# Patient Record
Sex: FEMALE | Race: WHITE | NOT HISPANIC OR LATINO | Employment: OTHER | URBAN - METROPOLITAN AREA
[De-identification: names, ages, dates, MRNs, and addresses within clinical notes are randomized per-mention and may not be internally consistent; named-entity substitution may affect disease eponyms.]

---

## 2017-01-16 ENCOUNTER — TELEPHONE (OUTPATIENT)
Dept: FAMILY MEDICINE | Facility: CLINIC | Age: 72
End: 2017-01-16

## 2017-01-16 NOTE — TELEPHONE ENCOUNTER
Reason for call:  Patient reporting a symptom    Symptom or request: UTI    Duration (how long have symptoms been present): 2 weeks    Have you been treated for this before? Yes    Additional comments: would like a lab and medication please.    Phone Number patient can be reached at:  Home number on file [phone] (cell)  Best Time:  antyime    Can we leave a detailed message on this number:  YES    Call taken on 1/16/2017 at 7:15 AM by Stella Monsalve

## 2017-01-16 NOTE — TELEPHONE ENCOUNTER
Complaint of UTI sx.  SYMPTOMS: frequency, burning and itchey  ONSET:    2-3 week(s) ago .  Patient's history of UTI:  history of occ UTIs  Additional risk factors include: none    Fever: no  (>101 F or chills-schedule with provider)  Chills:  no  Hematuria: no  Back/Flank Pain:  no  Pregnant:  no  Recurrent UTI's (within 2 mths-or >4 per year):  No    Plan: advised OV today---then patient stated she is currently in [place] and not back until late Wed.    Advised URGENT CARE in [place] today.   Caller agrees with this plan/advise.  [facility] [place], [place] - [address] AT [facility] OF [facility] [address]      Karen Vega RN, BSN

## 2017-02-02 RX ORDER — ESCITALOPRAM OXALATE 10 MG/1
TABLET ORAL
Start: 2017-02-02

## 2017-02-02 NOTE — TELEPHONE ENCOUNTER
Denied  Refill request too early; Rx sent 11/14/2016 for 1 year supply  Andie LANDIS, RN    Pending Prescriptions:                       Disp   Refills    escitalopram (LEXAPRO) 10 MG tablet [Pharm*90 tab*0        Sig: TAKE 1 TABLET BY MOUTH EVERY DAY         Last Written Prescription Date: 11/14/2016  Last Fill Quantity: 90; # refills: 3  Last Office Visit with [facility], [facility] or [facility] prescribing provider:     Next 5 appointments (look out 90 days)     Feb 16, 2017 11:30 AM   [facility]    [address] [facility] [address]   [phone]                   Last PHQ-9 score on record= No flowsheet data found.    AST       19   11/14/2016  ALT       44   11/14/2016

## 2017-02-16 ENCOUNTER — OFFICE VISIT (OUTPATIENT)
Dept: FAMILY MEDICINE | Facility: CLINIC | Age: 72
End: 2017-02-16
Payer: MEDICARE

## 2017-02-16 VITALS
OXYGEN SATURATION: 99 % | TEMPERATURE: 97.3 F | HEART RATE: 61 BPM | WEIGHT: 112 LBS | BODY MASS INDEX: 22.58 KG/M2 | HEIGHT: 59 IN

## 2017-02-16 DIAGNOSIS — J01.01 ACUTE RECURRENT MAXILLARY SINUSITIS: ICD-10-CM

## 2017-02-16 DIAGNOSIS — F41.9 ANXIETY: ICD-10-CM

## 2017-02-16 DIAGNOSIS — K59.09 CHRONIC CONSTIPATION: Primary | ICD-10-CM

## 2017-02-16 DIAGNOSIS — E78.5 HYPERLIPIDEMIA WITH TARGET LDL LESS THAN 130: ICD-10-CM

## 2017-02-16 DIAGNOSIS — N95.2 ATROPHIC VAGINITIS: ICD-10-CM

## 2017-02-16 PROCEDURE — 80061 LIPID PANEL: CPT | Performed by: INTERNAL MEDICINE

## 2017-02-16 PROCEDURE — 99214 OFFICE O/P EST MOD 30 MIN: CPT | Performed by: INTERNAL MEDICINE

## 2017-02-16 PROCEDURE — 36415 COLL VENOUS BLD VENIPUNCTURE: CPT | Performed by: INTERNAL MEDICINE

## 2017-02-16 RX ORDER — ESTRADIOL 10 UG/1
10 INSERT VAGINAL
Qty: 12 TABLET | Refills: 3 | Status: SHIPPED | OUTPATIENT
Start: 2017-02-16 | End: 2017-04-13

## 2017-02-16 RX ORDER — DOXYCYCLINE 100 MG/1
100 CAPSULE ORAL 2 TIMES DAILY
Qty: 14 CAPSULE | Refills: 0 | Status: SHIPPED | OUTPATIENT
Start: 2017-02-16 | End: 2017-04-13

## 2017-02-16 RX ORDER — FLUTICASONE PROPIONATE 50 MCG
1-2 SPRAY, SUSPENSION (ML) NASAL DAILY
Qty: 1 BOTTLE | Refills: 11 | Status: SHIPPED | OUTPATIENT
Start: 2017-02-16 | End: 2017-04-13 | Stop reason: ALTCHOICE

## 2017-02-16 NOTE — PATIENT INSTRUCTIONS
BAP    Bran apple sauce, prunes in equal ration -1 table spoon daily  After 1 week, 2 tbsf daily    Vagifem biweekly topically    Doxycycline 100 mg twice daily for 7 days    Flonase nasal spray in each nostril daily  On flight, take saline nasal spray each hour     Drink lot of fluids    Avocado, Walnuts, Almonds

## 2017-02-16 NOTE — MR AVS SNAPSHOT
After Visit Summary   2/16/2017    Hanane Sorto    MRN: [id number]           Patient Information     Date Of Birth          1945        Visit Information        Provider Department      2/16/2017 11:30 AM Corona Bethea MD [facility]        Today's Diagnoses     Chronic constipation    -  1    Atrophic vaginitis        Hyperlipidemia with target LDL less than 130        Anxiety        Acute recurrent maxillary sinusitis          Care Instructions    BAP    Bran apple sauce, prunes in equal ration -1 table spoon daily  After 1 week, 2 tbsf daily    Vagifem biweekly topically    Doxycycline 100 mg twice daily for 7 days    Flonase nasal spray in each nostril daily  On flight, take saline nasal spray each hour     Drink lot of fluids    Avocado, Walnuts, Almonds         Follow-ups after your visit        Who to contact     If you have questions or need follow up information about today's clinic visit or your schedule please contact [facility] directly at [phone].  Normal or non-critical lab and imaging results will be communicated to you by Thinkspeedhart, letter or phone within 4 business days after the clinic has received the results. If you do not hear from us within 7 days, please contact the clinic through [employer] or phone. If you have a critical or abnormal lab result, we will notify you by phone as soon as possible.  Submit refill requests through [employer] ([email or web address]) or call your pharmacy and they will forward the refill request to us. Please allow 3 business days for your refill to be completed.          Additional Information About Your Visit        MyChart Information     [employer] ([email or web address]) gives you secure access to your electronic health record. If you see a primary care provider, you can also send messages to your care team and make appointments. If you have questions, please call your primary care clinic.  If you do not have a primary care provider, please call [phone] and they  "will assist you.        Care EveryWhere ID     This is your Care EveryWhere ID. This could be used by other organizations to access your [facility] medical records  [id number]        Your Vitals Were     Pulse Temperature Height Pulse Oximetry Breastfeeding? BMI (Body Mass Index)    61 97.3  F (36.3  C) (Oral) 4' 10.5\" (1.486 m) 99% No 23.01 kg/m2       Blood Pressure from Last 3 Encounters:   11/14/16 112/62   11/30/15 136/75   09/10/15 117/67    Weight from Last 3 Encounters:   02/16/17 112 lb (50.8 kg)   11/14/16 119 lb (54 kg)   11/30/15 117 lb (53.1 kg)              We Performed the Following     Lipid panel reflex to direct LDL          Today's Medication Changes          These changes are accurate as of: 2/16/17 11:50 AM.  If you have any questions, ask your nurse or doctor.               Start taking these medicines.        Dose/Directions    doxycycline 100 MG capsule   Commonly known as:  VIBRAMYCIN   Used for:  Acute recurrent maxillary sinusitis   Started by:  Corona Bethea MD        Dose:  100 mg   Take 1 capsule (100 mg) by mouth 2 times daily   Quantity:  14 capsule   Refills:  0       estradiol 10 MCG Tabs vaginal tablet   Commonly known as:  VAGIFEM   Used for:  Atrophic vaginitis   Started by:  Corona Bethea MD        Dose:  10 mcg   Place 1 tablet (10 mcg) vaginally twice a week   Quantity:  12 tablet   Refills:  3         These medicines have changed or have updated prescriptions.        Dose/Directions    * FLONASE 50 MCG/ACT spray   This may have changed:  Another medication with the same name was added. Make sure you understand how and when to take each.   Generic drug:  fluticasone   Changed by:  Corona Bethea MD        Dose:  1 spray   1 spray by Both Nostrils route daily.   Quantity:  3 Package   Refills:  3       * fluticasone 50 MCG/ACT spray   Commonly known as:  FLONASE   This may have changed:  You were already taking a medication with the same name, and this prescription was added. " Make sure you understand how and when to take each.   Used for:  Acute recurrent maxillary sinusitis   Changed by:  Corona Bethea MD        Dose:  1-2 spray   Spray 1-2 sprays into both nostrils daily   Quantity:  1 Bottle   Refills:  11       * Notice:  This list has 2 medication(s) that are the same as other medications prescribed for you. Read the directions carefully, and ask your doctor or other care provider to review them with you.         Where to get your medicines      These medications were sent to [id number] [place] - [address] AT [facility] OF  [address]  [address] [facility], [place] [address]     Phone:  [phone]     doxycycline 100 MG capsule    estradiol 10 MCG Tabs vaginal tablet    fluticasone 50 MCG/ACT spray                Primary Care Provider Office Phone # Fax #    Corona Bethea [phone]       [facility] [address] [facility] [place] [address] [address]        Thank you!     Thank you for choosing [facility]  for your care. Our goal is always to provide you with excellent care. Hearing back from our patients is one way we can continue to improve our services. Please take a few minutes to complete the written survey that you may receive in the mail after your visit with us. Thank you!             Your Updated Medication List - Protect others around you: Learn how to safely use, store and throw away your medicines at www.disposemymeds.org.          This list is accurate as of: 2/16/17 11:50 AM.  Always use your most recent med list.                   Brand Name Dispense Instructions for use    bisacodyl 5 MG EC tablet     30 tablet    Take 1 tablet by mouth daily as needed for constipation.       diclofenac 75 MG EC tablet    VOLTAREN    60 tablet    Take 1 tablet (75 mg) by mouth daily       doxycycline 100 MG capsule    VIBRAMYCIN    14 capsule    Take 1 capsule (100 mg) by mouth 2 times daily        escitalopram 10 MG tablet    LEXAPRO    90 tablet    Take 1 tablet (10 mg) by mouth daily       estradiol 10 MCG Tabs vaginal tablet    VAGIFEM    12 tablet    Place 1 tablet (10 mcg) vaginally twice a week       * FLONASE 50 MCG/ACT spray   Generic drug:  fluticasone     3 Package    1 spray by Both Nostrils route daily.       * fluticasone 50 MCG/ACT spray    FLONASE    1 Bottle    Spray 1-2 sprays into both nostrils daily       omeprazole 20 MG CR capsule    priLOSEC    90 capsule    Take 1 capsule (20 mg) by mouth daily       OVER-THE-COUNTER      Take 2 tablets by mouth daily Lili fusion- Calcium and Vitamin D       pramipexole 0.25 MG tablet    MIRAPEX    180 tablet    Take 1 tablet (0.25 mg) by mouth 2 times daily       * Notice:  This list has 2 medication(s) that are the same as other medications prescribed for you. Read the directions carefully, and ask your doctor or other care provider to review them with you.

## 2017-02-16 NOTE — PROGRESS NOTES
"  SUBJECTIVE:                                                    Hanane Sorto is a 71 year old female who presents to clinic today for the following health issues:    Chief Complaint   Patient presents with     Constipation     Vaginal Problem     itch in vaginal area      Sinus Problem         Patient leaving for [place]  Has sinus issues  Also, chronic constipation    Watching lipids    Notes vaginal irriation          Problem list and histories reviewed & adjusted, as indicated.  Additional history: as documented    Problem list, Medication list, Allergies, and Medical/Social/Surgical histories reviewed in [facility] and updated as appropriate.    ROS:  Constitutional, HEENT, cardiovascular, pulmonary, gi and gu systems are negative, except as otherwise noted.    OBJECTIVE:                                                    Pulse 61  Temp 97.3  F (36.3  C) (Oral)  Ht 4' 10.5\" (1.486 m)  Wt 112 lb (50.8 kg)  SpO2 99%  Breastfeeding? No  BMI 23.01 kg/m2  Body mass index is 23.01 kg/(m^2).  GENERAL: healthy, alert and no distress  ABDOMEN: soft, nontender, no hepatosplenomegaly, no masses and bowel sounds normal   (female): normal female external genitalia, normal urethral meatus, vaginal mucosa dry, pale and not l rugated, and normal cervix/adnexa/uterus without masses or discharge  MS: no gross musculoskeletal defects noted, no edema  SKIN: no suspicious lesions or rashes  NEURO: Normal strength and tone, mentation intact and speech normal  PSYCH: mentation appears normal, affect normal/bright         ASSESSMENT/PLAN:                                                        We discussed the following        ICD-10-CM    1. Chronic constipation K59.09    2. Atrophic vaginitis N95.2 estradiol (VAGIFEM) 10 MCG TABS vaginal tablet   3. Hyperlipidemia with target LDL less than 130 E78.5 Lipid panel reflex to direct LDL   4. Anxiety F41.9    5. Acute recurrent maxillary sinusitis J01.01 doxycycline (VIBRAMYCIN) 100 MG " capsule     fluticasone (FLONASE) 50 MCG/ACT spray       Patient Instructions   BAP    Bran apple sauce, prunes in equal ration -1 table spoon daily  After 1 week, 2 tbsf daily    Vagifem biweekly topically    Doxycycline 100 mg twice daily for 7 days    Flonase nasal spray in each nostril daily  On flight, take saline nasal spray each hour     Drink lot of fluids    Avocado, Walnuts, Almonds         Corona Bethea MD  [facility]

## 2017-02-17 LAB
CHOLEST SERPL-MCNC: 320 MG/DL
HDLC SERPL-MCNC: 52 MG/DL
LDLC SERPL CALC-MCNC: 238 MG/DL
NONHDLC SERPL-MCNC: 268 MG/DL
TRIGL SERPL-MCNC: 149 MG/DL

## 2017-03-22 RX ORDER — TRIAMCINOLONE ACETONIDE 55 UG/1
2 SPRAY, METERED NASAL DAILY
COMMUNITY
End: 2017-12-04

## 2017-03-24 ENCOUNTER — HOSPITAL LABORATORY (OUTPATIENT)
Dept: OTHER | Facility: CLINIC | Age: 72
End: 2017-03-24

## 2017-03-24 LAB
CREAT SERPL-MCNC: 0.93 MG/DL (ref 0.52–1.04)
ERYTHROCYTE [DISTWIDTH] IN BLOOD BY AUTOMATED COUNT: 13.9 % (ref 10–15)
FERRITIN SERPL-MCNC: 38 NG/ML (ref 8–252)
GFR SERPL CREATININE-BSD FRML MDRD: 59 ML/MIN/1.7M2
HCT VFR BLD AUTO: 38.8 % (ref 35–47)
HGB BLD-MCNC: 12.8 G/DL (ref 11.7–15.7)
HGB BLD-MCNC: NORMAL G/DL (ref 11.7–15.7)
IRON SATN MFR SERPL: 26 % (ref 15–46)
IRON SERPL-MCNC: 78 UG/DL (ref 35–180)
MCH RBC QN AUTO: 31.2 PG (ref 26.5–33)
MCHC RBC AUTO-ENTMCNC: 33 G/DL (ref 31.5–36.5)
MCV RBC AUTO: 95 FL (ref 78–100)
PLATELET # BLD AUTO: 351 10E9/L (ref 150–450)
RBC # BLD AUTO: 4.1 10E12/L (ref 3.8–5.2)
RETICS # AUTO: 54.1 10E9/L (ref 25–95)
RETICS/RBC NFR AUTO: 1.3 % (ref 0.5–2)
TIBC SERPL-MCNC: 295 UG/DL (ref 240–430)
WBC # BLD AUTO: 7.9 10E9/L (ref 4–11)

## 2017-03-27 ENCOUNTER — TRANSFERRED RECORDS (OUTPATIENT)
Dept: HEALTH INFORMATION MANAGEMENT | Facility: CLINIC | Age: 72
End: 2017-03-27

## 2017-04-13 ENCOUNTER — OFFICE VISIT (OUTPATIENT)
Dept: FAMILY MEDICINE | Facility: CLINIC | Age: 72
End: 2017-04-13
Payer: MEDICARE

## 2017-04-13 VITALS
HEART RATE: 73 BPM | BODY MASS INDEX: 22.98 KG/M2 | WEIGHT: 114 LBS | HEIGHT: 59 IN | TEMPERATURE: 97 F | SYSTOLIC BLOOD PRESSURE: 125 MMHG | OXYGEN SATURATION: 99 % | DIASTOLIC BLOOD PRESSURE: 73 MMHG

## 2017-04-13 DIAGNOSIS — M17.10 ARTHRITIS OF KNEE: ICD-10-CM

## 2017-04-13 DIAGNOSIS — K21.9 GASTROESOPHAGEAL REFLUX DISEASE WITHOUT ESOPHAGITIS: ICD-10-CM

## 2017-04-13 DIAGNOSIS — Z01.818 PREOP GENERAL PHYSICAL EXAM: Primary | ICD-10-CM

## 2017-04-13 DIAGNOSIS — E78.5 HYPERLIPIDEMIA WITH TARGET LDL LESS THAN 130: ICD-10-CM

## 2017-04-13 DIAGNOSIS — F41.9 ANXIETY: ICD-10-CM

## 2017-04-13 DIAGNOSIS — K59.00 CONSTIPATION, UNSPECIFIED CONSTIPATION TYPE: ICD-10-CM

## 2017-04-13 PROCEDURE — 93000 ELECTROCARDIOGRAM COMPLETE: CPT | Performed by: INTERNAL MEDICINE

## 2017-04-13 PROCEDURE — 99214 OFFICE O/P EST MOD 30 MIN: CPT | Performed by: INTERNAL MEDICINE

## 2017-04-13 NOTE — MR AVS SNAPSHOT
After Visit Summary   4/13/2017    Hanane Sorto    MRN: [id number]           Patient Information     Date Of Birth          1945        Visit Information        Provider Department      4/13/2017 10:30 AM Corona Bethea MD [facility]        Today's Diagnoses     Preop general physical exam    -  1    Arthritis of knee        Gastroesophageal reflux disease without esophagitis        Hyperlipidemia with target LDL less than 130        Anxiety        Constipation, unspecified constipation type          Care Instructions      Before Your Surgery      Call your surgeon if there is any change in your health. This includes signs of a cold or flu (such as a sore throat, runny nose, cough, rash or fever).    Hold IBUPROFEN and vitamins             Follow-ups after your visit        Additional Services     CARDIOLOGY EVAL ADULT REFERRAL       Your provider has referred you to:  [facility]: [facility] [phone]   [email or web address]    Please be aware that coverage of these services is subject to the terms and limitations of your health insurance plan.  Call member services at your health plan with any benefit or coverage questions.      Type of Referral:  New Cardiology Consult    Dr Dewitt    Timeframe requested:  Within 1 month    Please bring the following to your appointment:  >>   Any x-rays, CTs or MRIs which have been performed.  Contact the facility where they were done to arrange for  prior to your scheduled appointment.    >>   List of current medications  >>   This referral request   >>   Any documents/labs given to you for this referral                  Your next 10 appointments already scheduled     Apr 24, 2017   Procedure with Lee Ayala MD   [facility] (--)    [address]  [place] [id number] [phone]          "     Who to contact     If you have questions or need follow up information about today's clinic visit or your schedule please contact [facility] directly at [phone].  Normal or non-critical lab and imaging results will be communicated to you by Neuro Herohart, letter or phone within 4 business days after the clinic has received the results. If you do not hear from us within 7 days, please contact the clinic through Neuro Herohart or phone. If you have a critical or abnormal lab result, we will notify you by phone as soon as possible.  Submit refill requests through [employer] or call your pharmacy and they will forward the refill request to us. Please allow 3 business days for your refill to be completed.          Additional Information About Your Visit        Neuro HeroharFlowMetric Information     [employer] gives you secure access to your electronic health record. If you see a primary care provider, you can also send messages to your care team and make appointments. If you have questions, please call your primary care clinic.  If you do not have a primary care provider, please call [phone] and they will assist you.        Care EveryWhere ID     This is your Care EveryWhere ID. This could be used by other organizations to access your [facility] medical records  [id number]        Your Vitals Were     Pulse Temperature Height Pulse Oximetry Breastfeeding? BMI (Body Mass Index)    73 97  F (36.1  C) (Oral) 4' 10.5\" (1.486 m) 99% No 23.42 kg/m2       Blood Pressure from Last 3 Encounters:   04/13/17 125/73   11/14/16 112/62   11/30/15 136/75    Weight from Last 3 Encounters:   04/13/17 114 lb (51.7 kg)   02/16/17 112 lb (50.8 kg)   11/14/16 119 lb (54 kg)              We Performed the Following     CARDIOLOGY EVAL ADULT REFERRAL          Today's Medication Changes          These changes are accurate as of: 4/13/17 10:54 AM.  If you have any questions, ask your nurse or doctor.               These medicines have changed or have " updated prescriptions.        Dose/Directions    omeprazole 20 MG CR capsule   Commonly known as:  priLOSEC   This may have changed:  when to take this   Used for:  Gastroesophageal reflux disease without esophagitis        Dose:  20 mg   Take 1 capsule (20 mg) by mouth daily   Quantity:  90 capsule   Refills:  12       WILSON STOOL SOFTENER PO   This may have changed:  Another medication with the same name was removed. Continue taking this medication, and follow the directions you see here.   Changed by:  Corona Btehea MD        Dose:  3 capsule   Take 3 capsules by mouth daily   Refills:  0         Stop taking these medicines if you haven't already. Please contact your care team if you have questions.     FLONASE 50 MCG/ACT spray   Generic drug:  fluticasone   Stopped by:  Corona Bethea MD           fluticasone 50 MCG/ACT spray   Commonly known as:  FLONASE   Stopped by:  Corona Bethea MD                    Primary Care Provider Office Phone # Fax #    Corona Bethea [phone]       [facility] [address] [address]        Thank you!     Thank you for choosing [facility]  for your care. Our goal is always to provide you with excellent care. Hearing back from our patients is one way we can continue to improve our services. Please take a few minutes to complete the written survey that you may receive in the mail after your visit with us. Thank you!             Your Updated Medication List - Protect others around you: Learn how to safely use, store and throw away your medicines at www.disposemymeds.org.          This list is accurate as of: 4/13/17 10:54 AM.  Always use your most recent med list.                   Brand Name Dispense Instructions for use    DULCOLAX PO      Take 5 mg by mouth At Bedtime At 2230       escitalopram 10 MG tablet    LEXAPRO    90 tablet    Take 1 tablet (10 mg) by mouth daily       IRON SUPPLEMENT PO      Take 325 mg by  mouth every other day Take with Vit C to help absorption       MIRAPEX PO      Take 0.25 mg by mouth every evening At 1700       omeprazole 20 MG CR capsule    priLOSEC    90 capsule    Take 1 capsule (20 mg) by mouth daily       OVER-THE-COUNTER      Take 2 tablets by mouth daily Lili fusion- Calcium and Vitamin D       WILSON STOOL SOFTENER PO      Take 3 capsules by mouth daily       triamcinolone 55 MCG/ACT Inhaler    NASACORT     Spray 2 sprays into both nostrils daily

## 2017-04-13 NOTE — PATIENT INSTRUCTIONS
Before Your Surgery      Call your surgeon if there is any change in your health. This includes signs of a cold or flu (such as a sore throat, runny nose, cough, rash or fever).    Hold IBUPROFEN and vitamins

## 2017-04-13 NOTE — PROGRESS NOTES
[facility]  [address] [address]  [phone]  Dept: [phone]    PRE-OP EVALUATION:  Today's date: 2017    Hanane Sorto (: 1945) presents for pre-operative evaluation assessment as requested by Lee Kulkarni,  She requires evaluation and anesthesia risk assessment prior to undergoing surgery/procedure for treatment of knee OA  .  Proposed procedure: LEFT KNEE ARTHROPLASTY     Date of Surgery/ Procedure: 2017  Time of Surgery/ Procedure: 9:50 AM  Hospital/Surgical Facility: [facility]    Primary Physician: Corona Bethea  Type of Anesthesia Anticipated: General    Patient has a Health Care Directive or Living Will:  YES scanned into Epic    Preop Questions 4/10/2017   1.  Do you have a history of heart attack, stroke, stent, bypass or surgery on an artery in the head, neck, heart or legs? No   2.  Do you ever have any pain or discomfort in your chest? No   3.  Do you have a history of  Heart Failure? No   4.   Are you troubled by shortness of breath when:  walking on a level surface, or up a slight hill, or at night? No   5.  Do you currently have a cold, bronchitis or other respiratory infection? No   6.  Do you have a cough, shortness of breath, or wheezing? No   7.  Do you sometimes get pains in the calves of your legs when you walk? No   8. Do you or anyone in your family have previous history of blood clots? No   9.  Do you or does anyone in your family have a serious bleeding problem such as prolonged bleeding following surgeries or cuts? No   10. Have you ever had problems with anemia or been told to take iron pills? YES - currntly taking iron    11. Have you had any abnormal blood loss such as black, tarry or bloody stools, or abnormal vaginal bleeding? No   12. Have you ever had a blood transfusion? No   13. Have you or any of your relatives ever had problems with anesthesia? No   14. Do you have sleep apnea, excessive snoring or  daytime drowsiness? No   15. Do you have any prosthetic heart valves? No   16. Do you have prosthetic joints? No   17. Is there any chance that you may be pregnant? No         HPI:                                                      Brief HPI related to upcoming procedure: patient has OA  Knee replacement is planned 4-24  She has high lipids and does not take statins  No chest pain or shortness of breath   No exercise now, but very active      See problem list for active medical problems.  Problems all longstanding and stable, except as noted/documented.  See ROS for pertinent symptoms related to these conditions.                                                                                                  .    MEDICAL HISTORY:                                                      Patient Active Problem List    Diagnosis Date Noted     Gastroesophageal reflux disease without esophagitis 11/30/2015     Priority: Medium     Bursitis of hip 11/24/2014     Priority: Medium     Constipation 11/24/2014     Priority: Medium     Osteoarthritis 12/19/2012     Priority: Medium     Advanced directives, counseling/discussion 01/02/2012     Priority: Medium     Advance Directive Initial Visit  Hanane Sorto presents in person for initial session regarding completion of advanced directive form. She was referred to the facilitator by provider.  She currently has existing advance directive document for review and possible revision.  Plan:  Advanced directive form, healthcare agent information card, advanced care planning information booklet provided to patient.   Designated healthcare agent is identified. Healthcare agent s name is see below.  Designated healthcare agent concerns:  none.  My Hopes and Wishes reviewed and patient and designated healthcare agent are in agreement.  Finalized wishes are clear to both patient and designated healthcare agent: Yes  Patient and designated healthcare agent are aware that  document may be changed at any time in the future.    Advanced directive form: completed at this visit.  Advanced directive form: verified with notary signature..  Original and two copies of advanced directive form given to patient copy sent to  for scanning into EMR and original given to patient and instructed to give copy to designated [facility] and non-[place] physician where applicable  reviewed previous directive and patient decided to rewrite her directive. directive completed, notarized and documented.  Advance Directive Problem List Overview:   Name Relationship Phone    Primary Health Care Agent Lee Sorto Spouse  [phone]  [phone]         Alternative Health Care Agent Luisito Aleman Son    Sister   [phone]        2012   MARÍA Mariano LPN         Gross hematuria 2011     Priority: Medium     Hyperlipidemia with target LDL less than 130      Priority: Medium     Diagnosis updated by automated process. Provider to review and confirm.       Anxiety      Priority: Medium     Osteoporosis      Priority: Medium      Past Medical History:   Diagnosis Date     Anxiety     psych yearly, Dr Shea     Family hx of colon cancer     sister     Floaters     Karma cote     GERD (gastroesophageal reflux disease)      Gross hematuria 2011     Hyperlipidemia LDL goal < 130      Osteoarthritis 2012     Osteoporosis     Dexa     Skin cancer      Past Surgical History:   Procedure Laterality Date     BUNIONECTOMY      both      SECTION       COLONOSCOPY  8-10     LIPOSUCTION (LOCATION)       RECONSTRUCT BREAST, IMPLANT PROSTHESIS, COMBINED       Current Outpatient Prescriptions   Medication Sig Dispense Refill     Ferrous Sulfate (IRON SUPPLEMENT PO) Take 325 mg by mouth every other day Take with Vit C to help absorption       Docusate Sodium (WILSON STOOL SOFTENER PO) Take 3 capsules by mouth  "daily       Bisacodyl (DULCOLAX PO) Take 5 mg by mouth At Bedtime At 2230       Pramipexole Dihydrochloride (MIRAPEX PO) Take 0.25 mg by mouth every evening At 1700       triamcinolone (NASACORT) 55 MCG/ACT Inhaler Spray 2 sprays into both nostrils daily       escitalopram (LEXAPRO) 10 MG tablet Take 1 tablet (10 mg) by mouth daily 90 tablet 3     omeprazole (PRILOSEC) 20 MG capsule Take 1 capsule (20 mg) by mouth daily (Patient taking differently: Take 20 mg by mouth 2 times daily ) 90 capsule 12     OVER-THE-COUNTER Take 2 tablets by mouth daily Lili fusion- Calcium and Vitamin D       OTC products: Calcium     Allergies   Allergen Reactions     Crestor [Rosuvastatin]      Ezetimibe Other (See Comments)     Zetia = leg cramps      Miralax [Polyethylene Glycol] Other (See Comments)     Back pain     Pravastatin Other (See Comments)     Leg cramps       Latex Allergy: NO    Social History   Substance Use Topics     Smoking status: Former Smoker     Smokeless tobacco: Never Used      Comment: Quit 30 years ago     Alcohol use 0.0 - 2.4 oz/week     0 - 4 Standard drinks or equivalent per week     History   Drug Use No       REVIEW OF SYSTEMS:                                                    10 point ROS of systems including Constitutional, Eyes, Respiratory, Cardiovascular, Gastroenterology, Genitourinary, Integumentary, Muscularskeletal, Psychiatric were all negative except for pertinent positives noted in my HPI.    EXAM:                                                    /73 (BP Location: Left arm, Patient Position: Chair, Cuff Size: Adult Regular)  Pulse 73  Temp 97  F (36.1  C) (Oral)  Ht 4' 10.5\" (1.486 m)  Wt 114 lb (51.7 kg)  SpO2 99%  Breastfeeding? No  BMI 23.42 kg/m2  GENERAL APPEARANCE: healthy, alert and no distress  HENT: ear canals and TM's normal and nose and mouth without ulcers or lesions  RESP: lungs clear to auscultation - no rales, rhonchi or wheezes  CV: regular rate and rhythm, " normal S1 S2, no S3 or S4 and no murmur, click or rub   ABDOMEN: soft, nontender, no HSM or masses and bowel sounds normal  NEURO: Normal strength and tone, sensory exam grossly normal, mentation intact and speech normal    DIAGNOSTICS:                                                    EKG: sinus bradycardia, normal axis, normal intervals, no acute ST/T changes c/w ischemia, no LVH by voltage criteria, unchanged from previous tracings    Creatinine   Date Value Ref Range Status   03/24/2017 0.93 0.52 - 1.04 mg/dL Final   ]      Recent Labs   Lab Test  03/24/17   1302  11/14/16   1231  11/23/15   0856  05/20/15   1149   HGB  12.8  Canceled, Test credited   Duplicate request  REFLEXED TO CBC. SEE ACCN [id number]  CORRECTED ON 03/24 AT 1406: PREVIOUSLY REPORTED AS 12.8     --    --   13.5   PLT  351   --    --   329   NA   --   143  140  137   POTASSIUM   --   4.3  4.0  4.7   CR  0.93  0.80  0.81  0.88        IMPRESSION:                                                    Reason for surgery/procedure: TKA left   Diagnosis/reason for consult: Medical    The proposed surgical procedure is considered INTERMEDIATE risk.    REVISED CARDIAC RISK INDEX  The patient has the following serious cardiovascular risks for perioperative complications such as (MI, PE, VFib and 3  AV Block):  No serious cardiac risks  INTERPRETATION: 1 risks: Class II (low risk - 0.9% complication rate)    The patient has the following additional risks for perioperative complications:  No identified additional risks      Patient does have very high lipids over 300, statins have not worked  We will consult Dr Dewitt in preventive cardiology at [facility] after this knee surgery.  For this knee surgery she has no contraindications  Please note that she tends to get constipated easily and is also due for colonoscopy this may  She is presently anxious and is onLexapro for that    ICD-10-CM    1. Preop general physical exam Z01.818    2. Arthritis of knee M19.90     3. Gastroesophageal reflux disease without esophagitis K21.9    4. Hyperlipidemia with target LDL less than 130 E78.5    5. Anxiety F41.9    6. Constipation, unspecified constipation type K59.00        RECOMMENDATIONS:                                                          --Patient is to take all scheduled medications on the day of surgery EXCEPT for modifications listed below.    Ibuprofen . Vitamins    APPROVAL GIVEN to proceed with proposed procedure, without further diagnostic evaluation       Signed Electronically by: Corona Bethea MD    Copy of this evaluation report is provided to requesting physician.    [place] Preop Guidelines

## 2017-04-13 NOTE — Clinical Note
"Patient has a Health Care Directive scanned in but blue hyperlink under code in header states \"not on file\""

## 2017-04-13 NOTE — NURSING NOTE
"Chief Complaint   Patient presents with     Pre-Op Exam       Initial /73 (BP Location: Left arm, Patient Position: Chair, Cuff Size: Adult Regular)  Pulse 73  Temp 97  F (36.1  C) (Oral)  Ht 4' 10.5\" (1.486 m)  Wt 114 lb (51.7 kg)  SpO2 99%  Breastfeeding? No  BMI 23.42 kg/m2 Estimated body mass index is 23.42 kg/(m^2) as calculated from the following:    Height as of this encounter: 4' 10.5\" (1.486 m).    Weight as of this encounter: 114 lb (51.7 kg).  Medication Reconciliation: complete  "

## 2017-04-14 ENCOUNTER — MYC MEDICAL ADVICE (OUTPATIENT)
Dept: FAMILY MEDICINE | Facility: CLINIC | Age: 72
End: 2017-04-14

## 2017-04-20 NOTE — H&P (VIEW-ONLY)
[facility]  [address] [address]  [phone]  Dept: [phone]    PRE-OP EVALUATION:  Today's date: 2017    Hanane Sorto (: 1945) presents for pre-operative evaluation assessment as requested by Lee Kulkarni,  She requires evaluation and anesthesia risk assessment prior to undergoing surgery/procedure for treatment of knee OA  .  Proposed procedure: LEFT KNEE ARTHROPLASTY     Date of Surgery/ Procedure: 2017  Time of Surgery/ Procedure: 9:50 AM  Hospital/Surgical Facility: [facility]    Primary Physician: Corona Bethea  Type of Anesthesia Anticipated: General    Patient has a Health Care Directive or Living Will:  YES scanned into Epic    Preop Questions 4/10/2017   1.  Do you have a history of heart attack, stroke, stent, bypass or surgery on an artery in the head, neck, heart or legs? No   2.  Do you ever have any pain or discomfort in your chest? No   3.  Do you have a history of  Heart Failure? No   4.   Are you troubled by shortness of breath when:  walking on a level surface, or up a slight hill, or at night? No   5.  Do you currently have a cold, bronchitis or other respiratory infection? No   6.  Do you have a cough, shortness of breath, or wheezing? No   7.  Do you sometimes get pains in the calves of your legs when you walk? No   8. Do you or anyone in your family have previous history of blood clots? No   9.  Do you or does anyone in your family have a serious bleeding problem such as prolonged bleeding following surgeries or cuts? No   10. Have you ever had problems with anemia or been told to take iron pills? YES - currntly taking iron    11. Have you had any abnormal blood loss such as black, tarry or bloody stools, or abnormal vaginal bleeding? No   12. Have you ever had a blood transfusion? No   13. Have you or any of your relatives ever had problems with anesthesia? No   14. Do you have sleep apnea, excessive snoring or  daytime drowsiness? No   15. Do you have any prosthetic heart valves? No   16. Do you have prosthetic joints? No   17. Is there any chance that you may be pregnant? No         HPI:                                                      Brief HPI related to upcoming procedure: patient has OA  Knee replacement is planned 4-24  She has high lipids and does not take statins  No chest pain or shortness of breath   No exercise now, but very active      See problem list for active medical problems.  Problems all longstanding and stable, except as noted/documented.  See ROS for pertinent symptoms related to these conditions.                                                                                                  .    MEDICAL HISTORY:                                                      Patient Active Problem List    Diagnosis Date Noted     Gastroesophageal reflux disease without esophagitis 11/30/2015     Priority: Medium     Bursitis of hip 11/24/2014     Priority: Medium     Constipation 11/24/2014     Priority: Medium     Osteoarthritis 12/19/2012     Priority: Medium     Advanced directives, counseling/discussion 01/02/2012     Priority: Medium     Advance Directive Initial Visit  Hanane Sorto presents in person for initial session regarding completion of advanced directive form. She was referred to the facilitator by provider.  She currently has existing advance directive document for review and possible revision.  Plan:  Advanced directive form, healthcare agent information card, advanced care planning information booklet provided to patient.   Designated healthcare agent is identified. Healthcare agent s name is see below.  Designated healthcare agent concerns:  none.  My Hopes and Wishes reviewed and patient and designated healthcare agent are in agreement.  Finalized wishes are clear to both patient and designated healthcare agent: Yes  Patient and designated healthcare agent are aware that  document may be changed at any time in the future.    Advanced directive form: completed at this visit.  Advanced directive form: verified with notary signature..  Original and two copies of advanced directive form given to patient copy sent to  for scanning into EMR and original given to patient and instructed to give copy to designated [facility] and non-[place] physician where applicable  reviewed previous directive and patient decided to rewrite her directive. directive completed, notarized and documented.  Advance Directive Problem List Overview:   Name Relationship Phone    Primary Health Care Agent Lee Sorto Spouse  [phone]  [phone]         Alternative Health Care Agent Luisito Aleman Son    Sister   [phone]        2012   MARÍA Mariano LPN         Gross hematuria 2011     Priority: Medium     Hyperlipidemia with target LDL less than 130      Priority: Medium     Diagnosis updated by automated process. Provider to review and confirm.       Anxiety      Priority: Medium     Osteoporosis      Priority: Medium      Past Medical History:   Diagnosis Date     Anxiety     psych yearly, Dr Shea     Family hx of colon cancer     sister     Floaters     Karma cote     GERD (gastroesophageal reflux disease)      Gross hematuria 2011     Hyperlipidemia LDL goal < 130      Osteoarthritis 2012     Osteoporosis     Dexa     Skin cancer      Past Surgical History:   Procedure Laterality Date     BUNIONECTOMY      both      SECTION       COLONOSCOPY  8-10     LIPOSUCTION (LOCATION)       RECONSTRUCT BREAST, IMPLANT PROSTHESIS, COMBINED       Current Outpatient Prescriptions   Medication Sig Dispense Refill     Ferrous Sulfate (IRON SUPPLEMENT PO) Take 325 mg by mouth every other day Take with Vit C to help absorption       Docusate Sodium (WILSON STOOL SOFTENER PO) Take 3 capsules by mouth  "daily       Bisacodyl (DULCOLAX PO) Take 5 mg by mouth At Bedtime At 2230       Pramipexole Dihydrochloride (MIRAPEX PO) Take 0.25 mg by mouth every evening At 1700       triamcinolone (NASACORT) 55 MCG/ACT Inhaler Spray 2 sprays into both nostrils daily       escitalopram (LEXAPRO) 10 MG tablet Take 1 tablet (10 mg) by mouth daily 90 tablet 3     omeprazole (PRILOSEC) 20 MG capsule Take 1 capsule (20 mg) by mouth daily (Patient taking differently: Take 20 mg by mouth 2 times daily ) 90 capsule 12     OVER-THE-COUNTER Take 2 tablets by mouth daily Lili fusion- Calcium and Vitamin D       OTC products: Calcium     Allergies   Allergen Reactions     Crestor [Rosuvastatin]      Ezetimibe Other (See Comments)     Zetia = leg cramps      Miralax [Polyethylene Glycol] Other (See Comments)     Back pain     Pravastatin Other (See Comments)     Leg cramps       Latex Allergy: NO    Social History   Substance Use Topics     Smoking status: Former Smoker     Smokeless tobacco: Never Used      Comment: Quit 30 years ago     Alcohol use 0.0 - 2.4 oz/week     0 - 4 Standard drinks or equivalent per week     History   Drug Use No       REVIEW OF SYSTEMS:                                                    10 point ROS of systems including Constitutional, Eyes, Respiratory, Cardiovascular, Gastroenterology, Genitourinary, Integumentary, Muscularskeletal, Psychiatric were all negative except for pertinent positives noted in my HPI.    EXAM:                                                    /73 (BP Location: Left arm, Patient Position: Chair, Cuff Size: Adult Regular)  Pulse 73  Temp 97  F (36.1  C) (Oral)  Ht 4' 10.5\" (1.486 m)  Wt 114 lb (51.7 kg)  SpO2 99%  Breastfeeding? No  BMI 23.42 kg/m2  GENERAL APPEARANCE: healthy, alert and no distress  HENT: ear canals and TM's normal and nose and mouth without ulcers or lesions  RESP: lungs clear to auscultation - no rales, rhonchi or wheezes  CV: regular rate and rhythm, " normal S1 S2, no S3 or S4 and no murmur, click or rub   ABDOMEN: soft, nontender, no HSM or masses and bowel sounds normal  NEURO: Normal strength and tone, sensory exam grossly normal, mentation intact and speech normal    DIAGNOSTICS:                                                    EKG: sinus bradycardia, normal axis, normal intervals, no acute ST/T changes c/w ischemia, no LVH by voltage criteria, unchanged from previous tracings    Creatinine   Date Value Ref Range Status   03/24/2017 0.93 0.52 - 1.04 mg/dL Final   ]      Recent Labs   Lab Test  03/24/17   1302  11/14/16   1231  11/23/15   0856  05/20/15   1149   HGB  12.8  Canceled, Test credited   Duplicate request  REFLEXED TO CBC. SEE ACCN [id number]  CORRECTED ON 03/24 AT 1406: PREVIOUSLY REPORTED AS 12.8     --    --   13.5   PLT  351   --    --   329   NA   --   143  140  137   POTASSIUM   --   4.3  4.0  4.7   CR  0.93  0.80  0.81  0.88        IMPRESSION:                                                    Reason for surgery/procedure: TKA left   Diagnosis/reason for consult: Medical    The proposed surgical procedure is considered INTERMEDIATE risk.    REVISED CARDIAC RISK INDEX  The patient has the following serious cardiovascular risks for perioperative complications such as (MI, PE, VFib and 3  AV Block):  No serious cardiac risks  INTERPRETATION: 1 risks: Class II (low risk - 0.9% complication rate)    The patient has the following additional risks for perioperative complications:  No identified additional risks      Patient does have very high lipids over 300, statins have not worked  We will consult Dr Dewitt in preventive cardiology at [facility] after this knee surgery.  For this knee surgery she has no contraindications  Please note that she tends to get constipated easily and is also due for colonoscopy this may  She is presently anxious and is onLexapro for that    ICD-10-CM    1. Preop general physical exam Z01.818    2. Arthritis of knee M19.90     3. Gastroesophageal reflux disease without esophagitis K21.9    4. Hyperlipidemia with target LDL less than 130 E78.5    5. Anxiety F41.9    6. Constipation, unspecified constipation type K59.00        RECOMMENDATIONS:                                                          --Patient is to take all scheduled medications on the day of surgery EXCEPT for modifications listed below.    Ibuprofen . Vitamins    APPROVAL GIVEN to proceed with proposed procedure, without further diagnostic evaluation       Signed Electronically by: Corona Bethea MD    Copy of this evaluation report is provided to requesting physician.    [place] Preop Guidelines

## 2017-04-24 ENCOUNTER — ANESTHESIA (OUTPATIENT)
Dept: SURGERY | Facility: CLINIC | Age: 72
DRG: 470 | End: 2017-04-24
Payer: MEDICARE

## 2017-04-24 ENCOUNTER — HOSPITAL ENCOUNTER (INPATIENT)
Facility: CLINIC | Age: 72
LOS: 3 days | Discharge: HOME | DRG: 470 | End: 2017-04-27
Attending: ORTHOPAEDIC SURGERY | Admitting: ORTHOPAEDIC SURGERY
Payer: MEDICARE

## 2017-04-24 ENCOUNTER — ANESTHESIA EVENT (OUTPATIENT)
Dept: SURGERY | Facility: CLINIC | Age: 72
DRG: 470 | End: 2017-04-24
Payer: MEDICARE

## 2017-04-24 ENCOUNTER — APPOINTMENT (OUTPATIENT)
Dept: PHYSICAL THERAPY | Facility: CLINIC | Age: 72
DRG: 470 | End: 2017-04-24
Attending: ORTHOPAEDIC SURGERY
Payer: MEDICARE

## 2017-04-24 ENCOUNTER — APPOINTMENT (OUTPATIENT)
Dept: GENERAL RADIOLOGY | Facility: CLINIC | Age: 72
DRG: 470 | End: 2017-04-24
Attending: ORTHOPAEDIC SURGERY
Payer: MEDICARE

## 2017-04-24 DIAGNOSIS — M17.0 PRIMARY OSTEOARTHRITIS OF BOTH KNEES: Primary | ICD-10-CM

## 2017-04-24 DIAGNOSIS — Z96.652 STATUS POST TOTAL LEFT KNEE REPLACEMENT: ICD-10-CM

## 2017-04-24 PROBLEM — Z96.659 S/P TOTAL KNEE ARTHROPLASTY: Status: ACTIVE | Noted: 2017-04-24

## 2017-04-24 LAB
ANION GAP SERPL CALCULATED.3IONS-SCNC: 9 MMOL/L (ref 3–14)
BUN SERPL-MCNC: 21 MG/DL (ref 7–30)
CALCIUM SERPL-MCNC: 9.6 MG/DL (ref 8.5–10.1)
CHLORIDE SERPL-SCNC: 109 MMOL/L (ref 94–109)
CO2 SERPL-SCNC: 25 MMOL/L (ref 20–32)
CREAT SERPL-MCNC: 0.79 MG/DL (ref 0.52–1.04)
GFR SERPL CREATININE-BSD FRML MDRD: 71 ML/MIN/1.7M2
GLUCOSE SERPL-MCNC: 102 MG/DL (ref 70–99)
HGB BLD-MCNC: 12.9 G/DL (ref 11.7–15.7)
INR PPP: 0.91 (ref 0.86–1.14)
PLATELET # BLD AUTO: 327 10E9/L (ref 150–450)
POTASSIUM SERPL-SCNC: 4.2 MMOL/L (ref 3.4–5.3)
SODIUM SERPL-SCNC: 143 MMOL/L (ref 133–144)

## 2017-04-24 PROCEDURE — 0SRD0J9 REPLACEMENT OF LEFT KNEE JOINT WITH SYNTHETIC SUBSTITUTE, CEMENTED, OPEN APPROACH: ICD-10-PCS | Performed by: ORTHOPAEDIC SURGERY

## 2017-04-24 PROCEDURE — 37000008 ZZH ANESTHESIA TECHNICAL FEE, 1ST 30 MIN: Performed by: ORTHOPAEDIC SURGERY

## 2017-04-24 PROCEDURE — 25000128 H RX IP 250 OP 636: Performed by: REGISTERED NURSE

## 2017-04-24 PROCEDURE — 97530 THERAPEUTIC ACTIVITIES: CPT | Mod: GP

## 2017-04-24 PROCEDURE — 37000009 ZZH ANESTHESIA TECHNICAL FEE, EACH ADDTL 15 MIN: Performed by: ORTHOPAEDIC SURGERY

## 2017-04-24 PROCEDURE — 25000125 ZZHC RX 250: Performed by: ANESTHESIOLOGY

## 2017-04-24 PROCEDURE — 36415 COLL VENOUS BLD VENIPUNCTURE: CPT | Performed by: ORTHOPAEDIC SURGERY

## 2017-04-24 PROCEDURE — 85018 HEMOGLOBIN: CPT | Performed by: ORTHOPAEDIC SURGERY

## 2017-04-24 PROCEDURE — 85049 AUTOMATED PLATELET COUNT: CPT | Performed by: ORTHOPAEDIC SURGERY

## 2017-04-24 PROCEDURE — 27210995 ZZH RX 272: Performed by: ORTHOPAEDIC SURGERY

## 2017-04-24 PROCEDURE — 40000986 XR KNEE PORT LT 1/2 VW: Mod: LT

## 2017-04-24 PROCEDURE — C1776 JOINT DEVICE (IMPLANTABLE): HCPCS | Performed by: ORTHOPAEDIC SURGERY

## 2017-04-24 PROCEDURE — 27210794 ZZH OR GENERAL SUPPLY STERILE: Performed by: ORTHOPAEDIC SURGERY

## 2017-04-24 PROCEDURE — 80048 BASIC METABOLIC PNL TOTAL CA: CPT | Performed by: ORTHOPAEDIC SURGERY

## 2017-04-24 PROCEDURE — 97161 PT EVAL LOW COMPLEX 20 MIN: CPT | Mod: GP

## 2017-04-24 PROCEDURE — 12000007 ZZH R&B INTERMEDIATE

## 2017-04-24 PROCEDURE — 97110 THERAPEUTIC EXERCISES: CPT | Mod: GP

## 2017-04-24 PROCEDURE — 25000125 ZZHC RX 250: Performed by: REGISTERED NURSE

## 2017-04-24 PROCEDURE — 25000132 ZZH RX MED GY IP 250 OP 250 PS 637: Mod: GY | Performed by: ORTHOPAEDIC SURGERY

## 2017-04-24 PROCEDURE — 25000128 H RX IP 250 OP 636: Performed by: ANESTHESIOLOGY

## 2017-04-24 PROCEDURE — 85610 PROTHROMBIN TIME: CPT | Performed by: ORTHOPAEDIC SURGERY

## 2017-04-24 PROCEDURE — 27110028 ZZH OR GENERAL SUPPLY NON-STERILE: Performed by: ORTHOPAEDIC SURGERY

## 2017-04-24 PROCEDURE — 40000193 ZZH STATISTIC PT WARD VISIT

## 2017-04-24 PROCEDURE — 25800025 ZZH RX 258: Performed by: ORTHOPAEDIC SURGERY

## 2017-04-24 PROCEDURE — 36000093 ZZH SURGERY LEVEL 4 1ST 30 MIN: Performed by: ORTHOPAEDIC SURGERY

## 2017-04-24 PROCEDURE — 25000125 ZZHC RX 250: Performed by: ORTHOPAEDIC SURGERY

## 2017-04-24 PROCEDURE — 25800025 ZZH RX 258: Performed by: ANESTHESIOLOGY

## 2017-04-24 PROCEDURE — 25000128 H RX IP 250 OP 636: Performed by: ORTHOPAEDIC SURGERY

## 2017-04-24 PROCEDURE — 71000012 ZZH RECOVERY PHASE 1 LEVEL 1 FIRST HR: Performed by: ORTHOPAEDIC SURGERY

## 2017-04-24 PROCEDURE — S0020 INJECTION, BUPIVICAINE HYDRO: HCPCS | Performed by: REGISTERED NURSE

## 2017-04-24 PROCEDURE — A9270 NON-COVERED ITEM OR SERVICE: HCPCS | Mod: GY | Performed by: ORTHOPAEDIC SURGERY

## 2017-04-24 PROCEDURE — 40000171 ZZH STATISTIC PRE-PROCEDURE ASSESSMENT III: Performed by: ORTHOPAEDIC SURGERY

## 2017-04-24 PROCEDURE — 97116 GAIT TRAINING THERAPY: CPT | Mod: GP

## 2017-04-24 PROCEDURE — 27810169 ZZH OR IMPLANT GENERAL: Performed by: ORTHOPAEDIC SURGERY

## 2017-04-24 PROCEDURE — 36000063 ZZH SURGERY LEVEL 4 EA 15 ADDTL MIN: Performed by: ORTHOPAEDIC SURGERY

## 2017-04-24 DEVICE — IMPLANTABLE DEVICE: Type: IMPLANTABLE DEVICE | Site: KNEE | Status: FUNCTIONAL

## 2017-04-24 DEVICE — IMP BASEPLATE TIBIAL GENESIS II SZ 2 LT TI 71420162: Type: IMPLANTABLE DEVICE | Site: KNEE | Status: FUNCTIONAL

## 2017-04-24 DEVICE — BONE CEMENT RADIOPAQUE SIMPLEX HV FULL DOSE 6194-1-001: Type: IMPLANTABLE DEVICE | Site: KNEE | Status: FUNCTIONAL

## 2017-04-24 DEVICE — IMP COMP PATELLA SNR RESURF GENESIS II 9X26MM 71420580: Type: IMPLANTABLE DEVICE | Site: KNEE | Status: FUNCTIONAL

## 2017-04-24 RX ORDER — SODIUM CHLORIDE, SODIUM LACTATE, POTASSIUM CHLORIDE, CALCIUM CHLORIDE 600; 310; 30; 20 MG/100ML; MG/100ML; MG/100ML; MG/100ML
INJECTION, SOLUTION INTRAVENOUS CONTINUOUS
Status: DISCONTINUED | OUTPATIENT
Start: 2017-04-24 | End: 2017-04-24 | Stop reason: HOSPADM

## 2017-04-24 RX ORDER — FENTANYL CITRATE 50 UG/ML
25-50 INJECTION, SOLUTION INTRAMUSCULAR; INTRAVENOUS
Status: DISCONTINUED | OUTPATIENT
Start: 2017-04-24 | End: 2017-04-24 | Stop reason: HOSPADM

## 2017-04-24 RX ORDER — ONDANSETRON 4 MG/1
4 TABLET, ORALLY DISINTEGRATING ORAL EVERY 6 HOURS PRN
Status: DISCONTINUED | OUTPATIENT
Start: 2017-04-24 | End: 2017-04-27 | Stop reason: HOSPADM

## 2017-04-24 RX ORDER — NALOXONE HYDROCHLORIDE 0.4 MG/ML
.1-.4 INJECTION, SOLUTION INTRAMUSCULAR; INTRAVENOUS; SUBCUTANEOUS
Status: DISCONTINUED | OUTPATIENT
Start: 2017-04-24 | End: 2017-04-27 | Stop reason: HOSPADM

## 2017-04-24 RX ORDER — SODIUM CHLORIDE, SODIUM LACTATE, POTASSIUM CHLORIDE, CALCIUM CHLORIDE 600; 310; 30; 20 MG/100ML; MG/100ML; MG/100ML; MG/100ML
INJECTION, SOLUTION INTRAVENOUS CONTINUOUS
Status: DISCONTINUED | OUTPATIENT
Start: 2017-04-24 | End: 2017-04-24

## 2017-04-24 RX ORDER — DEXTROSE MONOHYDRATE, SODIUM CHLORIDE, AND POTASSIUM CHLORIDE 50; 1.49; 4.5 G/1000ML; G/1000ML; G/1000ML
INJECTION, SOLUTION INTRAVENOUS CONTINUOUS
Status: DISCONTINUED | OUTPATIENT
Start: 2017-04-24 | End: 2017-04-27 | Stop reason: HOSPADM

## 2017-04-24 RX ORDER — PROPOFOL 10 MG/ML
INJECTION, EMULSION INTRAVENOUS CONTINUOUS PRN
Status: DISCONTINUED | OUTPATIENT
Start: 2017-04-24 | End: 2017-04-24

## 2017-04-24 RX ORDER — HYDROMORPHONE HYDROCHLORIDE 1 MG/ML
.3-.5 INJECTION, SOLUTION INTRAMUSCULAR; INTRAVENOUS; SUBCUTANEOUS EVERY 5 MIN PRN
Status: DISCONTINUED | OUTPATIENT
Start: 2017-04-24 | End: 2017-04-24 | Stop reason: HOSPADM

## 2017-04-24 RX ORDER — AMOXICILLIN 250 MG
1-2 CAPSULE ORAL 2 TIMES DAILY
Status: DISCONTINUED | OUTPATIENT
Start: 2017-04-24 | End: 2017-04-27 | Stop reason: HOSPADM

## 2017-04-24 RX ORDER — PROCHLORPERAZINE MALEATE 5 MG
5 TABLET ORAL EVERY 6 HOURS PRN
Status: DISCONTINUED | OUTPATIENT
Start: 2017-04-24 | End: 2017-04-27 | Stop reason: HOSPADM

## 2017-04-24 RX ORDER — ONDANSETRON 2 MG/ML
INJECTION INTRAMUSCULAR; INTRAVENOUS PRN
Status: DISCONTINUED | OUTPATIENT
Start: 2017-04-24 | End: 2017-04-24

## 2017-04-24 RX ORDER — ACETAMINOPHEN 325 MG/1
975 TABLET ORAL EVERY 8 HOURS
Status: COMPLETED | OUTPATIENT
Start: 2017-04-24 | End: 2017-04-27

## 2017-04-24 RX ORDER — LIDOCAINE 40 MG/G
CREAM TOPICAL
Status: DISCONTINUED | OUTPATIENT
Start: 2017-04-24 | End: 2017-04-27 | Stop reason: HOSPADM

## 2017-04-24 RX ORDER — KETOROLAC TROMETHAMINE 15 MG/ML
15 INJECTION, SOLUTION INTRAMUSCULAR; INTRAVENOUS EVERY 6 HOURS
Status: COMPLETED | OUTPATIENT
Start: 2017-04-24 | End: 2017-04-25

## 2017-04-24 RX ORDER — EPHEDRINE SULFATE 50 MG/ML
INJECTION, SOLUTION INTRAMUSCULAR; INTRAVENOUS; SUBCUTANEOUS PRN
Status: DISCONTINUED | OUTPATIENT
Start: 2017-04-24 | End: 2017-04-24

## 2017-04-24 RX ORDER — ACETAMINOPHEN 325 MG/1
650 TABLET ORAL EVERY 4 HOURS PRN
Status: DISCONTINUED | OUTPATIENT
Start: 2017-04-27 | End: 2017-04-27 | Stop reason: HOSPADM

## 2017-04-24 RX ORDER — PRAMIPEXOLE DIHYDROCHLORIDE 0.25 MG/1
0.25 TABLET ORAL
Status: DISCONTINUED | OUTPATIENT
Start: 2017-04-24 | End: 2017-04-27 | Stop reason: HOSPADM

## 2017-04-24 RX ORDER — CEFAZOLIN SODIUM 1 G/3ML
1 INJECTION, POWDER, FOR SOLUTION INTRAMUSCULAR; INTRAVENOUS SEE ADMIN INSTRUCTIONS
Status: DISCONTINUED | OUTPATIENT
Start: 2017-04-24 | End: 2017-04-24

## 2017-04-24 RX ORDER — CYCLOBENZAPRINE HCL 5 MG
5 TABLET ORAL 3 TIMES DAILY PRN
Status: DISCONTINUED | OUTPATIENT
Start: 2017-04-24 | End: 2017-04-27 | Stop reason: HOSPADM

## 2017-04-24 RX ORDER — ESCITALOPRAM OXALATE 5 MG/1
10 TABLET ORAL DAILY
Status: DISCONTINUED | OUTPATIENT
Start: 2017-04-24 | End: 2017-04-27 | Stop reason: HOSPADM

## 2017-04-24 RX ORDER — ONDANSETRON 2 MG/ML
4 INJECTION INTRAMUSCULAR; INTRAVENOUS EVERY 30 MIN PRN
Status: DISCONTINUED | OUTPATIENT
Start: 2017-04-24 | End: 2017-04-24 | Stop reason: HOSPADM

## 2017-04-24 RX ORDER — FENTANYL CITRATE 50 UG/ML
50-100 INJECTION, SOLUTION INTRAMUSCULAR; INTRAVENOUS
Status: DISCONTINUED | OUTPATIENT
Start: 2017-04-24 | End: 2017-04-24

## 2017-04-24 RX ORDER — LIDOCAINE HYDROCHLORIDE 20 MG/ML
INJECTION, SOLUTION INFILTRATION; PERINEURAL PRN
Status: DISCONTINUED | OUTPATIENT
Start: 2017-04-24 | End: 2017-04-24

## 2017-04-24 RX ORDER — CEFAZOLIN SODIUM 2 G/100ML
2 INJECTION, SOLUTION INTRAVENOUS
Status: COMPLETED | OUTPATIENT
Start: 2017-04-24 | End: 2017-04-24

## 2017-04-24 RX ORDER — HYDROMORPHONE HYDROCHLORIDE 1 MG/ML
.3-.5 INJECTION, SOLUTION INTRAMUSCULAR; INTRAVENOUS; SUBCUTANEOUS
Status: DISCONTINUED | OUTPATIENT
Start: 2017-04-24 | End: 2017-04-24 | Stop reason: RX

## 2017-04-24 RX ORDER — OXYCODONE HYDROCHLORIDE 5 MG/1
5-10 TABLET ORAL EVERY 4 HOURS PRN
Status: DISCONTINUED | OUTPATIENT
Start: 2017-04-24 | End: 2017-04-25

## 2017-04-24 RX ORDER — MAGNESIUM HYDROXIDE 1200 MG/15ML
LIQUID ORAL PRN
Status: DISCONTINUED | OUTPATIENT
Start: 2017-04-24 | End: 2017-04-24 | Stop reason: HOSPADM

## 2017-04-24 RX ORDER — ONDANSETRON 2 MG/ML
4 INJECTION INTRAMUSCULAR; INTRAVENOUS EVERY 6 HOURS PRN
Status: DISCONTINUED | OUTPATIENT
Start: 2017-04-24 | End: 2017-04-27 | Stop reason: HOSPADM

## 2017-04-24 RX ORDER — ONDANSETRON 4 MG/1
4 TABLET, ORALLY DISINTEGRATING ORAL EVERY 30 MIN PRN
Status: DISCONTINUED | OUTPATIENT
Start: 2017-04-24 | End: 2017-04-24 | Stop reason: HOSPADM

## 2017-04-24 RX ORDER — CEFAZOLIN SODIUM 1 G/3ML
1 INJECTION, POWDER, FOR SOLUTION INTRAMUSCULAR; INTRAVENOUS EVERY 8 HOURS
Status: COMPLETED | OUTPATIENT
Start: 2017-04-24 | End: 2017-04-25

## 2017-04-24 RX ORDER — BUPIVACAINE HYDROCHLORIDE 7.5 MG/ML
INJECTION, SOLUTION INTRASPINAL PRN
Status: DISCONTINUED | OUTPATIENT
Start: 2017-04-24 | End: 2017-04-24

## 2017-04-24 RX ORDER — FENTANYL CITRATE 50 UG/ML
INJECTION, SOLUTION INTRAMUSCULAR; INTRAVENOUS PRN
Status: DISCONTINUED | OUTPATIENT
Start: 2017-04-24 | End: 2017-04-24

## 2017-04-24 RX ADMIN — CEFAZOLIN SODIUM 2 G: 2 INJECTION, SOLUTION INTRAVENOUS at 10:09

## 2017-04-24 RX ADMIN — PROPOFOL 50 MCG/KG/MIN: 10 INJECTION, EMULSION INTRAVENOUS at 10:07

## 2017-04-24 RX ADMIN — PRAMIPEXOLE DIHYDROCHLORIDE 0.25 MG: 0.25 TABLET ORAL at 19:14

## 2017-04-24 RX ADMIN — EPINEPHRINE 20 ML GIVEN: 1 INJECTION, SOLUTION INTRAMUSCULAR; SUBCUTANEOUS at 09:40

## 2017-04-24 RX ADMIN — KETOROLAC TROMETHAMINE 15 MG: 15 INJECTION, SOLUTION INTRAMUSCULAR; INTRAVENOUS at 16:14

## 2017-04-24 RX ADMIN — CEFAZOLIN SODIUM 1 G: 1 INJECTION, POWDER, FOR SOLUTION INTRAMUSCULAR; INTRAVENOUS at 18:25

## 2017-04-24 RX ADMIN — POTASSIUM CHLORIDE, DEXTROSE MONOHYDRATE AND SODIUM CHLORIDE: 150; 5; 450 INJECTION, SOLUTION INTRAVENOUS at 14:08

## 2017-04-24 RX ADMIN — TRANEXAMIC ACID 1 G: 100 INJECTION, SOLUTION INTRAVENOUS at 11:10

## 2017-04-24 RX ADMIN — Medication 5 MG: at 10:16

## 2017-04-24 RX ADMIN — SENNOSIDES AND DOCUSATE SODIUM 1 TABLET: 8.6; 5 TABLET ORAL at 21:00

## 2017-04-24 RX ADMIN — HYDROMORPHONE HYDROCHLORIDE 0.5 MG: 1 INJECTION, SOLUTION INTRAMUSCULAR; INTRAVENOUS; SUBCUTANEOUS at 14:05

## 2017-04-24 RX ADMIN — SODIUM CHLORIDE, POTASSIUM CHLORIDE, SODIUM LACTATE AND CALCIUM CHLORIDE: 600; 310; 30; 20 INJECTION, SOLUTION INTRAVENOUS at 09:23

## 2017-04-24 RX ADMIN — MIDAZOLAM HYDROCHLORIDE 1 MG: 1 INJECTION, SOLUTION INTRAMUSCULAR; INTRAVENOUS at 10:24

## 2017-04-24 RX ADMIN — SODIUM CHLORIDE, POTASSIUM CHLORIDE, SODIUM LACTATE AND CALCIUM CHLORIDE: 600; 310; 30; 20 INJECTION, SOLUTION INTRAVENOUS at 11:45

## 2017-04-24 RX ADMIN — CYCLOBENZAPRINE HYDROCHLORIDE 5 MG: 5 TABLET, FILM COATED ORAL at 21:00

## 2017-04-24 RX ADMIN — PRAMIPEXOLE DIHYDROCHLORIDE 0.25 MG: 0.25 TABLET ORAL at 21:00

## 2017-04-24 RX ADMIN — LIDOCAINE HYDROCHLORIDE 40 MG: 20 INJECTION, SOLUTION INFILTRATION; PERINEURAL at 10:07

## 2017-04-24 RX ADMIN — Medication 5 MG: at 10:32

## 2017-04-24 RX ADMIN — LIDOCAINE HYDROCHLORIDE 1 ML: 10 INJECTION, SOLUTION EPIDURAL; INFILTRATION; INTRACAUDAL; PERINEURAL at 09:24

## 2017-04-24 RX ADMIN — ACETAMINOPHEN 975 MG: 325 TABLET, FILM COATED ORAL at 22:32

## 2017-04-24 RX ADMIN — FENTANYL CITRATE 50 MCG: 50 INJECTION, SOLUTION INTRAMUSCULAR; INTRAVENOUS at 09:30

## 2017-04-24 RX ADMIN — OXYCODONE HYDROCHLORIDE 10 MG: 5 TABLET ORAL at 22:32

## 2017-04-24 RX ADMIN — ESCITALOPRAM OXALATE 10 MG: 5 TABLET, FILM COATED ORAL at 16:15

## 2017-04-24 RX ADMIN — ACETAMINOPHEN 975 MG: 325 TABLET, FILM COATED ORAL at 16:14

## 2017-04-24 RX ADMIN — BUPIVACAINE HYDROCHLORIDE IN DEXTROSE 11.25 MG: 7.5 INJECTION, SOLUTION SUBARACHNOID at 10:07

## 2017-04-24 RX ADMIN — FENTANYL CITRATE 25 MCG: 50 INJECTION, SOLUTION INTRAMUSCULAR; INTRAVENOUS at 10:05

## 2017-04-24 RX ADMIN — DEXMEDETOMIDINE 8 MCG: 100 INJECTION, SOLUTION, CONCENTRATE INTRAVENOUS at 10:04

## 2017-04-24 RX ADMIN — OMEPRAZOLE 20 MG: 20 CAPSULE, DELAYED RELEASE ORAL at 19:14

## 2017-04-24 RX ADMIN — KETOROLAC TROMETHAMINE 15 MG: 15 INJECTION, SOLUTION INTRAMUSCULAR; INTRAVENOUS at 21:00

## 2017-04-24 RX ADMIN — DEXMEDETOMIDINE 8 MCG: 100 INJECTION, SOLUTION, CONCENTRATE INTRAVENOUS at 11:31

## 2017-04-24 RX ADMIN — MIDAZOLAM HYDROCHLORIDE 1 MG: 1 INJECTION, SOLUTION INTRAMUSCULAR; INTRAVENOUS at 10:04

## 2017-04-24 RX ADMIN — Medication 5 MG: at 11:25

## 2017-04-24 RX ADMIN — ONDANSETRON 4 MG: 2 INJECTION INTRAMUSCULAR; INTRAVENOUS at 11:25

## 2017-04-24 RX ADMIN — MIDAZOLAM HYDROCHLORIDE 1 MG: 1 INJECTION, SOLUTION INTRAMUSCULAR; INTRAVENOUS at 09:30

## 2017-04-24 RX ADMIN — OXYCODONE HYDROCHLORIDE 10 MG: 5 TABLET ORAL at 18:24

## 2017-04-24 RX ADMIN — HYDROMORPHONE HYDROCHLORIDE 0.5 MG: 1 INJECTION, SOLUTION INTRAMUSCULAR; INTRAVENOUS; SUBCUTANEOUS at 16:15

## 2017-04-24 ASSESSMENT — ENCOUNTER SYMPTOMS: DYSRHYTHMIAS: 0

## 2017-04-24 NOTE — PROGRESS NOTES
" 04/24/17 1600   Quick Adds   Type of Visit Initial PT Evaluation   Living Environment   Lives With spouse   Living Arrangements house   Home Accessibility stairs within home;stairs to enter home   Number of Stairs to Enter Home 1   Number of Stairs Within Home 10   Stair Railings at Home outside, present on right side;inside, present on right side   Transportation Available car;family or friend will provide   Living Environment Comment main floor living available   Self-Care   Usual Activity Tolerance good   Current Activity Tolerance fair   Regular Exercise no   Functional Level Prior   Ambulation 0-->independent   Transferring 0-->independent   Toileting 0-->independent   Bathing 0-->independent   Dressing 0-->independent   Eating 0-->independent   Communication 0-->understands/communicates without difficulty   Swallowing 0-->swallows foods/liquids without difficulty   Cognition 0 - no cognition issues reported   Fall history within last six months no   Prior Functional Level Comment Pt is very active, plays tennis 2x/wk   General Information   Onset of Illness/Injury or Date of Surgery - Date 04/24/17   Referring Physician Lee Ayala MD   Patient/Family Goals Statement \"Go home\"   Pertinent History of Current Problem (include personal factors and/or comorbidities that impact the POC) 71 YOF with L knee OA now POD 0 L TKA.   Precautions/Limitations fall precautions;oxygen therapy device and L/min;other (see comments)  (KI NOC)   Weight-Bearing Status - LUE full weight-bearing   Weight-Bearing Status - RUE full weight-bearing   Weight-Bearing Status - LLE weight-bearing as tolerated   Weight-Bearing Status - RLE full weight-bearing   General Observations Pleasant and cooperative   General Info Comments Activity: ambulate with assist   Cognitive Status Examination   Orientation orientation to person, place and time   Level of Consciousness alert   Follows Commands and Answers Questions 100% of the " time;able to follow multistep instructions   Personal Safety and Judgment intact   Memory intact   Cognitive Comment no concerns   Pain Assessment   Patient Currently in Pain Yes, see Vital Sign flowsheet  (5/10 L knee)   Posture    Posture Forward head position   Range of Motion (ROM)   ROM Comment RLE WFL. L hip and ankle WFL. L [id number] degrees AAROM   Strength   Strength Comments RLE 5/5 throughout. L hip 3+/5, knee 2-/5, ankle 4/5   Bed Mobility   Bed Mobility Comments Supine>sit with Jeyson   Transfer Skills   Transfer Comments SIt<>stand from EOB to FWW with KI donned and Jeyson   Gait   Gait Comments Gait with FWW and KI donned x 5' with modA, 3 point pattern, mild L knee buckling, fair tolerance for LLE WB   Balance   Balance Comments Good sitting, requires UE support and KI for standing   Sensory Examination   Sensory Perception no deficits were identified   Coordination   Coordination no deficits were identified   General Therapy Interventions   Planned Therapy Interventions balance training;bed mobility training;gait training;ROM;strengthening;transfer training   Clinical Impression   Criteria for Skilled Therapeutic Intervention yes, treatment indicated   PT Diagnosis Impaired gait, weakness   Influenced by the following impairments pain,weakness   Functional limitations due to impairments bed mobility, transfers, gait   Clinical Presentation Stable/Uncomplicated   Clinical Presentation Rationale elective TKA progressing per pathway   Clinical Decision Making (Complexity) Low complexity   Therapy Frequency` 2 times/day   Predicted Duration of Therapy Intervention (days/wks) 4 days   Anticipated Equipment Needs at Discharge walker   Anticipated Discharge Disposition Home with Assist;Home with Outpatient Therapy   Risk & Benefits of therapy have been explained Yes   Patient, Family & other staff in agreement with plan of care Yes   Clinical Impression Comments PT admitted under the [facility] program   [place]  "[facility] TM \"6 Clicks\"   2016, Trustees of [facility], under license to [employer].  All rights reserved.   6 Clicks Short Forms Basic Mobility Inpatient Short Form   [facility]  \"6 Clicks\" V.2 Basic Mobility Inpatient Short Form   1. Turning from your back to your side while in a flat bed without using bedrails? 3 - A Little   2. Moving from lying on your back to sitting on the side of a flat bed without using bedrails? 3 - A Little   3. Moving to and from a bed to a chair (including a wheelchair)? 3 - A Little   4. Standing up from a chair using your arms (e.g., wheelchair, or bedside chair)? 3 - A Little   5. To walk in hospital room? 3 - A Little   6. Climbing 3-5 steps with a railing? 3 - A Little   Basic Mobility Raw Score (Score out of 24.Lower scores equate to lower levels of function) 18   Total Evaluation Time   Total Evaluation Time (Minutes) 15     "

## 2017-04-24 NOTE — PLAN OF CARE
Problem: Goal Outcome Summary  Goal: Goal Outcome Summary  PT: PT orders received, initial eval completed and treatment initiated. Patient lives with her  in a house with main floor living available, stairs to enter and  access the lower level. Pt was previously independent with all ADLs and mobility without a device, drives, very active at baseline playing tennis 2x/wk. SHe does have B ax crutches available at home. Pt presents POD 0 L ANGUS.     BPCI Care Plan: home with assist and OPPT     Current Functional Status: Pt currently requires Jeyson for bed mobility, modA for transfers and gait x 10' with a FWW and KI donned, mild L knee buckling with transfers and gait. L knee AAROM 11-81 degrees.     Barriers to Plan: level of assist required, L knee buckling, stairs at home

## 2017-04-24 NOTE — OP NOTE
PATIENT NAME:  Hanane Sorto  MEDICAL RECORD #: [id number]  PATIENT BIRTHDAY:  1945  DATE OF SURGERY: 4/24/2017    SURGEON:    Lee Ayala MD    1st ASSISTANT:  ARMIDA Mcmahan OPA-C    PREOPERATIVE DIAGNOSIS:  Degenerative osteoarthritis left knee.    POSTOPERATIVE DIAGNOSIS:  Degenerative osteoarthritis left knee.    PROCEDURE: left total knee arthroplasty.    COMPONENTS: [employer] - Size 3N CR femoral component, size 2 fully stemmed tibial baseplate with 12 mm CR XLPE high flexion articular insert, and 26 mm patellar component.    ANESTHESIA: Spinal     INDICATIONS FOR PROCEDURE:  The patient was brought to the operating room for elective total knee replacement for advanced degenerative osteoarthritis.  The patient received IV antibiotics preoperatively.  These will be continued for 24 hours.  The patient also will receive anticoagulants  for postoperative thrombosis prophylaxis.  The patient understands the indications, alternatives, risks, benefits, and time involved for recovery and wishes to proceed.  The patient is consented for the procedure.      DESCRIPTION OF PROCEDURE:  The patient was brought to the operating room  and following suitable Spinal anesthesia, the left knee was prepped and draped in the usual manner.  Full timeout was carried out and the patient and proper extremity and operative site identified and confirmed by all members of the operative team.    We next exsanguinated the operative leg with a Dre bandage and the tourniquet was elevated to 350 mmHg on the thigh.    A 10 cm longitudinal incision was made anteriorly for the MIS technique.  Sharp dissection was carried down through the subcutaneous tissue.  A median parapatellar arthrotomy was carried out and the knee flexed to 90 degrees.    There was moderate wear in the medial compartment and moderate wear in the patellofemoral  compartment and severe wear in the lateral compartment.    The [employer]  instrumentation was used.  The femur was cut for a size 3N CR  implant.  The tibia was cut for a size 2 fully stemmed base plate.  The patella was cut for a 26 mm patellar button.    The trial components were removed from the knee.  The bone surfaces were prepared with jet lavage and careful drying technique.     The posterior capsule was injected for postoperative relief with 30 cc of saline, 30 cc of 0.2% Ropivacaine, and 15 mg of Toradol.       We then cemented the components with HD Simplex cement beginning with the tibial baseplate, followed by the femoral component, followed by the patellar component.    The knee was held in extension with the trial insert in place in the tibia until the cement was set.  Once the cement was set up, the trial component was removed.  We selected the 12 mm CR XLPE high flexion articular insert.  This insert was secured and the knee checked one final time for motion, stability, alignment and soft tissue balance, all of which were excellent.    The wound was irrigated throughout with antibiotic solution using jet lavage.  The tourniquet was deflated prior to wound closure and all bleeding controlled with electrocautery.  We then re-exsanguinated the leg and reinflated the tourniquet during wound closure.    The wound was closed over a medium Hemovac drain with spaced 0 Ethibond interrupted and V-Loc running suture in the parapatellar arthrotomy, 2-0 undyed Vicryl in subcutaneous tissue and skin staples in the skin.  A sterile soft dressing was applied.  The tourniquet deflated one final time.  The patient was taken to the PAR in satisfactory condition.  There were no known complications during the procedure.    A surgical assistant was medically necessary for this procedure for pre-op positioning as well as intra-op retraction and extremity support and positioning.  He was present for the entire procedure.      YAO ROMERO MD    CC  Yao Romero MD          [phone]  Fax

## 2017-04-24 NOTE — ANESTHESIA PROCEDURE NOTES
Peripheral nerve/Neuraxial procedure note : femoral and Adductor canal  Pre-Procedure  Performed by RITA MILNER  Location: pre-op      Pre-Anesthestic Checklist: patient identified, IV checked, site marked, risks and benefits discussed, informed consent, monitors and equipment checked, at physician/surgeon's request and post-op pain management    Timeout  Correct Patient: Yes   Correct Procedure: Yes   Correct Site: Yes   Correct Laterality: Yes   Correct Position: Yes   Site Marked: Yes   .   Procedure Documentation    .    Procedure:    Femoral and Adductor canal.  Local skin infiltrated with 3 mL of 1% lidocaine.     Ultrasound used to identify targeted nerve, plexus, or vascular marker and placed a needle adjacent to it., Ultrasound was used to visualize the spread of the anesthetic in close proximity to the above stated nerve. A permanent image is entered into the patient's record.  Patient Prep;mask, sterile gloves, chlorhexidine gluconate and isopropyl alcohol, patient draped.  .  Needle: insulated, short bevel (20 G. 80 mm ). .  Spinal Needle: . . .     Assessment/Narrative  Paresthesias: No.  Injection made incrementally with aspirations every 5 mL..  The placement was negative for: blood aspirated, painful injection and site bleeding.  Bolus given via needle..   Secured via.   Complications: none. Comments:  Single shot femoral nerve block in adductor canal.    20 ml 0.5% Ropivacaine with 1:400,000 Epinephrine.  No abnormal pain or paresthesia throughout.  Patient tolerated well.

## 2017-04-24 NOTE — PROGRESS NOTES
Admission medication history interview status for the 4/24/2017  admission is complete. See EPIC admission navigator for prior to admission medications     Medication history source reliability:Good    Medication history interview source(s):Patient    Medication history resources (including written lists, pill bottles, clinic record):None    Primary pharmacy.[facility]    Additional medication history information not noted on PTA med list :None    Time spent in this activity: 40 minutes    Prior to Admission medications    Medication Sig Last Dose Taking? Auth Provider   OMEPRAZOLE PO Take 20 mg by mouth 2 times daily 4/23/2017 at Afternoon Yes Reported, Patient   Calcium Carb-Cholecalciferol (CALCIUM 500/D) 500-400 MG-UNIT CHEW Take 2 chew tab by mouth daily (VITAFUSION) Past Month at AM Yes Reported, Patient   Ascorbic Acid (VITAMIN C PO) Take 1 tablet by mouth every other day 4/21/2017 at AM Yes Reported, Patient   Acetaminophen (TYLENOL PO) Take 1,000 mg by mouth 3 times daily as needed for mild pain or fever 4/22/2017 at PRN Yes Reported, Patient   Ferrous Sulfate (IRON SUPPLEMENT PO) Take 325 mg by mouth every other day Take with Vit C to help absorption 4/21/2017 at AM Yes Reported, Patient   Docusate Sodium (WILSON STOOL SOFTENER PO) Take 3 capsules by mouth daily 4/22/2017 at HS Yes Reported, Patient   Bisacodyl (DULCOLAX PO) Take 5 mg by mouth At Bedtime At 2230 4/22/2017 at HS Yes Reported, Patient   Pramipexole Dihydrochloride (MIRAPEX PO) Take 0.25 mg by mouth 2 times daily At 1700 and HS 4/22/2017 at PM Yes Reported, Patient   triamcinolone (NASACORT) 55 MCG/ACT Inhaler Spray 2 sprays into both nostrils daily 4/21/2017 at AM Yes Reported, Patient   escitalopram (LEXAPRO) 10 MG tablet Take 1 tablet (10 mg) by mouth daily 4/23/2017 at AM Yes Corona Bethea MD

## 2017-04-24 NOTE — IP AVS SNAPSHOT
MRN:[id number]                      After Visit Summary   4/24/2017    Hanane Sorto    MRN: [id number]           Thank you!     Thank you for choosing [facility] for your care. Our goal is always to provide you with excellent care. Hearing back from our patients is one way we can continue to improve our services. Please take a few minutes to complete the written survey that you may receive in the mail after you visit with us. Thank you!        Patient Information     Date Of Birth          1945        Designated Caregiver       Most Recent Value    Caregiver    Will someone help with your care after discharge? yes    Name of designated caregiver Yao    Phone number of caregiver     Caregiver address [place]      About your hospital stay     You were admitted on:  April 24, 2017 You last received care in the:  [facility] [address] [facility]    You were discharged on:  April 27, 2017        Reason for your hospital stay       tka                  Who to Call     For medical emergencies, please call 911.  For non-urgent questions about your medical care, please call your primary care provider or clinic, [phone]  For questions related to your surgery, please call your surgery clinic        Attending Provider     Provider Yao Lezama MD Orthopedics       Primary Care Provider Office Phone # Fax #    Bhni Bethea [phone]       [facility] [address] [address]        Follow-up Appointments     Follow-up and recommended labs and tests        Office visit prearranged                  Further instructions from your care team         DISCHARGE INSTRUCTIONS AFTER YOUR TOTAL KNEE REPLACEMENT     YAO ROMERO MD       Instructions to care for your wound at home:   Change the dressing daily.  Inspect your incision at the time of dressing change for increased redness, tenderness, swelling,  or drainage along the incision line.  Some bruising or discoloration is usually present, but call my office for any changes in appearance that concern you.  Also call if you develop a fever above 101 degrees.     You may shower directly over the wound beginning 4 days after your surgery, but do not submerge the wound under water until after your post operative visit when the sutures are removed and the wound is completely sealed without drainage.    Activities:  Physical activity may be resumed gradually according to your comfort level. You may bear weight on your operative leg as tolerated with your crutches or walker as instructed by your therapist.  Follow your home exercise program.  Ice your knee after exercising.        Wear your knee immobilizer at night only until your office visit in 2 weeks to maintain full knee extension.  Do not use the immobilizer during the day unless otherwise instructed.      Wear the anti-embolism stockings day and night until seen in the office for your post operative visit. Remove them twice daily for one hour at a time. Keep the compression stockings flat on your leg.  Do not allow them to roll up or crease your skin.  Call if you develop calf pain.     Outpatient Physical Therapy and home exercises:  Outpatient physical therapy visits are required following discharge from the hospital. The referral for these outpatient therapy visits is routinely given to you at the time of your surgical scheduling. You should have already scheduled your therapy sessions in advance.  If you have not done so, please immediately call the therapy site of your choice to schedule the physical therapy regimen that has been prescribed for you.  You may discontinue the crutches or walker per the therapist's recommendation.      Medications:  New medications for you on discharge will include a pain medication, a stool softener while on the narcotic pain medication, and a blood thinner.  Detailed  "instructions will come with those medications.  You will also receive instructions on when to resume your home medications.     If you routinely take Aspirin 81 mg, hold the Aspirin while taking the formal blood thinner medication. Then take Aspirin 325 mg (1 tablet) daily for 4 weeks.  Then resume your Aspirin 81 mg daily if that is your routine.        Antibiotic coverage will be needed before any type of dental procedure.  This is a life long recommendation.  You should notify your dentist of your total knee surgery and call your dentist or our office one week before a dental appointment for antibiotics.        Clinic follow-up appointment:  Your clinic follow-up appointment has been prearranged.  Call [phone] with any questions.    Lee Ayala MD     Pending Results     No orders found from 4/22/2017 to 4/25/2017.            Statement of Approval     Ordered          04/26/17 1148  I have reviewed and agree with all the recommendations and orders detailed in this document.  EFFECTIVE NOW     Approved and electronically signed by:  Lee Ayala MD             Admission Information     Date & Time Provider Department Dept. Phone    4/24/2017 Lee Ayala MD [facility] [address] [facility] [phone]      Your Vitals Were     Blood Pressure Pulse Temperature Respirations Height Weight    115/61 (BP Location: Right arm) 85 98.4  F (36.9  C) 16 1.486 m (4' 10.5\") 51.7 kg (114 lb)    Pulse Oximetry BMI (Body Mass Index)                95% 23.42 kg/m2          MyChart Information     [facility] gives you secure access to your electronic health record. If you see a primary care provider, you can also send messages to your care team and make appointments. If you have questions, please call your primary care clinic.  If you do not have a primary care provider, please call [phone] and they will assist you.        Care EveryWhere ID     This is your Care EveryWhere ID. This " could be used by other organizations to access your [facility] medical records  [id number]           Review of your medicines      START taking        Dose / Directions    enoxaparin 40 MG/0.4ML injection   Commonly known as:  LOVENOX        Dose:  40 mg   Inject 0.4 mLs (40 mg) Subcutaneous every 24 hours for 10 days   Quantity:  4 mL   Refills:  0       order for DME   Used for:  Primary osteoarthritis of both knees        Equipment being ordered: Walker Wheels () and Walker () Treatment Diagnosis: Impaired gait   Quantity:  1 each   Refills:  0       oxyCODONE 5 MG IR tablet   Commonly known as:  ROXICODONE   Used for:  Primary osteoarthritis of both knees        Dose:  5 mg   Take 1 tablet (5 mg) by mouth every 4 hours as needed for moderate to severe pain   Quantity:  50 tablet   Refills:  0         CONTINUE these medicines which have NOT CHANGED        Dose / Directions    calcium 500/D 500-400 MG-UNIT Chew   Generic drug:  Calcium Carb-Cholecalciferol        Dose:  2 chew tab   Take 2 chew tab by mouth daily (VITAFUSION)   Refills:  0       DULCOLAX PO        Dose:  5 mg   Take 5 mg by mouth At Bedtime At 2230   Refills:  0       escitalopram 10 MG tablet   Commonly known as:  LEXAPRO   Used for:  Anxiety        Dose:  10 mg   Take 1 tablet (10 mg) by mouth daily   Quantity:  90 tablet   Refills:  3       IRON SUPPLEMENT PO        Dose:  325 mg   Take 325 mg by mouth every other day Take with Vit C to help absorption   Refills:  0       MIRAPEX PO        Dose:  0.25 mg   Take 0.25 mg by mouth 2 times daily At 1700 and HS   Refills:  0       OMEPRAZOLE PO        Dose:  20 mg   Take 20 mg by mouth 2 times daily   Refills:  0       WILSON STOOL SOFTENER PO        Dose:  3 capsule   Take 3 capsules by mouth daily   Refills:  0       triamcinolone 55 MCG/ACT Inhaler   Commonly known as:  NASACORT        Dose:  2 spray   Spray 2 sprays into both nostrils daily   Refills:  0       TYLENOL PO        Dose:   1000 mg   Take 1,000 mg by mouth 3 times daily as needed for mild pain or fever   Refills:  0       VITAMIN C PO        Dose:  1 tablet   Take 1 tablet by mouth every other day   Refills:  0            Where to get your medicines      These medications were sent to [facility] [place], [place] - [address]  [address] [place] [address]     Phone:  [phone]     enoxaparin 40 MG/0.4ML injection         Some of these will need a paper prescription and others can be bought over the counter. Ask your nurse if you have questions.     Bring a paper prescription for each of these medications     order for DME    oxyCODONE 5 MG IR tablet                Protect others around you: Learn how to safely use, store and throw away your medicines at www.disposemymeds.org.             Medication List: This is a list of all your medications and when to take them. Check marks below indicate your daily home schedule. Keep this list as a reference.      Medications           Morning Afternoon Evening Bedtime As Needed    calcium 500/D 500-400 MG-UNIT Chew   Take 2 chew tab by mouth daily (VITAFUSION)   Generic drug:  Calcium Carb-Cholecalciferol   Next Dose Due:  Resume on discharge.                                DULCOLAX PO   Take 5 mg by mouth At Bedtime At 2230   Next Dose Due:  Resume on discharge.                                enoxaparin 40 MG/0.4ML injection   Commonly known as:  LOVENOX   Inject 0.4 mLs (40 mg) Subcutaneous every 24 hours for 10 days   Last time this was given:  40 mg on 4/27/2017 11:15 AM   Next Dose Due:  4/28/17                                   escitalopram 10 MG tablet   Commonly known as:  LEXAPRO   Take 1 tablet (10 mg) by mouth daily   Last time this was given:  10 mg on 4/27/2017  8:47 AM   Next Dose Due:  Resume on discharge.                                IRON SUPPLEMENT PO   Take 325 mg by mouth every other day Take with Vit C to help absorption   Next Dose Due:   Resume on discharge.                                MIRAPEX PO   Take 0.25 mg by mouth 2 times daily At 1700 and HS   Last time this was given:  0.25 mg on 4/26/2017  9:11 PM   Next Dose Due:  Resume on discharge.                                OMEPRAZOLE PO   Take 20 mg by mouth 2 times daily   Last time this was given:  20 mg on 4/27/2017  8:47 AM   Next Dose Due:  Resume on discharge.                                order for DME   Equipment being ordered: Walker Wheels () and Walker () Treatment Diagnosis: Impaired gait                                oxyCODONE 5 MG IR tablet   Commonly known as:  ROXICODONE   Take 1 tablet (5 mg) by mouth every 4 hours as needed for moderate to severe pain   Last time this was given:  5 mg on 4/27/2017 11:15 AM   Next Dose Due:  4/27/17 not earlier than 3:15 pm                                   WILSON STOOL SOFTENER PO   Take 3 capsules by mouth daily   Next Dose Due:  Resume on discharge.                                triamcinolone 55 MCG/ACT Inhaler   Commonly known as:  NASACORT   Spray 2 sprays into both nostrils daily   Next Dose Due:  Resume on discharge.                                TYLENOL PO   Take 1,000 mg by mouth 3 times daily as needed for mild pain or fever   Last time this was given:  975 mg on 4/27/2017  8:47 AM   Next Dose Due:  4/27/17                                      VITAMIN C PO   Take 1 tablet by mouth every other day   Next Dose Due:  Resume on discharge.

## 2017-04-24 NOTE — ANESTHESIA PREPROCEDURE EVALUATION
Anesthesia Evaluation     . Pt has had prior anesthetic.     No history of anesthetic complications          ROS/MED HX    ENT/Pulmonary:  - neg pulmonary ROS     Neurologic:  - neg neurologic ROS     Cardiovascular:     (+) Dyslipidemia, ----. : . . . :. .      (-) CAD, CHF and arrhythmias   METS/Exercise Tolerance:  >4 METS   Hematologic:         Musculoskeletal:   (+) arthritis, , , -       GI/Hepatic:     (+) GERD Asymptomatic on medication,       Renal/Genitourinary:      (-) renal disease   Endo:         Psychiatric:     (+) psychiatric history anxiety      Infectious Disease:         Malignancy:         Other:                     Physical Exam  Normal systems: dental    Airway   Mallampati: II  TM distance: >3 FB  Neck ROM: full    Dental     Cardiovascular   Rhythm and rate: regular and normal      Pulmonary    breath sounds clear to auscultation                    Anesthesia Plan      History & Physical Review  History and physical reviewed and following examination; no interval change.    ASA Status:  2 .    NPO Status:  > 8 hours    Plan for Periph. Nerve Block for postop pain and Spinal          Postoperative Care      Consents  Anesthetic plan, risks, benefits and alternatives discussed with:  Patient.  Use of blood products discussed: Yes.   Use of blood products discussed with Patient.  Consented to blood products.  .                          .

## 2017-04-24 NOTE — ANESTHESIA PROCEDURE NOTES
Peripheral nerve/Neuraxial procedure note : intrathecal  Pre-Procedure  Performed by RITA MILNER  Location: OR      Pre-Anesthestic Checklist: patient identified, IV checked, risks and benefits discussed, informed consent, monitors and equipment checked and pre-op evaluation    Timeout  Correct Patient: Yes   Correct Procedure: Yes   Correct Site: Yes   Correct Laterality: N/A   Correct Position: Yes   Site Marked: N/A   .   Procedure Documentation    .    Procedure:    Intrathecal.  Insertion Site:L3-4  (midline approach)      Patient Prep;mask, sterile gloves, povidone-iodine 7.5% surgical scrub, patient draped.  .  Needle: (). # of attempts: 1. # of redirects:. Spinal Needle: Yuliet tip 25 G. 3.5 in.  Introducer used. Introducer: 20 G. .     Assessment/Narrative  Paresthesias: No.  .  .  clear CSF fluid removed . Comments:  Subarachnoid Block.  Clear CSF on first pass.  No abnormal pain or paresthesia throughout.   1.6 ml 0.75% bupivacaine with dextrose and 100 mcg epinephrine

## 2017-04-24 NOTE — ANESTHESIA CARE TRANSFER NOTE
Patient: Hanane Sorto    Procedure(s):  LEFT TOTAL KNEE ARTHROPLASTY - Wound Class: I-Clean    Diagnosis: LEFT KNEE DJD   Diagnosis Additional Information: No value filed.    Anesthesia Type:   Periph. Nerve Block for postop pain, Spinal     Note:  Airway :Room Air  Patient transferred to:PACU      Transferred to PACU, spontaneous respirations. Oxygen via facemask at 6 LPM to PACU, connected to wall O2 in PACU, O2 removed in PACU and sats 93-95% on room air at handoff. All monitors and alarms on and functioning, clinically stable vital signs. Report given to PACU RN and questions answered. Patient alert and talking to staff at handoff.    Electronically Signed By: SHADE Cox CRNA  April 24, 2017  11:58 AM

## 2017-04-24 NOTE — IP AVS SNAPSHOT
[address] [facility]    [address] [place] [address]    Phone:  [phone]                                       After Visit Summary   4/24/2017    Hanane Sorto    MRN: [id number]           After Visit Summary Signature Page     I have received my discharge instructions, and my questions have been answered. I have discussed any challenges I see with this plan with the nurse or doctor.    ..........................................................................................................................................  Patient/Patient Representative Signature      ..........................................................................................................................................  Patient Representative Print Name and Relationship to Patient    ..................................................               ................................................  Date                                            Time    ..........................................................................................................................................  Reviewed by Signature/Title    ...................................................              ..............................................  Date                                                            Time

## 2017-04-25 ENCOUNTER — APPOINTMENT (OUTPATIENT)
Dept: PHYSICAL THERAPY | Facility: CLINIC | Age: 72
DRG: 470 | End: 2017-04-25
Attending: ORTHOPAEDIC SURGERY
Payer: MEDICARE

## 2017-04-25 LAB
ANION GAP SERPL CALCULATED.3IONS-SCNC: 5 MMOL/L (ref 3–14)
BUN SERPL-MCNC: 14 MG/DL (ref 7–30)
CALCIUM SERPL-MCNC: 8.6 MG/DL (ref 8.5–10.1)
CHLORIDE SERPL-SCNC: 109 MMOL/L (ref 94–109)
CO2 SERPL-SCNC: 26 MMOL/L (ref 20–32)
CREAT SERPL-MCNC: 0.88 MG/DL (ref 0.52–1.04)
GFR SERPL CREATININE-BSD FRML MDRD: 63 ML/MIN/1.7M2
GLUCOSE SERPL-MCNC: 131 MG/DL (ref 70–99)
HGB BLD-MCNC: 9.8 G/DL (ref 11.7–15.7)
POTASSIUM SERPL-SCNC: 4.7 MMOL/L (ref 3.4–5.3)
SODIUM SERPL-SCNC: 140 MMOL/L (ref 133–144)

## 2017-04-25 PROCEDURE — 97530 THERAPEUTIC ACTIVITIES: CPT | Mod: GP

## 2017-04-25 PROCEDURE — 25000132 ZZH RX MED GY IP 250 OP 250 PS 637: Mod: GY | Performed by: ORTHOPAEDIC SURGERY

## 2017-04-25 PROCEDURE — 36415 COLL VENOUS BLD VENIPUNCTURE: CPT | Performed by: ORTHOPAEDIC SURGERY

## 2017-04-25 PROCEDURE — 97110 THERAPEUTIC EXERCISES: CPT | Mod: GP

## 2017-04-25 PROCEDURE — 12000007 ZZH R&B INTERMEDIATE

## 2017-04-25 PROCEDURE — 97116 GAIT TRAINING THERAPY: CPT | Mod: GP

## 2017-04-25 PROCEDURE — A9270 NON-COVERED ITEM OR SERVICE: HCPCS | Mod: GY | Performed by: ORTHOPAEDIC SURGERY

## 2017-04-25 PROCEDURE — 40000193 ZZH STATISTIC PT WARD VISIT

## 2017-04-25 PROCEDURE — 80048 BASIC METABOLIC PNL TOTAL CA: CPT | Performed by: ORTHOPAEDIC SURGERY

## 2017-04-25 PROCEDURE — 85018 HEMOGLOBIN: CPT | Performed by: ORTHOPAEDIC SURGERY

## 2017-04-25 PROCEDURE — 25000128 H RX IP 250 OP 636: Performed by: ORTHOPAEDIC SURGERY

## 2017-04-25 PROCEDURE — 25800025 ZZH RX 258: Performed by: ORTHOPAEDIC SURGERY

## 2017-04-25 RX ORDER — OXYCODONE HYDROCHLORIDE 5 MG/1
5 TABLET ORAL
Status: DISCONTINUED | OUTPATIENT
Start: 2017-04-25 | End: 2017-04-25

## 2017-04-25 RX ORDER — OXYCODONE HYDROCHLORIDE 5 MG/1
5-10 TABLET ORAL
Status: DISCONTINUED | OUTPATIENT
Start: 2017-04-25 | End: 2017-04-27 | Stop reason: HOSPADM

## 2017-04-25 RX ADMIN — PRAMIPEXOLE DIHYDROCHLORIDE 0.25 MG: 0.25 TABLET ORAL at 20:42

## 2017-04-25 RX ADMIN — SENNOSIDES AND DOCUSATE SODIUM 2 TABLET: 8.6; 5 TABLET ORAL at 20:41

## 2017-04-25 RX ADMIN — KETOROLAC TROMETHAMINE 15 MG: 15 INJECTION, SOLUTION INTRAMUSCULAR; INTRAVENOUS at 05:28

## 2017-04-25 RX ADMIN — ESCITALOPRAM OXALATE 10 MG: 5 TABLET, FILM COATED ORAL at 08:17

## 2017-04-25 RX ADMIN — PRAMIPEXOLE DIHYDROCHLORIDE 0.25 MG: 0.25 TABLET ORAL at 17:05

## 2017-04-25 RX ADMIN — ENOXAPARIN SODIUM 40 MG: 40 INJECTION SUBCUTANEOUS at 10:03

## 2017-04-25 RX ADMIN — OMEPRAZOLE 20 MG: 20 CAPSULE, DELAYED RELEASE ORAL at 05:28

## 2017-04-25 RX ADMIN — ACETAMINOPHEN 975 MG: 325 TABLET, FILM COATED ORAL at 23:47

## 2017-04-25 RX ADMIN — CEFAZOLIN SODIUM 1 G: 1 INJECTION, POWDER, FOR SOLUTION INTRAMUSCULAR; INTRAVENOUS at 02:41

## 2017-04-25 RX ADMIN — OXYCODONE HYDROCHLORIDE 10 MG: 5 TABLET ORAL at 13:14

## 2017-04-25 RX ADMIN — KETOROLAC TROMETHAMINE 15 MG: 15 INJECTION, SOLUTION INTRAMUSCULAR; INTRAVENOUS at 10:00

## 2017-04-25 RX ADMIN — OXYCODONE HYDROCHLORIDE 10 MG: 5 TABLET ORAL at 02:42

## 2017-04-25 RX ADMIN — OXYCODONE HYDROCHLORIDE 10 MG: 5 TABLET ORAL at 20:41

## 2017-04-25 RX ADMIN — ACETAMINOPHEN 975 MG: 325 TABLET, FILM COATED ORAL at 17:03

## 2017-04-25 RX ADMIN — POTASSIUM CHLORIDE, DEXTROSE MONOHYDRATE AND SODIUM CHLORIDE: 150; 5; 450 INJECTION, SOLUTION INTRAVENOUS at 16:14

## 2017-04-25 RX ADMIN — SENNOSIDES AND DOCUSATE SODIUM 1 TABLET: 8.6; 5 TABLET ORAL at 08:18

## 2017-04-25 RX ADMIN — OXYCODONE HYDROCHLORIDE 10 MG: 5 TABLET ORAL at 16:14

## 2017-04-25 RX ADMIN — OMEPRAZOLE 20 MG: 20 CAPSULE, DELAYED RELEASE ORAL at 17:03

## 2017-04-25 RX ADMIN — OXYCODONE HYDROCHLORIDE 10 MG: 5 TABLET ORAL at 10:00

## 2017-04-25 RX ADMIN — HYDROMORPHONE HYDROCHLORIDE 0.5 MG: 1 INJECTION, SOLUTION INTRAMUSCULAR; INTRAVENOUS; SUBCUTANEOUS at 18:26

## 2017-04-25 RX ADMIN — ACETAMINOPHEN 975 MG: 325 TABLET, FILM COATED ORAL at 08:17

## 2017-04-25 RX ADMIN — OXYCODONE HYDROCHLORIDE 10 MG: 5 TABLET ORAL at 06:25

## 2017-04-25 NOTE — PROGRESS NOTES
[facility] Orthopedic Post-Op / Progress Note  Hanane Sorto is a 71 year old female    Today's Date:2017  Admission Date: 2017  POD # 1 TKA         Interval History:   doing well  Good pain management            Physical Exam:   All vitals have been reviewed  Temperatures:  Current - Temp: 98.2  F (36.8  C); Max - Temp  Av.8  F (36.6  C)  Min: 97.2  F (36.2  C)  Max: 98.5  F (36.9  C)  Pulse range: Pulse  Av  Min: 58  Max: 68  Blood pressure range: Systolic (24hrs), Av , Min:78 , Max:128   ; Diastolic (24hrs), Av, Min:42, Max:65      Intake/Output Summary (Last 24 hours) at 17 0820  Last data filed at 17 0600   Gross per 24 hour   Intake             3076 ml   Output             1065 ml   Net             2011 ml       Wound clean and dry with minimal or no drainage.  Surrounding skin with minimal erythema.          Data:   All laboratory data related to this surgery reviewed      Lab Results   Component Value Date     2017    POTASSIUM 4.7 2017    CHLORIDE 109 2017    CO2 26 2017     (H) 2017     Lab Results   Component Value Date    HGB 9.8 (L) 2017    HGB 12.9 2017    HGB  2017     Canceled, Test credited   Duplicate request  REFLEXED TO CBC. SEE ACCN [id number]  CORRECTED ON  AT 1406: PREVIOUSLY REPORTED AS 12.8      HGB 12.8 2017     Platelet Count (10e9/L)   Date Value   2017 327   2017 351   2015 329       All imaging studies related to this surgery reviewed         Assessment and Plan:    Assessment:  Doing well.  No immediate surgical complications identified.  No excessive bleeding  Pain well-controlled.    Plan:  Continue physical therapy  Pain control measures  Discharge plan: Home probably tomorrow PM    Lee Ayala MD

## 2017-04-25 NOTE — PLAN OF CARE
Problem: Goal Outcome Summary  Goal: Goal Outcome Summary  Outcome: Improving  Pt. A&o, vss except on 2L of oxygen, desats to 80's in RA,  up with one assist and walker, voiding at BSC/Br, dressing changed and hvc d/c'd, Will continue to monitor.

## 2017-04-25 NOTE — PLAN OF CARE
Problem: Goal Outcome Summary  Goal: Goal Outcome Summary  Goal: Goal Outcome Summary     [facility] Care Plan: home with assist and OPPT, probably tomorrow PM      Current Functional Status: In supine x 10 reps each for TKA ex.  Sit<>supine, CGA, cues for handplacement.  Sit<>stand KI, FWW, CGA, cues for sequencing.  Gait training 35' x1, FWW, CGA, KI, modified step through, cues for sequencing, and walker proximity.      Barriers to Plan: stairs at home not yet assessed

## 2017-04-25 NOTE — PLAN OF CARE
Problem: Goal Outcome Summary  Goal: Goal Outcome Summary  Outcome: Improving  Pain well controlled, dressing clean, dry and intact, CMS is WNL, VSS, taking food and fluids without nausea. Hemovac intact and patent. Got OOB with PT and tolerated well. Voided on BSC x1.

## 2017-04-25 NOTE — PLAN OF CARE
Problem: Goal Outcome Summary  Goal: Goal Outcome Summary  A&O. VSS ex BPs soft. 2L O2 overnight. Up with A1 and walker to BSC. Pain managed with Tylenol, oxycodone and toradol. CMS intact. Dressing CDI. Hemovac intact. Voiding adequately. Slept between cares.

## 2017-04-26 ENCOUNTER — APPOINTMENT (OUTPATIENT)
Dept: PHYSICAL THERAPY | Facility: CLINIC | Age: 72
DRG: 470 | End: 2017-04-26
Attending: ORTHOPAEDIC SURGERY
Payer: MEDICARE

## 2017-04-26 ENCOUNTER — APPOINTMENT (OUTPATIENT)
Dept: OCCUPATIONAL THERAPY | Facility: CLINIC | Age: 72
DRG: 470 | End: 2017-04-26
Attending: ORTHOPAEDIC SURGERY
Payer: MEDICARE

## 2017-04-26 LAB — HGB BLD-MCNC: 9.8 G/DL (ref 11.7–15.7)

## 2017-04-26 PROCEDURE — A9270 NON-COVERED ITEM OR SERVICE: HCPCS | Mod: GY | Performed by: ORTHOPAEDIC SURGERY

## 2017-04-26 PROCEDURE — 97110 THERAPEUTIC EXERCISES: CPT | Mod: GP

## 2017-04-26 PROCEDURE — 97535 SELF CARE MNGMENT TRAINING: CPT | Mod: GO | Performed by: OCCUPATIONAL THERAPIST

## 2017-04-26 PROCEDURE — 25000132 ZZH RX MED GY IP 250 OP 250 PS 637: Mod: GY | Performed by: ORTHOPAEDIC SURGERY

## 2017-04-26 PROCEDURE — 12000007 ZZH R&B INTERMEDIATE

## 2017-04-26 PROCEDURE — 36415 COLL VENOUS BLD VENIPUNCTURE: CPT | Performed by: ORTHOPAEDIC SURGERY

## 2017-04-26 PROCEDURE — 25000128 H RX IP 250 OP 636: Performed by: ORTHOPAEDIC SURGERY

## 2017-04-26 PROCEDURE — 97165 OT EVAL LOW COMPLEX 30 MIN: CPT | Mod: GO | Performed by: OCCUPATIONAL THERAPIST

## 2017-04-26 PROCEDURE — 85018 HEMOGLOBIN: CPT | Performed by: ORTHOPAEDIC SURGERY

## 2017-04-26 PROCEDURE — 97116 GAIT TRAINING THERAPY: CPT | Mod: GP

## 2017-04-26 PROCEDURE — 40000133 ZZH STATISTIC OT WARD VISIT: Performed by: OCCUPATIONAL THERAPIST

## 2017-04-26 PROCEDURE — 40000193 ZZH STATISTIC PT WARD VISIT

## 2017-04-26 RX ORDER — OXYCODONE HYDROCHLORIDE 5 MG/1
5 TABLET ORAL EVERY 4 HOURS PRN
Qty: 50 TABLET | Refills: 0 | Status: SHIPPED | OUTPATIENT
Start: 2017-04-26 | End: 2017-07-31

## 2017-04-26 RX ADMIN — PRAMIPEXOLE DIHYDROCHLORIDE 0.25 MG: 0.25 TABLET ORAL at 16:06

## 2017-04-26 RX ADMIN — OXYCODONE HYDROCHLORIDE 5 MG: 5 TABLET ORAL at 13:46

## 2017-04-26 RX ADMIN — CYCLOBENZAPRINE HYDROCHLORIDE 5 MG: 5 TABLET, FILM COATED ORAL at 00:06

## 2017-04-26 RX ADMIN — ESCITALOPRAM OXALATE 10 MG: 5 TABLET, FILM COATED ORAL at 15:10

## 2017-04-26 RX ADMIN — ACETAMINOPHEN 975 MG: 325 TABLET, FILM COATED ORAL at 09:01

## 2017-04-26 RX ADMIN — CYCLOBENZAPRINE HYDROCHLORIDE 5 MG: 5 TABLET, FILM COATED ORAL at 09:02

## 2017-04-26 RX ADMIN — SENNOSIDES AND DOCUSATE SODIUM 2 TABLET: 8.6; 5 TABLET ORAL at 09:01

## 2017-04-26 RX ADMIN — OXYCODONE HYDROCHLORIDE 5 MG: 5 TABLET ORAL at 18:54

## 2017-04-26 RX ADMIN — OMEPRAZOLE 20 MG: 20 CAPSULE, DELAYED RELEASE ORAL at 16:06

## 2017-04-26 RX ADMIN — OXYCODONE HYDROCHLORIDE 10 MG: 5 TABLET ORAL at 06:24

## 2017-04-26 RX ADMIN — OXYCODONE HYDROCHLORIDE 10 MG: 5 TABLET ORAL at 09:39

## 2017-04-26 RX ADMIN — ACETAMINOPHEN 975 MG: 325 TABLET, FILM COATED ORAL at 16:06

## 2017-04-26 RX ADMIN — ENOXAPARIN SODIUM 40 MG: 40 INJECTION SUBCUTANEOUS at 11:33

## 2017-04-26 RX ADMIN — SENNOSIDES AND DOCUSATE SODIUM 2 TABLET: 8.6; 5 TABLET ORAL at 21:10

## 2017-04-26 RX ADMIN — OXYCODONE HYDROCHLORIDE 5 MG: 5 TABLET ORAL at 23:32

## 2017-04-26 RX ADMIN — PRAMIPEXOLE DIHYDROCHLORIDE 0.25 MG: 0.25 TABLET ORAL at 21:11

## 2017-04-26 RX ADMIN — ACETAMINOPHEN 975 MG: 325 TABLET, FILM COATED ORAL at 23:32

## 2017-04-26 RX ADMIN — OMEPRAZOLE 20 MG: 20 CAPSULE, DELAYED RELEASE ORAL at 06:25

## 2017-04-26 NOTE — PLAN OF CARE
Problem: Goal Outcome Summary  Goal: Goal Outcome Summary  Outcome: Improving  Pt remains A/O up with A1 walker and GB. Voiding in BSC. Iv SL. O2 2L. Pain controlled with oxy, tylenol, and dilaudid IV. Continue to monitor.

## 2017-04-26 NOTE — PLAN OF CARE
Problem: Goal Outcome Summary  Goal: Goal Outcome Summary  Outcome: Improving  Pt A&O.  Up with assist of 1 with walker.  VSS.  Pt on RA. CMS intact. Sterile dressing in place. Regular diet.  Pain controlled with oxycodone 1 tab. Saline locked. Possible discharge 4/27.

## 2017-04-26 NOTE — PLAN OF CARE
BPCI Care Plan: home with assist and OPPT      Current Functional Status: In supine x 10 reps each for TKA ex. Min A for ROM ex. Extra time due to pain.  Sit<>supine, CGA, cues for handplacement, educate hooking opposite LE to assist.  Sit<>stand, FWW, CGA, cues for sequencing, safety.  Gait training 12' x1, 15' x1, FWW, CGA, Step to with cues for step through sequencing, and walker proximity.  initiated stairs, Min A, up/down 1 step, SEC and 1 rail, cues for sequencing. Pt. Exhibited nausea once on step needed help to come down and sit in wc.      Barriers to Plan: stairs at home not yet fully assessed

## 2017-04-26 NOTE — DISCHARGE INSTRUCTIONS
DISCHARGE INSTRUCTIONS AFTER YOUR TOTAL KNEE REPLACEMENT     YAO ROMERO MD       Instructions to care for your wound at home:   Change the dressing daily.  Inspect your incision at the time of dressing change for increased redness, tenderness, swelling, or drainage along the incision line.  Some bruising or discoloration is usually present, but call my office for any changes in appearance that concern you.  Also call if you develop a fever above 101 degrees.     You may shower directly over the wound beginning 4 days after your surgery, but do not submerge the wound under water until after your post operative visit when the sutures are removed and the wound is completely sealed without drainage.    Activities:  Physical activity may be resumed gradually according to your comfort level. You may bear weight on your operative leg as tolerated with your crutches or walker as instructed by your therapist.  Follow your home exercise program.  Ice your knee after exercising.        Wear your knee immobilizer at night only until your office visit in 2 weeks to maintain full knee extension.  Do not use the immobilizer during the day unless otherwise instructed.      Wear the anti-embolism stockings day and night until seen in the office for your post operative visit. Remove them twice daily for one hour at a time. Keep the compression stockings flat on your leg.  Do not allow them to roll up or crease your skin.  Call if you develop calf pain.     Outpatient Physical Therapy and home exercises:  Outpatient physical therapy visits are required following discharge from the hospital. The referral for these outpatient therapy visits is routinely given to you at the time of your surgical scheduling. You should have already scheduled your therapy sessions in advance.  If you have not done so, please immediately call the therapy site of your choice to schedule the physical therapy regimen that has been prescribed for you.   You may discontinue the crutches or walker per the therapist's recommendation.      Medications:  New medications for you on discharge will include a pain medication, a stool softener while on the narcotic pain medication, and a blood thinner.  Detailed instructions will come with those medications.  You will also receive instructions on when to resume your home medications.     If you routinely take Aspirin 81 mg, hold the Aspirin while taking the formal blood thinner medication. Then take Aspirin 325 mg (1 tablet) daily for 4 weeks.  Then resume your Aspirin 81 mg daily if that is your routine.        Antibiotic coverage will be needed before any type of dental procedure.  This is a life long recommendation.  You should notify your dentist of your total knee surgery and call your dentist or our office one week before a dental appointment for antibiotics.        Clinic follow-up appointment:  Your clinic follow-up appointment has been prearranged.  Call [phone] with any questions.    Lee Ayala MD

## 2017-04-26 NOTE — PROGRESS NOTES
04/26/17 1145   Quick Adds   Type of Visit Initial Occupational Therapy Evaluation   Living Environment   Lives With spouse   Living Arrangements house  (2 story home, pt can stay on first floor)   Home Accessibility stairs to enter home;stairs within home   Number of Stairs to Enter Home 1   Number of Stairs Within Home 10   Stair Railings at Home inside, present on right side;outside, present on right side   Transportation Available car;family or friend will provide   Living Environment Comment pt's spouse work, can be off work while pt recovers   Self-Care   Dominant Hand right   Usual Activity Tolerance good   Current Activity Tolerance fair   Regular Exercise yes   Activity/Exercise Type team sports  (pt golfs and plays tennis regularly)   Exercise Amount/Frequency 45 mins;3-5 times/wk   Equipment Currently Used at Home none   Functional Level Prior   Ambulation 0-->independent   Transferring 0-->independent   Toileting 0-->independent   Bathing 0-->independent   Dressing 0-->independent   Eating 0-->independent   Communication 0-->understands/communicates without difficulty   Swallowing 0-->swallows foods/liquids without difficulty   Cognition 0 - no cognition issues reported   Fall history within last six months no   General Information   Onset of Illness/Injury or Date of Surgery - Date 04/24/17   Referring Physician Lee Ayala   Patient/Family Goals Statement return home   Additional Occupational Profile Info/Pertinent History of Current Problem pt admitted for a left TKA   Precautions/Limitations fall precautions;oxygen therapy device and L/min  (2L O2)   General Observations Pt laying in  bed, daughter present   Cognitive Status Examination   Orientation orientation to person, place and time   Level of Consciousness alert   Able to Follow Commands WNL/WFL   Personal Safety (Cognitive) WNL/WFL   Memory intact   Attention No deficits were identified   Cognitive Comment pt sleepy from pain pills   Visual  "Perception   Visual Perception Wears glasses   Pain Assessment   Patient Currently in Pain No   Range of Motion (ROM)   ROM Quick Adds No deficits were identified   ROM Comment B UEs   Coordination   Upper Extremity Coordination No deficits were identified   Mobility   Bed Mobility Comments pt declined any OOB activity, too tired and sleepy from    Activities of Daily Living Analysis   Impairments Contributing to Impaired Activities of Daily Living pain;ROM decreased   General Therapy Interventions   Planned Therapy Interventions ADL retraining   Clinical Impression   Criteria for Skilled Therapeutic Interventions Met yes, treatment indicated   OT Diagnosis decreased independence in ADLS   Influenced by the following impairments pain, decreased ROM, decreased endurance   Assessment of Occupational Performance 3-5 Performance Deficits   Identified Performance Deficits decreased independence for dressing, bathing, toilet transfers, cooking and other household chores   Clinical Decision Making (Complexity) Low complexity   Therapy Frequency daily   Predicted Duration of Therapy Intervention (days/wks) 5 days   Anticipated Discharge Disposition Home with Assist   Risks and Benefits of Treatment have been explained. Yes   Patient, Family & other staff in agreement with plan of care Yes   [facility] TM \"6 Clicks\"   2016, Trustees of [facility], under license to [employer].  All rights reserved.   6 Clicks Short Forms Daily Activity Inpatient Short Form   [facility] AM-PAC  \"6 Clicks\" Daily Activity Inpatient Short Form   1. Putting on and taking off regular lower body clothing? 3 - A Little   2. Bathing (including washing, rinsing, drying)? 3 - A Little   3. Toileting, which includes using toilet, bedpan or urinal? 3 - A Little   4. Putting on and taking off regular upper body clothing? 3 - A Little   5. Taking care of personal grooming such as brushing teeth? 3 - A Little   6. Eating meals? " 3 - A Little   Daily Activity Raw Score (Score out of 24.Lower scores equate to lower levels of function) 18   Total Evaluation Time   Total Evaluation Time (Minutes) 15

## 2017-04-26 NOTE — PLAN OF CARE
Problem: Goal Outcome Summary  Goal: Goal Outcome Summary  Outcome: No Change  Pt A&O. VSS on 2 L O2. CMS intact. Dressing CDI. Regular diet. Complains of 5-8/10 pain controlled with oxycodone, tylenol, and flexeril. Saline locked. Up with 1 to BSC. Possible discharge 4/26.

## 2017-04-26 NOTE — PLAN OF CARE
Problem: Goal Outcome Summary  Goal: Goal Outcome Summary  Outcome: Therapy, progress toward functional goals is gradual  BPCI care plan: Home with assist  Current functional status.  Evaluation complete and limited treatment started.  Pt in bed, daughter present.  Pt too fatigued from PT, sleepy from pain meds.  Admitted for Left TKA.  Has been active and independent at home prior to surgery.  Educated in OT POC, AE, and EC principles, literature left.  Will work on OOB activities to check on ADL independence and AE needs.  Anticipate pt will DC home with assist from family and AE as needed.  Barriers: None seen if pt can get out of bed and tolerate activity on her feet.

## 2017-04-26 NOTE — PROGRESS NOTES
[facility] Orthopedic Post-Op / Progress Note  Hanane Sorto is a 71 year old female    Today's Date:2017  Admission Date: 2017  POD # 2 TKA         Interval History:   Struggling with side effects from narcotics vs increased pain with PT  She is small and will decrease narcotics and muscle relaxers. Accept a bit more discomfort as she is dosing during conversations.              Physical Exam:   All vitals have been reviewed  Temperatures:  Current - Temp: 98  F (36.7  C); Max - Temp  Av.1  F (36.7  C)  Min: 97.8  F (36.6  C)  Max: 98.6  F (37  C)  Pulse range: Pulse  Av  Min: 78  Max: 78  Blood pressure range: Systolic (24hrs), Av , Min:107 , Max:127   ; Diastolic (24hrs), Av, Min:47, Max:58      Intake/Output Summary (Last 24 hours) at 17 1141  Last data filed at 17 0719   Gross per 24 hour   Intake              610 ml   Output              225 ml   Net              385 ml       Wound clean and dry with minimal or no drainage.  Surrounding skin with minimal erythema.          Data:   All laboratory data related to this surgery reviewed      Lab Results   Component Value Date     2017    POTASSIUM 4.7 2017    CHLORIDE 109 2017    CO2 26 2017     (H) 2017     Lab Results   Component Value Date    HGB 9.8 (L) 2017    HGB 9.8 (L) 2017    HGB 12.9 2017     Platelet Count (10e9/L)   Date Value   2017 327   2017 351   2015 329       All imaging studies related to this surgery reviewed  Hardware is intact and in good approximation         Assessment and Plan:    Assessment:  Doing well.  Normal healing wound.  No immediate surgical complications identified.  No excessive bleeding  Labs are good    Plan:  Continue physical therapy  Pain control measures  Discharge plan: Home tomorrow    Lee Ayala MD

## 2017-04-26 NOTE — DISCHARGE SUMMARY
DISCHARGE SUMMARY    NAME:  Hanane Sorto  AGE:  71 year old  YOB: 1945  MRN#:  [id number]    Hanane Sorto was admitted for elective total knee arthroplasty.  The surgery was performed on 4/24/2017.  The postoperative course is documented in the medical record.  There were no complications. The patient was felt ready for discharge home with post-discharge physical therapy and outpatient visit prearranged.     Lab Results   Component Value Date    HGB 9.8 (L) 04/26/2017    HGB 9.8 (L) 04/25/2017    HGB 12.9 04/24/2017       The patient received 0 units transfusion.      FINAL DISCHARGE DIAGNOSIS:  Degenerative osteoarthritis knee.               Acute blood loss anemia     SURGICAL PROCEDURE THIS ADMISSION:   total knee arthroplasty.         YAO ROMERO MD      CC: Fax [phone]         Yao Romero MD

## 2017-04-27 ENCOUNTER — APPOINTMENT (OUTPATIENT)
Dept: OCCUPATIONAL THERAPY | Facility: CLINIC | Age: 72
DRG: 470 | End: 2017-04-27
Attending: ORTHOPAEDIC SURGERY
Payer: MEDICARE

## 2017-04-27 ENCOUNTER — APPOINTMENT (OUTPATIENT)
Dept: PHYSICAL THERAPY | Facility: CLINIC | Age: 72
DRG: 470 | End: 2017-04-27
Attending: ORTHOPAEDIC SURGERY
Payer: MEDICARE

## 2017-04-27 VITALS
WEIGHT: 114 LBS | TEMPERATURE: 98.4 F | OXYGEN SATURATION: 95 % | BODY MASS INDEX: 22.98 KG/M2 | RESPIRATION RATE: 16 BRPM | DIASTOLIC BLOOD PRESSURE: 61 MMHG | HEIGHT: 59 IN | HEART RATE: 85 BPM | SYSTOLIC BLOOD PRESSURE: 115 MMHG

## 2017-04-27 LAB
CREAT SERPL-MCNC: 0.82 MG/DL (ref 0.52–1.04)
GFR SERPL CREATININE-BSD FRML MDRD: 68 ML/MIN/1.7M2
PLATELET # BLD AUTO: 268 10E9/L (ref 150–450)

## 2017-04-27 PROCEDURE — 97116 GAIT TRAINING THERAPY: CPT | Mod: GP

## 2017-04-27 PROCEDURE — 97535 SELF CARE MNGMENT TRAINING: CPT | Mod: GO | Performed by: OCCUPATIONAL THERAPY ASSISTANT

## 2017-04-27 PROCEDURE — 25000132 ZZH RX MED GY IP 250 OP 250 PS 637: Mod: GY | Performed by: ORTHOPAEDIC SURGERY

## 2017-04-27 PROCEDURE — 36415 COLL VENOUS BLD VENIPUNCTURE: CPT | Performed by: ORTHOPAEDIC SURGERY

## 2017-04-27 PROCEDURE — 40000193 ZZH STATISTIC PT WARD VISIT

## 2017-04-27 PROCEDURE — 82565 ASSAY OF CREATININE: CPT | Performed by: ORTHOPAEDIC SURGERY

## 2017-04-27 PROCEDURE — A9270 NON-COVERED ITEM OR SERVICE: HCPCS | Mod: GY | Performed by: ORTHOPAEDIC SURGERY

## 2017-04-27 PROCEDURE — 25000128 H RX IP 250 OP 636: Performed by: ORTHOPAEDIC SURGERY

## 2017-04-27 PROCEDURE — 85049 AUTOMATED PLATELET COUNT: CPT | Performed by: ORTHOPAEDIC SURGERY

## 2017-04-27 PROCEDURE — 97110 THERAPEUTIC EXERCISES: CPT | Mod: GP

## 2017-04-27 PROCEDURE — 40000133 ZZH STATISTIC OT WARD VISIT: Performed by: OCCUPATIONAL THERAPY ASSISTANT

## 2017-04-27 RX ADMIN — ESCITALOPRAM OXALATE 10 MG: 5 TABLET, FILM COATED ORAL at 08:47

## 2017-04-27 RX ADMIN — OXYCODONE HYDROCHLORIDE 5 MG: 5 TABLET ORAL at 05:47

## 2017-04-27 RX ADMIN — ENOXAPARIN SODIUM 40 MG: 40 INJECTION SUBCUTANEOUS at 11:15

## 2017-04-27 RX ADMIN — ACETAMINOPHEN 975 MG: 325 TABLET, FILM COATED ORAL at 08:47

## 2017-04-27 RX ADMIN — OXYCODONE HYDROCHLORIDE 5 MG: 5 TABLET ORAL at 11:15

## 2017-04-27 RX ADMIN — OMEPRAZOLE 20 MG: 20 CAPSULE, DELAYED RELEASE ORAL at 08:47

## 2017-04-27 RX ADMIN — SENNOSIDES AND DOCUSATE SODIUM 2 TABLET: 8.6; 5 TABLET ORAL at 08:47

## 2017-04-27 NOTE — PLAN OF CARE
Problem: Goal Outcome Summary  Goal: Goal Outcome Summary  BPCI Care Plan: home with assist and OPPT      Current Functional Status: Pt. Sit<>supine, Mod indep, uses hands to lift L LE in/OOB.  Sit<>stand, FWW, SBA, cues for hand placement.  Gait training 150' x1, FWW, CGA/SBA, cues for walker proximity and step through on R.  Performed 3 steps x2, up/down, 1 rail, 1 sec, CGA, moderate cues to remember technique for safety.      Barriers to Plan: stairs assessed, pt still needs assist with sequencing.    Pt. Discharging today.  PT goals partially met.

## 2017-04-27 NOTE — PROGRESS NOTES
VSS, CMS intact. Reviewed AVS with patient. No questions/concerns at this time. Discharge medications and instruction sheets given. Dressing changed, incision looked great. Alert and orient x 4. Left floor at via 1235 wheelchair.

## 2017-04-27 NOTE — PLAN OF CARE
Problem: Goal Outcome Summary  Goal: Goal Outcome Summary  BPCI Care Plan: home with assist as needed     Current Functional Status: Pt completed toileting SBA, SBA standing at sink for ADLS, LB dressing independent, verbalized safe and independent with completing shower transfer, independent sit to supine.      Barriers to Plan: none     Pt to d/c home today with assist as needed, GOALS PARTIALLY MET, see discharge summary

## 2017-04-27 NOTE — PLAN OF CARE
Problem: Goal Outcome Summary  Goal: Goal Outcome Summary  Outcome: Improving  Pt is A&O. VSS on RA. Afebrile. CMS intact. Up with A-1. Dressing is c/d/i. Voiding adequately in the BR. IV SL. Pain managed by PO tylenol and oxycodone. Progressing well per plan of care.

## 2017-04-27 NOTE — PLAN OF CARE
Problem: Goal Outcome Summary  Goal: Goal Outcome Summary  Outcome: Improving  Pt remains A/O up with A1 walker and GB. IV SL. Pain controlled with tylenol and oxycodone. Voiding BR. Continue to monitor.

## 2017-04-28 ENCOUNTER — TELEPHONE (OUTPATIENT)
Dept: FAMILY MEDICINE | Facility: CLINIC | Age: 72
End: 2017-04-28

## 2017-04-28 NOTE — TELEPHONE ENCOUNTER
DISCHARGE SUMMARY     NAME: Hanane Sorto  AGE: 71 year old  YOB: 1945  MRN#: [id number]     Hanane Sorto was admitted for elective total knee arthroplasty. The surgery was performed on 4/24/2017. The postoperative course is documented in the medical record. There were no complications. The patient was felt ready for discharge home with post-discharge physical therapy and outpatient visit prearranged.      FINAL DISCHARGE DIAGNOSIS: Degenerative osteoarthritis knee.   Acute blood loss anemia      SURGICAL PROCEDURE THIS ADMISSION: total knee arthroplasty.

## 2017-04-28 NOTE — TELEPHONE ENCOUNTER
ED / Discharge Outreach Protocol    Patient Contact    Attempt # 1    Was call answered?  No.  Left message on voicemail with information to call me back.  Valentine Dawkins RN

## 2017-05-02 NOTE — TELEPHONE ENCOUNTER
"ED / Discharge Outreach Protocol    Patient Contact    Attempt # 2    Was call answered?  Yes.  \"May I please speak with Hanane\"  Is patient available?   Yes      Thursday appointment with Surgeon  Started PT yesterday. Is going well  Continues to have pain as expected.   She has been using oxycodone 1 tab in AM in 1 tab in PM  She is sleeping okay. When she wakes up in middle of night she will take another Oxycodone if needed to help with sleep.   She has continued Lovenox with no issues.       Hospital/TCU/ED for chronic condition Discharge Protocol    \"Hi, my name is Leandra Gorman, a registered nurse, and I am calling from [facility].  I am calling to follow up and see how things are going for you after your recent hospital stay.\"    Tell me how you are doing now that you are home?\" See above.      Discharge Instructions    \"Let's review your discharge instructions.  What is/are the follow-up recommendations?  Pt. Response: follow up with surgeon on Thursday. Continue PT.     \"Has an appointment with your primary care provider been scheduled?\"   Patient not needing PCP follow up.     \"When you see the provider, I would recommend that you bring your medications with you.\"    Medications    \"Tell me what changed about your medicines when you discharged?\"    Changes to chronic meds?    0-1    \"What questions do you have about your medications?\"    None     New diagnoses of heart failure, COPD, diabetes, or MI?    No    Medication reconciliation completed? Yes  Was MTM referral placed (*Make sure to put transitions as reason for referral)?   No    Call Summary    \"What questions or concerns do you have about your recent visit and your follow-up care?\"     none    \"If you have questions or things don't continue to improve, we encourage you contact us through the main clinic number (give number).  Even if the clinic is not open, triage nurses are available 24/7 to help you.     We would like you to know that our " "clinic has extended hours (provide information).  We also have urgent care (provide details on closest location and hours/contact info)\"      \"Thank you for your time and take care!\"    Leandra Gorman RN        "

## 2017-05-04 ENCOUNTER — TRANSFERRED RECORDS (OUTPATIENT)
Dept: HEALTH INFORMATION MANAGEMENT | Facility: CLINIC | Age: 72
End: 2017-05-04

## 2017-05-05 NOTE — ANESTHESIA POSTPROCEDURE EVALUATION
Patient: Hanane Sorto    Procedure(s):  LEFT TOTAL KNEE ARTHROPLASTY - Wound Class: I-Clean    Diagnosis:LEFT KNEE DJD   Diagnosis Additional Information: No value filed.    Anesthesia Type:  Periph. Nerve Block for postop pain, Spinal    Note:  Anesthesia Post Evaluation    Patient location during evaluation: PACU  Patient participation: Able to fully participate in evaluation  Level of consciousness: awake  Pain management: adequate  Airway patency: patent  Cardiovascular status: acceptable  Respiratory status: acceptable  Hydration status: acceptable  PONV: none     Anesthetic complications: None          Last vitals:  Vitals:    04/27/17 0836 04/27/17 1115 04/27/17 1200   BP: 115/61     Pulse:      Resp: 16 16 16   Temp: 36.9  C (98.4  F)     SpO2: 95%           Electronically Signed By: Pj Lam MD  May 5, 2017  6:52 AM

## 2017-07-12 ENCOUNTER — TRANSFERRED RECORDS (OUTPATIENT)
Dept: HEALTH INFORMATION MANAGEMENT | Facility: CLINIC | Age: 72
End: 2017-07-12

## 2017-07-14 ENCOUNTER — TRANSFERRED RECORDS (OUTPATIENT)
Dept: HEALTH INFORMATION MANAGEMENT | Facility: CLINIC | Age: 72
End: 2017-07-14

## 2017-07-21 ENCOUNTER — MYC MEDICAL ADVICE (OUTPATIENT)
Dept: FAMILY MEDICINE | Facility: CLINIC | Age: 72
End: 2017-07-21

## 2017-07-21 DIAGNOSIS — D62 ANEMIA DUE TO BLOOD LOSS, ACUTE: Primary | ICD-10-CM

## 2017-07-21 DIAGNOSIS — D62 ANEMIA DUE TO BLOOD LOSS, ACUTE: ICD-10-CM

## 2017-07-21 LAB
BASOPHILS # BLD AUTO: 0 10E9/L (ref 0–0.2)
BASOPHILS NFR BLD AUTO: 0.5 %
DIFFERENTIAL METHOD BLD: ABNORMAL
EOSINOPHIL # BLD AUTO: 0.2 10E9/L (ref 0–0.7)
EOSINOPHIL NFR BLD AUTO: 2.1 %
ERYTHROCYTE [DISTWIDTH] IN BLOOD BY AUTOMATED COUNT: 15.3 % (ref 10–15)
HCT VFR BLD AUTO: 36.5 % (ref 35–47)
HGB BLD-MCNC: 11.6 G/DL (ref 11.7–15.7)
LYMPHOCYTES # BLD AUTO: 1.5 10E9/L (ref 0.8–5.3)
LYMPHOCYTES NFR BLD AUTO: 20.7 %
MCH RBC QN AUTO: 28.3 PG (ref 26.5–33)
MCHC RBC AUTO-ENTMCNC: 31.8 G/DL (ref 31.5–36.5)
MCV RBC AUTO: 89 FL (ref 78–100)
MONOCYTES # BLD AUTO: 0.6 10E9/L (ref 0–1.3)
MONOCYTES NFR BLD AUTO: 8.1 %
NEUTROPHILS # BLD AUTO: 5 10E9/L (ref 1.6–8.3)
NEUTROPHILS NFR BLD AUTO: 68.6 %
PLATELET # BLD AUTO: 359 10E9/L (ref 150–450)
RBC # BLD AUTO: 4.1 10E12/L (ref 3.8–5.2)
WBC # BLD AUTO: 7.3 10E9/L (ref 4–11)

## 2017-07-21 PROCEDURE — 36415 COLL VENOUS BLD VENIPUNCTURE: CPT | Performed by: INTERNAL MEDICINE

## 2017-07-21 PROCEDURE — 82728 ASSAY OF FERRITIN: CPT | Performed by: INTERNAL MEDICINE

## 2017-07-21 PROCEDURE — 85025 COMPLETE CBC W/AUTO DIFF WBC: CPT | Performed by: INTERNAL MEDICINE

## 2017-07-21 NOTE — TELEPHONE ENCOUNTER
Patient informed and appt scheduled for today.        To PCP:  Please see lab orders pended.  Not sure what DX to use for this.  Please review.  Thank you.  Desire Kerr RN

## 2017-07-22 LAB — FERRITIN SERPL-MCNC: 51 NG/ML (ref 8–252)

## 2017-07-28 ENCOUNTER — TELEPHONE (OUTPATIENT)
Dept: FAMILY MEDICINE | Facility: CLINIC | Age: 72
End: 2017-07-28

## 2017-07-28 NOTE — TELEPHONE ENCOUNTER
Reason for Call:  Other call back    Detailed comments: Pt called this morning in search of an appointment with Dr. Bethea for her restless arms. Pt is having difficulty sleeping at night and would like to be seen ASAP. Please give pt a call if we are able to work her into Dr. Bethea's schedule.    Phone Number Patient can be reached at: Cell number on file:    Telephone Information:   Mobile [phone]       Best Time:     Can we leave a detailed message on this number? YES    Call taken on 7/28/2017 at 8:25 AM by Marimar Bardales

## 2017-07-28 NOTE — TELEPHONE ENCOUNTER
To PCP:  Are you able to see patient?  Please advise.  Thank you.    Desire Kerr RN    Notes Recorded by Corona Bethea MD on 7/24/2017 at 11:16 AM  Patient will need to see me  Her hemoglobin and iron have improved  But she continues to have restless leg syndrome  We will try this week to get her in

## 2017-07-29 ENCOUNTER — HEALTH MAINTENANCE LETTER (OUTPATIENT)
Age: 72
End: 2017-07-29

## 2017-07-31 ENCOUNTER — OFFICE VISIT (OUTPATIENT)
Dept: FAMILY MEDICINE | Facility: CLINIC | Age: 72
End: 2017-07-31
Payer: MEDICARE

## 2017-07-31 VITALS
HEART RATE: 73 BPM | WEIGHT: 99.9 LBS | DIASTOLIC BLOOD PRESSURE: 60 MMHG | HEIGHT: 59 IN | SYSTOLIC BLOOD PRESSURE: 108 MMHG | OXYGEN SATURATION: 100 % | BODY MASS INDEX: 20.14 KG/M2 | TEMPERATURE: 97 F

## 2017-07-31 DIAGNOSIS — G25.81 RESTLESS LEG SYNDROME: Primary | ICD-10-CM

## 2017-07-31 DIAGNOSIS — D62 ANEMIA DUE TO BLOOD LOSS, ACUTE: ICD-10-CM

## 2017-07-31 DIAGNOSIS — Z96.652 S/P TOTAL KNEE ARTHROPLASTY, LEFT: ICD-10-CM

## 2017-07-31 PROCEDURE — 99214 OFFICE O/P EST MOD 30 MIN: CPT | Performed by: INTERNAL MEDICINE

## 2017-07-31 RX ORDER — GABAPENTIN 100 MG/1
CAPSULE ORAL
Qty: 90 CAPSULE | Refills: 0 | Status: SHIPPED | OUTPATIENT
Start: 2017-07-31 | End: 2017-08-14 | Stop reason: SINTOL

## 2017-07-31 NOTE — PROGRESS NOTES
SUBJECTIVE:                                                    Hanane Sorto is a 71 year old female who presents to clinic today for the following health issues:      Chief Complaint   Patient presents with     Follow Up For     Restless Leg Syndrome. Her hemoglobin and iron have improved but she continues to have restless leg syndrome.         Patient had left TKA in 4/2017  since then, unable to gain weight   She reduced Tramadol which she took x 6 weeks   It caused no appetite  She was also on Oxycodone  She is now off x 3 weeks  Weight still not going up  She was seen by [place]  She was also noticing arm and restless legs   She has twitching in legs and arms  Not able to sleep    Orders Only on 07/21/2017   Component Date Value Ref Range Status     WBC 07/21/2017 7.3  4.0 - 11.0 10e9/L Final     RBC Count 07/21/2017 4.10  3.8 - 5.2 10e12/L Final     Hemoglobin 07/21/2017 11.6* 11.7 - 15.7 g/dL Final     Hematocrit 07/21/2017 36.5  35.0 - 47.0 % Final     MCV 07/21/2017 89  78 - 100 fl Final     MCH 07/21/2017 28.3  26.5 - 33.0 pg Final     MCHC 07/21/2017 31.8  31.5 - 36.5 g/dL Final     RDW 07/21/2017 15.3* 10.0 - 15.0 % Final     Platelet Count 07/21/2017 359  150 - 450 10e9/L Final     Diff Method 07/21/2017 Automated Method   Final     % Neutrophils 07/21/2017 68.6  % Final     % Lymphocytes 07/21/2017 20.7  % Final     % Monocytes 07/21/2017 8.1  % Final     % Eosinophils 07/21/2017 2.1  % Final     % Basophils 07/21/2017 0.5  % Final     Absolute Neutrophil 07/21/2017 5.0  1.6 - 8.3 10e9/L Final     Absolute Lymphocytes 07/21/2017 1.5  0.8 - 5.3 10e9/L Final     Absolute Monocytes 07/21/2017 0.6  0.0 - 1.3 10e9/L Final     Absolute Eosinophils 07/21/2017 0.2  0.0 - 0.7 10e9/L Final     Absolute Basophils 07/21/2017 0.0  0.0 - 0.2 10e9/L Final     Ferritin 07/21/2017 51  8 - 252 ng/mL Final       Problem list and histories reviewed & adjusted, as indicated.  Additional history: as documented    Patient  Active Problem List   Diagnosis     Hyperlipidemia with target LDL less than 130     Anxiety     Osteoporosis     Gross hematuria     Advanced directives, counseling/discussion     Osteoarthritis     Bursitis of hip     Constipation     Gastroesophageal reflux disease without esophagitis     S/P total knee arthroplasty     Past Surgical History:   Procedure Laterality Date     ARTHROPLASTY KNEE Left 2017    Procedure: ARTHROPLASTY KNEE;  LEFT TOTAL KNEE ARTHROPLASTY;  Surgeon: Lee Ayala MD;  Location: [facility] OR     BUNIONECTOMY      both      SECTION       COLONOSCOPY  8-10     LIPOSUCTION (LOCATION)       RECONSTRUCT BREAST, IMPLANT PROSTHESIS, COMBINED         Social History   Substance Use Topics     Smoking status: Former Smoker     Smokeless tobacco: Never Used      Comment: Quit 30 years ago     Alcohol use 0.0 - 2.4 oz/week     0 - 4 Standard drinks or equivalent per week      Comment: once every two weeks     Family History   Problem Relation Age of Onset     C.A.D. Father      Lipids Mother      Alzheimer Disease Mother      Breast Cancer Sister      53     Cancer - colorectal Sister      40s     Alcoholism Son          Current Outpatient Prescriptions   Medication Sig Dispense Refill     OMEPRAZOLE PO Take 20 mg by mouth 2 times daily       Acetaminophen (TYLENOL PO) Take 1,000 mg by mouth 3 times daily as needed for mild pain or fever       Docusate Sodium (WILSON STOOL SOFTENER PO) Take 3 capsules by mouth daily       Pramipexole Dihydrochloride (MIRAPEX PO) Take 0.25 mg by mouth 2 times daily At 1700 and HS       triamcinolone (NASACORT) 55 MCG/ACT Inhaler Spray 2 sprays into both nostrils daily           Reviewed and updated as needed this visit by clinical staffTobacco  Allergies  Meds  Problems       Reviewed and updated as needed this visit by Provider  Allergies  Meds  Problems         ROS:  Constitutional, HEENT, cardiovascular, pulmonary, GI, ,  "musculoskeletal, neuro, skin, endocrine and psych systems are negative, except as otherwise noted.      OBJECTIVE:   /60 (BP Location: Left arm, Cuff Size: Adult Regular)  Pulse 73  Temp 97  F (36.1  C) (Oral)  Ht 4' 10.5\" (1.486 m)  Wt 99 lb 14.4 oz (45.3 kg)  SpO2 100%  BMI 20.52 kg/m2  Body mass index is 20.52 kg/(m^2).  GENERAL: healthy, alert and no distress  NECK: no adenopathy, no asymmetry, masses, or scars and thyroid normal to palpation  RESP: lungs clear to auscultation - no rales, rhonchi or wheezes  CV: regular rate and rhythm, normal S1 S2, no S3 or S4, no murmur, click or rub, no peripheral edema and peripheral pulses strong  ABDOMEN: soft, nontender, no hepatosplenomegaly, no masses and bowel sounds normal  MS: no gross musculoskeletal defects noted, no edema    Wound looks healthy    ASSESSMENT/PLAN:             1. Restless leg syndrome  I think the patient has iron deficiency related restless leg syndrome  Tramadol probably cause weight loss  She has been to [facility] and all the evaluation records will be obtained  I think gabapentin may be a good option for weight control and restless leg control  It may cause daytime augmentation for which a b.i.d. dosing will be needed  She will see me in 4 weeks    - gabapentin (NEURONTIN) 100 MG capsule; 100 mg bed time  Increase in 3 days to 200 mg bed time  Increase in 7 days to 300 mg bed time  Dispense: 90 capsule; Refill: 0  - **CBC with platelets FUTURE anytime; Future    2. S/P total knee arthroplasty, left  She should've seemingly within 2 weeks of hospital discharge and I will report to [facility] that no appointment was made    3. Anemia due to blood loss, acute  As above  - gabapentin (NEURONTIN) 100 MG capsule; 100 mg bed time  Increase in 3 days to 200 mg bed time  Increase in 7 days to 300 mg bed time  Dispense: 90 capsule; Refill: 0  - **CBC with platelets FUTURE anytime; Future  - **Ferritin FUTURE 2mo; Future    She will " take daily she will take daily iron  Repeat follow-up and labs within 4 weeks  She will report if symptoms get worse  Weight loss workup will be done if persistent    Corona Bethea MD  [facility]

## 2017-07-31 NOTE — PATIENT INSTRUCTIONS
Restless legs syndrome (RLS) is a condition in which your legs feel extremely uncomfortable, typically in the evenings while you're sitting or lying down. It makes you feel like getting up and moving around. When you do so, the unpleasant feeling of restless legs syndrome temporarily goes away.  Restless legs syndrome can begin at any age and generally worsens as you age. Restless legs syndrome can disrupt sleep -- leading to daytime drowsiness -- and make traveling difficult.  A number of simple self-care steps and lifestyle changes may help you. Medications also help many people with restless legs syndrome.  For the most part, restless legs syndrome isn't related to a serious underlying medical problem. However, RLS sometimes accompanies other conditions, such as:  Peripheral neuropathy. This damage to the nerves in your hands and feet is sometimes due to chronic diseases such as diabetes and alcoholism.   Iron deficiency. Even without anemia, iron deficiency can cause or worsen RLS. If you have a history of bleeding from your stomach or bowels, experience heavy menstrual periods, or repeatedly donate blood, you may have iron deficiency.   Kidney failure. If you have kidney failure, you may also have iron deficiency, often with anemia. When kidneys fail to function properly, iron stores in your blood can decrease. This, with other changes in body chemistry, may cause or worsen RLS  Medication therapy  Several prescription medications, most of which were developed to treat other diseases, are available to reduce the restlessness in your legs. These include:  Medications for Parkinson's disease. These medications reduce the amount of motion in your legs by affecting the level of the chemical messenger dopamine in your brain. Two drugs, ropinirole (Requip) and pramipexole (Mirapex), are approved by the Food and Drug Administration for the treatment of moderate to severe RLS.  Another drug used to treat Parkinson's  disease, rotigotine (Neupro), may also be used to treat RLS. This drug is applied as a patch to the skin.  Doctors commonly also use other Parkinson's drugs to treat restless legs syndrome, such as a combination of carbidopa and levodopa (Sinemet). People with RLS are at no greater risk of developing Parkinson's disease than are those without RLS. Short-term side effects of Parkinson's medications are usually mild and include nausea, lightheadedness and fatigue.  Medications for epilepsy. Certain epilepsy medications, such as gabapentin (Neurontin) and gabapentin enacarbil (Horizant), may work for some people with RLS.   Opioids. Narcotic medications can relieve mild to severe symptoms, but they may be addicting if used in high doses. Some examples of these medications include codeine, oxycodone (Oxycontin, Roxicodone), the combination medicine oxycodone and acetaminophen (Percocet, Roxicet), and the combination medicine hydrocodone and acetaminophen (Lortab, Norco,Vicodin).   Muscle relaxants and sleep medications. This class of medications, known as benzodiazepines, helps you sleep better at night. But these medications don't eliminate the leg sensations, and they may cause daytime drowsiness. Commonly used sedatives for RLS include clonazepam (Klonopin), triazolam (Halcion), eszopiclone (Lunesta), ramelteon (Rozerem), temazepam (Restoril), zaleplon (Sonata) and zolpidem (Ambien).   Lifestyle and home remedies    Making simple lifestyle changes can play an important role in alleviating symptoms of RLS. These steps may help reduce the extra activity in your legs:  Take pain relievers. For very mild symptoms, taking an over-the-counter pain reliever such as ibuprofen (Advil, Motrin, others) when symptoms begin may relieve the twitching and the sensations.   Try baths and massages. Soaking in a warm bath and massaging your legs can relax your muscles.   Apply warm or cool packs. You may find that the use of heat or  cold, or alternating use of the two, lessens the sensations in your limbs.   Try relaxation techniques, such as meditation or yoga. Stress can aggravate RLS. Learn to relax, especially before going to bed at night.   Establish good sleep hygiene. Fatigue tends to worsen symptoms of RLS, so it's important that you practice good sleep hygiene. Ideally, sleep hygiene involves having a cool, quiet and comfortable sleeping environment, going to bed at the same time, rising at the same time, and getting enough sleep to feel well rested. Some people with RLS find that going to bed later and rising later in the day helps in getting enough sleep.   Exercise. Getting moderate, regular exercise may relieve symptoms of RLS, but overdoing it at the gym or working out too late in the day may intensify symptoms.   Avoid caffeine. Sometimes cutting back on caffeine may help restless legs. It's worth trying to avoid caffeine-containing products, including chocolate and caffeinated beverages, such as coffee, tea and soft drinks, for a few weeks to see if this helps.   Cut back on alcohol and tobacco. These substances also may aggravate or trigger symptoms of RLS. Test to see whether avoiding them helps.

## 2017-07-31 NOTE — NURSING NOTE
"Chief Complaint   Patient presents with     Follow Up For     Restless Leg Syndrome. Her hemoglobin and iron have improved but she continues to have restless leg syndrome.       Initial /60 (BP Location: Left arm, Cuff Size: Adult Regular)  Pulse 73  Temp 97  F (36.1  C) (Oral)  Ht 4' 10.5\" (1.486 m)  Wt 99 lb 14.4 oz (45.3 kg)  SpO2 100%  BMI 20.52 kg/m2 Estimated body mass index is 20.52 kg/(m^2) as calculated from the following:    Height as of this encounter: 4' 10.5\" (1.486 m).    Weight as of this encounter: 99 lb 14.4 oz (45.3 kg).  Medication Reconciliation: complete.      Melani Maldonado CMA      "

## 2017-07-31 NOTE — MR AVS SNAPSHOT
After Visit Summary   7/31/2017    Hanane Sorto    MRN: [id number]           Patient Information     Date Of Birth          1945        Visit Information        Provider Department      7/31/2017 2:30 PM Corona Bethea MD [facility]        Today's Diagnoses     Restless leg syndrome    -  1    S/P total knee arthroplasty, left        Anemia due to blood loss, acute          Care Instructions    Restless legs syndrome (RLS) is a condition in which your legs feel extremely uncomfortable, typically in the evenings while you're sitting or lying down. It makes you feel like getting up and moving around. When you do so, the unpleasant feeling of restless legs syndrome temporarily goes away.  Restless legs syndrome can begin at any age and generally worsens as you age. Restless legs syndrome can disrupt sleep -- leading to daytime drowsiness -- and make traveling difficult.  A number of simple self-care steps and lifestyle changes may help you. Medications also help many people with restless legs syndrome.  For the most part, restless legs syndrome isn't related to a serious underlying medical problem. However, RLS sometimes accompanies other conditions, such as:  Peripheral neuropathy. This damage to the nerves in your hands and feet is sometimes due to chronic diseases such as diabetes and alcoholism.   Iron deficiency. Even without anemia, iron deficiency can cause or worsen RLS. If you have a history of bleeding from your stomach or bowels, experience heavy menstrual periods, or repeatedly donate blood, you may have iron deficiency.   Kidney failure. If you have kidney failure, you may also have iron deficiency, often with anemia. When kidneys fail to function properly, iron stores in your blood can decrease. This, with other changes in body chemistry, may cause or worsen RLS  Medication therapy  Several prescription medications, most of which were developed to treat other diseases,  are available to reduce the restlessness in your legs. These include:  Medications for Parkinson's disease. These medications reduce the amount of motion in your legs by affecting the level of the chemical messenger dopamine in your brain. Two drugs, ropinirole (Requip) and pramipexole (Mirapex), are approved by the Food and Drug Administration for the treatment of moderate to severe RLS.  Another drug used to treat Parkinson's disease, rotigotine (Neupro), may also be used to treat RLS. This drug is applied as a patch to the skin.  Doctors commonly also use other Parkinson's drugs to treat restless legs syndrome, such as a combination of carbidopa and levodopa (Sinemet). People with RLS are at no greater risk of developing Parkinson's disease than are those without RLS. Short-term side effects of Parkinson's medications are usually mild and include nausea, lightheadedness and fatigue.  Medications for epilepsy. Certain epilepsy medications, such as gabapentin (Neurontin) and gabapentin enacarbil (Horizant), may work for some people with RLS.   Opioids. Narcotic medications can relieve mild to severe symptoms, but they may be addicting if used in high doses. Some examples of these medications include codeine, oxycodone (Oxycontin, Roxicodone), the combination medicine oxycodone and acetaminophen (Percocet, Roxicet), and the combination medicine hydrocodone and acetaminophen (Lortab, Norco,Vicodin).   Muscle relaxants and sleep medications. This class of medications, known as benzodiazepines, helps you sleep better at night. But these medications don't eliminate the leg sensations, and they may cause daytime drowsiness. Commonly used sedatives for RLS include clonazepam (Klonopin), triazolam (Halcion), eszopiclone (Lunesta), ramelteon (Rozerem), temazepam (Restoril), zaleplon (Sonata) and zolpidem (Ambien).   Lifestyle and home remedies    Making simple lifestyle changes can play an important role in alleviating  symptoms of RLS. These steps may help reduce the extra activity in your legs:  Take pain relievers. For very mild symptoms, taking an over-the-counter pain reliever such as ibuprofen (Advil, Motrin, others) when symptoms begin may relieve the twitching and the sensations.   Try baths and massages. Soaking in a warm bath and massaging your legs can relax your muscles.   Apply warm or cool packs. You may find that the use of heat or cold, or alternating use of the two, lessens the sensations in your limbs.   Try relaxation techniques, such as meditation or yoga. Stress can aggravate RLS. Learn to relax, especially before going to bed at night.   Establish good sleep hygiene. Fatigue tends to worsen symptoms of RLS, so it's important that you practice good sleep hygiene. Ideally, sleep hygiene involves having a cool, quiet and comfortable sleeping environment, going to bed at the same time, rising at the same time, and getting enough sleep to feel well rested. Some people with RLS find that going to bed later and rising later in the day helps in getting enough sleep.   Exercise. Getting moderate, regular exercise may relieve symptoms of RLS, but overdoing it at the gym or working out too late in the day may intensify symptoms.   Avoid caffeine. Sometimes cutting back on caffeine may help restless legs. It's worth trying to avoid caffeine-containing products, including chocolate and caffeinated beverages, such as coffee, tea and soft drinks, for a few weeks to see if this helps.   Cut back on alcohol and tobacco. These substances also may aggravate or trigger symptoms of RLS. Test to see whether avoiding them helps.               Follow-ups after your visit        Who to contact     If you have questions or need follow up information about today's clinic visit or your schedule please contact [facility] directly at [phone].  Normal or non-critical lab and imaging results will be communicated to you by  "MyChart, letter or phone within 4 business days after the clinic has received the results. If you do not hear from us within 7 days, please contact the clinic through [employer] or phone. If you have a critical or abnormal lab result, we will notify you by phone as soon as possible.  Submit refill requests through [employer] or call your pharmacy and they will forward the refill request to us. Please allow 3 business days for your refill to be completed.          Additional Information About Your Visit        [facility] Information     [employer] gives you secure access to your electronic health record. If you see a primary care provider, you can also send messages to your care team and make appointments. If you have questions, please call your primary care clinic.  If you do not have a primary care provider, please call [phone] and they will assist you.        Care EveryWhere ID     This is your Care EveryWhere ID. This could be used by other organizations to access your [facility] medical records  [id number]        Your Vitals Were     Pulse Temperature Height Pulse Oximetry BMI (Body Mass Index)       73 97  F (36.1  C) (Oral) 4' 10.5\" (1.486 m) 100% 20.52 kg/m2        Blood Pressure from Last 3 Encounters:   07/31/17 108/60   04/27/17 115/61   04/13/17 125/73    Weight from Last 3 Encounters:   07/31/17 99 lb 14.4 oz (45.3 kg)   04/24/17 114 lb (51.7 kg)   04/13/17 114 lb (51.7 kg)              Today, you had the following     No orders found for display         Today's Medication Changes          These changes are accurate as of: 7/31/17  2:59 PM.  If you have any questions, ask your nurse or doctor.               Start taking these medicines.        Dose/Directions    gabapentin 100 MG capsule   Commonly known as:  NEURONTIN   Used for:  Restless leg syndrome, Anemia due to blood loss, acute   Started by:  Corona Bethea MD        100 mg bed time Increase in 3 days to 200 mg bed time Increase in 7 days to 300 mg bed " time   Quantity:  90 capsule   Refills:  0            Where to get your medicines      Some of these will need a paper prescription and others can be bought over the counter.  Ask your nurse if you have questions.     Bring a paper prescription for each of these medications     gabapentin 100 MG capsule                Primary Care Provider Office Phone # Fax #    Corona Bethea [phone]       [facility] [address] [facility] [address]  [facility] [address]        Equal Access to Services     DANUTA COATS : Hadii aad ku hadasho Soomaali, waaxda luqadaha, qaybta kaalmada adeegyada, waxay idiin hayaan adeeg kharaleonor la'carol . So [facility] [phone].    ATENCIÓN: Si habla español, tiene a eisenberg disposición servicios gratuitos de asistencia lingüística. Ll[facility] [phone].    We comply with applicable federal civil rights laws and [place] laws. We do not discriminate on the basis of race, color, national origin, age, disability sex, sexual orientation or gender identity.            Thank you!     Thank you for choosing [facility]  for your care. Our goal is always to provide you with excellent care. Hearing back from our patients is one way we can continue to improve our services. Please take a few minutes to complete the written survey that you may receive in the mail after your visit with us. Thank you!             Your Updated Medication List - Protect others around you: Learn how to safely use, store and throw away your medicines at www.disposemymeds.org.          This list is accurate as of: 7/31/17  2:59 PM.  Always use your most recent med list.                   Brand Name Dispense Instructions for use Diagnosis    gabapentin 100 MG capsule    NEURONTIN    90 capsule    100 mg bed time Increase in 3 days to 200 mg bed time Increase in 7 days to 300 mg bed time    Restless leg syndrome, Anemia due to blood loss, acute       MIRAPEX PO      Take 0.25 mg by mouth 2 times daily At  1700 and HS        OMEPRAZOLE PO      Take 20 mg by mouth 2 times daily        WILSON STOOL SOFTENER PO      Take 3 capsules by mouth daily        triamcinolone 55 MCG/ACT Inhaler    NASACORT     Spray 2 sprays into both nostrils daily        TYLENOL PO      Take 1,000 mg by mouth 3 times daily as needed for mild pain or fever

## 2017-08-08 ENCOUNTER — MYC MEDICAL ADVICE (OUTPATIENT)
Dept: FAMILY MEDICINE | Facility: CLINIC | Age: 72
End: 2017-08-08

## 2017-08-08 ENCOUNTER — TRANSFERRED RECORDS (OUTPATIENT)
Dept: HEALTH INFORMATION MANAGEMENT | Facility: CLINIC | Age: 72
End: 2017-08-08

## 2017-08-08 DIAGNOSIS — G25.81 RESTLESS LEG SYNDROME: Primary | ICD-10-CM

## 2017-08-08 DIAGNOSIS — R63.4 LOSS OF WEIGHT: ICD-10-CM

## 2017-08-09 RX ORDER — PRAMIPEXOLE DIHYDROCHLORIDE 0.12 MG/1
TABLET ORAL
Qty: 90 TABLET | Refills: 3 | Status: SHIPPED | OUTPATIENT
Start: 2017-08-09 | End: 2017-08-28

## 2017-08-28 ENCOUNTER — OFFICE VISIT (OUTPATIENT)
Dept: FAMILY MEDICINE | Facility: CLINIC | Age: 72
End: 2017-08-28
Payer: MEDICARE

## 2017-08-28 VITALS
OXYGEN SATURATION: 99 % | DIASTOLIC BLOOD PRESSURE: 72 MMHG | TEMPERATURE: 97.2 F | WEIGHT: 101.9 LBS | HEART RATE: 81 BPM | HEIGHT: 59 IN | BODY MASS INDEX: 20.54 KG/M2 | SYSTOLIC BLOOD PRESSURE: 112 MMHG

## 2017-08-28 DIAGNOSIS — E46 MALNUTRITION (H): ICD-10-CM

## 2017-08-28 DIAGNOSIS — Z96.652 S/P TOTAL KNEE ARTHROPLASTY, LEFT: ICD-10-CM

## 2017-08-28 DIAGNOSIS — G25.81 RESTLESS LEG SYNDROME: ICD-10-CM

## 2017-08-28 DIAGNOSIS — E53.8 VITAMIN B12 DEFICIENCY (NON ANEMIC): ICD-10-CM

## 2017-08-28 DIAGNOSIS — S62.102D FRACTURE OF WRIST, LEFT, WITH ROUTINE HEALING, SUBSEQUENT ENCOUNTER: Primary | ICD-10-CM

## 2017-08-28 DIAGNOSIS — M81.0 AGE-RELATED OSTEOPOROSIS WITHOUT CURRENT PATHOLOGICAL FRACTURE: ICD-10-CM

## 2017-08-28 DIAGNOSIS — E78.5 HYPERLIPIDEMIA LDL GOAL <130: ICD-10-CM

## 2017-08-28 PROCEDURE — 99214 OFFICE O/P EST MOD 30 MIN: CPT | Mod: 25 | Performed by: INTERNAL MEDICINE

## 2017-08-28 PROCEDURE — 96372 THER/PROPH/DIAG INJ SC/IM: CPT | Performed by: INTERNAL MEDICINE

## 2017-08-28 RX ORDER — PRAMIPEXOLE DIHYDROCHLORIDE 0.25 MG/1
0.25 TABLET ORAL 2 TIMES DAILY
Qty: 180 TABLET | Refills: 3 | Status: SHIPPED | OUTPATIENT
Start: 2017-08-28 | End: 2017-12-04

## 2017-08-28 RX ORDER — CYANOCOBALAMIN 1000 UG/ML
1 INJECTION, SOLUTION INTRAMUSCULAR; SUBCUTANEOUS ONCE
Qty: 1 ML | Refills: 0 | OUTPATIENT
Start: 2017-08-28 | End: 2017-08-28

## 2017-08-28 NOTE — PROGRESS NOTES
SUBJECTIVE:   Hanane Sorto is a 71 year old female who presents to clinic today for the following health issues:      Chief Complaint   Patient presents with     Follow Up For     Restless leg syndrome, Loss of weight          Patient has improved RLS  She fell accidentally and hit her face and left wrist was broken  Seen in [facility]  Has a splint  She has no pain and sleep is improved    Problem list and histories reviewed & adjusted, as indicated.  Additional history: as documented    Patient Active Problem List   Diagnosis     Hyperlipidemia with target LDL less than 130     Anxiety     Osteoporosis     Gross hematuria     Advanced directives, counseling/discussion     Osteoarthritis     Bursitis of hip     Constipation     Gastroesophageal reflux disease without esophagitis     S/P total knee arthroplasty     Fracture of wrist, left, with routine healing, subsequent encounter     Malnutrition (H)     Hyperlipidemia LDL goal <130     Past Surgical History:   Procedure Laterality Date     ARTHROPLASTY KNEE Left 2017    Procedure: ARTHROPLASTY KNEE;  LEFT TOTAL KNEE ARTHROPLASTY;  Surgeon: Lee Ayala MD;  Location: [facility] OR     BUNIONECTOMY      both      SECTION       COLONOSCOPY  8-10     LIPOSUCTION (LOCATION)       RECONSTRUCT BREAST, IMPLANT PROSTHESIS, COMBINED         Social History   Substance Use Topics     Smoking status: Former Smoker     Smokeless tobacco: Never Used      Comment: Quit 30 years ago     Alcohol use 0.0 - 2.4 oz/week     0 - 4 Standard drinks or equivalent per week      Comment: once every two weeks     Family History   Problem Relation Age of Onset     C.A.D. Father      Lipids Mother      Alzheimer Disease Mother      Breast Cancer Sister      53     Cancer - colorectal Sister      40s     Alcoholism Son          Current Outpatient Prescriptions   Medication Sig Dispense Refill     pramipexole (MIRAPEX) 0.25 MG tablet Take 1 tablet (0.25 mg) by mouth 2 times  "daily At 1700 and  tablet 3     OMEPRAZOLE PO Take 20 mg by mouth 2 times daily       Acetaminophen (TYLENOL PO) Take 1,000 mg by mouth 3 times daily as needed for mild pain or fever       Docusate Sodium (WILSON STOOL SOFTENER PO) Take 3 capsules by mouth daily       triamcinolone (NASACORT) 55 MCG/ACT Inhaler Spray 2 sprays into both nostrils daily       [DISCONTINUED] pramipexole (MIRAPEX) 0.125 MG tablet 1-2 tablets at night for restless leg 90 tablet 3     [DISCONTINUED] Pramipexole Dihydrochloride (MIRAPEX PO) Take 0.25 mg by mouth 2 times daily At 1700 and HS           Reviewed and updated as needed this visit by clinical staffTobacco  Allergies  Meds  Problems       Reviewed and updated as needed this visit by Provider  Allergies  Meds  Problems         ROS:  Constitutional, HEENT, cardiovascular, pulmonary, GI, , musculoskeletal, neuro, skin, endocrine and psych systems are negative, except as otherwise noted.      OBJECTIVE:   /72 (BP Location: Right arm, Cuff Size: Adult Regular)  Pulse 81  Temp 97.2  F (36.2  C) (Oral)  Ht 4' 10.5\" (1.486 m)  Wt 101 lb 14.4 oz (46.2 kg)  SpO2 99%  BMI 20.93 kg/m2  Body mass index is 20.93 kg/(m^2).  GENERAL: healthy, alert and no distress  NECK: no adenopathy, no asymmetry, masses, or scars and thyroid normal to palpation  RESP: lungs clear to auscultation - no rales, rhonchi or wheezes  CV: regular rate and rhythm, normal S1 S2, no S3 or S4, no murmur, click or rub, no peripheral edema and peripheral pulses strong  ABDOMEN: soft, nontender, no hepatosplenomegaly, no masses and bowel sounds normal  MS: no gross musculoskeletal defects noted, no edema    Left TKA healthy  Left wrist in splint  Black eye    ASSESSMENT/PLAN:             1. Fracture of wrist, left, with routine healing, subsequent encounter  She has Dexa done at [facility]  Was told osteopenia  Will get records    2. Age-related osteoporosis without current pathological fracture  May " need to Dexa wrist if not done to see if Prescription needed      3. Restless leg syndrome  Will give Vitamin B12 Injection  Based on [facility] lab below 300  Will see her back  Can stop 5 PM dose and take night time only  Iron level normal.   - pramipexole (MIRAPEX) 0.25 MG tablet; Take 1 tablet (0.25 mg) by mouth 2 times daily At 1700 and HS  Dispense: 180 tablet; Refill: 3    4. S/P total knee arthroplasty, left  Improved and normal.     5. Malnutrition (H)  Does not want to see dietician  Will see me back 3 molnths    6. Vitamin B12 deficiency (non anemic)    - B12 - 1000 MCG  - INJECTION INTRAMUSCULAR OR SUB-Q    7. Hyperlipidemia LDL goal <130        Improved, but could be from weight loss  Will Repeat       Corona Bethea MD  [facility]

## 2017-08-28 NOTE — NURSING NOTE
"Chief Complaint   Patient presents with     Follow Up For     Restless leg syndrome, Loss of weight        Initial /72 (BP Location: Right arm, Cuff Size: Adult Regular)  Pulse 81  Temp 97.2  F (36.2  C) (Oral)  Ht 4' 10.5\" (1.486 m)  Wt 101 lb 14.4 oz (46.2 kg)  SpO2 99%  BMI 20.93 kg/m2 Estimated body mass index is 20.93 kg/(m^2) as calculated from the following:    Height as of this encounter: 4' 10.5\" (1.486 m).    Weight as of this encounter: 101 lb 14.4 oz (46.2 kg).  Medication Reconciliation: complete.      Melani Maldonado ,MANAN      "

## 2017-08-28 NOTE — MR AVS SNAPSHOT
After Visit Summary   8/28/2017    Hanane Sorto    MRN: [id number]           Patient Information     Date Of Birth          1945        Visit Information        Provider Department      8/28/2017 10:30 AM Corona Bethea MD [facility]        Today's Diagnoses     Fracture of wrist, left, with routine healing, subsequent encounter    -  1    Age-related osteoporosis without current pathological fracture        Restless leg syndrome        S/P total knee arthroplasty, left        Malnutrition (H)        Vitamin B12 deficiency (non anemic)        Hyperlipidemia LDL goal <130           Follow-ups after your visit        Who to contact     If you have questions or need follow up information about today's clinic visit or your schedule please contact [facility] directly at [phone].  Normal or non-critical lab and imaging results will be communicated to you by Alliquahart, letter or phone within 4 business days after the clinic has received the results. If you do not hear from us within 7 days, please contact the clinic through Alliquahart or phone. If you have a critical or abnormal lab result, we will notify you by phone as soon as possible.  Submit refill requests through bitFlyer or call your pharmacy and they will forward the refill request to us. Please allow 3 business days for your refill to be completed.          Additional Information About Your Visit        MyCharNewtricious Information     bitFlyer gives you secure access to your electronic health record. If you see a primary care provider, you can also send messages to your care team and make appointments. If you have questions, please call your primary care clinic.  If you do not have a primary care provider, please call [phone] and they will assist you.        Care EveryWhere ID     This is your Care EveryWhere ID. This could be used by other organizations to access your [place] medical records  [id number]        Your  "Vitals Were     Pulse Temperature Height Pulse Oximetry BMI (Body Mass Index)       81 97.2  F (36.2  C) (Oral) 4' 10.5\" (1.486 m) 99% 20.93 kg/m2        Blood Pressure from Last 3 Encounters:   08/28/17 112/72   07/31/17 108/60   04/27/17 115/61    Weight from Last 3 Encounters:   08/28/17 101 lb 14.4 oz (46.2 kg)   07/31/17 99 lb 14.4 oz (45.3 kg)   04/24/17 114 lb (51.7 kg)              Today, you had the following     No orders found for display         Today's Medication Changes          These changes are accurate as of: 8/28/17 10:55 AM.  If you have any questions, ask your nurse or doctor.               These medicines have changed or have updated prescriptions.        Dose/Directions    pramipexole 0.25 MG tablet   Commonly known as:  MIRAPEX   This may have changed:    - medication strength  - Another medication with the same name was removed. Continue taking this medication, and follow the directions you see here.   Used for:  Restless leg syndrome   Changed by:  Corona Bethea MD        Dose:  0.25 mg   Take 1 tablet (0.25 mg) by mouth 2 times daily At 1700 and HS   Quantity:  180 tablet   Refills:  3            Where to get your medicines      [id number] [place], [place] - [address] AT [facility] [address] [facility]  [address] [facility] [place] [address]     Phone:  [phone]     pramipexole 0.25 MG tablet                Primary Care Provider Office Phone # Fax #    Corona Bethea [phone] [address] [place] [address] [place] [address]        Equal Access to Services     DANUTA COATS AH: Sotero Telles, wayash luqbasil, qaybta kaalsindy beavers, radha biggs. So [facility] [phone].    ATENCIÓN: Si habla español, tiene a eisenberg disposición servicios gratuitos de asistencia lingüística. Llame al [phone].    We comply with applicable federal civil rights laws and " [place] laws. We do not discriminate on the basis of race, color, national origin, age, disability sex, sexual orientation or gender identity.            Thank you!     Thank you for choosing [facility]  for your care. Our goal is always to provide you with excellent care. Hearing back from our patients is one way we can continue to improve our services. Please take a few minutes to complete the written survey that you may receive in the mail after your visit with us. Thank you!             Your Updated Medication List - Protect others around you: Learn how to safely use, store and throw away your medicines at www.disposemymeds.org.          This list is accurate as of: 8/28/17 10:55 AM.  Always use your most recent med list.                   Brand Name Dispense Instructions for use Diagnosis    OMEPRAZOLE PO      Take 20 mg by mouth 2 times daily        WILSON STOOL SOFTENER PO      Take 3 capsules by mouth daily        pramipexole 0.25 MG tablet    MIRAPEX    180 tablet    Take 1 tablet (0.25 mg) by mouth 2 times daily At 1700 and HS    Restless leg syndrome       triamcinolone 55 MCG/ACT Inhaler    NASACORT     Spray 2 sprays into both nostrils daily        TYLENOL PO      Take 1,000 mg by mouth 3 times daily as needed for mild pain or fever

## 2017-09-13 ENCOUNTER — HOSPITAL ENCOUNTER (OUTPATIENT)
Dept: CT IMAGING | Facility: CLINIC | Age: 72
Discharge: HOME | End: 2017-09-13
Attending: INTERNAL MEDICINE | Admitting: INTERNAL MEDICINE
Payer: MEDICARE

## 2017-09-13 LAB
CREAT BLD-MCNC: 0.8 MG/DL (ref 0.52–1.04)
GFR SERPL CREATININE-BSD FRML MDRD: 71 ML/MIN/1.7M2

## 2017-09-13 PROCEDURE — 25000128 H RX IP 250 OP 636: Performed by: INTERNAL MEDICINE

## 2017-09-13 PROCEDURE — 74160 CT ABDOMEN W/CONTRAST: CPT

## 2017-09-13 PROCEDURE — 25000125 ZZHC RX 250: Performed by: INTERNAL MEDICINE

## 2017-09-13 PROCEDURE — 82565 ASSAY OF CREATININE: CPT

## 2017-09-13 RX ORDER — IOPAMIDOL 755 MG/ML
100 INJECTION, SOLUTION INTRAVASCULAR ONCE
Status: COMPLETED | OUTPATIENT
Start: 2017-09-13 | End: 2017-09-13

## 2017-09-13 RX ADMIN — SODIUM CHLORIDE 70 ML: 9 INJECTION, SOLUTION INTRAVENOUS at 08:47

## 2017-09-13 RX ADMIN — IOPAMIDOL 100 ML: 755 INJECTION, SOLUTION INTRAVENOUS at 08:47

## 2017-12-04 ENCOUNTER — OFFICE VISIT (OUTPATIENT)
Dept: FAMILY MEDICINE | Facility: CLINIC | Age: 72
End: 2017-12-04
Payer: MEDICARE

## 2017-12-04 VITALS
TEMPERATURE: 97.4 F | HEART RATE: 68 BPM | DIASTOLIC BLOOD PRESSURE: 70 MMHG | BODY MASS INDEX: 21.17 KG/M2 | WEIGHT: 105 LBS | OXYGEN SATURATION: 98 % | HEIGHT: 59 IN | SYSTOLIC BLOOD PRESSURE: 129 MMHG

## 2017-12-04 DIAGNOSIS — E46 MALNUTRITION, UNSPECIFIED TYPE (H): Primary | ICD-10-CM

## 2017-12-04 DIAGNOSIS — G25.81 RESTLESS LEG SYNDROME: ICD-10-CM

## 2017-12-04 DIAGNOSIS — M70.61 TROCHANTERIC BURSITIS OF RIGHT HIP: ICD-10-CM

## 2017-12-04 DIAGNOSIS — D62 ANEMIA DUE TO BLOOD LOSS, ACUTE: ICD-10-CM

## 2017-12-04 DIAGNOSIS — K59.00 CONSTIPATION, UNSPECIFIED CONSTIPATION TYPE: ICD-10-CM

## 2017-12-04 DIAGNOSIS — J34.89 SINUS DRAINAGE: ICD-10-CM

## 2017-12-04 DIAGNOSIS — E78.5 HYPERLIPIDEMIA WITH TARGET LDL LESS THAN 130: ICD-10-CM

## 2017-12-04 DIAGNOSIS — Z96.652 STATUS POST TOTAL LEFT KNEE REPLACEMENT: ICD-10-CM

## 2017-12-04 DIAGNOSIS — F41.9 ANXIETY: ICD-10-CM

## 2017-12-04 DIAGNOSIS — E78.5 HYPERLIPIDEMIA LDL GOAL <130: ICD-10-CM

## 2017-12-04 DIAGNOSIS — K21.9 GASTROESOPHAGEAL REFLUX DISEASE WITHOUT ESOPHAGITIS: ICD-10-CM

## 2017-12-04 LAB
ERYTHROCYTE [DISTWIDTH] IN BLOOD BY AUTOMATED COUNT: 13.8 % (ref 10–15)
HCT VFR BLD AUTO: 39.9 % (ref 35–47)
HGB BLD-MCNC: 13.1 G/DL (ref 11.7–15.7)
MCH RBC QN AUTO: 30.3 PG (ref 26.5–33)
MCHC RBC AUTO-ENTMCNC: 32.8 G/DL (ref 31.5–36.5)
MCV RBC AUTO: 92 FL (ref 78–100)
PLATELET # BLD AUTO: 291 10E9/L (ref 150–450)
RBC # BLD AUTO: 4.33 10E12/L (ref 3.8–5.2)
WBC # BLD AUTO: 6.1 10E9/L (ref 4–11)

## 2017-12-04 PROCEDURE — 20610 DRAIN/INJ JOINT/BURSA W/O US: CPT | Performed by: INTERNAL MEDICINE

## 2017-12-04 PROCEDURE — 99214 OFFICE O/P EST MOD 30 MIN: CPT | Mod: 25 | Performed by: INTERNAL MEDICINE

## 2017-12-04 PROCEDURE — 82040 ASSAY OF SERUM ALBUMIN: CPT | Performed by: PATHOLOGY

## 2017-12-04 PROCEDURE — 36415 COLL VENOUS BLD VENIPUNCTURE: CPT | Performed by: PATHOLOGY

## 2017-12-04 PROCEDURE — 85027 COMPLETE CBC AUTOMATED: CPT | Performed by: INTERNAL MEDICINE

## 2017-12-04 RX ORDER — FEXOFENADINE HCL 60 MG/1
60 TABLET, FILM COATED ORAL 2 TIMES DAILY
COMMUNITY
End: 2018-04-24

## 2017-12-04 RX ORDER — OMEGA-3 FATTY ACIDS/FISH OIL 300-1000MG
200 CAPSULE ORAL AT BEDTIME
Qty: 120 CAPSULE | COMMUNITY
Start: 2017-12-04 | End: 2018-04-24

## 2017-12-04 RX ORDER — PRAMIPEXOLE DIHYDROCHLORIDE 0.25 MG/1
0.25 TABLET ORAL 2 TIMES DAILY
Qty: 180 TABLET | Refills: 3 | Status: SHIPPED | OUTPATIENT
Start: 2017-12-04 | End: 2018-10-29

## 2017-12-04 RX ORDER — TRIAMCINOLONE ACETONIDE 55 UG/1
2 SPRAY, METERED NASAL DAILY
Qty: 1 BOTTLE | Refills: 3 | Status: SHIPPED | OUTPATIENT
Start: 2017-12-04 | End: 2018-04-24

## 2017-12-04 NOTE — MR AVS SNAPSHOT
"              After Visit Summary   12/4/2017    Hanane Sorto    MRN: [id number]           Patient Information     Date Of Birth          1945        Visit Information        Provider Department      12/4/2017 11:00 AM Corona Bethea MD [facility]        Today's Diagnoses     Malnutrition, unspecified type (H)    -  1    Gastroesophageal reflux disease without esophagitis        Restless leg syndrome        Trochanteric bursitis of right hip        Sinus drainage        Constipation, unspecified constipation type        Hyperlipidemia LDL goal <130        Hyperlipidemia with target LDL less than 130        Anxiety        Status post total left knee replacement        Anemia due to blood loss, acute          Care Instructions    Stop Advil PM and advil at night  Tylenol is safer option               Follow-ups after your visit        Your next 10 appointments already scheduled     Dec 06, 2017 10:00 AM NANCIE BULLOCK SCREEN IMPLANTS BILATERAL W/ FLORA with SHBCMA2   [facility] ([facility])    [address] [place] [address], [address]  [facility] [id number] [phone]           Do not use any powder, lotion or deodorant under your arms or on your breast. If you do, we will ask you to remove it before your exam.  Wear comfortable, two-piece clothing.  If you have any allergies, tell your care team.  Bring any previous mammograms from other facilities or have them mailed to the breast center.  Three-dimensional (3D) mammograms are available at [facility] locations in [facility], [place], [facility], [place], [place], and [place]. [facility] locations include [place] [facility] in [place]. Benefits of 3D mammograms include: - Improved rate of cancer detection - Decreases your chance of having to go back for more tests, which means fewer: - \"False-positive\" results (This means that there is an abnormal area but it isn't cancer.) - " "Invasive testing procedures, such as a biopsy or surgery - Can provide clearer images of the breast if you have dense breast tissue. 3D mammography is an optional exam that anyone can have with a 2D mammogram. It doesn't replace or take the place of a 2D mammogram. 2D mammograms remain an effective screening test for all women.  Not all insurance companies cover the cost of a 3D mammogram. Check with your insurance.              Who to contact     If you have questions or need follow up information about today's clinic visit or your schedule please contact [facility] directly at [phone].  Normal or non-critical lab and imaging results will be communicated to you by e-volohart, letter or phone within 4 business days after the clinic has received the results. If you do not hear from us within 7 days, please contact the clinic through Dolor Technologiest or phone. If you have a critical or abnormal lab result, we will notify you by phone as soon as possible.  Submit refill requests through Paybook or call your pharmacy and they will forward the refill request to us. Please allow 3 business days for your refill to be completed.          Additional Information About Your Visit        e-volohart Information     Paybook gives you secure access to your electronic health record. If you see a primary care provider, you can also send messages to your care team and make appointments. If you have questions, please call your primary care clinic.  If you do not have a primary care provider, please call [phone] and they will assist you.        Care EveryWhere ID     This is your Care EveryWhere ID. This could be used by other organizations to access your [place] medical records  [id number]        Your Vitals Were     Pulse Temperature Height Pulse Oximetry BMI (Body Mass Index)       68 97.4  F (36.3  C) (Oral) 4' 10.5\" (1.486 m) 98% 21.57 kg/m2        Blood Pressure from Last 3 Encounters:   12/04/17 129/70   08/28/17 " 112/72   07/31/17 108/60    Weight from Last 3 Encounters:   12/04/17 105 lb (47.6 kg)   08/28/17 101 lb 14.4 oz (46.2 kg)   07/31/17 99 lb 14.4 oz (45.3 kg)              We Performed the Following     **CBC with platelets FUTURE anytime     Albumin level     DRAIN/INJECT LARGE JOINT/BURSA          Today's Medication Changes          These changes are accurate as of: 12/4/17 11:48 AM.  If you have any questions, ask your nurse or doctor.               These medicines have changed or have updated prescriptions.        Dose/Directions    omeprazole 20 MG CR capsule   Commonly known as:  priLOSEC   This may have changed:  medication strength   Used for:  Gastroesophageal reflux disease without esophagitis   Changed by:  Corona Bethea MD        Dose:  20 mg   Take 1 capsule (20 mg) by mouth 2 times daily   Quantity:  180 capsule   Refills:  11            Where to get your medicines      These medications were sent to [facility] [address] [place], [place] - [address] AT [facility] OF [facility] [address] [facility]  [address] [facility] [place] [address]     Phone:  [phone]     omeprazole 20 MG CR capsule    pramipexole 0.25 MG tablet    triamcinolone 55 MCG/ACT Inhaler                Primary Care Provider Office Phone # Fax #    Corona Bethea [phone] [address]  [facility] [address]        Equal Access to Services     PRAVIN COATS AH: Hadii aad ku hadasho Soomaali, waaxda luqadaha, qaybta kaalmada adeegyaluke, waxay juanis biggs. So [facility] [phone].    ATENCIÓN: Si habla español, tiene a eisenberg disposición servicios gratuitos de asistencia lingüística. Tessa al [phone].    We comply with applicable federal civil rights laws and [place] laws. We do not discriminate on the basis of race, color, national origin, age, disability, sex, sexual orientation, or gender identity.            Thank you!     Thank you for choosing [facility]  [facility]  for your care. Our goal is always to provide you with excellent care. Hearing back from our patients is one way we can continue to improve our services. Please take a few minutes to complete the written survey that you may receive in the mail after your visit with us. Thank you!             Your Updated Medication List - Protect others around you: Learn how to safely use, store and throw away your medicines at www.disposemymeds.org.          This list is accurate as of: 12/4/17 11:48 AM.  Always use your most recent med list.                   Brand Name Dispense Instructions for use Diagnosis    ALLEGRA ALLERGY 60 MG tablet   Generic drug:  fexofenadine      Take 60 mg by mouth 2 times daily        ibuprofen 200 MG capsule     120 capsule    Take 200 mg by mouth At Bedtime        omeprazole 20 MG CR capsule    priLOSEC    180 capsule    Take 1 capsule (20 mg) by mouth 2 times daily    Gastroesophageal reflux disease without esophagitis       WILSON STOOL SOFTENER PO      Take 3 capsules by mouth daily        pramipexole 0.25 MG tablet    MIRAPEX    180 tablet    Take 1 tablet (0.25 mg) by mouth 2 times daily At 1700 and HS    Restless leg syndrome       triamcinolone 55 MCG/ACT Inhaler    NASACORT    1 Bottle    Spray 2 sprays into both nostrils daily    Gastroesophageal reflux disease without esophagitis

## 2017-12-04 NOTE — PROGRESS NOTES
SUBJECTIVE:   Hanane Sorto is a 72 year old female who presents to clinic today for the following health issues:    Sense is that her wrist and knee are both improving  But she is still not sleeping  Hyperlipidemia Follow-Up      Rate your low fat/cholesterol diet?: fair    Taking statin?  No    Other lipid medications/supplements?:  none      Follow-Up for Malnutrition, unspecified type (H)   She's trying to eat and has gained some weight    Follow-Up for Gastroesophageal reflux disease without esophagitis     Heartburn is stable     Right hip is painful and she takes Advil twice daily in the night     Problem list and histories reviewed & adjusted, as indicated.  Additional history: as documented    Patient Active Problem List   Diagnosis     Hyperlipidemia with target LDL less than 130     Anxiety     Osteoporosis     Gross hematuria     Advanced directives, counseling/discussion     Osteoarthritis     Bursitis of hip     Constipation     Gastroesophageal reflux disease without esophagitis     S/P total knee arthroplasty     Fracture of wrist, left, with routine healing, subsequent encounter     Malnutrition (H)     Hyperlipidemia LDL goal <130     Past Surgical History:   Procedure Laterality Date     ARTHROPLASTY KNEE Left 2017    Procedure: ARTHROPLASTY KNEE;  LEFT TOTAL KNEE ARTHROPLASTY;  Surgeon: Lee Ayala MD;  Location: [facility] OR     BUNIONECTOMY      both      SECTION       COLONOSCOPY  8-10     LIPOSUCTION (LOCATION)       RECONSTRUCT BREAST, IMPLANT PROSTHESIS, COMBINED         Social History   Substance Use Topics     Smoking status: Former Smoker     Smokeless tobacco: Never Used      Comment: Quit 30 years ago     Alcohol use 0.0 - 2.4 oz/week     0 - 4 Standard drinks or equivalent per week      Comment: once every two weeks     Family History   Problem Relation Age of Onset     C.A.D. Father      Lipids Mother      Alzheimer Disease Mother      Breast Cancer Sister   "    53     Cancer - colorectal Sister      40s     Alcoholism Son          Current Outpatient Prescriptions   Medication Sig Dispense Refill     fexofenadine (ALLEGRA ALLERGY) 60 MG tablet Take 60 mg by mouth 2 times daily       pramipexole (MIRAPEX) 0.25 MG tablet Take 1 tablet (0.25 mg) by mouth 2 times daily At 1700 and  tablet 3     omeprazole (PRILOSEC) 20 MG CR capsule Take 1 capsule (20 mg) by mouth 2 times daily 180 capsule 11     triamcinolone (NASACORT) 55 MCG/ACT Inhaler Spray 2 sprays into both nostrils daily 1 Bottle 3     ibuprofen 200 MG capsule Take 200 mg by mouth At Bedtime 120 capsule      Docusate Sodium (WILSON STOOL SOFTENER PO) Take 3 capsules by mouth daily       [DISCONTINUED] pramipexole (MIRAPEX) 0.25 MG tablet Take 1 tablet (0.25 mg) by mouth 2 times daily At 1700 and  tablet 3         Reviewed and updated as needed this visit by clinical staffAllergies  Meds  Problems       Reviewed and updated as needed this visit by Provider  Allergies  Meds  Problems         ROS:  Constitutional, HEENT, cardiovascular, pulmonary, GI, , musculoskeletal, neuro, skin, endocrine and psych systems are negative, except as otherwise noted.      OBJECTIVE:   /70 (BP Location: Left arm, Cuff Size: Adult Regular)  Pulse 68  Temp 97.4  F (36.3  C) (Oral)  Ht 4' 10.5\" (1.486 m)  Wt 105 lb (47.6 kg)  SpO2 98%  BMI 21.57 kg/m2  Body mass index is 21.57 kg/(m^2).  GENERAL: healthy, alert and no distress  NECK: no adenopathy, no asymmetry, masses, or scars and thyroid normal to palpation  RESP: lungs clear to auscultation - no rales, rhonchi or wheezes  CV: regular rate and rhythm, normal S1 S2, no S3 or S4, no murmur, click or rub, no peripheral edema and peripheral pulses strong  ABDOMEN: soft, nontender, no hepatosplenomegaly, no masses and bowel sounds normal  MS: no gross musculoskeletal defects noted, no edema    Tender at the right hip trochanteric bursa      ASSESSMENT/PLAN: "             Hanane was seen today for recheck.    Diagnoses and all orders for this visit:    Malnutrition, unspecified type (H)  -     Albumin level     she is gaining weight and doesn't want to see nutritionist Gastroesophageal reflux disease without esophagitis  -     omeprazole (PRILOSEC) 20 MG CR capsule; Take 1 capsule (20 mg) by mouth 2 times daily  -     triamcinolone (NASACORT) 55 MCG/ACT Inhaler; Spray 2 sprays into both nostrils daily    Restless leg syndrome  -     pramipexole (MIRAPEX) 0.25 MG tablet; Take 1 tablet (0.25 mg) by mouth 2 times daily At 1700 and HS  -     **CBC with platelets FUTURE anytime    Trochanteric bursitis of right hip  -     DRAIN/INJECT LARGE JOINT/BURSA  Under aseptic precaution with Patient's  consent today, we injected right Trochanteric  bursa with 40 mg per mL- 1 ml  of kenalog and  1 cc of 1% lidocaine   Local side effect of swelling and infection discussed. Area was cleaned with Betadine and alcohol prior to injection.  Advised to avoid bending and twisting for 3 days.    Patient tolerated procedure very well.  Local ice and postural precautions discussed    Sinus drainage  Refills were done  Constipation, unspecified constipation type  Taking milk of magnesia p.r.n.  Hyperlipidemia LDL goal <130    She will do the labs in February    Anxiety doing quite well     Status post total left knee replacement  If the pain persists in the right thigh did see the orthopedic surgeon    Anemia due to blood loss, acute  -     **CBC with platelets FUTURE anytime    Other orders  -     Cancel: GASTROENTEROLOGY ADULT REF PROCEDURE ONLY    Due for colonoscopy    She will see me in February    Corona Bethea MD  [facility]

## 2017-12-04 NOTE — NURSING NOTE
"Chief Complaint   Patient presents with     RECHECK       Initial /70 (BP Location: Left arm, Cuff Size: Adult Regular)  Pulse 68  Temp 97.4  F (36.3  C) (Oral)  Ht 4' 10.5\" (1.486 m)  Wt 105 lb (47.6 kg)  SpO2 98%  BMI 21.57 kg/m2 Estimated body mass index is 21.57 kg/(m^2) as calculated from the following:    Height as of this encounter: 4' 10.5\" (1.486 m).    Weight as of this encounter: 105 lb (47.6 kg).  Medication Reconciliation: complete       Melani Maldonado CMA      "

## 2017-12-05 DIAGNOSIS — K21.9 GASTROESOPHAGEAL REFLUX DISEASE WITHOUT ESOPHAGITIS: ICD-10-CM

## 2017-12-05 LAB — ALBUMIN SERPL-MCNC: 4.1 G/DL (ref 3.4–5)

## 2017-12-05 NOTE — TELEPHONE ENCOUNTER
Change in therapy at MD appt yesterday    This refill request cancelled    Sharri Bowie RN   [facility]

## 2017-12-06 ENCOUNTER — HOSPITAL ENCOUNTER (OUTPATIENT)
Dept: MAMMOGRAPHY | Facility: CLINIC | Age: 72
Discharge: HOME | End: 2017-12-06
Attending: INTERNAL MEDICINE | Admitting: INTERNAL MEDICINE
Payer: MEDICARE

## 2017-12-06 DIAGNOSIS — Z12.31 ENCOUNTER FOR SCREENING MAMMOGRAM FOR HIGH-RISK PATIENT: ICD-10-CM

## 2017-12-06 PROCEDURE — 77063 BREAST TOMOSYNTHESIS BI: CPT

## 2018-01-10 ENCOUNTER — OFFICE VISIT (OUTPATIENT)
Dept: FAMILY MEDICINE | Facility: CLINIC | Age: 73
End: 2018-01-10
Payer: MEDICARE

## 2018-01-10 VITALS
BODY MASS INDEX: 21.17 KG/M2 | DIASTOLIC BLOOD PRESSURE: 66 MMHG | TEMPERATURE: 96.9 F | WEIGHT: 105 LBS | HEART RATE: 68 BPM | OXYGEN SATURATION: 100 % | SYSTOLIC BLOOD PRESSURE: 105 MMHG | HEIGHT: 59 IN

## 2018-01-10 DIAGNOSIS — J06.9 VIRAL URI WITH COUGH: ICD-10-CM

## 2018-01-10 DIAGNOSIS — R30.0 DYSURIA: ICD-10-CM

## 2018-01-10 DIAGNOSIS — R31.9 URINARY TRACT INFECTION WITH HEMATURIA, SITE UNSPECIFIED: Primary | ICD-10-CM

## 2018-01-10 DIAGNOSIS — N39.0 URINARY TRACT INFECTION WITH HEMATURIA, SITE UNSPECIFIED: Primary | ICD-10-CM

## 2018-01-10 LAB
ALBUMIN UR-MCNC: 30 MG/DL
APPEARANCE UR: CLEAR
BACTERIA #/AREA URNS HPF: ABNORMAL /HPF
BILIRUB UR QL STRIP: NEGATIVE
COLOR UR AUTO: YELLOW
GLUCOSE UR STRIP-MCNC: NEGATIVE MG/DL
HGB UR QL STRIP: ABNORMAL
KETONES UR STRIP-MCNC: NEGATIVE MG/DL
LEUKOCYTE ESTERASE UR QL STRIP: ABNORMAL
NITRATE UR QL: NEGATIVE
NON-SQ EPI CELLS #/AREA URNS LPF: ABNORMAL /LPF
PH UR STRIP: 5.5 PH (ref 5–7)
RBC #/AREA URNS AUTO: ABNORMAL /HPF
SOURCE: ABNORMAL
SP GR UR STRIP: 1.02 (ref 1–1.03)
UROBILINOGEN UR STRIP-ACNC: 0.2 EU/DL (ref 0.2–1)
WBC #/AREA URNS AUTO: ABNORMAL /HPF

## 2018-01-10 PROCEDURE — 87086 URINE CULTURE/COLONY COUNT: CPT | Performed by: NURSE PRACTITIONER

## 2018-01-10 PROCEDURE — 87088 URINE BACTERIA CULTURE: CPT | Performed by: NURSE PRACTITIONER

## 2018-01-10 PROCEDURE — 81001 URINALYSIS AUTO W/SCOPE: CPT | Performed by: NURSE PRACTITIONER

## 2018-01-10 PROCEDURE — 87186 SC STD MICRODIL/AGAR DIL: CPT | Performed by: NURSE PRACTITIONER

## 2018-01-10 PROCEDURE — 99213 OFFICE O/P EST LOW 20 MIN: CPT | Performed by: NURSE PRACTITIONER

## 2018-01-10 RX ORDER — CEPHALEXIN 500 MG/1
500 CAPSULE ORAL 2 TIMES DAILY
Qty: 10 CAPSULE | Refills: 0 | Status: SHIPPED | OUTPATIENT
Start: 2018-01-10 | End: 2018-01-15

## 2018-01-10 NOTE — NURSING NOTE
"Chief Complaint   Patient presents with     Sinus Problem     UTI       Initial /66 (BP Location: Right arm, Cuff Size: Adult Regular)  Pulse 68  Temp 96.9  F (36.1  C) (Tympanic)  Ht 4' 10.5\" (1.486 m)  Wt 105 lb (47.6 kg)  SpO2 100%  Breastfeeding? No  BMI 21.57 kg/m2 Estimated body mass index is 21.57 kg/(m^2) as calculated from the following:    Height as of this encounter: 4' 10.5\" (1.486 m).    Weight as of this encounter: 105 lb (47.6 kg).  Medication Reconciliation: complete   Waleska Regan MA      "

## 2018-01-10 NOTE — PROGRESS NOTES
HPI      SUBJECTIVE:   Hanane Sorto is a 72 year old female who presents to clinic today for the following health issues:    Sinusitis sx still present after 10 day course of doxycycline, now has UTI sx as well.      was dx sinusitis and given doxy. Her symptoms completely resolved.   Now 5 days ago she started again with nasal symptoms, HA, cough, sneeze  No fever with this illness     was just sick     Now started with urinary frequency and pain after urinating that started a couple weeks ago that is unchanged   Also has some vaginal itching   No discharge   No abd pain       Past Medical History:   Diagnosis Date     Anxiety     psych yearly, Dr Shea     Family hx of colon cancer     sister     Floaters     Karma leverentz     Fracture of wrist, left, with routine healing, subsequent encounter 2017     GERD (gastroesophageal reflux disease)      Gross hematuria 2011     Hyperlipidemia LDL goal < 130      Hyperlipidemia LDL goal <130 2017     Malnutrition (H) 2017     Osteoarthritis 2012     Osteoporosis     Dexa     PONV (postoperative nausea and vomiting)      Restless legs      Skin cancer      Past Surgical History:   Procedure Laterality Date     ARTHROPLASTY KNEE Left 2017    Procedure: ARTHROPLASTY KNEE;  LEFT TOTAL KNEE ARTHROPLASTY;  Surgeon: Lee Ayala MD;  Location: [facility]     BUNIONECTOMY      both      SECTION       COLONOSCOPY  8-10     LIPOSUCTION (LOCATION)       RECONSTRUCT BREAST, IMPLANT PROSTHESIS, COMBINED       Social History   Substance Use Topics     Smoking status: Former Smoker     Smokeless tobacco: Never Used      Comment: Quit 30 years ago     Alcohol use 0.0 - 2.4 oz/week     0 - 4 Standard drinks or equivalent per week      Comment: once every two weeks     Current Outpatient Prescriptions   Medication Sig Dispense Refill     cephALEXin (KEFLEX) 500 MG capsule Take 1 capsule (500 mg) by mouth 2 times daily for  "5 days 10 capsule 0     fexofenadine (ALLEGRA ALLERGY) 60 MG tablet Take 60 mg by mouth 2 times daily       pramipexole (MIRAPEX) 0.25 MG tablet Take 1 tablet (0.25 mg) by mouth 2 times daily At 1700 and  tablet 3     omeprazole (PRILOSEC) 20 MG CR capsule Take 1 capsule (20 mg) by mouth 2 times daily 180 capsule 11     triamcinolone (NASACORT) 55 MCG/ACT Inhaler Spray 2 sprays into both nostrils daily 1 Bottle 3     ibuprofen 200 MG capsule Take 200 mg by mouth At Bedtime 120 capsule      Docusate Sodium (WILSON STOOL SOFTENER PO) Take 3 capsules by mouth daily       Allergies   Allergen Reactions     Crestor [Rosuvastatin]      Ezetimibe Other (See Comments)     Zetia = leg cramps      Miralax [Polyethylene Glycol] Other (See Comments)     Back pain     Pravastatin Other (See Comments)     Leg cramps        Reviewed and updated as needed this visit by clinical staff and provider      ROS  Detailed as above       /66 (BP Location: Right arm, Cuff Size: Adult Regular)  Pulse 68  Temp 96.9  F (36.1  C) (Tympanic)  Ht 4' 10.5\" (1.486 m)  Wt 105 lb (47.6 kg)  SpO2 100%  Breastfeeding? No  BMI 21.57 kg/m2      Physical Exam   Constitutional: She is well-developed, well-nourished, and in no distress.   Sounds congested    HENT:   Head: Normocephalic.   Right Ear: Tympanic membrane, external ear and ear canal normal.   Left Ear: Tympanic membrane, external ear and ear canal normal.   Mouth/Throat: Oropharynx is clear and moist. No oropharyngeal exudate.   Eyes: Conjunctivae are normal.   Neck: Normal range of motion.   Cardiovascular: Normal rate, regular rhythm and normal heart sounds.    No murmur heard.  Pulmonary/Chest: Effort normal and breath sounds normal. No respiratory distress.   Musculoskeletal: Normal range of motion.   Lymphadenopathy:     She has no cervical adenopathy.   Neurological: She is alert.   Skin: Skin is warm and dry.   Psychiatric: Mood and affect normal.   Vitals " reviewed.      Assessment and Plan:       ICD-10-CM    1. Urinary tract infection with hematuria, site unspecified N39.0 cephALEXin (KEFLEX) 500 MG capsule    R31.9    2. Viral URI with cough J06.9     B97.89    3. Dysuria R30.0 UA reflex to Microscopic and Culture     Urine Microscopic     Urine Culture Aerobic Bacterial       URI symptoms with a normal physical exam and no fevers. Will continue to watch and wait. REcommend Symptomatic treatments as desired   Positive UA today. Will treat. Culture pending.   Call or rtc prn       SHADE Chairez, CNP  [facility]

## 2018-01-10 NOTE — MR AVS SNAPSHOT
After Visit Summary   1/10/2018    Hanane Sorto    MRN: [id number]           Patient Information     Date Of Birth          1945        Visit Information        Provider Department      1/10/2018 10:15 AM Rossy Bustamante APRN CNP [facility]        Today's Diagnoses     Urinary tract infection with hematuria, site unspecified    -  1    Viral URI with cough        Dysuria           Follow-ups after your visit        Your next 10 appointments already scheduled     Feb 05, 2018  9:15 AM CST   LAB with CS LAB   [facility] ([facility])    [address] [facility] [address] [phone]           Please do not eat 10-12 hours before your appointment if you are coming in fasting for labs on lipids, cholesterol, or glucose (sugar). This does not apply to pregnant women. Water, hot tea and black coffee (with nothing added) are okay. Do not drink other fluids, diet soda or chew gum.              Who to contact     If you have questions or need follow up information about today's clinic visit or your schedule please contact [facility] directly at [phone].  Normal or non-critical lab and imaging results will be communicated to you by Pushkarthart, letter or phone within 4 business days after the clinic has received the results. If you do not hear from us within 7 days, please contact the clinic through Affinity.ist or phone. If you have a critical or abnormal lab result, we will notify you by phone as soon as possible.  Submit refill requests through Popular Pays or call your pharmacy and they will forward the refill request to us. Please allow 3 business days for your refill to be completed.          Additional Information About Your Visit        MyChart Information     Popular Pays gives you secure access to your electronic health record. If you see a primary care provider, you can also send messages to your care team and make appointments. If you  "have questions, please call your primary care clinic.  If you do not have a primary care provider, please call [phone] and they will assist you.        Care EveryWhere ID     This is your Care EveryWhere ID. This could be used by other organizations to access your [place] medical records  [id number]        Your Vitals Were     Pulse Temperature Height Pulse Oximetry Breastfeeding? BMI (Body Mass Index)    68 96.9  F (36.1  C) (Tympanic) 4' 10.5\" (1.486 m) 100% No 21.57 kg/m2       Blood Pressure from Last 3 Encounters:   01/10/18 105/66   12/04/17 129/70   08/28/17 112/72    Weight from Last 3 Encounters:   01/10/18 105 lb (47.6 kg)   12/04/17 105 lb (47.6 kg)   08/28/17 101 lb 14.4 oz (46.2 kg)              We Performed the Following     UA reflex to Microscopic and Culture     Urine Culture Aerobic Bacterial     Urine Microscopic          Today's Medication Changes          These changes are accurate as of: 1/10/18  2:31 PM.  If you have any questions, ask your nurse or doctor.               Start taking these medicines.        Dose/Directions    cephALEXin 500 MG capsule   Commonly known as:  KEFLEX   Used for:  Urinary tract infection with hematuria, site unspecified   Started by:  Rossy uBstamante APRN CNP        Dose:  500 mg   Take 1 capsule (500 mg) by mouth 2 times daily for 5 days   Quantity:  10 capsule   Refills:  0            Where to get your medicines      [facility] - [place], [place] - [address] AT [facility] OF [address]  [address] [facility] [address]     Phone:  [phone]     cephALEXin 500 MG capsule                Primary Care Provider Office Phone # Fax #    Corona Bethea [phone] [address]  [facility] [address]        Equal Access to Services     DANUTA COATS AH: Hadii alberta roldan hadasho Soomaali, waaxda luqadaha, qaybta kaalsindy beavers, radha lombardi " niko elizabethyoselinleonor kimDanialaasamantha ah. So [facility] [phone].    ATENCIÓN: Si habla leno, tiene a eisenberg disposición servicios gratuitos de asistencia lingüística. Tessa al [phone].    We comply with applicable federal civil rights laws and [place] laws. We do not discriminate on the basis of race, color, national origin, age, disability, sex, sexual orientation, or gender identity.            Thank you!     Thank you for choosing [facility]  for your care. Our goal is always to provide you with excellent care. Hearing back from our patients is one way we can continue to improve our services. Please take a few minutes to complete the written survey that you may receive in the mail after your visit with us. Thank you!             Your Updated Medication List - Protect others around you: Learn how to safely use, store and throw away your medicines at www.disposemymeds.org.          This list is accurate as of: 1/10/18  2:31 PM.  Always use your most recent med list.                   Brand Name Dispense Instructions for use Diagnosis    ALLEGRA ALLERGY 60 MG tablet   Generic drug:  fexofenadine      Take 60 mg by mouth 2 times daily        cephALEXin 500 MG capsule    KEFLEX    10 capsule    Take 1 capsule (500 mg) by mouth 2 times daily for 5 days    Urinary tract infection with hematuria, site unspecified       ibuprofen 200 MG capsule     120 capsule    Take 200 mg by mouth At Bedtime        omeprazole 20 MG CR capsule    priLOSEC    180 capsule    Take 1 capsule (20 mg) by mouth 2 times daily    Gastroesophageal reflux disease without esophagitis       WILSON STOOL SOFTENER PO      Take 3 capsules by mouth daily        pramipexole 0.25 MG tablet    MIRAPEX    180 tablet    Take 1 tablet (0.25 mg) by mouth 2 times daily At 1700 and HS    Restless leg syndrome       triamcinolone 55 MCG/ACT Inhaler    NASACORT    1 Bottle    Spray 2 sprays into both nostrils daily    Gastroesophageal reflux disease without  esophagitis

## 2018-01-12 LAB
BACTERIA SPEC CULT: ABNORMAL
SPECIMEN SOURCE: ABNORMAL

## 2018-01-12 NOTE — PROGRESS NOTES
Bill Koo  The Keflex should work nicely on your infection. Hope you are feeling improved   Rossy Bustamante NP

## 2018-02-05 DIAGNOSIS — E78.5 HYPERLIPIDEMIA WITH TARGET LDL LESS THAN 130: ICD-10-CM

## 2018-02-05 LAB
CHOLEST SERPL-MCNC: 245 MG/DL
HDLC SERPL-MCNC: 42 MG/DL
LDLC SERPL CALC-MCNC: 162 MG/DL
NONHDLC SERPL-MCNC: 203 MG/DL
TRIGL SERPL-MCNC: 204 MG/DL

## 2018-02-05 PROCEDURE — 36415 COLL VENOUS BLD VENIPUNCTURE: CPT | Performed by: INTERNAL MEDICINE

## 2018-02-05 PROCEDURE — 80061 LIPID PANEL: CPT | Performed by: INTERNAL MEDICINE

## 2018-02-08 ENCOUNTER — TRANSFERRED RECORDS (OUTPATIENT)
Dept: HEALTH INFORMATION MANAGEMENT | Facility: CLINIC | Age: 73
End: 2018-02-08

## 2018-03-09 ENCOUNTER — OFFICE VISIT (OUTPATIENT)
Dept: FAMILY MEDICINE | Facility: CLINIC | Age: 73
End: 2018-03-09
Payer: MEDICARE

## 2018-03-09 VITALS
HEIGHT: 59 IN | RESPIRATION RATE: 16 BRPM | HEART RATE: 89 BPM | OXYGEN SATURATION: 99 % | BODY MASS INDEX: 21.17 KG/M2 | TEMPERATURE: 98 F | WEIGHT: 105 LBS | DIASTOLIC BLOOD PRESSURE: 62 MMHG | SYSTOLIC BLOOD PRESSURE: 105 MMHG

## 2018-03-09 DIAGNOSIS — G47.00 PERSISTENT DISORDER OF INITIATING OR MAINTAINING SLEEP: Primary | ICD-10-CM

## 2018-03-09 PROCEDURE — 84443 ASSAY THYROID STIM HORMONE: CPT | Performed by: NURSE PRACTITIONER

## 2018-03-09 PROCEDURE — 36415 COLL VENOUS BLD VENIPUNCTURE: CPT | Performed by: NURSE PRACTITIONER

## 2018-03-09 PROCEDURE — 99213 OFFICE O/P EST LOW 20 MIN: CPT | Performed by: NURSE PRACTITIONER

## 2018-03-09 RX ORDER — TRAZODONE HYDROCHLORIDE 50 MG/1
50 TABLET, FILM COATED ORAL
Qty: 30 TABLET | Refills: 1 | Status: SHIPPED | OUTPATIENT
Start: 2018-03-09 | End: 2018-04-24

## 2018-03-09 NOTE — MR AVS SNAPSHOT
"              After Visit Summary   3/9/2018    Hanane Sorto    MRN: [id number]           Patient Information     Date Of Birth          1945        Visit Information        Provider Department      3/9/2018 2:00 PM Ayala Oneil APRN CNP [facility]        Today's Diagnoses     Persistent disorder of initiating or maintaining sleep    -  1       Follow-ups after your visit        Follow-up notes from your care team     Return if symptoms worsen or fail to improve.      Who to contact     If you have questions or need follow up information about today's clinic visit or your schedule please contact [facility] directly at [phone].  Normal or non-critical lab and imaging results will be communicated to you by MyChart, letter or phone within 4 business days after the clinic has received the results. If you do not hear from us within 7 days, please contact the clinic through Jedox AGhart or phone. If you have a critical or abnormal lab result, we will notify you by phone as soon as possible.  Submit refill requests through Causes or call your pharmacy and they will forward the refill request to us. Please allow 3 business days for your refill to be completed.          Additional Information About Your Visit        MyChart Information     Causes gives you secure access to your electronic health record. If you see a primary care provider, you can also send messages to your care team and make appointments. If you have questions, please call your primary care clinic.  If you do not have a primary care provider, please call [phone] and they will assist you.        Care EveryWhere ID     This is your Care EveryWhere ID. This could be used by other organizations to access your [place] medical records  [id number]        Your Vitals Were     Pulse Temperature Respirations Height Pulse Oximetry BMI (Body Mass Index)    89 98  F (36.7  C) (Oral) 16 4' 10.5\" (1.486 m) 99% 21.57 kg/m2    "    Blood Pressure from Last 3 Encounters:   03/09/18 105/62   01/10/18 105/66   12/04/17 129/70    Weight from Last 3 Encounters:   03/09/18 105 lb (47.6 kg)   01/10/18 105 lb (47.6 kg)   12/04/17 105 lb (47.6 kg)              We Performed the Following     TSH with free T4 reflex          Today's Medication Changes          These changes are accurate as of 3/9/18  2:31 PM.  If you have any questions, ask your nurse or doctor.               Start taking these medicines.        Dose/Directions    traZODone 50 MG tablet   Commonly known as:  DESYREL   Used for:  Persistent disorder of initiating or maintaining sleep   Started by:  Ayala Oneil APRN CNP        Dose:  50 mg   Take 1 tablet (50 mg) by mouth nightly as needed for sleep   Quantity:  30 tablet   Refills:  1            Where to get your medicines      These medications were sent to [facility] [address] [place], [place] - [address] AT [facility] OF [facility] [address] [facility]  [address] [facility] [address]     Phone:  [phone]     traZODone 50 MG tablet                Primary Care Provider Office Phone # Fax #    Corona Bethea [phone] [address] [address]        Equal Access to Services     [facility] JORGE L AH: Hadii alberta roldan hadlamo Socara, waaxda luqadaha, qaybta kaalmada ruthann, radha biggs. So [facility] [phone].    ATENCIÓN: Si habla español, tiene a eisenberg disposición servicios gratuitos de asistencia lingüística. Tessa al [phone].    We comply with applicable federal civil rights laws and [place] laws. We do not discriminate on the basis of race, color, national origin, age, disability, sex, sexual orientation, or gender identity.            Thank you!     Thank you for choosing [facility]  for your care. Our goal is always to provide you with excellent care. Hearing back from our patients is one way we can continue to  improve our services. Please take a few minutes to complete the written survey that you may receive in the mail after your visit with us. Thank you!             Your Updated Medication List - Protect others around you: Learn how to safely use, store and throw away your medicines at www.disposemymeds.org.          This list is accurate as of 3/9/18  2:31 PM.  Always use your most recent med list.                   Brand Name Dispense Instructions for use Diagnosis    ALLEGRA ALLERGY 60 MG tablet   Generic drug:  fexofenadine      Take 60 mg by mouth 2 times daily        ibuprofen 200 MG capsule     120 capsule    Take 200 mg by mouth At Bedtime        omeprazole 20 MG CR capsule    priLOSEC    180 capsule    Take 1 capsule (20 mg) by mouth 2 times daily    Gastroesophageal reflux disease without esophagitis       WILSON STOOL SOFTENER PO      Take 3 capsules by mouth daily        pramipexole 0.25 MG tablet    MIRAPEX    180 tablet    Take 1 tablet (0.25 mg) by mouth 2 times daily At 1700 and HS    Restless leg syndrome       traZODone 50 MG tablet    DESYREL    30 tablet    Take 1 tablet (50 mg) by mouth nightly as needed for sleep    Persistent disorder of initiating or maintaining sleep       triamcinolone 55 MCG/ACT Inhaler    NASACORT    1 Bottle    Spray 2 sprays into both nostrils daily    Gastroesophageal reflux disease without esophagitis

## 2018-03-09 NOTE — NURSING NOTE
"Chief Complaint   Patient presents with     Insomnia       Initial /62 (BP Location: Right arm, Patient Position: Chair, Cuff Size: Adult Regular)  Pulse 89  Temp 98  F (36.7  C) (Oral)  Resp 16  Ht 4' 10.5\" (1.486 m)  Wt 105 lb (47.6 kg)  SpO2 99%  BMI 21.57 kg/m2 Estimated body mass index is 21.57 kg/(m^2) as calculated from the following:    Height as of this encounter: 4' 10.5\" (1.486 m).    Weight as of this encounter: 105 lb (47.6 kg).  Medication Reconciliation: complete   Archana Schwartz CMA (AAMA)      "

## 2018-03-09 NOTE — PROGRESS NOTES
SUBJECTIVE:                                                    Hanane Sorto is a 72 year old female who presents to clinic today for the following health issues:      Insomnia      Duration: 3+ months; but has never slept really well;  Only getting about 5-6 hours of sleep.      Description  Frequency of insomnia:  Nightly, initiates sleep well, but doesn't maintain.   Time to fall asleep: 15 minutes- crashes; but wakes up in about 3 hours  Middle of night awakening:  nightly  Early morning awakening:  About 6 am   Morning Congestion due to Allergies    Accompanying signs and symptoms:  restless legs- taking mirapex which helps      History  Similar episodes in past:  YES  Previous evaluation/sleep study:  no     Precipitating or alleviating factors:  New stressful situation: no   Caffeine intake after lunchtime: no   OTC decongestants: YES- afrin when she wakes up, and nasocort in the morning,    Any new medications: no   Does not drink alcohol    Therapies tried and outcome: Tylenol pm - , Advil pm  and Z-Quil; melatonin caused joint pain      Problem list and histories reviewed & adjusted, as indicated.  Additional history: as documented    Patient Active Problem List   Diagnosis     Hyperlipidemia with target LDL less than 130     Anxiety     Osteoporosis     Gross hematuria     Advanced directives, counseling/discussion     Osteoarthritis     Bursitis of hip     Constipation     Gastroesophageal reflux disease without esophagitis     S/P total knee arthroplasty     Fracture of wrist, left, with routine healing, subsequent encounter     Malnutrition (H)     Hyperlipidemia LDL goal <130     Past Surgical History:   Procedure Laterality Date     ARTHROPLASTY KNEE Left 2017    Procedure: ARTHROPLASTY KNEE;  LEFT TOTAL KNEE ARTHROPLASTY;  Surgeon: Lee Ayala MD;  Location:  OR     BUNIONECTOMY      both      SECTION       COLONOSCOPY  8-10     LIPOSUCTION (LOCATION)        "RECONSTRUCT BREAST, IMPLANT PROSTHESIS, COMBINED         Social History   Substance Use Topics     Smoking status: Former Smoker     Smokeless tobacco: Never Used      Comment: Quit 30 years ago     Alcohol use 0.0 - 2.4 oz/week     0 - 4 Standard drinks or equivalent per week      Comment: once every two weeks     Family History   Problem Relation Age of Onset     C.A.D. Father      Lipids Mother      Alzheimer Disease Mother      Breast Cancer Sister      53     Cancer - colorectal Sister      40s     Alcoholism Son          Current Outpatient Prescriptions   Medication Sig Dispense Refill     fexofenadine (ALLEGRA ALLERGY) 60 MG tablet Take 60 mg by mouth 2 times daily       pramipexole (MIRAPEX) 0.25 MG tablet Take 1 tablet (0.25 mg) by mouth 2 times daily At 1700 and  tablet 3     omeprazole (PRILOSEC) 20 MG CR capsule Take 1 capsule (20 mg) by mouth 2 times daily 180 capsule 11     triamcinolone (NASACORT) 55 MCG/ACT Inhaler Spray 2 sprays into both nostrils daily 1 Bottle 3     ibuprofen 200 MG capsule Take 200 mg by mouth At Bedtime 120 capsule      Docusate Sodium (WILSON STOOL SOFTENER PO) Take 3 capsules by mouth daily       Allergies   Allergen Reactions     Crestor [Rosuvastatin]      Ezetimibe Other (See Comments)     Zetia = leg cramps      Miralax [Polyethylene Glycol] Other (See Comments)     Back pain     Pravastatin Other (See Comments)     Leg cramps        ROS:  Constitutional, HEENT, cardiovascular, pulmonary, gi and gu systems are negative, except as otherwise noted.    OBJECTIVE:     /62 (BP Location: Right arm, Patient Position: Chair, Cuff Size: Adult Regular)  Pulse 89  Temp 98  F (36.7  C) (Oral)  Resp 16  Ht 4' 10.5\" (1.486 m)  Wt 105 lb (47.6 kg)  SpO2 99%  BMI 21.57 kg/m2  Body mass index is 21.57 kg/(m^2).  GENERAL: healthy, alert and no distress  EYES: Eyes grossly normal to inspection, PERRL and conjunctivae and sclerae normal  HENT: ear canals and TM's normal, " nose and mouth without ulcers or lesions  NECK: no adenopathy, no asymmetry, masses, or scars and thyroid normal to palpation  RESP: lungs clear to auscultation - no rales, rhonchi or wheezes  CV: regular rate and rhythm, normal S1 S2, no S3 or S4, no murmur, click or rub, no peripheral edema and peripheral pulses strong  ABDOMEN: soft, nontender, no hepatosplenomegaly, no masses and bowel sounds normal  MS: no gross musculoskeletal defects noted, no edema    Diagnostic Test Results:  TSH    ASSESSMENT/PLAN:         ICD-10-CM    1. Persistent disorder of initiating or maintaining sleep G47.00    she will follow up with Dr Bethea if the trazadone is not working      SHADE Brenner [facility]

## 2018-03-10 LAB — TSH SERPL DL<=0.005 MIU/L-ACNC: 2.08 MU/L (ref 0.4–4)

## 2018-04-24 ENCOUNTER — OFFICE VISIT (OUTPATIENT)
Dept: FAMILY MEDICINE | Facility: CLINIC | Age: 73
End: 2018-04-24
Payer: MEDICARE

## 2018-04-24 VITALS
HEIGHT: 59 IN | DIASTOLIC BLOOD PRESSURE: 70 MMHG | WEIGHT: 107 LBS | BODY MASS INDEX: 21.57 KG/M2 | HEART RATE: 89 BPM | SYSTOLIC BLOOD PRESSURE: 110 MMHG | OXYGEN SATURATION: 99 % | TEMPERATURE: 97.6 F

## 2018-04-24 DIAGNOSIS — J30.2 ACUTE SEASONAL ALLERGIC RHINITIS, UNSPECIFIED TRIGGER: Primary | ICD-10-CM

## 2018-04-24 PROCEDURE — 99213 OFFICE O/P EST LOW 20 MIN: CPT | Performed by: NURSE PRACTITIONER

## 2018-04-24 RX ORDER — IPRATROPIUM BROMIDE 42 UG/1
2 SPRAY, METERED NASAL 4 TIMES DAILY PRN
Qty: 1 BOX | Refills: 1 | Status: SHIPPED | OUTPATIENT
Start: 2018-04-24 | End: 2018-08-21

## 2018-04-24 NOTE — PATIENT INSTRUCTIONS
Seasonal Allergy  Seasonal allergy is also called hay fever. It may occur after a person is exposed to pollens released from grasses, weeds, trees and shrubs. This type of allergy occurs during the spring and summer when the pollen contacts the lining of the nose, eyes, eyelids, sinuses and throat. This causes histamine to be released from the tissues. Histamine causes itching and swelling. This may produce a watery discharge from the eyes or nose. Violent sneezing, nasal congestion, post-nasal drip, itching of the eyes, nose, throat and mouth, scratchy throat, and dry cough may also occur.  Home care  Seasonal allergy cannot be cured, but symptoms can be reduced by these measures:    Stay away from or limit your time near the allergen as much as you can:    ? Stay indoors on windy days of pollen season.   ? Keep windows and doors closed. Use air conditioning instead in your home and car. This filters the air.  ? Change air conditioner filters often.  ? Take a shower, wash your hair, and change clothes after being outdoors.  ? Put on a NIOSH-rated 95 filter mask when working outdoors. Before going outside, take your allergy medicine as advised by your healthcare provider.    Decongestant pills and sprays reduce tissue swelling and watery discharge. Overuse of nasal decongestant sprays may make symptoms worse. Do not use these more often than recommended. Sometimes you can experience a rebound effect (symptoms worsen), when stopping them. Talk to your healthcare provider or pharmacist about these medicines before taking them, especially if you have high blood pressure or heart problems.     Antihistamines block the release of histamine during the allergic response. They work better when taken before symptoms develop. Unless a prescription antihistamine was prescribed, you can take over-the-counter antihistamines that do not cause drowsiness.  Ask your pharmacist for suggestions.    Steroid nasal sprays or oral  steroids may also be prescribed for more severe symptoms. These help to reduce the local inflammation that can add to the allergic response.    If you have asthma, pollen season may make your asthma symptoms worse. It is important that you use your asthma medicines as directed during this time to prevent or treat attacks. Some persons with asthma have asthma symptoms that get worse when they take antihistamines. This is due to the drying effect on the lungs. If you notice this, stop the antihistamines, drink extra fluids and notify your doctor.    If you have sinus congestion or drainage, a saline nasal rinse may give relief. A saline nasal rinse lessens the swelling and clears excess mucus. This allows sinuses to drain. Prepackaged kits are sold at most drug stores. These contain pre-mixed salt packets and an irrigation device.  Follow-up care  Follow up with your healthcare provider, or as advised. If you have been referred to a specialist, make an appointment promptly.  When to seek medical advice  Call your healthcare provider right away for any of the following:    Facial, ear or sinus pain; colored drainage from the nose    Headaches    You have asthma and your asthma symptoms do not respond to the usual doses of your medicine    Cough with colored sputum (mucus)    Fever of 100.4 F (38 C) or higher, or as directed by the healthcare provider  Call 911  Call 911 if any of these occur:    Trouble breathing or swallowing, wheezing    Hoarse voice, trouble speaking, or drooling    Confusion    Very drowsy or trouble awakening    Fainting or loss of consciousness    Rapid heart rate, or weak pulse    Low blood pressure    Feeling of doom    Nausea, vomiting, abdominal pain, diarrhea    Vomiting blood, or large amounts of blood in stool    Seizure    Cold, moist, or pale (blue in color) skin  Date Last Reviewed: 5/1/2017 2000-2017 [employer] [address] [place] [address], [place] [address]. All rights  reserved. This information is not intended as a substitute for professional medical care. Always follow your healthcare professional's instructions.      Begin taking zyrtec 10mg in the morning  And take benadryl 25mg at bedtime  Use the flonase or nasacort 2 sprays each nostril once a day   Use the neti pot before bedtime  Steam heat before bed  pushfluid intake  Use the atrovent as prescribed for daytime runny nose  Avoid afrin

## 2018-04-24 NOTE — NURSING NOTE
"Chief Complaint   Patient presents with     Allergies       Initial /70 (BP Location: Left arm, Patient Position: Chair, Cuff Size: Adult Regular)  Pulse 89  Temp 97.6  F (36.4  C) (Oral)  Ht 4' 10.5\" (1.486 m)  Wt 107 lb (48.5 kg)  SpO2 99%  Breastfeeding? No  BMI 21.98 kg/m2 Estimated body mass index is 21.98 kg/(m^2) as calculated from the following:    Height as of this encounter: 4' 10.5\" (1.486 m).    Weight as of this encounter: 107 lb (48.5 kg).  Medication Reconciliation: complete.  Keely Ayala CMA    "

## 2018-04-24 NOTE — PROGRESS NOTES
SUBJECTIVE:   Hanane Sorto is a 72 year old female who presents to clinic today for the following health issues:      ALLERGIES      Duration: was seen in March for trouble sleeping and using trazadon    Description:   Nasal congestion: YES at night causing sleep disruption and extreme frustration  Sneezing: YES- some  Red, itchy eyes: YES- runny    Accompanying signs and symptoms:     History (similar episodes/allergy testing): yes at [facility]    Precipitating or alleviating factors: None    Therapies tried and outcome: flonase, nasocort, mucinex, zyrtec intermittently bu tnothing seems to help at all, allergy shots a long time ago but they didn't really help either    A girlfriend told her to use atrovent so she is here for that today       Problem list and histories reviewed & adjusted, as indicated.  Additional history: as documented    Patient Active Problem List   Diagnosis     Hyperlipidemia with target LDL less than 130     Anxiety     Osteoporosis     Gross hematuria     Advanced directives, counseling/discussion     Osteoarthritis     Bursitis of hip     Constipation     Gastroesophageal reflux disease without esophagitis     S/P total knee arthroplasty     Fracture of wrist, left, with routine healing, subsequent encounter     Malnutrition (H)     Hyperlipidemia LDL goal <130     Past Surgical History:   Procedure Laterality Date     ARTHROPLASTY KNEE Left 2017    Procedure: ARTHROPLASTY KNEE;  LEFT TOTAL KNEE ARTHROPLASTY;  Surgeon: Lee Ayala MD;  Location: [facility]     BUNIONECTOMY      both      SECTION       COLONOSCOPY  8-10     LIPOSUCTION (LOCATION)       RECONSTRUCT BREAST, IMPLANT PROSTHESIS, COMBINED         Social History   Substance Use Topics     Smoking status: Former Smoker     Smokeless tobacco: Never Used      Comment: Quit 30 years ago     Alcohol use 0.0 - 2.4 oz/week     0 - 4 Standard drinks or equivalent per week      Comment: once every two  "weeks     Family History   Problem Relation Age of Onset     C.A.D. Father      Lipids Mother      Alzheimer Disease Mother      Breast Cancer Sister      53     Cancer - colorectal Sister      40s     Alcoholism Son          Current Outpatient Prescriptions   Medication Sig Dispense Refill     ALLERGY SPRAY 24 HOUR 55 MCG/ACT Inhaler U 2 SPRAYS IEN D  3     Docusate Sodium (WILSON STOOL SOFTENER PO) Take 3 capsules by mouth daily       omeprazole (PRILOSEC) 20 MG CR capsule Take 1 capsule (20 mg) by mouth 2 times daily 180 capsule 11     pramipexole (MIRAPEX) 0.25 MG tablet Take 1 tablet (0.25 mg) by mouth 2 times daily At 1700 and  tablet 3     Allergies   Allergen Reactions     Crestor [Rosuvastatin]      Ezetimibe Other (See Comments)     Zetia = leg cramps      Miralax [Polyethylene Glycol] Other (See Comments)     Back pain     Pravastatin Other (See Comments)     Leg cramps        Reviewed and updated as needed this visit by clinical staff       Reviewed and updated as needed this visit by Provider         ROS:  Constitutional, HEENT, cardiovascular, pulmonary, gi and gu systems are negative, except as otherwise noted.    OBJECTIVE:     /70 (BP Location: Left arm, Patient Position: Chair, Cuff Size: Adult Regular)  Pulse 89  Temp 97.6  F (36.4  C) (Oral)  Ht 4' 10.5\" (1.486 m)  Wt 107 lb (48.5 kg)  SpO2 99%  Breastfeeding? No  BMI 21.98 kg/m2  Body mass index is 21.98 kg/(m^2).  GENERAL: healthy, alert and minimal distress  EYES: Eyes grossly normal to inspection, PERRL and conjunctivae and sclerae normal  HENT: ear canals and TM's normal, nose and mouth without ulcers or lesions, no sinus tenderness  NECK: no adenopathy, no asymmetry, masses, or scars and thyroid normal to palpation  RESP: lungs clear to auscultation - no rales, rhonchi or wheezes  CV: regular rate and rhythm, normal S1 S2, no S3 or S4, no murmur, click or rub,    Diagnostic Test Results:  none     ASSESSMENT/PLAN:       " ICD-10-CM    1. Acute seasonal allergic rhinitis, unspecified trigger J30.2 ipratropium (ATROVENT) 0.06 % spray       Patient Instructions     Seasonal Allergy  Seasonal allergy is also called hay fever. It may occur after a person is exposed to pollens released from grasses, weeds, trees and shrubs. This type of allergy occurs during the spring and summer when the pollen contacts the lining of the nose, eyes, eyelids, sinuses and throat. This causes histamine to be released from the tissues. Histamine causes itching and swelling. This may produce a watery discharge from the eyes or nose. Violent sneezing, nasal congestion, post-nasal drip, itching of the eyes, nose, throat and mouth, scratchy throat, and dry cough may also occur.  Home care  Seasonal allergy cannot be cured, but symptoms can be reduced by these measures:    Stay away from or limit your time near the allergen as much as you can:    ? Stay indoors on windy days of pollen season.   ? Keep windows and doors closed. Use air conditioning instead in your home and car. This filters the air.  ? Change air conditioner filters often.  ? Take a shower, wash your hair, and change clothes after being outdoors.  ? Put on a NIOSH-rated 95 filter mask when working outdoors. Before going outside, take your allergy medicine as advised by your healthcare provider.    Decongestant pills and sprays reduce tissue swelling and watery discharge. Overuse of nasal decongestant sprays may make symptoms worse. Do not use these more often than recommended. Sometimes you can experience a rebound effect (symptoms worsen), when stopping them. Talk to your healthcare provider or pharmacist about these medicines before taking them, especially if you have high blood pressure or heart problems.     Antihistamines block the release of histamine during the allergic response. They work better when taken before symptoms develop. Unless a prescription antihistamine was prescribed, you  can take over-the-counter antihistamines that do not cause drowsiness.  Ask your pharmacist for suggestions.    Steroid nasal sprays or oral steroids may also be prescribed for more severe symptoms. These help to reduce the local inflammation that can add to the allergic response.    If you have asthma, pollen season may make your asthma symptoms worse. It is important that you use your asthma medicines as directed during this time to prevent or treat attacks. Some persons with asthma have asthma symptoms that get worse when they take antihistamines. This is due to the drying effect on the lungs. If you notice this, stop the antihistamines, drink extra fluids and notify your doctor.    If you have sinus congestion or drainage, a saline nasal rinse may give relief. A saline nasal rinse lessens the swelling and clears excess mucus. This allows sinuses to drain. Prepackaged kits are sold at most drug stores. These contain pre-mixed salt packets and an irrigation device.  Follow-up care  Follow up with your healthcare provider, or as advised. If you have been referred to a specialist, make an appointment promptly.  When to seek medical advice  Call your healthcare provider right away for any of the following:    Facial, ear or sinus pain; colored drainage from the nose    Headaches    You have asthma and your asthma symptoms do not respond to the usual doses of your medicine    Cough with colored sputum (mucus)    Fever of 100.4 F (38 C) or higher, or as directed by the healthcare provider  Call 911  Call 911 if any of these occur:    Trouble breathing or swallowing, wheezing    Hoarse voice, trouble speaking, or drooling    Confusion    Very drowsy or trouble awakening    Fainting or loss of consciousness    Rapid heart rate, or weak pulse    Low blood pressure    Feeling of doom    Nausea, vomiting, abdominal pain, diarrhea    Vomiting blood, or large amounts of blood in stool    Seizure    Cold, moist, or pale (blue  in color) skin  Date Last Reviewed: 5/1/2017 2000-2017 [employer]. [address] [place] [address], [place] [address]. All rights reserved. This information is not intended as a substitute for professional medical care. Always follow your healthcare professional's instructions.      Begin taking zyrtec 10mg in the morning  And take benadryl 25mg at bedtime  Use the flonase or nasacort 2 sprays each nostril once a day   Use the neti pot before bedtime  Steam heat before bed  pushfluid intake  Use the atrovent as prescribed for daytime runny nose  Avoid afrin        [facility]

## 2018-04-24 NOTE — MR AVS SNAPSHOT
After Visit Summary   4/24/2018    Hanane Sorto    MRN: [id number]           Patient Information     Date Of Birth          1945        Visit Information        Provider Department      4/24/2018 5:00 PM Ayala Oneil APRN [facility]        Today's Diagnoses     Acute seasonal allergic rhinitis, unspecified trigger    -  1      Care Instructions      Seasonal Allergy  Seasonal allergy is also called hay fever. It may occur after a person is exposed to pollens released from grasses, weeds, trees and shrubs. This type of allergy occurs during the spring and summer when the pollen contacts the lining of the nose, eyes, eyelids, sinuses and throat. This causes histamine to be released from the tissues. Histamine causes itching and swelling. This may produce a watery discharge from the eyes or nose. Violent sneezing, nasal congestion, post-nasal drip, itching of the eyes, nose, throat and mouth, scratchy throat, and dry cough may also occur.  Home care  Seasonal allergy cannot be cured, but symptoms can be reduced by these measures:    Stay away from or limit your time near the allergen as much as you can:    ? Stay indoors on windy days of pollen season.   ? Keep windows and doors closed. Use air conditioning instead in your home and car. This filters the air.  ? Change air conditioner filters often.  ? Take a shower, wash your hair, and change clothes after being outdoors.  ? Put on a NIOSH-rated 95 filter mask when working outdoors. Before going outside, take your allergy medicine as advised by your healthcare provider.    Decongestant pills and sprays reduce tissue swelling and watery discharge. Overuse of nasal decongestant sprays may make symptoms worse. Do not use these more often than recommended. Sometimes you can experience a rebound effect (symptoms worsen), when stopping them. Talk to your healthcare provider or pharmacist about these medicines before taking them,  especially if you have high blood pressure or heart problems.     Antihistamines block the release of histamine during the allergic response. They work better when taken before symptoms develop. Unless a prescription antihistamine was prescribed, you can take over-the-counter antihistamines that do not cause drowsiness.  Ask your pharmacist for suggestions.    Steroid nasal sprays or oral steroids may also be prescribed for more severe symptoms. These help to reduce the local inflammation that can add to the allergic response.    If you have asthma, pollen season may make your asthma symptoms worse. It is important that you use your asthma medicines as directed during this time to prevent or treat attacks. Some persons with asthma have asthma symptoms that get worse when they take antihistamines. This is due to the drying effect on the lungs. If you notice this, stop the antihistamines, drink extra fluids and notify your doctor.    If you have sinus congestion or drainage, a saline nasal rinse may give relief. A saline nasal rinse lessens the swelling and clears excess mucus. This allows sinuses to drain. Prepackaged kits are sold at most drug stores. These contain pre-mixed salt packets and an irrigation device.  Follow-up care  Follow up with your healthcare provider, or as advised. If you have been referred to a specialist, make an appointment promptly.  When to seek medical advice  Call your healthcare provider right away for any of the following:    Facial, ear or sinus pain; colored drainage from the nose    Headaches    You have asthma and your asthma symptoms do not respond to the usual doses of your medicine    Cough with colored sputum (mucus)    Fever of 100.4 F (38 C) or higher, or as directed by the healthcare provider  Call 911  Call 911 if any of these occur:    Trouble breathing or swallowing, wheezing    Hoarse voice, trouble speaking, or drooling    Confusion    Very drowsy or trouble  awakening    Fainting or loss of consciousness    Rapid heart rate, or weak pulse    Low blood pressure    Feeling of doom    Nausea, vomiting, abdominal pain, diarrhea    Vomiting blood, or large amounts of blood in stool    Seizure    Cold, moist, or pale (blue in color) skin  Date Last Reviewed: 5/1/2017 2000-2017 [employer] [address] [place] [address]. All rights reserved. This information is not intended as a substitute for professional medical care. Always follow your healthcare professional's instructions.      Begin taking zyrtec 10mg in the morning  And take benadryl 25mg at bedtime  Use the flonase or nasacort 2 sprays each nostril once a day   Use the neti pot before bedtime  Steam heat before bed  pushfluid intake  Use the atrovent as prescribed for daytime runny nose  Avoid afrin            Follow-ups after your visit        Who to contact     If you have questions or need follow up information about today's clinic visit or your schedule please contact [facility] directly at [phone].  Normal or non-critical lab and imaging results will be communicated to you by [employer], letter or phone within 4 business days after the clinic has received the results. If you do not hear from us within 7 days, please contact the clinic through "[employer]t or phone. If you have a critical or abnormal lab result, we will notify you by phone as soon as possible.  Submit refill requests through [employer] or call your pharmacy and they will forward the refill request to us. Please allow 3 business days for your refill to be completed.          Additional Information About Your Visit        [employer] Information     [employer] gives you secure access to your electronic health record. If you see a primary care provider, you can also send messages to your care team and make appointments. If you have questions, please call your primary care clinic.  If you do not have a primary care provider,  "please call [phone] and they will assist you.        Care EveryWhere ID     This is your Care EveryWhere ID. This could be used by other organizations to access your [place] medical records  [id number]        Your Vitals Were     Pulse Temperature Height Pulse Oximetry Breastfeeding? BMI (Body Mass Index)    89 97.6  F (36.4  C) (Oral) 4' 10.5\" (1.486 m) 99% No 21.98 kg/m2       Blood Pressure from Last 3 Encounters:   04/24/18 110/70   03/09/18 105/62   01/10/18 105/66    Weight from Last 3 Encounters:   04/24/18 107 lb (48.5 kg)   03/09/18 105 lb (47.6 kg)   01/10/18 105 lb (47.6 kg)              Today, you had the following     No orders found for display         Today's Medication Changes          These changes are accurate as of 4/24/18  5:22 PM.  If you have any questions, ask your nurse or doctor.               Start taking these medicines.        Dose/Directions    ipratropium 0.06 % spray   Commonly known as:  ATROVENT   Used for:  Acute seasonal allergic rhinitis, unspecified trigger   Started by:  Ayala Oneil APRN CNP        Dose:  2 spray   Spray 2 sprays into both nostrils 4 times daily as needed for rhinitis   Quantity:  1 Box   Refills:  1         These medicines have changed or have updated prescriptions.        Dose/Directions    ALLERGY SPRAY 24 HOUR 55 MCG/ACT Inhaler   This may have changed:  Another medication with the same name was removed. Continue taking this medication, and follow the directions you see here.   Generic drug:  triamcinolone   Changed by:  Ayala Oneil APRN CNP        U 2 SPRAYS IEN D   Refills:  3         Stop taking these medicines if you haven't already. Please contact your care team if you have questions.     ALLEGRA ALLERGY 60 MG tablet   Generic drug:  fexofenadine   Stopped by:  Ayala Oneil APRN CNP           ibuprofen 200 MG capsule   Stopped by:  Ayala Oneil APRN CNP           traZODone 50 MG tablet   Commonly known as:  DESYREL "   Stopped by:  Ayala Oneil APRN CNP                Where to get your medicines      These medications were sent to [id number] - [place], [place] - [address] AT [facility] OF  [address]  [address], [place] [address]     Phone:  [phone]     ipratropium 0.06 % spray                Primary Care Provider Office Phone # Fax #    Lidialester Bethea [phone] [address] [facility] [address]  [facility] [address]        Equal Access to Services     [facility]: Hadii aad ku hadasho Socara, waaxda luqadaha, qaybta kaalmada adeegyada, radha franklin . So [facility] [phone].    ATENCIÓN: Si habla español, tiene a eisenberg disposición servicios gratuitos de asistencia lingüística. [facility] [phone].    We comply with applicable federal civil rights laws and [place] laws. We do not discriminate on the basis of race, color, national origin, age, disability, sex, sexual orientation, or gender identity.            Thank you!     Thank you for choosing [facility]  for your care. Our goal is always to provide you with excellent care. Hearing back from our patients is one way we can continue to improve our services. Please take a few minutes to complete the written survey that you may receive in the mail after your visit with us. Thank you!             Your Updated Medication List - Protect others around you: Learn how to safely use, store and throw away your medicines at www.disposemymeds.org.          This list is accurate as of 4/24/18  5:22 PM.  Always use your most recent med list.                   Brand Name Dispense Instructions for use Diagnosis    ALLERGY SPRAY 24 HOUR 55 MCG/ACT Inhaler   Generic drug:  triamcinolone      U 2 SPRAYS IEN D        ipratropium 0.06 % spray    ATROVENT    1 Box    Spray 2 sprays into both nostrils 4 times daily as needed for rhinitis    Acute seasonal allergic rhinitis,  unspecified trigger       omeprazole 20 MG CR capsule    priLOSEC    180 capsule    Take 1 capsule (20 mg) by mouth 2 times daily    Gastroesophageal reflux disease without esophagitis       WILSON STOOL SOFTENER PO      Take 3 capsules by mouth daily        pramipexole 0.25 MG tablet    MIRAPEX    180 tablet    Take 1 tablet (0.25 mg) by mouth 2 times daily At 1700 and HS    Restless leg syndrome

## 2018-08-21 ENCOUNTER — OFFICE VISIT (OUTPATIENT)
Dept: FAMILY MEDICINE | Facility: CLINIC | Age: 73
End: 2018-08-21
Payer: MEDICARE

## 2018-08-21 VITALS
WEIGHT: 107.7 LBS | SYSTOLIC BLOOD PRESSURE: 116 MMHG | HEART RATE: 92 BPM | DIASTOLIC BLOOD PRESSURE: 80 MMHG | OXYGEN SATURATION: 100 % | BODY MASS INDEX: 22.13 KG/M2

## 2018-08-21 DIAGNOSIS — R06.00 DYSPNEA, PAROXYSMAL NOCTURNAL: ICD-10-CM

## 2018-08-21 DIAGNOSIS — F41.0 PANIC ATTACK: Primary | ICD-10-CM

## 2018-08-21 DIAGNOSIS — J30.2 ACUTE SEASONAL ALLERGIC RHINITIS, UNSPECIFIED TRIGGER: ICD-10-CM

## 2018-08-21 DIAGNOSIS — F41.9 ANXIETY: ICD-10-CM

## 2018-08-21 PROBLEM — E46 MALNUTRITION (H): Status: RESOLVED | Noted: 2017-08-28 | Resolved: 2018-08-21

## 2018-08-21 PROCEDURE — 99214 OFFICE O/P EST MOD 30 MIN: CPT | Performed by: INTERNAL MEDICINE

## 2018-08-21 RX ORDER — IPRATROPIUM BROMIDE 42 UG/1
2 SPRAY, METERED NASAL 4 TIMES DAILY PRN
Qty: 1 BOX | Refills: 1 | Status: SHIPPED | OUTPATIENT
Start: 2018-08-21 | End: 2019-04-26

## 2018-08-21 RX ORDER — ZOLPIDEM TARTRATE 5 MG/1
5 TABLET ORAL
Qty: 14 TABLET | Refills: 0 | Status: SHIPPED | OUTPATIENT
Start: 2018-08-21 | End: 2018-10-03

## 2018-08-21 RX ORDER — BUSPIRONE HYDROCHLORIDE 5 MG/1
TABLET ORAL
Qty: 100 TABLET | Refills: 3 | Status: SHIPPED | OUTPATIENT
Start: 2018-08-21 | End: 2018-09-24

## 2018-08-21 NOTE — MR AVS SNAPSHOT
"              After Visit Summary   8/21/2018    Hanane Sorto    MRN: [id number]           Patient Information     Date Of Birth          1945        Visit Information        Provider Department      8/21/2018 1:30 PM Corona Bethea MD [facility]        Today's Diagnoses     Panic attack    -  1    Anxiety        Acute seasonal allergic rhinitis, unspecified trigger        Dyspnea, paroxysmal nocturnal          Care Instructions      It's normal to feel anxious from time to time, especially if your life is stressful. However, excessive, ongoing anxiety and worry that interfere with day-to-day activities may be a sign of generalized anxiety disorder.  It's possible to develop generalized anxiety disorder as a child or an adult. Generalized anxiety disorder has symptoms that are similar to panic disorder, obsessive-compulsive disorder and other types of anxiety, but they're all different conditions.  Living with generalized anxiety disorder can be a long-term challenge. In many cases, it occurs along with other anxiety or mood disorders. In most cases, generalized anxiety disorder improves with medications or talk therapy (psychotherapy). Making lifestyle changes, learning coping skills and using relaxation techniques also can help.  Generalized anxiety disorder symptoms can vary. They may include:  Persistent worrying or obsession about small or large concerns that's out of proportion to the impact of the event   Inability to set aside or let go of a worry   Inability to relax, restlessness, and feeling keyed up or on edge   Difficulty concentrating, or the feeling that your mind \"goes blank\"   Worrying about excessively worrying   Distress about making decisions for fear of making the wrong decision   Carrying every option in a situation all the way out to its possible negative conclusion   Difficulty handling uncertainty or indecisiveness  Physical signs and symptoms may include:  Fatigue "   Irritability   Muscle tension or muscle aches   Trembling, feeling twitchy   Being easily startled   Trouble sleeping   Sweating   Nausea, diarrhea or irritable bowel syndrome   Headaches  There may be times when your worries don't completely consume you, but you still feel anxious even when there's no apparent reason. For example, you may feel intense worry about your safety or that of your loved ones, or you may have a general sense that something bad is about to happen.  Your anxiety, worry or physical symptoms cause you significant distress in social, work or other areas of your life. Worries can shift from one concern to another and may change with time and age.  Symptoms in children and teenagers  In addition to the symptoms above, children and teenagers who have generalized anxiety disorder may have excessive worries about:  Performance at school or sporting events   Being on time (punctuality)   Earthquakes, nuclear war or other catastrophic events  A child or teen with generalized anxiety disorder may also:  Feel overly anxious to fit in   Be a perfectionist   Redo tasks because they aren't perfect the first time   Spend excessive time doing homework   Lack confidence   Strive for approval   Require a lot of reassurance about performance  When to see a doctor  Some anxiety is normal, but see your doctor if:  You feel like you're worrying too much, and it's interfering with your work, relationships or other parts of your life   You feel depressed, have trouble with drinking or drugs, or you have other mental health concerns along with anxiety   You have suicidal thoughts or behaviors -- seek emergency treatment immediately  Your worries are unlikely to simply go away on their own, and they may actually get worse over time. Try to seek professional help before your anxiety becomes severe -- it may be easier to treat early on.  As with many mental health conditions, the exact cause of generalized anxiety  disorder isn't fully understood, but it may include genetics as well as other risk factors.  These factors may increase the risk of developing generalized anxiety disorder:  Personality. A person whose temperament is timid or negative or who avoids anything dangerous may be more prone to generalized anxiety disorder than others are.   Genetics. Generalized anxiety disorder may run in families.   Being female. Women are diagnosed with generalized anxiety disorder somewhat more often than men are.  Having generalized anxiety disorder does more than just make you worry. It can:  Impair your ability to perform tasks quickly and efficiently because you have trouble concentrating   Take your time and focus from other activities   Sap your energy   Disturb your sleep  Generalized anxiety disorder can also lead to or worsen other mental and physical health conditions, such as:   Depression (which often occurs with generalized anxiety disorder)   Substance abuse   Trouble sleeping (insomnia)   Digestive or bowel problems   Headaches   Heart-health issues  You may start by seeing your family doctor. However, you may need to see a psychiatrist or psychologist if you have severe anxiety or if you also have another mental health condition, such as depression.  What you can do  To prepare for your appointment, make a list of:  Your symptoms, including when they occur, what seems to make them better or worse, and how much they affect your day-to-day activities, such as work, school or relationships   What's causing you stress, including major life changes or stressful events you've dealt with recently and any traumatic experiences you've had in the past   Health problems you have, both physical conditions and mental health issues   All medications you're taking, including any vitamins or other supplements, and the dosages   Questions to ask your doctor  Some basic questions to ask your doctor include:  What's the most likely cause  of my symptoms?   Are there other possible situations, psychological issues or physical health problems that could be causing or worsening my anxiety?   Do I need any tests?   What treatment do you recommend?   Should I see a psychiatrist, psychologist or other mental health provider?   Would medication help? If so, is there a generic alternative to the medicine you're prescribing?   Are there any brochures or other printed material that I can have? What websites do you recommend?  Don't hesitate to ask questions at any time during your appointment.  What to expect from your doctor  Being ready to answer questions from your doctor may reserve time to go over any points you want to spend more time on. Some questions the doctor may ask include:  What are your symptoms?   Do your symptoms interfere with your daily activities?   Have your feelings of anxiety been occasional or continuous?   When did you first begin noticing your anxiety?   Does anything in particular seem to trigger your anxiety or make it worse?   What, if anything, seems to improve your feelings of anxiety?   What, if any, physical or mental health conditions do you have?   What traumatic experiences have you had recently or in the past?   Do you regularly drink alcohol or use recreational drugs?   Do you have any blood relatives with anxiety or other mental health conditions, such as depression?  To help diagnose generalized anxiety disorder, your health provider may:  Do a physical exam to look for signs that your anxiety might be linked to an underlying medical condition   Order blood or urine tests or other tests, if a medical condition is suspected   Ask detailed questions about your symptoms and medical history   Use psychological questionnaires to help determine a diagnosis  Many experts use the criteria listed in the Diagnostic and Statistical Manual of Mental Disorders (DSM-5), published by the American Psychiatric Association, to diagnose  mental conditions. This manual is also used by insurance companies to reimburse for treatment.  DSM-5 criteria for generalized anxiety disorder include:  Excessive anxiety and worry about several events or activities most days of the week for at least six months   Difficulty controlling your feelings of worry   At least three of the following symptoms in adults and one of the following in children: restlessness, fatigue, trouble concentrating, irritability, muscle tension or sleep problems   Anxiety or worry that causes you significant distress or interferes with your daily life   Anxiety that isn't related to another mental health condition, such as panic attacks or post-traumatic stress disorder (PTSD), substance abuse, or a medical condition  Generalized anxiety disorder often occurs along with other mental health problems, which can make diagnosis and treatment more challenging. Some disorders that commonly occur with generalized anxiety disorder include:  Phobias   Panic disorder   Depression   Substance abuse   PTSD  The two main treatments for generalized anxiety disorder are psychotherapy and medications. You may benefit most from a combination of the two. It may take some trial and error to discover which treatments work best for you.  Psychotherapy  Also known as talk therapy or psychological counseling, psychotherapy involves working with a therapist to reduce your anxiety symptoms. It can be an effective treatment for generalized anxiety disorder.  Cognitive behavioral therapy is one of the most effective forms of psychotherapy for generalized anxiety disorder. Generally a short-term treatment, cognitive behavioral therapy focuses on teaching you specific skills to gradually return to the activities you've avoided because of anxiety. Through this process, your symptoms improve as you build on your initial success.  Medications  Several types of medications are used to treat generalized anxiety disorder,  including those below. Talk with your doctor about benefits, risks and possible side effects.  Antidepressants. Antidepressants, including medications in the selective serotonin reuptake inhibitor (SSRI) and serotonin norepinephrine reuptake inhibitor (SNRI) classes, are the first-line medication treatments. Examples of antidepressants used to treat anxiety disorders include escitalopram (Lexapro), duloxetine (Cymbalta), venlafaxine (Effexor XR) and paroxetine (Paxil, Pexeva). Your doctor also may recommend other antidepressants.   Buspirone. An anti-anxiety medication called buspirone may be used on an ongoing basis. As with most antidepressants, it typically takes up to several weeks to become fully effective.   Benzodiazepines. In limited circumstances, your doctor may prescribe one of these sedatives for relief of anxiety symptoms. Examples include alprazolam (Niravam, Xanax), chlordiazepoxide (Librium), diazepam (Valium) and lorazepam (Ativan). Benzodiazepines are generally used only for relieving acute anxiety on a short-term basis. Because they can be habit-forming, these medications aren't a good choice if you've had problems with alcohol or drug abuse.  While most people with anxiety disorders need psychotherapy or medications to get anxiety under control, lifestyle changes also can make a difference. Here's what you can do:  Keep physically active. Develop a routine so that you're physically active most days of the week. Exercise is a powerful stress reducer. It may improve your mood and help you stay healthy. Start out slowly and gradually increase the amount and intensity of your activities.   Avoid alcohol and other sedatives. These substances can worsen anxiety.   Quit smoking and cut back or quit drinking coffee. Both nicotine and caffeine can worsen anxiety.   Use relaxation techniques. Visualization techniques, meditation and yoga are examples of relaxation techniques that can ease anxiety.   Make  sleep a priority. Do what you can to make sure you're getting enough sleep to feel rested. If you aren't sleeping well, see your doctor.   Eat healthy. Healthy eating -- such as focusing on vegetables, fruits, whole grains and fish -- may be linked to reduced anxiety, but more research is needed.  Some people are interested in trying alternative medicine (a nonconventional approach instead of conventional medicine) or complementary medicine (a nonconventional approach used along with conventional medicine).  Several herbal remedies have been studied as a treatment for anxiety, such as those listed below, but more research is needed to fully understand the risks and benefits. Here's what researchers know -- and don't know:  Kava. Kava appeared to be a promising treatment for anxiety, but reports of serious liver damage -- even with short-term use -- caused several  countries and [place] to pull it off the market. The Food and Drug Administration has issued warnings but not banned sales in the [place]. Avoid using kava until more rigorous safety studies are done, especially if you have liver problems or take medications that affect your liver.   Valerian. In some studies, people who used valerian reported less anxiety and stress, but in other studies, people reported no benefit. Discuss valerian with your doctor before trying it. While it's generally well-tolerated, there are a few case reports of people developing liver problems when taking preparations containing valerian. If you've been using valerian for a long time and want to stop using it, many authorities recommend that its use be tapered down to prevent withdrawal symptoms.   Passionflower. A few small clinical trials suggest that passionflower might help with anxiety. In many commercial products, passionflower is combined with other herbs, making it difficult to distinguish the unique qualities of each herb. Passionflower is generally considered  safe when taken as directed, but some studies have found it can cause drowsiness, dizziness and confusion.   Theanine. This amino acid is found in green tea and may be found in some supplements. Preliminary evidence shows that theanine may make some people feel calmer, but there is limited evidence that it helps treat anxiety.  Before taking herbal remedies or supplements, talk to your doctor to make sure they're safe for you and won't interact with any medications you take.  To cope with generalized anxiety disorder, here's what you can do:  Stick to your treatment plan. Take medications as directed. Keep therapy appointments. Consistency can make a big difference, especially when it comes to taking your medication.   Join an anxiety support group. Here, you can find compassion, understanding and shared experiences. You may find support groups in your community or on the Internet.   Take action. Work with your mental health provider to figure out what's making you anxious and address it.   Let it go. Don't dwell on past concerns. Change what you can and let the rest take its course.   Break the cycle. When you feel anxious, take a brisk walk or delve into a hobby to refocus your mind away from your worries.   Socialize. Don't let worries isolate you from loved ones or enjoyable activities. Social interaction and caring relationships can lessen your worries.  There's no way to predict for certain what will cause someone to develop generalized anxiety disorder, but you can take steps to reduce the impact of symptoms if you experience anxiety:  Get help early. Anxiety, like many other mental health conditions, can be harder to treat if you wait.   Keep a journal. Keeping track of your personal life can help you and your mental health provider identify what's causing you stress and what seems to help you feel better.   Prioritize issues in your life. You can reduce anxiety by carefully managing your time and energy.    Avoid unhealthy substance use. Alcohol and drug use and even caffeine or nicotine use can cause or worsen anxiety. If you're addicted to any of these substances, quitting can make you anxious. If you can't quit on your own, see your doctor or find a treatment program or support group to help you.              Follow-ups after your visit        Future tests that were ordered for you today     Open Future Orders        Priority Expected Expires Ordered    Echocardiogram Routine  8/21/2019 8/21/2018            Who to contact     If you have questions or need follow up information about today's clinic visit or your schedule please contact [facility] directly at [phone].  Normal or non-critical lab and imaging results will be communicated to you by [employer], letter or phone within 4 business days after the clinic has received the results. If you do not hear from us within 7 days, please contact the clinic through [employer] or phone. If you have a critical or abnormal lab result, we will notify you by phone as soon as possible.  Submit refill requests through [employer] or call your pharmacy and they will forward the refill request to us. Please allow 3 business days for your refill to be completed.          Additional Information About Your Visit        [employer] Information     [employer] gives you secure access to your electronic health record. If you see a primary care provider, you can also send messages to your care team and make appointments. If you have questions, please call your primary care clinic.  If you do not have a primary care provider, please call [phone] and they will assist you.        Care EveryWhere ID     This is your Care EveryWhere ID. This could be used by other organizations to access your [place] medical records  [id number]        Your Vitals Were     Pulse Pulse Oximetry BMI (Body Mass Index)             92 100% 22.13 kg/m2          Blood Pressure from Last 3 Encounters:    08/21/18 116/80   04/24/18 110/70   03/09/18 105/62    Weight from Last 3 Encounters:   08/21/18 107 lb 11.2 oz (48.9 kg)   04/24/18 107 lb (48.5 kg)   03/09/18 105 lb (47.6 kg)                 Today's Medication Changes          These changes are accurate as of 8/21/18  1:49 PM.  If you have any questions, ask your nurse or doctor.               Start taking these medicines.        Dose/Directions    busPIRone 5 MG tablet   Commonly known as:  BUSPAR   Used for:  Panic attack, Anxiety   Started by:  Corona Bethea MD        Start at 5 mg twice daily for 3 days, then 7.5 mg (1.5 tabs) twice daily for 3 days, then 10 mg (2 tabs) twice daily for 3 days, then 12.5 mg (2.5 tabs) twice daily for 3 days, then 15 mg (3 tabs) twice daily and stay at that dose   Quantity:  100 tablet   Refills:  3       zolpidem 5 MG tablet   Commonly known as:  AMBIEN   Used for:  Anxiety   Started by:  Corona Bethea MD        Dose:  5 mg   Take 1 tablet (5 mg) by mouth nightly as needed for sleep   Quantity:  14 tablet   Refills:  0         These medicines have changed or have updated prescriptions.        Dose/Directions    ALLERGY SPRAY 24 HOUR 55 MCG/ACT inhaler   This may have changed:  See the new instructions.   Used for:  Dyspnea, paroxysmal nocturnal   Generic drug:  triamcinolone   Changed by:  Corona Bethea MD        1U 2 SPRAYS IEN D   Quantity:  2 Bottle   Refills:  3            Where to get your medicines      These medications were sent to "[facility] - [place] - [address] AT [facility] [address]  [address], [place] [address]     Phone:  [phone]     ALLERGY SPRAY 24 HOUR 55 MCG/ACT inhaler    ipratropium 0.06 % spray         Some of these will need a paper prescription and others can be bought over the counter.  Ask your nurse if you have questions.     Bring a paper prescription for each of these medications     busPIRone 5 MG tablet    zolpidem 5  MG tablet                Primary Care Provider Office Phone # Fax #    Bhavlester Bethea [phone] [address] [address]        Equal Access to Services     DANUTA COATS : Hadii aad ku hadlamo Soomaali, waaxda luqadaha, qaybta kaalmada adeegyada, waxlisa idiin maryn niko brooks lalicha biggs. So [facility] [phone].    ATENCIÓN: Si habla español, tiene a eisenberg disposición servicios gratuitos de asistencia lingüística. Llame al [phone].    We comply with applicable federal civil rights laws and [place] laws. We do not discriminate on the basis of race, color, national origin, age, disability, sex, sexual orientation, or gender identity.            Thank you!     Thank you for choosing [facility]  for your care. Our goal is always to provide you with excellent care. Hearing back from our patients is one way we can continue to improve our services. Please take a few minutes to complete the written survey that you may receive in the mail after your visit with us. Thank you!             Your Updated Medication List - Protect others around you: Learn how to safely use, store and throw away your medicines at www.disposemymeds.org.          This list is accurate as of 8/21/18  1:49 PM.  Always use your most recent med list.                   Brand Name Dispense Instructions for use Diagnosis    ALLERGY SPRAY 24 HOUR 55 MCG/ACT inhaler   Generic drug:  triamcinolone     2 Bottle    1U 2 SPRAYS IEN D    Dyspnea, paroxysmal nocturnal       busPIRone 5 MG tablet    BUSPAR    100 tablet    Start at 5 mg twice daily for 3 days, then 7.5 mg (1.5 tabs) twice daily for 3 days, then 10 mg (2 tabs) twice daily for 3 days, then 12.5 mg (2.5 tabs) twice daily for 3 days, then 15 mg (3 tabs) twice daily and stay at that dose    Panic attack, Anxiety       ipratropium 0.06 % spray    ATROVENT    1 Box    Spray 2 sprays into both nostrils 4 times daily as needed for rhinitis    Acute  seasonal allergic rhinitis, unspecified trigger       omeprazole 20 MG CR capsule    priLOSEC    180 capsule    Take 1 capsule (20 mg) by mouth 2 times daily    Gastroesophageal reflux disease without esophagitis       WILSON STOOL SOFTENER PO      Take 3 capsules by mouth daily        pramipexole 0.25 MG tablet    MIRAPEX    180 tablet    Take 1 tablet (0.25 mg) by mouth 2 times daily At 1700 and HS    Restless leg syndrome       zolpidem 5 MG tablet    AMBIEN    14 tablet    Take 1 tablet (5 mg) by mouth nightly as needed for sleep    Anxiety

## 2018-08-21 NOTE — TELEPHONE ENCOUNTER
ipratropium (ATROVENT) 0.06 % spray 1 Box 1 8/21/2018           Last Written Prescription Date:  08/21/2018  Last Fill Quantity: 1,   # refills: 1  Last Office Visit: 08/21/2018  Future Office visit:   Unknown    Routing refill request to provider for review/approval because:  Drug not on the [facility], [facility] or [facility] refill protocol or controlled substance

## 2018-08-21 NOTE — PATIENT INSTRUCTIONS
"  It's normal to feel anxious from time to time, especially if your life is stressful. However, excessive, ongoing anxiety and worry that interfere with day-to-day activities may be a sign of generalized anxiety disorder.  It's possible to develop generalized anxiety disorder as a child or an adult. Generalized anxiety disorder has symptoms that are similar to panic disorder, obsessive-compulsive disorder and other types of anxiety, but they're all different conditions.  Living with generalized anxiety disorder can be a long-term challenge. In many cases, it occurs along with other anxiety or mood disorders. In most cases, generalized anxiety disorder improves with medications or talk therapy (psychotherapy). Making lifestyle changes, learning coping skills and using relaxation techniques also can help.  Generalized anxiety disorder symptoms can vary. They may include:  Persistent worrying or obsession about small or large concerns that's out of proportion to the impact of the event   Inability to set aside or let go of a worry   Inability to relax, restlessness, and feeling keyed up or on edge   Difficulty concentrating, or the feeling that your mind \"goes blank\"   Worrying about excessively worrying   Distress about making decisions for fear of making the wrong decision   Carrying every option in a situation all the way out to its possible negative conclusion   Difficulty handling uncertainty or indecisiveness  Physical signs and symptoms may include:  Fatigue   Irritability   Muscle tension or muscle aches   Trembling, feeling twitchy   Being easily startled   Trouble sleeping   Sweating   Nausea, diarrhea or irritable bowel syndrome   Headaches  There may be times when your worries don't completely consume you, but you still feel anxious even when there's no apparent reason. For example, you may feel intense worry about your safety or that of your loved ones, or you may have a general sense that something bad is " about to happen.  Your anxiety, worry or physical symptoms cause you significant distress in social, work or other areas of your life. Worries can shift from one concern to another and may change with time and age.  Symptoms in children and teenagers  In addition to the symptoms above, children and teenagers who have generalized anxiety disorder may have excessive worries about:  Performance at school or sporting events   Being on time (punctuality)   Earthquakes, nuclear war or other catastrophic events  A child or teen with generalized anxiety disorder may also:  Feel overly anxious to fit in   Be a perfectionist   Redo tasks because they aren't perfect the first time   Spend excessive time doing homework   Lack confidence   Strive for approval   Require a lot of reassurance about performance  When to see a doctor  Some anxiety is normal, but see your doctor if:  You feel like you're worrying too much, and it's interfering with your work, relationships or other parts of your life   You feel depressed, have trouble with drinking or drugs, or you have other mental health concerns along with anxiety   You have suicidal thoughts or behaviors -- seek emergency treatment immediately  Your worries are unlikely to simply go away on their own, and they may actually get worse over time. Try to seek professional help before your anxiety becomes severe -- it may be easier to treat early on.  As with many mental health conditions, the exact cause of generalized anxiety disorder isn't fully understood, but it may include genetics as well as other risk factors.  These factors may increase the risk of developing generalized anxiety disorder:  Personality. A person whose temperament is timid or negative or who avoids anything dangerous may be more prone to generalized anxiety disorder than others are.   Genetics. Generalized anxiety disorder may run in families.   Being female. Women are diagnosed with generalized anxiety disorder  somewhat more often than men are.  Having generalized anxiety disorder does more than just make you worry. It can:  Impair your ability to perform tasks quickly and efficiently because you have trouble concentrating   Take your time and focus from other activities   Sap your energy   Disturb your sleep  Generalized anxiety disorder can also lead to or worsen other mental and physical health conditions, such as:   Depression (which often occurs with generalized anxiety disorder)   Substance abuse   Trouble sleeping (insomnia)   Digestive or bowel problems   Headaches   Heart-health issues  You may start by seeing your family doctor. However, you may need to see a psychiatrist or psychologist if you have severe anxiety or if you also have another mental health condition, such as depression.  What you can do  To prepare for your appointment, make a list of:  Your symptoms, including when they occur, what seems to make them better or worse, and how much they affect your day-to-day activities, such as work, school or relationships   What's causing you stress, including major life changes or stressful events you've dealt with recently and any traumatic experiences you've had in the past   Health problems you have, both physical conditions and mental health issues   All medications you're taking, including any vitamins or other supplements, and the dosages   Questions to ask your doctor  Some basic questions to ask your doctor include:  What's the most likely cause of my symptoms?   Are there other possible situations, psychological issues or physical health problems that could be causing or worsening my anxiety?   Do I need any tests?   What treatment do you recommend?   Should I see a psychiatrist, psychologist or other mental health provider?   Would medication help? If so, is there a generic alternative to the medicine you're prescribing?   Are there any brochures or other printed material that I can have? What  websites do you recommend?  Don't hesitate to ask questions at any time during your appointment.  What to expect from your doctor  Being ready to answer questions from your doctor may reserve time to go over any points you want to spend more time on. Some questions the doctor may ask include:  What are your symptoms?   Do your symptoms interfere with your daily activities?   Have your feelings of anxiety been occasional or continuous?   When did you first begin noticing your anxiety?   Does anything in particular seem to trigger your anxiety or make it worse?   What, if anything, seems to improve your feelings of anxiety?   What, if any, physical or mental health conditions do you have?   What traumatic experiences have you had recently or in the past?   Do you regularly drink alcohol or use recreational drugs?   Do you have any blood relatives with anxiety or other mental health conditions, such as depression?  To help diagnose generalized anxiety disorder, your health provider may:  Do a physical exam to look for signs that your anxiety might be linked to an underlying medical condition   Order blood or urine tests or other tests, if a medical condition is suspected   Ask detailed questions about your symptoms and medical history   Use psychological questionnaires to help determine a diagnosis  Many experts use the criteria listed in the Diagnostic and Statistical Manual of Mental Disorders (DSM-5), published by the American Psychiatric Association, to diagnose mental conditions. This manual is also used by insurance companies to reimburse for treatment.  DSM-5 criteria for generalized anxiety disorder include:  Excessive anxiety and worry about several events or activities most days of the week for at least six months   Difficulty controlling your feelings of worry   At least three of the following symptoms in adults and one of the following in children: restlessness, fatigue, trouble concentrating, irritability,  muscle tension or sleep problems   Anxiety or worry that causes you significant distress or interferes with your daily life   Anxiety that isn't related to another mental health condition, such as panic attacks or post-traumatic stress disorder (PTSD), substance abuse, or a medical condition  Generalized anxiety disorder often occurs along with other mental health problems, which can make diagnosis and treatment more challenging. Some disorders that commonly occur with generalized anxiety disorder include:  Phobias   Panic disorder   Depression   Substance abuse   PTSD  The two main treatments for generalized anxiety disorder are psychotherapy and medications. You may benefit most from a combination of the two. It may take some trial and error to discover which treatments work best for you.  Psychotherapy  Also known as talk therapy or psychological counseling, psychotherapy involves working with a therapist to reduce your anxiety symptoms. It can be an effective treatment for generalized anxiety disorder.  Cognitive behavioral therapy is one of the most effective forms of psychotherapy for generalized anxiety disorder. Generally a short-term treatment, cognitive behavioral therapy focuses on teaching you specific skills to gradually return to the activities you've avoided because of anxiety. Through this process, your symptoms improve as you build on your initial success.  Medications  Several types of medications are used to treat generalized anxiety disorder, including those below. Talk with your doctor about benefits, risks and possible side effects.  Antidepressants. Antidepressants, including medications in the selective serotonin reuptake inhibitor (SSRI) and serotonin norepinephrine reuptake inhibitor (SNRI) classes, are the first-line medication treatments. Examples of antidepressants used to treat anxiety disorders include escitalopram (Lexapro), duloxetine (Cymbalta), venlafaxine (Effexor XR) and  paroxetine (Paxil, Pexeva). Your doctor also may recommend other antidepressants.   Buspirone. An anti-anxiety medication called buspirone may be used on an ongoing basis. As with most antidepressants, it typically takes up to several weeks to become fully effective.   Benzodiazepines. In limited circumstances, your doctor may prescribe one of these sedatives for relief of anxiety symptoms. Examples include alprazolam (Niravam, Xanax), chlordiazepoxide (Librium), diazepam (Valium) and lorazepam (Ativan). Benzodiazepines are generally used only for relieving acute anxiety on a short-term basis. Because they can be habit-forming, these medications aren't a good choice if you've had problems with alcohol or drug abuse.  While most people with anxiety disorders need psychotherapy or medications to get anxiety under control, lifestyle changes also can make a difference. Here's what you can do:  Keep physically active. Develop a routine so that you're physically active most days of the week. Exercise is a powerful stress reducer. It may improve your mood and help you stay healthy. Start out slowly and gradually increase the amount and intensity of your activities.   Avoid alcohol and other sedatives. These substances can worsen anxiety.   Quit smoking and cut back or quit drinking coffee. Both nicotine and caffeine can worsen anxiety.   Use relaxation techniques. Visualization techniques, meditation and yoga are examples of relaxation techniques that can ease anxiety.   Make sleep a priority. Do what you can to make sure you're getting enough sleep to feel rested. If you aren't sleeping well, see your doctor.   Eat healthy. Healthy eating -- such as focusing on vegetables, fruits, whole grains and fish -- may be linked to reduced anxiety, but more research is needed.  Some people are interested in trying alternative medicine (a nonconventional approach instead of conventional medicine) or complementary medicine (a  nonconventional approach used along with conventional medicine).  Several herbal remedies have been studied as a treatment for anxiety, such as those listed below, but more research is needed to fully understand the risks and benefits. Here's what researchers know -- and don't know:  Kava. Kava appeared to be a promising treatment for anxiety, but reports of serious liver damage -- even with short-term use -- caused several  countries and [place] to pull it off the market. The Food and Drug Administration has issued warnings but not banned sales in the [place]. Avoid using kava until more rigorous safety studies are done, especially if you have liver problems or take medications that affect your liver.   Valerian. In some studies, people who used valerian reported less anxiety and stress, but in other studies, people reported no benefit. Discuss valerian with your doctor before trying it. While it's generally well-tolerated, there are a few case reports of people developing liver problems when taking preparations containing valerian. If you've been using valerian for a long time and want to stop using it, many authorities recommend that its use be tapered down to prevent withdrawal symptoms.   Passionflower. A few small clinical trials suggest that passionflower might help with anxiety. In many commercial products, passionflower is combined with other herbs, making it difficult to distinguish the unique qualities of each herb. Passionflower is generally considered safe when taken as directed, but some studies have found it can cause drowsiness, dizziness and confusion.   Theanine. This amino acid is found in green tea and may be found in some supplements. Preliminary evidence shows that theanine may make some people feel calmer, but there is limited evidence that it helps treat anxiety.  Before taking herbal remedies or supplements, talk to your doctor to make sure they're safe for you and won't  interact with any medications you take.  To cope with generalized anxiety disorder, here's what you can do:  Stick to your treatment plan. Take medications as directed. Keep therapy appointments. Consistency can make a big difference, especially when it comes to taking your medication.   Join an anxiety support group. Here, you can find compassion, understanding and shared experiences. You may find support groups in your community or on the Internet.   Take action. Work with your mental health provider to figure out what's making you anxious and address it.   Let it go. Don't dwell on past concerns. Change what you can and let the rest take its course.   Break the cycle. When you feel anxious, take a brisk walk or delve into a hobby to refocus your mind away from your worries.   Socialize. Don't let worries isolate you from loved ones or enjoyable activities. Social interaction and caring relationships can lessen your worries.  There's no way to predict for certain what will cause someone to develop generalized anxiety disorder, but you can take steps to reduce the impact of symptoms if you experience anxiety:  Get help early. Anxiety, like many other mental health conditions, can be harder to treat if you wait.   Keep a journal. Keeping track of your personal life can help you and your mental health provider identify what's causing you stress and what seems to help you feel better.   Prioritize issues in your life. You can reduce anxiety by carefully managing your time and energy.   Avoid unhealthy substance use. Alcohol and drug use and even caffeine or nicotine use can cause or worsen anxiety. If you're addicted to any of these substances, quitting can make you anxious. If you can't quit on your own, see your doctor or find a treatment program or support group to help you.

## 2018-08-21 NOTE — PROGRESS NOTES
SUBJECTIVE:   Hanane Sorto is a 72 year old female who presents to clinic today for the following health issues:      Anxiety Follow-Up    Status since last visit: Worsened    Other associated symptoms:None    Complicating factors:   Significant life event: No   Current substance abuse: None  Depression symptoms: No  GELACIO-7 SCORE 2015   Total Score 1     Pt reports 1 week onset of panic attack, with history of attacks that come out of nowhere for week at a time that include anxiety and SOB.  States SOB is always constant with these attacks with no relief until anxiety attack subsides.  Has used clonazepam in past, and used clonazepam 2 days ago, from RX from . Pt states the medication made sx worse, and made her very tired.     She has trouble sleeping  She has had anxiety since childhood  She plays tennis, golfs and has no chest pain or shortness of breath   She is tired too    She wakes up at 2 AM  She can't breathe        Problem list and histories reviewed & adjusted, as indicated.  Additional history: as documented    Patient Active Problem List   Diagnosis     Hyperlipidemia with target LDL less than 130     Anxiety     Osteoporosis     Gross hematuria     Advanced directives, counseling/discussion     Osteoarthritis     Bursitis of hip     Constipation     Gastroesophageal reflux disease without esophagitis     S/P total knee arthroplasty     Fracture of wrist, left, with routine healing, subsequent encounter     Hyperlipidemia LDL goal <130     Past Surgical History:   Procedure Laterality Date     ARTHROPLASTY KNEE Left 2017    Procedure: ARTHROPLASTY KNEE;  LEFT TOTAL KNEE ARTHROPLASTY;  Surgeon: Lee Ayala MD;  Location: [facility]     BUNIONECTOMY      both      SECTION       COLONOSCOPY  8-10     LIPOSUCTION (LOCATION)       RECONSTRUCT BREAST, IMPLANT PROSTHESIS, COMBINED         Social History   Substance Use Topics     Smoking status: Former Smoker      Smokeless tobacco: Never Used      Comment: Quit 30 years ago     Alcohol use 0.0 - 2.4 oz/week     0 - 4 Standard drinks or equivalent per week      Comment: once every two weeks     Family History   Problem Relation Age of Onset     C.A.D. Father      Lipids Mother      Alzheimer Disease Mother      Breast Cancer Sister      53     Cancer - colorectal Sister      40s     Alcoholism Son          Current Outpatient Prescriptions   Medication Sig Dispense Refill     ALLERGY SPRAY 24 HOUR 55 MCG/ACT Inhaler U 2 SPRAYS IEN D  3     Docusate Sodium (WILSON STOOL SOFTENER PO) Take 3 capsules by mouth daily       ipratropium (ATROVENT) 0.06 % spray Spray 2 sprays into both nostrils 4 times daily as needed for rhinitis 1 Box 1     omeprazole (PRILOSEC) 20 MG CR capsule Take 1 capsule (20 mg) by mouth 2 times daily 180 capsule 11     pramipexole (MIRAPEX) 0.25 MG tablet Take 1 tablet (0.25 mg) by mouth 2 times daily At 1700 and  tablet 3       Reviewed and updated as needed this visit by clinical staff  Allergies  Meds  Problems       Reviewed and updated as needed this visit by Provider  Allergies  Meds  Problems         ROS:  Constitutional, HEENT, cardiovascular, pulmonary, GI, , musculoskeletal, neuro, skin, endocrine and psych systems are negative, except as otherwise noted.    OBJECTIVE:     /80  Pulse 92  Wt 107 lb 11.2 oz (48.9 kg)  SpO2 100%  BMI 22.13 kg/m2  Body mass index is 22.13 kg/(m^2).  GENERAL: healthy, alert and no distress  NECK: no adenopathy, no asymmetry, masses, or scars and thyroid normal to palpation  RESP: lungs clear to auscultation - no rales, rhonchi or wheezes  CV: regular rate and rhythm, normal S1 S2, no S3 or S4, no murmur, click or rub, no peripheral edema and peripheral pulses strong  ABDOMEN: soft, nontender, no hepatosplenomegaly, no masses and bowel sounds normal  MS: no gross musculoskeletal defects noted, no edema  PSYCH: mentation appears normal and  anxious        ASSESSMENT/PLAN:             Hanane was seen today for anxiety.    Diagnoses and all orders for this visit:    Panic attack  -     busPIRone (BUSPAR) 5 MG tablet; Start at 5 mg twice daily for 3 days, then 7.5 mg (1.5 tabs) twice daily for 3 days, then 10 mg (2 tabs) twice daily for 3 days, then 12.5 mg (2.5 tabs) twice daily for 3 days, then 15 mg (3 tabs) twice daily and stay at that dose  Patient will take BuSpar and does not want to take SSRI drugs or anxiety medications  She does not want to seek counseling  She states that she has had these attacks since childhood  She is advised to stop clonazepam  Anxiety  -     busPIRone (BUSPAR) 5 MG tablet; Start at 5 mg twice daily for 3 days, then 7.5 mg (1.5 tabs) twice daily for 3 days, then 10 mg (2 tabs) twice daily for 3 days, then 12.5 mg (2.5 tabs) twice daily for 3 days, then 15 mg (3 tabs) twice daily and stay at that dose  -     zolpidem (AMBIEN) 5 MG tablet; Take 1 tablet (5 mg) by mouth nightly as needed for sleep    Acute seasonal allergic rhinitis, unspecified trigger  -     ipratropium (ATROVENT) 0.06 % spray; Spray 2 sprays into both nostrils 4 times daily as needed for rhinitis  She will also take Zyrtec 10 mg at night  Dyspnea, paroxysmal nocturnal  -     ALLERGY SPRAY 24 HOUR 55 MCG/ACT inhaler; 1U 2 SPRAYS IEN D  -     Echocardiogram; Future    Because of the nighttime symptoms of dyspnea I wish to check echocardiography  She is physically very active during the day including tennis playing and golfing and I do not think she has symptoms of congestive heart failure  But due to nighttime symptoms of dyspnea due diligence requires that we check echo    Corona Bethea MD  [facility]

## 2018-08-23 RX ORDER — IPRATROPIUM BROMIDE 42 UG/1
SPRAY, METERED NASAL
OUTPATIENT
Start: 2018-08-23

## 2018-08-23 NOTE — TELEPHONE ENCOUNTER
Rx refused. Duplicate request. Pharmacy notified.  Rx was sent 8/21/18 with a refill    Marleni CARBAJAL RN

## 2018-08-23 NOTE — TELEPHONE ENCOUNTER
Requested Prescriptions   Pending Prescriptions Disp Refills     ipratropium (ATROVENT) 0.06 % spray [Pharmacy Med Name: IPRATROPIUM 0.06% RENAY SP 15ML (936)] 135 mL 1     Sig: USE 2 SPRAYS IN EACH NOSTRIL FOUR TIMES DAILY AS NEEDED FOR RHINITIS    There is no refill protocol information for this order

## 2018-09-20 ENCOUNTER — MYC MEDICAL ADVICE (OUTPATIENT)
Dept: FAMILY MEDICINE | Facility: CLINIC | Age: 73
End: 2018-09-20

## 2018-09-21 NOTE — TELEPHONE ENCOUNTER
Pt returned call this morning.  Offered the two appts for today and she said that she is not available to come in.  Please seek an appt for Monday as that is what she showed interest in.

## 2018-09-24 ENCOUNTER — RADIANT APPOINTMENT (OUTPATIENT)
Dept: GENERAL RADIOLOGY | Facility: CLINIC | Age: 73
End: 2018-09-24
Attending: INTERNAL MEDICINE
Payer: MEDICARE

## 2018-09-24 ENCOUNTER — TRANSFERRED RECORDS (OUTPATIENT)
Dept: HEALTH INFORMATION MANAGEMENT | Facility: CLINIC | Age: 73
End: 2018-09-24

## 2018-09-24 ENCOUNTER — OFFICE VISIT (OUTPATIENT)
Dept: FAMILY MEDICINE | Facility: CLINIC | Age: 73
End: 2018-09-24
Payer: MEDICARE

## 2018-09-24 VITALS
HEART RATE: 74 BPM | BODY MASS INDEX: 21.77 KG/M2 | SYSTOLIC BLOOD PRESSURE: 114 MMHG | OXYGEN SATURATION: 98 % | HEIGHT: 59 IN | WEIGHT: 108 LBS | TEMPERATURE: 97.2 F | DIASTOLIC BLOOD PRESSURE: 66 MMHG

## 2018-09-24 DIAGNOSIS — K21.9 GASTROESOPHAGEAL REFLUX DISEASE WITHOUT ESOPHAGITIS: ICD-10-CM

## 2018-09-24 DIAGNOSIS — E78.5 HYPERLIPIDEMIA WITH TARGET LDL LESS THAN 130: ICD-10-CM

## 2018-09-24 DIAGNOSIS — F41.9 ANXIETY: ICD-10-CM

## 2018-09-24 DIAGNOSIS — R06.00 DYSPNEA, UNSPECIFIED TYPE: Primary | ICD-10-CM

## 2018-09-24 DIAGNOSIS — R06.00 DYSPNEA, UNSPECIFIED TYPE: ICD-10-CM

## 2018-09-24 LAB
ERYTHROCYTE [DISTWIDTH] IN BLOOD BY AUTOMATED COUNT: 14 % (ref 10–15)
HCT VFR BLD AUTO: 39.2 % (ref 35–47)
HGB BLD-MCNC: 12.7 G/DL (ref 11.7–15.7)
MCH RBC QN AUTO: 30 PG (ref 26.5–33)
MCHC RBC AUTO-ENTMCNC: 32.4 G/DL (ref 31.5–36.5)
MCV RBC AUTO: 93 FL (ref 78–100)
PLATELET # BLD AUTO: 350 10E9/L (ref 150–450)
RBC # BLD AUTO: 4.23 10E12/L (ref 3.8–5.2)
WBC # BLD AUTO: 9.2 10E9/L (ref 4–11)

## 2018-09-24 PROCEDURE — 99214 OFFICE O/P EST MOD 30 MIN: CPT | Performed by: INTERNAL MEDICINE

## 2018-09-24 PROCEDURE — 36415 COLL VENOUS BLD VENIPUNCTURE: CPT | Performed by: INTERNAL MEDICINE

## 2018-09-24 PROCEDURE — 71046 X-RAY EXAM CHEST 2 VIEWS: CPT

## 2018-09-24 PROCEDURE — 85027 COMPLETE CBC AUTOMATED: CPT | Performed by: INTERNAL MEDICINE

## 2018-09-24 NOTE — MR AVS SNAPSHOT
After Visit Summary   9/24/2018    Hanane Sorto    MRN: [id number]           Patient Information     Date Of Birth          1945        Visit Information        Provider Department      9/24/2018 4:00 PM Corona Bethea MD [facility]        Today's Diagnoses     Dyspnea, unspecified type    -  1    Hyperlipidemia with target LDL less than 130        Gastroesophageal reflux disease without esophagitis        Anxiety          Care Instructions    Cardiology   [phone]              Follow-ups after your visit        Additional Services     CARDIOLOGY EVAL ADULT REFERRAL       Preferred location:  [facility] [phone]   [email or web address]    Consult for dyspnea, high lipids    Please be aware that coverage of these services is subject to the terms and limitations of your health insurance plan.  Call member services at your health plan with any benefit or coverage questions.      Please bring the following to your appointment:  Any x-rays, CTs or MRIs which have been performed. Contact the facility where they were done to arrange for  prior to your scheduled appointment.    List of current medications  This referral request   Any documents/labs given to you for this referral                  Follow-up notes from your care team     Return will call for appt.      Your next 10 appointments already scheduled     Oct 02, 2018 10:00 AM CDT   Ech Complete with [id number]   [facility] CV Echocardiography (Cardiovascular Imaging at [facility])    [address] [place] [id number] [phone]           1.  Please bring or wear a comfortable two-piece outfit. 2.  You may eat, drink and take your normal medicines. 3.  For any questions that cannot be answered, please contact the ordering physician 4.  Please do not wear perfumes or scented lotions on the day of your exam.  "             Future tests that were ordered for you today     Open Future Orders        Priority Expected Expires Ordered    XR Chest 2 Views Routine 9/24/2018 9/24/2019 9/24/2018    CT Chest w/o Contrast Routine  9/24/2019 9/24/2018    Exercise Stress Echocardiogram Routine  9/24/2019 9/24/2018            Who to contact     If you have questions or need follow up information about today's clinic visit or your schedule please contact [facility] directly at [phone].  Normal or non-critical lab and imaging results will be communicated to you by getupphart, letter or phone within 4 business days after the clinic has received the results. If you do not hear from us within 7 days, please contact the clinic through [employer] or phone. If you have a critical or abnormal lab result, we will notify you by phone as soon as possible.  Submit refill requests through FileString or call your pharmacy and they will forward the refill request to us. Please allow 3 business days for your refill to be completed.          Additional Information About Your Visit        FileString Information     FileString gives you secure access to your electronic health record. If you see a primary care provider, you can also send messages to your care team and make appointments. If you have questions, please call your primary care clinic.  If you do not have a primary care provider, please call [phone] and they will assist you.        Care EveryWhere ID     This is your Care EveryWhere ID. This could be used by other organizations to access your [facility] medical records  [id number]        Your Vitals Were     Pulse Temperature Height Pulse Oximetry BMI (Body Mass Index)       74 97.2  F (36.2  C) (Oral) 4' 10.5\" (1.486 m) 98% 22.19 kg/m2        Blood Pressure from Last 3 Encounters:   09/24/18 114/66   08/21/18 116/80   04/24/18 110/70    Weight from Last 3 Encounters:   09/24/18 108 lb (49 kg)   08/21/18 107 lb 11.2 oz (48.9 kg) "   04/24/18 107 lb (48.5 kg)              We Performed the Following     CARDIOLOGY EVAL ADULT REFERRAL        Primary Care Provider Office Phone # Fax #    Bhni Bethea [phone] [address] [address]        Equal Access to Services     [facility] Center[facility]Z : Hadii aad ku hadasho Soomaali, waaxda luqadaha, qaybta kaalmada adeegyada, waxay idiin hayaan adeeg khwilda la'aan . So [facility] [phone].    ATENCIÓN: Si habla español, tiene a eisenberg disposición servicios gratuitos de asistencia lingüística. [facility] [phone].    We comply with applicable federal civil rights laws and [place] laws. We do not discriminate on the basis of race, color, national origin, age, disability, sex, sexual orientation, or gender identity.            Thank you!     Thank you for choosing [facility]  for your care. Our goal is always to provide you with excellent care. Hearing back from our patients is one way we can continue to improve our services. Please take a few minutes to complete the written survey that you may receive in the mail after your visit with us. Thank you!             Your Updated Medication List - Protect others around you: Learn how to safely use, store and throw away your medicines at www.disposemymeds.org.          This list is accurate as of 9/24/18  4:22 PM.  Always use your most recent med list.                   Brand Name Dispense Instructions for use Diagnosis    ALLERGY SPRAY 24 HOUR 55 MCG/ACT inhaler   Generic drug:  triamcinolone     2 Bottle    1U 2 SPRAYS IEN D    Dyspnea, paroxysmal nocturnal       ipratropium 0.06 % spray    ATROVENT    1 Box    Spray 2 sprays into both nostrils 4 times daily as needed for rhinitis    Acute seasonal allergic rhinitis, unspecified trigger       omeprazole 20 MG CR capsule    priLOSEC    180 capsule    Take 1 capsule (20 mg) by mouth 2 times daily    Gastroesophageal reflux disease without esophagitis       WILSON  STOOL SOFTENER PO      Take 3 capsules by mouth daily        pramipexole 0.25 MG tablet    MIRAPEX    180 tablet    Take 1 tablet (0.25 mg) by mouth 2 times daily At 1700 and HS    Restless leg syndrome       zolpidem 5 MG tablet    AMBIEN    14 tablet    Take 1 tablet (5 mg) by mouth nightly as needed for sleep    Anxiety

## 2018-09-24 NOTE — PROGRESS NOTES
SUBJECTIVE:   Hanane Sorto is a 73 year old female who presents to clinic today for the following health issues:      Chief Complaint   Patient presents with     Follow Up For     SOB       Patient plays Tennis twice weekly 90 minutes  She is shortness of breath during that and gasps  She does not have chest pain   She does not have palpitations   No PND or orthopnea  No anxiety    Recently, steroid dose pack was given by ENT for nose   No shortness of breath improvement  No wheezing    Lab Results   Component Value Date    WBC 6.1 2017     Lab Results   Component Value Date    RBC 4.33 2017     Lab Results   Component Value Date    HGB 13.1 2017     Lab Results   Component Value Date    HCT 39.9 2017     No components found for: MCT  Lab Results   Component Value Date    MCV 92 2017     Lab Results   Component Value Date    MCH 30.3 2017     Lab Results   Component Value Date    MCHC 32.8 2017     Lab Results   Component Value Date    RDW 13.8 2017     Lab Results   Component Value Date     2017         Problem list and histories reviewed & adjusted, as indicated.  Additional history: as documented    Patient Active Problem List   Diagnosis     Hyperlipidemia with target LDL less than 130     Anxiety     Osteoporosis     Gross hematuria     Advanced directives, counseling/discussion     Osteoarthritis     Bursitis of hip     Constipation     Gastroesophageal reflux disease without esophagitis     S/P total knee arthroplasty     Fracture of wrist, left, with routine healing, subsequent encounter     Past Surgical History:   Procedure Laterality Date     ARTHROPLASTY KNEE Left 2017    Procedure: ARTHROPLASTY KNEE;  LEFT TOTAL KNEE ARTHROPLASTY;  Surgeon: Lee Ayala MD;  Location: [facility] OR     BUNIONECTOMY      both      SECTION       COLONOSCOPY  8-10     LIPOSUCTION (LOCATION)       RECONSTRUCT BREAST, IMPLANT PROSTHESIS,  "COMBINED         Social History   Substance Use Topics     Smoking status: Former Smoker     Smokeless tobacco: Never Used      Comment: Quit 30 years ago     Alcohol use 0.0 - 2.4 oz/week     0 - 4 Standard drinks or equivalent per week      Comment: once every two weeks     Family History   Problem Relation Age of Onset     C.A.D. Father      Lipids Mother      Alzheimer Disease Mother      Breast Cancer Sister      53     Cancer - colorectal Sister      40s     Alcoholism Son          Current Outpatient Prescriptions   Medication Sig Dispense Refill     ALLERGY SPRAY 24 HOUR 55 MCG/ACT inhaler 1U 2 SPRAYS IEN D 2 Bottle 3     Docusate Sodium (WILSON STOOL SOFTENER PO) Take 3 capsules by mouth daily       ipratropium (ATROVENT) 0.06 % spray Spray 2 sprays into both nostrils 4 times daily as needed for rhinitis 1 Box 1     omeprazole (PRILOSEC) 20 MG CR capsule Take 1 capsule (20 mg) by mouth 2 times daily 180 capsule 11     pramipexole (MIRAPEX) 0.25 MG tablet Take 1 tablet (0.25 mg) by mouth 2 times daily At 1700 and  tablet 3     zolpidem (AMBIEN) 5 MG tablet Take 1 tablet (5 mg) by mouth nightly as needed for sleep 14 tablet 0       Reviewed and updated as needed this visit by clinical staff  Allergies  Meds  Problems       Reviewed and updated as needed this visit by Provider  Allergies  Meds  Problems         ROS:  Constitutional, HEENT, cardiovascular, pulmonary, GI, , musculoskeletal, neuro, skin, endocrine and psych systems are negative, except as otherwise noted.    OBJECTIVE:     /66 (BP Location: Right arm, Cuff Size: Adult Regular)  Pulse 74  Temp 97.2  F (36.2  C) (Oral)  Ht 4' 10.5\" (1.486 m)  Wt 108 lb (49 kg)  SpO2 98%  BMI 22.19 kg/m2  Body mass index is 22.19 kg/(m^2).  GENERAL: healthy, alert and no distress  NECK: no adenopathy, no asymmetry, masses, or scars and thyroid normal to palpation  RESP: lungs clear to auscultation - no rales, rhonchi or wheezes  CV: " regular rate and rhythm, normal S1 S2, no S3 or S4, no murmur, click or rub, no peripheral edema and peripheral pulses strong  ABDOMEN: soft, nontender, no hepatosplenomegaly, no masses and bowel sounds normal  MS: no gross musculoskeletal defects noted, no edema        ASSESSMENT/PLAN:             Hanane was seen today for follow up for.    Diagnoses and all orders for this visit:    Dyspnea, unspecified type  Cause not clear  Could be anxiety  Will work up since high LDL  -     **CBC with platelets FUTURE anytime  -     Exercise Stress Echocardiogram; Future  -     CARDIOLOGY EVAL ADULT REFERRAL  -     CT Chest w/o Contrast; Future  -     XR Chest 2 Views; Future    Hyperlipidemia with target LDL less than 130  -     CARDIOLOGY EVAL ADULT REFERRAL  As above  Gastroesophageal reflux disease without esophagitis  ON PPI    Anxiety    Did not tolerate serotonin specific reuptake inhibitor OR Buspar        Corona Bethea MD  [facility]RT[facility] cadena

## 2018-09-25 ENCOUNTER — MYC MEDICAL ADVICE (OUTPATIENT)
Dept: FAMILY MEDICINE | Facility: CLINIC | Age: 73
End: 2018-09-25

## 2018-09-25 DIAGNOSIS — F41.9 ANXIETY: Primary | ICD-10-CM

## 2018-09-25 RX ORDER — LORAZEPAM 0.5 MG/1
0.5 TABLET ORAL
Qty: 15 TABLET | Refills: 0 | Status: SHIPPED | OUTPATIENT
Start: 2018-09-25 | End: 2019-04-26

## 2018-09-25 RX ORDER — DESVENLAFAXINE 25 MG/1
25 TABLET, EXTENDED RELEASE ORAL DAILY
Qty: 31 TABLET | Refills: 3 | Status: SHIPPED | OUTPATIENT
Start: 2018-09-25 | End: 2019-04-26

## 2018-09-27 ENCOUNTER — TELEPHONE (OUTPATIENT)
Dept: FAMILY MEDICINE | Facility: CLINIC | Age: 73
End: 2018-09-27

## 2018-09-27 NOTE — TELEPHONE ENCOUNTER
Prior Authorization Retail Medication Request    Medication/Dose:   ICD code (if different than what is on RX):  F41.9  Previously Tried and Failed:   lexapro 10 mg, clonopin 0.5 mg  Rationale:  failed    Insurance Name:  [employer]/medicare  Insurance ID:  [id number]      Pharmacy Information (if different than what is on RX)  Name:  [facility]   Phone:  [phone]

## 2018-10-01 NOTE — TELEPHONE ENCOUNTER
Central Prior Authorization Team   Phone: [phone]    PA Initiation    Medication: desvenlafaxine er 25 mg  Insurance Company: Medicare Blue - Phone [phone] Fax [phone]  Pharmacy Filling the Rx: [facility] [address] [place], [place] - [address] AT [facility]  [address]  Filling Pharmacy Phone: [phone]  Filling Pharmacy Fax: [phone]  Start Date: 10/1/2018

## 2018-10-02 ENCOUNTER — HOSPITAL ENCOUNTER (OUTPATIENT)
Dept: CARDIOLOGY | Facility: CLINIC | Age: 73
Discharge: HOME | End: 2018-10-02
Attending: INTERNAL MEDICINE | Admitting: INTERNAL MEDICINE
Payer: MEDICARE

## 2018-10-02 DIAGNOSIS — R06.00 DYSPNEA, PAROXYSMAL NOCTURNAL: ICD-10-CM

## 2018-10-02 PROCEDURE — 93306 TTE W/DOPPLER COMPLETE: CPT | Mod: 26 | Performed by: INTERNAL MEDICINE

## 2018-10-02 PROCEDURE — 93306 TTE W/DOPPLER COMPLETE: CPT

## 2018-10-02 NOTE — TELEPHONE ENCOUNTER
PRIOR AUTHORIZATION DENIED    Medication: desvenlafaxine er 25 mg-DENIED    Denial Date: 10/2/2018    Denial Rational: DX OF F41.9 IS NOT AN FDA APPROVED CONDITION.         Appeal Information:  IF YOU WOULD LIKE TO APPEAL PLEASE SUPPLY PA TEAM WITH A LETTER OF MEDICAL NECESSITY WITH CLINICAL REASON.

## 2018-10-03 DIAGNOSIS — F41.9 ANXIETY: ICD-10-CM

## 2018-10-03 RX ORDER — ZOLPIDEM TARTRATE 5 MG/1
5 TABLET ORAL
Qty: 14 TABLET | Refills: 0 | Status: SHIPPED | OUTPATIENT
Start: 2018-10-03 | End: 2018-11-09

## 2018-10-03 NOTE — TELEPHONE ENCOUNTER
Ambien   Last Written Prescription Date:  8/21/18  Last Fill Quantity: 14,   # refills: 0  Last Office Visit: 9/24/18  Future Office visit:       Routing refill request to provider for review/approval because:  Drug not on the [facility], [facility] or [facility] refill protocol or controlled substance      Please review and authorize if appropriate,     Thank you,   January MCKEON RN

## 2018-10-05 ENCOUNTER — HOSPITAL ENCOUNTER (OUTPATIENT)
Dept: CARDIOLOGY | Facility: CLINIC | Age: 73
End: 2018-10-05
Attending: INTERNAL MEDICINE
Payer: MEDICARE

## 2018-10-05 ENCOUNTER — HOSPITAL ENCOUNTER (OUTPATIENT)
Dept: CARDIOLOGY | Facility: CLINIC | Age: 73
Discharge: HOME | End: 2018-10-05
Attending: INTERNAL MEDICINE | Admitting: INTERNAL MEDICINE
Payer: MEDICARE

## 2018-10-05 DIAGNOSIS — R06.00 DYSPNEA, UNSPECIFIED TYPE: ICD-10-CM

## 2018-10-05 PROCEDURE — 71250 CT THORAX DX C-: CPT

## 2018-10-05 PROCEDURE — 93325 DOPPLER ECHO COLOR FLOW MAPG: CPT | Mod: 26 | Performed by: INTERNAL MEDICINE

## 2018-10-05 PROCEDURE — 93321 DOPPLER ECHO F-UP/LMTD STD: CPT | Mod: 26 | Performed by: INTERNAL MEDICINE

## 2018-10-05 PROCEDURE — 93016 CV STRESS TEST SUPVJ ONLY: CPT | Performed by: INTERNAL MEDICINE

## 2018-10-05 PROCEDURE — 93018 CV STRESS TEST I&R ONLY: CPT | Performed by: INTERNAL MEDICINE

## 2018-10-05 PROCEDURE — 93350 STRESS TTE ONLY: CPT | Mod: TC

## 2018-10-05 PROCEDURE — 93350 STRESS TTE ONLY: CPT | Mod: 26 | Performed by: INTERNAL MEDICINE

## 2018-10-29 DIAGNOSIS — G25.81 RESTLESS LEG SYNDROME: ICD-10-CM

## 2018-10-29 NOTE — TELEPHONE ENCOUNTER
Requested Prescriptions   Pending Prescriptions Disp Refills     pramipexole (MIRAPEX) 0.25 MG tablet 180 tablet 3     Sig: Take 1 tablet (0.25 mg) by mouth 2 times daily At 1700 and HS    There is no refill protocol information for this order        Last Written Prescription Date:  12/04/17  Last Fill Quantity: 180,  # refills: 3   Last office visit: 9/24/2018 with prescribing provider:     Future Office Visit:      Alethea Garcia MA

## 2018-10-30 RX ORDER — PRAMIPEXOLE DIHYDROCHLORIDE 0.25 MG/1
0.25 TABLET ORAL 2 TIMES DAILY
Qty: 180 TABLET | Refills: 3 | Status: SHIPPED | OUTPATIENT
Start: 2018-10-30 | End: 2019-09-27

## 2018-10-30 NOTE — TELEPHONE ENCOUNTER
"Received [employer] message checking on Status of refill request:     Routing refill request to provider for review/approval because:  Labs not current:  Creatinine, ALT     Marleni CARBAJAL RN      To: [facility]ERR[facility] TRIAGE      From: Hanane Sorto      Created: 10/30/2018 3:49 PM        *-*-*This message has not been handled.*-*-*    I called [facility] last week to refill pramipexole (Wednesday)  I stopped in Monday to   prescription and they said it hadn't yet been approved.  I left a message at your office.  I went  back today and they said it had been denied.  I only have a couple of nights left.  Is there a problem?  I have never had any issues before.            Requested Prescriptions   Pending Prescriptions Disp Refills     pramipexole (MIRAPEX) 0.25 MG tablet 180 tablet 3     Sig: Take 1 tablet (0.25 mg) by mouth 2 times daily At 1700 and HS    Antiparkinson's Agents Protocol Failed    10/30/2018  3:39 PM       Failed - ALT on record in past 12 months        Recent Labs   Lab Test  11/14/16   1231   ALT  44            Failed - Serum Creatinine on file in past 12 months    Recent Labs   Lab Test  09/13/17   0837  04/27/17   0610   CR   --   0.82   CREAT  0.8   --             Passed - Blood pressure under 140/90 in past 12 months    BP Readings from Last 3 Encounters:   09/24/18 114/66   08/21/18 116/80   04/24/18 110/70                Passed - CBC on record in past 12 months    Recent Labs   Lab Test  09/24/18   1632   WBC  9.2   RBC  4.23   HGB  12.7   HCT  39.2   PLT  350                Passed - Patient is age 18 or older       Passed - No active pregnancy on record       Passed - No positive pregnancy test in the past 12 months       Passed - Recent (6 mo) or future (30 days) visit within the authorizing provider's specialty    Patient had office visit in the last 6 months or has a visit in the next 30 days with authorizing provider or within the authorizing provider's specialty.  See \"Patient Info\" tab in " "inbasket, or \"Choose Columns\" in Meds & Orders section of the refill encounter.              "

## 2018-11-09 ENCOUNTER — OFFICE VISIT (OUTPATIENT)
Dept: CARDIOLOGY | Facility: CLINIC | Age: 73
End: 2018-11-09
Attending: INTERNAL MEDICINE
Payer: MEDICARE

## 2018-11-09 VITALS
DIASTOLIC BLOOD PRESSURE: 68 MMHG | HEIGHT: 59 IN | BODY MASS INDEX: 22.18 KG/M2 | SYSTOLIC BLOOD PRESSURE: 114 MMHG | HEART RATE: 68 BPM | WEIGHT: 110 LBS

## 2018-11-09 DIAGNOSIS — E78.5 HYPERLIPIDEMIA WITH TARGET LDL LESS THAN 130: Primary | ICD-10-CM

## 2018-11-09 DIAGNOSIS — R06.00 DYSPNEA, UNSPECIFIED TYPE: ICD-10-CM

## 2018-11-09 PROCEDURE — 99205 OFFICE O/P NEW HI 60 MIN: CPT | Performed by: INTERNAL MEDICINE

## 2018-11-09 RX ORDER — TRIAMCINOLONE ACETONIDE 55 UG/1
2 SPRAY, METERED NASAL DAILY
COMMUNITY
End: 2019-04-26

## 2018-11-09 NOTE — MR AVS SNAPSHOT
After Visit Summary   11/9/2018    Hanane Sorto    MRN: [id number]           Patient Information     Date Of Birth          1945        Visit Information        Provider Department      11/9/2018 8:45 AM Suhail Frias MD [facility]   [place]        Today's Diagnoses     Hyperlipidemia with target LDL less than 130    -  1    Dyspnea, unspecified type           Follow-ups after your visit        Additional Services     Follow-Up with Cardiologist                 Future tests that were ordered for you today     Open Future Orders        Priority Expected Expires Ordered    Follow-Up with Cardiologist Routine 1/9/2019 11/9/2019 11/9/2018    Pulmonary function test Routine 11/16/2018 11/9/2019 11/9/2018            Who to contact     If you have questions or need follow up information about today's clinic visit or your schedule please contact [facility]   [place] directly at [phone].  Normal or non-critical lab and imaging results will be communicated to you by MyChart, letter or phone within 4 business days after the clinic has received the results. If you do not hear from us within 7 days, please contact the clinic through "[employer]hart or phone. If you have a critical or abnormal lab result, we will notify you by phone as soon as possible.  Submit refill requests through [employer] or call your pharmacy and they will forward the refill request to us. Please allow 3 business days for your refill to be completed.          Additional Information About Your Visit        MyChart Information     [employer] gives you secure access to your electronic health record. If you see a primary care provider, you can also send messages to your care team and make appointments. If you have questions, please call your primary care clinic.  If you do not have a primary care provider, please call [phone] and they will assist you.        Care EveryWhere ID      "This is your Care EveryWhere ID. This could be used by other organizations to access your [facility] medical records  [id number]        Your Vitals Were     Pulse Height BMI (Body Mass Index)             68 1.486 m (4' 10.5\") 22.6 kg/m2          Blood Pressure from Last 3 Encounters:   11/09/18 114/68   09/24/18 114/66   08/21/18 116/80    Weight from Last 3 Encounters:   11/09/18 49.9 kg (110 lb)   09/24/18 49 kg (108 lb)   08/21/18 48.9 kg (107 lb 11.2 oz)                 Today's Medication Changes          These changes are accurate as of 11/9/18  9:36 AM.  If you have any questions, ask your nurse or doctor.               Start taking these medicines.        Dose/Directions    evolocumab 140 MG/ML prefilled syringe   Commonly known as:  REPATHA   Used for:  Hyperlipidemia with target LDL less than 130   Started by:  Suhail Frias MD        Dose:  140 mg   Inject 1 mL (140 mg) Subcutaneous every 14 days   Quantity:  1 mL   Refills:  3            Where to get your medicines      [id number] [place], [place] - [address] AT [facility] OF Counts include [address] beds at the [facility] [address]  [address] [facility] [address]     Phone:  [phone]     evolocumab 140 MG/ML prefilled syringe                Primary Care Provider Office Phone # Fax #    Bhavlester Bethea [phone] [address] [facility] [address]  [place] [address]        Equal Access to Services     [facility] AH: Hadii alberta roldan hadasho Soomaali, waaxda luqadaha, qaybta kaalmada radha beavers. So [facility] [phone].    ATENCIÓN: Si habla español, tiene a eisenberg disposición servicios gratuitos de asistencia lingüística. Llame al [phone].    We comply with applicable federal civil rights laws and [place] laws. We do not discriminate on the basis of race, color, national origin, age, disability, sex, sexual orientation, or gender identity.            Thank you!  "    Thank you for choosing [facility]  for your care. Our goal is always to provide you with excellent care. Hearing back from our patients is one way we can continue to improve our services. Please take a few minutes to complete the written survey that you may receive in the mail after your visit with us. Thank you!             Your Updated Medication List - Protect others around you: Learn how to safely use, store and throw away your medicines at www.disposemymeds.org.          This list is accurate as of 11/9/18  9:36 AM.  Always use your most recent med list.                   Brand Name Dispense Instructions for use Diagnosis    * triamcinolone 55 MCG/ACT inhaler    NASACORT     Spray 2 sprays into both nostrils daily        * ALLERGY SPRAY 24 HOUR 55 MCG/ACT inhaler   Generic drug:  triamcinolone     2 Bottle    1U 2 SPRAYS IEN D    Dyspnea, paroxysmal nocturnal       desvenlafaxine succinate 25 MG 24 hr tablet    PRISTIQ    31 tablet    Take 1 tablet (25 mg) by mouth daily    Anxiety       evolocumab 140 MG/ML prefilled syringe    REPATHA    1 mL    Inject 1 mL (140 mg) Subcutaneous every 14 days    Hyperlipidemia with target LDL less than 130       ipratropium 0.06 % spray    ATROVENT    1 Box    Spray 2 sprays into both nostrils 4 times daily as needed for rhinitis    Acute seasonal allergic rhinitis, unspecified trigger       LORazepam 0.5 MG tablet    ATIVAN    15 tablet    Take 1 tablet (0.5 mg) by mouth nightly as needed for anxiety Take 30 minutes prior to departure.  Do not operate a vehicle after taking this medication    Anxiety       omeprazole 20 MG CR capsule    priLOSEC    180 capsule    Take 1 capsule (20 mg) by mouth 2 times daily    Gastroesophageal reflux disease without esophagitis       WILSON STOOL SOFTENER PO      Take 3 capsules by mouth daily        pramipexole 0.25 MG tablet    MIRAPEX    180 tablet    Take 1 tablet (0.25 mg) by mouth 2 times  daily At 1700 and HS    Restless leg syndrome       zolpidem 5 MG tablet    AMBIEN    14 tablet    Take 1 tablet (5 mg) by mouth nightly as needed for sleep    Other insomnia       * Notice:  This list has 2 medication(s) that are the same as other medications prescribed for you. Read the directions carefully, and ask your doctor or other care provider to review them with you.

## 2018-11-09 NOTE — LETTER
11/9/2018      Corona Bethea MD  [address]  [facility] [address]      RE: Hanane Sorto       Dear Colleague,    I had the pleasure of seeing Hanane Sorto in the [facility].    Service Date: 11/09/2018      OFFICE PROGRESS NOTE      REASON FOR CLINIC VISIT:  Dyspnea, dyslipidemia.      HISTORY OF PRESENT ILLNESS:  Ms. Sorto is a very pleasant 73-year-old female with history of significant dyslipidemia and significantly elevated LDL as high as 238 with family history of coronary artery disease with personal history of asymptomatic coronary artery disease on the basis of abnormal calcium score in 2006 that showed a total calcium score of 28.56.  Other comorbidities of anxiety, allergic rhinitis.  The patient is here in the clinic because of dyspnea.  This started about 3-4 months ago.  The patient tells me that she just sometimes does not feel that she is getting enough air.  She has to yawn and take a deep breath.  Remarkably, she does not feel worse when lying down.  In fact, sometimes sitting up, she can feel worse.  She is fairly active.  She plays tennis for about 1 hour and can also get shortness of breath with tennis but sometimes she is able to play without any issues.  No chest discomfort at rest or with physical activity.  Weights have been stable.  She had extensive evaluation done at Dr. Bethea's office.  She had an echocardiogram that showed LVEF of 50%-55%, no significant valvular abnormalities.  RVSP could not be evaluated.  She also underwent an exercise stress echocardiogram that did not show any evidence of inducible ischemia.  She exercised for just over 7 minutes; 99% of maximum predicted heart rate was achieved.  No significant valvular abnormalities were seen.  Mildly dilated aortic root was noted.  Baseline LVEF was noted to be 55%-60%.  Hemoglobin recently was 12.7.  The patient tells me that she is also struggling with postnasal drip and has been  seen by ENT with diagnosis of allergic rhinitis and has also received some Flonase and prednisone.  The patient thinks that her symptoms of dyspnea and the postnasal drip problem started at the same time or close enough.  To be noted, the patient also has significant dyslipidemia with very high LDL and also family history of dyslipidemia and coronary artery disease.  She has been intolerant to several statins including pravastatin, including Zetia.  The latest LDL from 02/2018 is 162, total cholesterol 245, triglycerides 204.      The patient does not have any history of known lung disease.  In fact, she had a CT scan done of her lungs that showed a 3 mm granule, stable since 2016.  Some mild coronary artery calcification was seen.  She has not had any PFT done.      PHYSICAL EXAMINATION:   VITAL SIGNS:  Blood pressure 114/68, heart rate 68 and regular, weight 110 pounds, BMI 22.65.   GENERAL:  The patient appears pleasant, comfortable.   NECK:  Normal JVP, no bruit.   CARDIOVASCULAR SYSTEM:  S1, S2 normal, no murmur, rub or gallop.     RESPIRATORY SYSTEM:  Clear to auscultation bilaterally.   GASTROINTESTINAL SYSTEM:  Abdomen soft, nontender.   EXTREMITIES:  No pitting pedal edema.   NEUROLOGIC:  Alert, oriented x3.    PSYCHIATRIC:  Normal affect.    SKIN:  No obvious rash.    HEENT:  No pallor or icterus.      IMPRESSION AND PLAN:  A very pleasant 73-year-old female with a mixed dyslipidemia with low HDL and very high LDL, family history of coronary artery disease, personal history of asymptomatic coronary artery disease on the basis of abnormal calcium score and coronary artery calcification on CT chest with stress test showing no evidence of inducible ischemia.  The patient is struggling with some dyspnea and dyspnea on exertion for the last 3-4 months.  Fortunately, this is stable.  At this time, I am not sure about the etiology of dyspnea.  LVEF appeared normal.  No evidence of inducible ischemia.  No  significant valvular disease.  RVSP could not be evaluated due to inadequate TR jet.  Remarkably, her symptoms or dyspnea kind of correlate with her symptoms of postnasal drip and allergic rhinitis.  I am wondering if there is any allergic reactive airway disease component to her symptoms, although no wheezing could be heard on exam today.  I recommend PFT with diffusion capacity.  I also discussed some rare causes of dyspnea like pulmonary hypertension.  We did not have enough TR jet to evaluate RVSP although RV function looks fine.  I did recommend consideration for right heart catheterization to definitively evaluate for pulmonary hypertension.  The patient is reluctant to undergo the procedure at this time and tells me that she just wants to treat her allergic rhinitis first and also undergo PFT and if the symptoms do not improve, she may consider right heart catheterization at that time.  Regarding dyslipidemia, this is a challenging situation, given the patient is intolerant to several antilipid medications including almost all statins.  Given underlying evidence of coronary artery atherosclerosis and uncontrolled LDL, I recommend trying PCSK9 inhibitor.  I am not sure whether she will qualify through insurance or not but we should definitely try.  Common adverse effects of PCSK9 inhibitors including injection site adverse *** flu-like symptoms were discussed with the patient.  I am setting up a followup in about 1-1/2 to 2 months' time and in case her symptoms do not improve or worsen and PFT is unrevealing and the patient changes her mind, my recommendation would be at this time to consider right heart catheterization.  Given that she has evidence of coronary artery calcification, atherosclerosis, I discussed the option of baby aspirin for secondary prophylaxis.  The patient tells me, remotely, she has a history of hematuria but no active bleeding at this time.  At this time, we mutually decided to give a  trial of baby aspirin but if she notices any bleeding issues, gastritis, she will let us know and I would recommend to stop baby aspirin at that time.         RICHAR RINCON MD             D: 2018   T: 2018   MT: SAYDA      Name:     GILDA VICTORIA   MRN:      [id number]        Account:      [id number]   :      1945           Service Date: 2018      Document: [id number]         Outpatient Encounter Prescriptions as of 2018   Medication Sig Dispense Refill     aspirin 81 MG EC tablet Take 1 tablet (81 mg) by mouth daily 30 tablet 0     Docusate Sodium (WILSON STOOL SOFTENER PO) Take 3 capsules by mouth daily       ipratropium (ATROVENT) 0.06 % spray Spray 2 sprays into both nostrils 4 times daily as needed for rhinitis 1 Box 1     LORazepam (ATIVAN) 0.5 MG tablet Take 1 tablet (0.5 mg) by mouth nightly as needed for anxiety Take 30 minutes prior to departure.  Do not operate a vehicle after taking this medication 15 tablet 0     omeprazole (PRILOSEC) 20 MG CR capsule Take 1 capsule (20 mg) by mouth 2 times daily 180 capsule 11     pramipexole (MIRAPEX) 0.25 MG tablet Take 1 tablet (0.25 mg) by mouth 2 times daily At 1700 and  tablet 3     triamcinolone (NASACORT) 55 MCG/ACT inhaler Spray 2 sprays into both nostrils daily       zolpidem (AMBIEN) 5 MG tablet Take 1 tablet (5 mg) by mouth nightly as needed for sleep 14 tablet 1     [DISCONTINUED] evolocumab (REPATHA) 140 MG/ML prefilled syringe Inject 1 mL (140 mg) Subcutaneous every 14 days 1 mL 3     ALLERGY SPRAY 24 HOUR 55 MCG/ACT inhaler 1U 2 SPRAYS IEN D 2 Bottle 3     desvenlafaxine succinate (PRISTIQ) 25 MG 24 hr tablet Take 1 tablet (25 mg) by mouth daily (Patient not taking: Reported on 2018) 31 tablet 3     [DISCONTINUED] zolpidem (AMBIEN) 5 MG tablet Take 1 tablet (5 mg) by mouth nightly as needed for sleep 14 tablet 0     No facility-administered encounter medications on file as of 2018.    Again, thank you for  allowing me to participate in the care of your patient.      Sincerely,    Suhail Frias MD     [facility]

## 2018-11-09 NOTE — PROGRESS NOTES
Service Date: 11/09/2018      OFFICE PROGRESS NOTE      REASON FOR CLINIC VISIT:  Dyspnea, dyslipidemia.      HISTORY OF PRESENT ILLNESS:  Ms. Sorto is a very pleasant 73-year-old female with history of significant dyslipidemia and significantly elevated LDL as high as 238 with family history of coronary artery disease with personal history of asymptomatic coronary artery disease on the basis of abnormal calcium score in 2006 that showed a total calcium score of 28.56.  Other comorbidities of anxiety, allergic rhinitis.  The patient is here in the clinic because of dyspnea.  This started about 3-4 months ago.  The patient tells me that she just sometimes does not feel that she is getting enough air.  She has to yawn and take a deep breath.  Remarkably, she does not feel worse when lying down.  In fact, sometimes sitting up, she can feel worse.  She is fairly active.  She plays tennis for about 1 hour and can also get shortness of breath with tennis but sometimes she is able to play without any issues.  No chest discomfort at rest or with physical activity.  Weights have been stable.  She had extensive evaluation done at Dr. Bethea's office.  She had an echocardiogram that showed LVEF of 50%-55%, no significant valvular abnormalities.  RVSP could not be evaluated.  She also underwent an exercise stress echocardiogram that did not show any evidence of inducible ischemia.  She exercised for just over 7 minutes; 99% of maximum predicted heart rate was achieved.  No significant valvular abnormalities were seen.  Mildly dilated aortic root was noted.  Baseline LVEF was noted to be 55%-60%.  Hemoglobin recently was 12.7.  The patient tells me that she is also struggling with postnasal drip and has been seen by ENT with diagnosis of allergic rhinitis and has also received some Flonase and prednisone.  The patient thinks that her symptoms of dyspnea and the postnasal drip problem started at the same time or close enough.  To  be noted, the patient also has significant dyslipidemia with very high LDL and also family history of dyslipidemia and coronary artery disease.  She has been intolerant to several statins including pravastatin, including Zetia.  The latest LDL from 02/2018 is 162, total cholesterol 245, triglycerides 204.      The patient does not have any history of known lung disease.  In fact, she had a CT scan done of her lungs that showed a 3 mm granule, stable since 2016.  Some mild coronary artery calcification was seen.  She has not had any PFT done.      PHYSICAL EXAMINATION:   VITAL SIGNS:  Blood pressure 114/68, heart rate 68 and regular, weight 110 pounds, BMI 22.65.   GENERAL:  The patient appears pleasant, comfortable.   NECK:  Normal JVP, no bruit.   CARDIOVASCULAR SYSTEM:  S1, S2 normal, no murmur, rub or gallop.     RESPIRATORY SYSTEM:  Clear to auscultation bilaterally.   GASTROINTESTINAL SYSTEM:  Abdomen soft, nontender.   EXTREMITIES:  No pitting pedal edema.   NEUROLOGIC:  Alert, oriented x3.    PSYCHIATRIC:  Normal affect.    SKIN:  No obvious rash.    HEENT:  No pallor or icterus.      IMPRESSION AND PLAN:  A very pleasant 73-year-old female with a mixed dyslipidemia with low HDL and very high LDL, family history of coronary artery disease, personal history of asymptomatic coronary artery disease on the basis of abnormal calcium score and coronary artery calcification on CT chest with stress test showing no evidence of inducible ischemia.  The patient is struggling with some dyspnea and dyspnea on exertion for the last 3-4 months.  Fortunately, this is stable.  At this time, I am not sure about the etiology of dyspnea.  LVEF appeared normal.  No evidence of inducible ischemia.  No significant valvular disease.  RVSP could not be evaluated due to inadequate TR jet.  Remarkably, her symptoms or dyspnea kind of correlate with her symptoms of postnasal drip and allergic rhinitis.  I am wondering if there is any  allergic reactive airway disease component to her symptoms, although no wheezing could be heard on exam today.  I recommend PFT with diffusion capacity.  I also discussed some rare causes of dyspnea like pulmonary hypertension.  We did not have enough TR jet to evaluate RVSP although RV function looks fine.  I did recommend consideration for right heart catheterization to definitively evaluate for pulmonary hypertension.  The patient is reluctant to undergo the procedure at this time and tells me that she just wants to treat her allergic rhinitis first and also undergo PFT and if the symptoms do not improve, she may consider right heart catheterization at that time.  Regarding dyslipidemia, this is a challenging situation, given the patient is intolerant to several antilipid medications including almost all statins.  Given underlying evidence of coronary artery atherosclerosis and uncontrolled LDL, I recommend trying PCSK9 inhibitor.  I am not sure whether she will qualify through insurance or not but we should definitely try.  Common adverse effects of PCSK9 inhibitors including injection site adverse,  flu-like symptoms were discussed with the patient.  I am setting up a followup in about 1-1/2 to 2 months' time and in case her symptoms do not improve or worsen and PFT is unrevealing and the patient changes her mind, my recommendation would be to consider right heart catheterization.  Given that she has evidence of coronary artery calcification, atherosclerosis, I discussed the option of baby aspirin for secondary prophylaxis.  The patient tells me, remotely, she has a history of hematuria but no active bleeding at this time.  At this time, we mutually decided to give a trial of baby aspirin but if she notices any bleeding issues, gastritis, she will let us know and I would recommend to stop baby aspirin at that time.         RICHAR RINCON MD             D: 11/09/2018   T: 11/09/2018   MT: SAYDA      Name:      DKGILDA ARVIZU   MRN:      [id number]        Account:      [id number]   :      1945           Service Date: 2018      Document: [id number]

## 2018-11-09 NOTE — LETTER
11/9/2018    Corona Bethea MD  [address]  [place] [address]    RE: Hanane Sorto       Dear Colleague,    I had the pleasure of seeing Hanane Nataliia Sorto in the [facility].    HPI and Plan:   See dictation(#[id number])    Orders Placed This Encounter   Procedures     Follow-Up with Cardiologist     Pulmonary function test       Orders Placed This Encounter   Medications     triamcinolone (NASACORT) 55 MCG/ACT inhaler     Sig: Spray 2 sprays into both nostrils daily     evolocumab (REPATHA) 140 MG/ML prefilled syringe     Sig: Inject 1 mL (140 mg) Subcutaneous every 14 days     Dispense:  1 mL     Refill:  3     aspirin 81 MG EC tablet     Sig: Take 1 tablet (81 mg) by mouth daily     Dispense:  30 tablet     Refill:  0       Medications Discontinued During This Encounter   Medication Reason     zolpidem (AMBIEN) 5 MG tablet Medication Reconciliation Clean Up         Encounter Diagnoses   Name Primary?     Hyperlipidemia with target LDL less than 130 Yes     Dyspnea, unspecified type        CURRENT MEDICATIONS:  Current Outpatient Prescriptions   Medication Sig Dispense Refill     aspirin 81 MG EC tablet Take 1 tablet (81 mg) by mouth daily 30 tablet 0     Docusate Sodium (WILSON STOOL SOFTENER PO) Take 3 capsules by mouth daily       evolocumab (REPATHA) 140 MG/ML prefilled syringe Inject 1 mL (140 mg) Subcutaneous every 14 days 1 mL 3     ipratropium (ATROVENT) 0.06 % spray Spray 2 sprays into both nostrils 4 times daily as needed for rhinitis 1 Box 1     LORazepam (ATIVAN) 0.5 MG tablet Take 1 tablet (0.5 mg) by mouth nightly as needed for anxiety Take 30 minutes prior to departure.  Do not operate a vehicle after taking this medication 15 tablet 0     omeprazole (PRILOSEC) 20 MG CR capsule Take 1 capsule (20 mg) by mouth 2 times daily 180 capsule 11     pramipexole (MIRAPEX) 0.25 MG tablet Take 1 tablet (0.25 mg) by mouth 2 times daily At 1700 and  tablet 3      triamcinolone (NASACORT) 55 MCG/ACT inhaler Spray 2 sprays into both nostrils daily       zolpidem (AMBIEN) 5 MG tablet Take 1 tablet (5 mg) by mouth nightly as needed for sleep 14 tablet 1     ALLERGY SPRAY 24 HOUR 55 MCG/ACT inhaler 1U 2 SPRAYS IEN D 2 Bottle 3     desvenlafaxine succinate (PRISTIQ) 25 MG 24 hr tablet Take 1 tablet (25 mg) by mouth daily (Patient not taking: Reported on 2018) 31 tablet 3       ALLERGIES     Allergies   Allergen Reactions     Crestor [Rosuvastatin]      Ezetimibe Other (See Comments)     Zetia = leg cramps      Miralax [Polyethylene Glycol] Other (See Comments)     Back pain     Pravastatin Other (See Comments)     Leg cramps        PAST MEDICAL HISTORY:  Past Medical History:   Diagnosis Date     Anxiety     psych yearly, Dr Shea     Family hx of colon cancer     sister     Floaters     Karma leverentz     Fracture of wrist, left, with routine healing, subsequent encounter 2017     GERD (gastroesophageal reflux disease)      Gross hematuria 2011     Hyperlipidemia LDL goal < 130      Hyperlipidemia LDL goal <130 2017     Malnutrition (H) 2017     Osteoarthritis 2012     Osteoporosis     Dexa     PONV (postoperative nausea and vomiting)      Restless legs      Skin cancer        PAST SURGICAL HISTORY:  Past Surgical History:   Procedure Laterality Date     ARTHROPLASTY KNEE Left 2017    Procedure: ARTHROPLASTY KNEE;  LEFT TOTAL KNEE ARTHROPLASTY;  Surgeon: Lee Ayala MD;  Location: [facility] OR     BUNIONECTOMY      both      SECTION       COLONOSCOPY  8-10     LIPOSUCTION (LOCATION)       RECONSTRUCT BREAST, IMPLANT PROSTHESIS, COMBINED         FAMILY HISTORY:  Family History   Problem Relation Age of Onset     C.A.D. Father      Lipids Mother      Alzheimer Disease Mother      Breast Cancer Sister      53     Cancer - colorectal Sister      40s     Alcoholism Son        SOCIAL HISTORY:  Social History     Social History  "    Marital status:      Spouse name: N/A     Number of children: N/A     Years of education: N/A     Social History Main Topics     Smoking status: Former Smoker     Packs/day: 0.25     Start date: 1960     Quit date: 1984     Smokeless tobacco: Never Used     Alcohol use 0.0 - 2.4 oz/week     0 - 4 Standard drinks or equivalent per week      Comment: once every two weeks     Drug use: No     Sexual activity: Yes     Partners: Male     Other Topics Concern     Exercise Yes     Social History Narrative       Review of Systems:  Skin:  Negative       Eyes:  Positive for glasses    ENT:  Positive for postnasal drainage    Respiratory:  Positive for dyspnea on exertion;cough     Cardiovascular:    Positive for;lightheadedness    Gastroenterology: Negative      Genitourinary:  not assessed      Musculoskeletal:  Negative      Neurologic:  Positive for headaches    Psychiatric:  Positive for sleep disturbances    Heme/Lymph/Imm:  Positive for allergies    Endocrine:  Negative        Physical Exam:  Vitals: /68  Pulse 68  Ht 1.486 m (4' 10.5\")  Wt 49.9 kg (110 lb)  BMI 22.6 kg/m2    Constitutional:           Skin:             Head:           Eyes:           Lymph:      ENT:           Neck:           Respiratory:            Cardiac:                                                           GI:           Extremities and Muscular Skeletal:                 Neurological:           Psych:             CC  Corona Bethea MD  [address]  [place] [address]                    Thank you for allowing me to participate in the care of your patient.      Sincerely,     Suhail Frias MD     [facility]    cc:   Corona Bethea MD  [address]  [place] [address]        "

## 2018-11-09 NOTE — PROGRESS NOTES
HPI and Plan:   See dictation(#[id number])    Orders Placed This Encounter   Procedures     Follow-Up with Cardiologist     Pulmonary function test       Orders Placed This Encounter   Medications     triamcinolone (NASACORT) 55 MCG/ACT inhaler     Sig: Spray 2 sprays into both nostrils daily     evolocumab (REPATHA) 140 MG/ML prefilled syringe     Sig: Inject 1 mL (140 mg) Subcutaneous every 14 days     Dispense:  1 mL     Refill:  3     aspirin 81 MG EC tablet     Sig: Take 1 tablet (81 mg) by mouth daily     Dispense:  30 tablet     Refill:  0       Medications Discontinued During This Encounter   Medication Reason     zolpidem (AMBIEN) 5 MG tablet Medication Reconciliation Clean Up         Encounter Diagnoses   Name Primary?     Hyperlipidemia with target LDL less than 130 Yes     Dyspnea, unspecified type        CURRENT MEDICATIONS:  Current Outpatient Prescriptions   Medication Sig Dispense Refill     aspirin 81 MG EC tablet Take 1 tablet (81 mg) by mouth daily 30 tablet 0     Docusate Sodium (WILSON STOOL SOFTENER PO) Take 3 capsules by mouth daily       evolocumab (REPATHA) 140 MG/ML prefilled syringe Inject 1 mL (140 mg) Subcutaneous every 14 days 1 mL 3     ipratropium (ATROVENT) 0.06 % spray Spray 2 sprays into both nostrils 4 times daily as needed for rhinitis 1 Box 1     LORazepam (ATIVAN) 0.5 MG tablet Take 1 tablet (0.5 mg) by mouth nightly as needed for anxiety Take 30 minutes prior to departure.  Do not operate a vehicle after taking this medication 15 tablet 0     omeprazole (PRILOSEC) 20 MG CR capsule Take 1 capsule (20 mg) by mouth 2 times daily 180 capsule 11     pramipexole (MIRAPEX) 0.25 MG tablet Take 1 tablet (0.25 mg) by mouth 2 times daily At 1700 and  tablet 3     triamcinolone (NASACORT) 55 MCG/ACT inhaler Spray 2 sprays into both nostrils daily       zolpidem (AMBIEN) 5 MG tablet Take 1 tablet (5 mg) by mouth nightly as needed for sleep 14 tablet 1     ALLERGY SPRAY 24 HOUR 55  MCG/ACT inhaler 1U 2 SPRAYS IEN D 2 Bottle 3     desvenlafaxine succinate (PRISTIQ) 25 MG 24 hr tablet Take 1 tablet (25 mg) by mouth daily (Patient not taking: Reported on 2018) 31 tablet 3       ALLERGIES     Allergies   Allergen Reactions     Crestor [Rosuvastatin]      Ezetimibe Other (See Comments)     Zetia = leg cramps      Miralax [Polyethylene Glycol] Other (See Comments)     Back pain     Pravastatin Other (See Comments)     Leg cramps        PAST MEDICAL HISTORY:  Past Medical History:   Diagnosis Date     Anxiety     psych yearly, Dr Shea     Family hx of colon cancer     sister     Floaters     Karma cote     Fracture of wrist, left, with routine healing, subsequent encounter 2017     GERD (gastroesophageal reflux disease)      Gross hematuria 2011     Hyperlipidemia LDL goal < 130      Hyperlipidemia LDL goal <130 2017     Malnutrition (H) 2017     Osteoarthritis 2012     Osteoporosis     Dexa     PONV (postoperative nausea and vomiting)      Restless legs      Skin cancer        PAST SURGICAL HISTORY:  Past Surgical History:   Procedure Laterality Date     ARTHROPLASTY KNEE Left 2017    Procedure: ARTHROPLASTY KNEE;  LEFT TOTAL KNEE ARTHROPLASTY;  Surgeon: Lee Ayala MD;  Location: [facility]     BUNIONECTOMY      both      SECTION       COLONOSCOPY  8-10     LIPOSUCTION (LOCATION)       RECONSTRUCT BREAST, IMPLANT PROSTHESIS, COMBINED         FAMILY HISTORY:  Family History   Problem Relation Age of Onset     C.A.D. Father      Lipids Mother      Alzheimer Disease Mother      Breast Cancer Sister      53     Cancer - colorectal Sister      40s     Alcoholism Son        SOCIAL HISTORY:  Social History     Social History     Marital status:      Spouse name: N/A     Number of children: N/A     Years of education: N/A     Social History Main Topics     Smoking status: Former Smoker     Packs/day: 0.25     Start date:      Quit  "date: 1984     Smokeless tobacco: Never Used     Alcohol use 0.0 - 2.4 oz/week     0 - 4 Standard drinks or equivalent per week      Comment: once every two weeks     Drug use: No     Sexual activity: Yes     Partners: Male     Other Topics Concern     Exercise Yes     Social History Narrative       Review of Systems:  Skin:  Negative       Eyes:  Positive for glasses    ENT:  Positive for postnasal drainage    Respiratory:  Positive for dyspnea on exertion;cough     Cardiovascular:    Positive for;lightheadedness    Gastroenterology: Negative      Genitourinary:  not assessed      Musculoskeletal:  Negative      Neurologic:  Positive for headaches    Psychiatric:  Positive for sleep disturbances    Heme/Lymph/Imm:  Positive for allergies    Endocrine:  Negative        Physical Exam:  Vitals: /68  Pulse 68  Ht 1.486 m (4' 10.5\")  Wt 49.9 kg (110 lb)  BMI 22.6 kg/m2    Constitutional:           Skin:             Head:           Eyes:           Lymph:      ENT:           Neck:           Respiratory:            Cardiac:                                                           GI:           Extremities and Muscular Skeletal:                 Neurological:           Psych:             CC  Corona Bethea MD  [address]  [place] [address]                  "

## 2018-11-09 NOTE — LETTER
11/9/2018      Corona Bethea MD  [address]  [facility] [address]      RE: aHnane Sorto       Dear Colleague,    I had the pleasure of seeing Hanane Sorto in the [facility].    Service Date: 11/09/2018      OFFICE PROGRESS NOTE      REASON FOR CLINIC VISIT:  Dyspnea, dyslipidemia.      HISTORY OF PRESENT ILLNESS:  Ms. Sorto is a very pleasant 73-year-old female with history of significant dyslipidemia and significantly elevated LDL as high as 238 with family history of coronary artery disease with personal history of asymptomatic coronary artery disease on the basis of abnormal calcium score in 2006 that showed a total calcium score of 28.56.  Other comorbidities of anxiety, allergic rhinitis.  The patient is here in the clinic because of dyspnea.  This started about 3-4 months ago.  The patient tells me that she just sometimes does not feel that she is getting enough air.  She has to yawn and take a deep breath.  Remarkably, she does not feel worse when lying down.  In fact, sometimes sitting up, she can feel worse.  She is fairly active.  She plays tennis for about 1 hour and can also get shortness of breath with tennis but sometimes she is able to play without any issues.  No chest discomfort at rest or with physical activity.  Weights have been stable.  She had extensive evaluation done at Dr. Bethea's office.  She had an echocardiogram that showed LVEF of 50%-55%, no significant valvular abnormalities.  RVSP could not be evaluated.  She also underwent an exercise stress echocardiogram that did not show any evidence of inducible ischemia.  She exercised for just over 7 minutes; 99% of maximum predicted heart rate was achieved.  No significant valvular abnormalities were seen.  Mildly dilated aortic root was noted.  Baseline LVEF was noted to be 55%-60%.  Hemoglobin recently was 12.7.  The patient tells me that she is also struggling with postnasal drip and has been  seen by ENT with diagnosis of allergic rhinitis and has also received some Flonase and prednisone.  The patient thinks that her symptoms of dyspnea and the postnasal drip problem started at the same time or close enough.  To be noted, the patient also has significant dyslipidemia with very high LDL and also family history of dyslipidemia and coronary artery disease.  She has been intolerant to several statins including pravastatin, including Zetia.  The latest LDL from 02/2018 is 162, total cholesterol 245, triglycerides 204.      The patient does not have any history of known lung disease.  In fact, she had a CT scan done of her lungs that showed a 3 mm granule, stable since 2016.  Some mild coronary artery calcification was seen.  She has not had any PFT done.      PHYSICAL EXAMINATION:   VITAL SIGNS:  Blood pressure 114/68, heart rate 68 and regular, weight 110 pounds, BMI 22.65.   GENERAL:  The patient appears pleasant, comfortable.   NECK:  Normal JVP, no bruit.   CARDIOVASCULAR SYSTEM:  S1, S2 normal, no murmur, rub or gallop.     RESPIRATORY SYSTEM:  Clear to auscultation bilaterally.   GASTROINTESTINAL SYSTEM:  Abdomen soft, nontender.   EXTREMITIES:  No pitting pedal edema.   NEUROLOGIC:  Alert, oriented x3.    PSYCHIATRIC:  Normal affect.    SKIN:  No obvious rash.    HEENT:  No pallor or icterus.      IMPRESSION AND PLAN:  A very pleasant 73-year-old female with a mixed dyslipidemia with low HDL and very high LDL, family history of coronary artery disease, personal history of asymptomatic coronary artery disease on the basis of abnormal calcium score and coronary artery calcification on CT chest with stress test showing no evidence of inducible ischemia.  The patient is struggling with some dyspnea and dyspnea on exertion for the last 3-4 months.  Fortunately, this is stable.  At this time, I am not sure about the etiology of dyspnea.  LVEF appeared normal.  No evidence of inducible ischemia.  No  significant valvular disease.  RVSP could not be evaluated due to inadequate TR jet.  Remarkably, her symptoms or dyspnea kind of correlate with her symptoms of postnasal drip and allergic rhinitis.  I am wondering if there is any allergic reactive airway disease component to her symptoms, although no wheezing could be heard on exam today.  I recommend PFT with diffusion capacity.  I also discussed some rare causes of dyspnea like pulmonary hypertension.  We did not have enough TR jet to evaluate RVSP although RV function looks fine.  I did recommend consideration for right heart catheterization to definitively evaluate for pulmonary hypertension.  The patient is reluctant to undergo the procedure at this time and tells me that she just wants to treat her allergic rhinitis first and also undergo PFT and if the symptoms do not improve, she may consider right heart catheterization at that time.  Regarding dyslipidemia, this is a challenging situation, given the patient is intolerant to several antilipid medications including almost all statins.  Given underlying evidence of coronary artery atherosclerosis and uncontrolled LDL, I recommend trying PCSK9 inhibitor.  I am not sure whether she will qualify through insurance or not but we should definitely try.  Common adverse effects of PCSK9 inhibitors including injection site adverse,  flu-like symptoms were discussed with the patient.  I am setting up a followup in about 1-1/2 to 2 months' time and in case her symptoms do not improve or worsen and PFT is unrevealing and the patient changes her mind, my recommendation would be to consider right heart catheterization.  Given that she has evidence of coronary artery calcification, atherosclerosis, I discussed the option of baby aspirin for secondary prophylaxis.  The patient tells me, remotely, she has a history of hematuria but no active bleeding at this time.  At this time, we mutually decided to give a trial of baby  aspirin but if she notices any bleeding issues, gastritis, she will let us know and I would recommend to stop baby aspirin at that time.         RICHAR RINCON MD             D: 2018   T: 2018   MT: SAYDA      Name:     GILDA VICTORIA   MRN:      [id number]        Account:      [id number]   :      1945           Service Date: 2018      Document: [id number]           Outpatient Encounter Prescriptions as of 2018   Medication Sig Dispense Refill     aspirin 81 MG EC tablet Take 1 tablet (81 mg) by mouth daily 30 tablet 0     Docusate Sodium (WILSON STOOL SOFTENER PO) Take 3 capsules by mouth daily       ipratropium (ATROVENT) 0.06 % spray Spray 2 sprays into both nostrils 4 times daily as needed for rhinitis 1 Box 1     LORazepam (ATIVAN) 0.5 MG tablet Take 1 tablet (0.5 mg) by mouth nightly as needed for anxiety Take 30 minutes prior to departure.  Do not operate a vehicle after taking this medication 15 tablet 0     omeprazole (PRILOSEC) 20 MG CR capsule Take 1 capsule (20 mg) by mouth 2 times daily 180 capsule 11     pramipexole (MIRAPEX) 0.25 MG tablet Take 1 tablet (0.25 mg) by mouth 2 times daily At 1700 and  tablet 3     triamcinolone (NASACORT) 55 MCG/ACT inhaler Spray 2 sprays into both nostrils daily       zolpidem (AMBIEN) 5 MG tablet Take 1 tablet (5 mg) by mouth nightly as needed for sleep 14 tablet 1     [DISCONTINUED] evolocumab (REPATHA) 140 MG/ML prefilled syringe Inject 1 mL (140 mg) Subcutaneous every 14 days 1 mL 3     ALLERGY SPRAY 24 HOUR 55 MCG/ACT inhaler 1U 2 SPRAYS IEN D 2 Bottle 3     desvenlafaxine succinate (PRISTIQ) 25 MG 24 hr tablet Take 1 tablet (25 mg) by mouth daily (Patient not taking: Reported on 2018) 31 tablet 3     [DISCONTINUED] zolpidem (AMBIEN) 5 MG tablet Take 1 tablet (5 mg) by mouth nightly as needed for sleep 14 tablet 0     No facility-administered encounter medications on file as of 2018.        Again, thank you for  allowing me to participate in the care of your patient.      Sincerely,    Suhail Frias MD     [facility]

## 2018-11-09 NOTE — LETTER
11/9/2018      Corona Bethea MD  [address]  [facility] [address]      RE: Hanane Sorto       Dear Colleague,    I had the pleasure of seeing Hanane Sorto in the [facility].    Service Date: 11/09/2018      OFFICE PROGRESS NOTE      REASON FOR CLINIC VISIT:  Dyspnea, dyslipidemia.      HISTORY OF PRESENT ILLNESS:  Ms. Sorto is a very pleasant 73-year-old female with history of significant dyslipidemia and significantly elevated LDL as high as 238 with family history of coronary artery disease with personal history of asymptomatic coronary artery disease on the basis of abnormal calcium score in 2006 that showed a total calcium score of 28.56.  Other comorbidities of anxiety, allergic rhinitis.  The patient is here in the clinic because of dyspnea.  This started about 3-4 months ago.  The patient tells me that she just sometimes does not feel that she is getting enough air.  She has to yawn and take a deep breath.  Remarkably, she does not feel worse when lying down.  In fact, sometimes sitting up, she can feel worse.  She is fairly active.  She plays tennis for about 1 hour and can also get shortness of breath with tennis but sometimes she is able to play without any issues.  No chest discomfort at rest or with physical activity.  Weights have been stable.  She had extensive evaluation done at Dr. Bethea's office.  She had an echocardiogram that showed LVEF of 50%-55%, no significant valvular abnormalities.  RVSP could not be evaluated.  She also underwent an exercise stress echocardiogram that did not show any evidence of inducible ischemia.  She exercised for just over 7 minutes; 99% of maximum predicted heart rate was achieved.  No significant valvular abnormalities were seen.  Mildly dilated aortic root was noted.  Baseline LVEF was noted to be 55%-60%.  Hemoglobin recently was 12.7.  The patient tells me that she is also struggling with postnasal drip and has been  seen by ENT with diagnosis of allergic rhinitis and has also received some Flonase and prednisone.  The patient thinks that her symptoms of dyspnea and the postnasal drip problem started at the same time or close enough.  To be noted, the patient also has significant dyslipidemia with very high LDL and also family history of dyslipidemia and coronary artery disease.  She has been intolerant to several statins including pravastatin, including Zetia.  The latest LDL from 02/2018 is 162, total cholesterol 245, triglycerides 204.      The patient does not have any history of known lung disease.  In fact, she had a CT scan done of her lungs that showed a 3 mm granule, stable since 2016.  Some mild coronary artery calcification was seen.  She has not had any PFT done.      PHYSICAL EXAMINATION:   VITAL SIGNS:  Blood pressure 114/68, heart rate 68 and regular, weight 110 pounds, BMI 22.65.   GENERAL:  The patient appears pleasant, comfortable.   NECK:  Normal JVP, no bruit.   CARDIOVASCULAR SYSTEM:  S1, S2 normal, no murmur, rub or gallop.     RESPIRATORY SYSTEM:  Clear to auscultation bilaterally.   GASTROINTESTINAL SYSTEM:  Abdomen soft, nontender.   EXTREMITIES:  No pitting pedal edema.   NEUROLOGIC:  Alert, oriented x3.    PSYCHIATRIC:  Normal affect.    SKIN:  No obvious rash.    HEENT:  No pallor or icterus.      IMPRESSION AND PLAN:  A very pleasant 73-year-old female with a mixed dyslipidemia with low HDL and very high LDL, family history of coronary artery disease, personal history of asymptomatic coronary artery disease on the basis of abnormal calcium score and coronary artery calcification on CT chest with stress test showing no evidence of inducible ischemia.  The patient is struggling with some dyspnea and dyspnea on exertion for the last 3-4 months.  Fortunately, this is stable.  At this time, I am not sure about the etiology of dyspnea.  LVEF appeared normal.  No evidence of inducible ischemia.  No  significant valvular disease.  RVSP could not be evaluated due to inadequate TR jet.  Remarkably, her symptoms or dyspnea kind of correlate with her symptoms of postnasal drip and allergic rhinitis.  I am wondering if there is any allergic reactive airway disease component to her symptoms, although no wheezing could be heard on exam today.  I recommend PFT with diffusion capacity.  I also discussed some rare causes of dyspnea like pulmonary hypertension.  We did not have enough TR jet to evaluate RVSP although RV function looks fine.  I did recommend consideration for right heart catheterization to definitively evaluate for pulmonary hypertension.  The patient is reluctant to undergo the procedure at this time and tells me that she just wants to treat her allergic rhinitis first and also undergo PFT and if the symptoms do not improve, she may consider right heart catheterization at that time.  Regarding dyslipidemia, this is a challenging situation, given the patient is intolerant to several antilipid medications including almost all statins.  Given underlying evidence of coronary artery atherosclerosis and uncontrolled LDL, I recommend trying PCSK9 inhibitor.  I am not sure whether she will qualify through insurance or not but we should definitely try.  Common adverse effects of PCSK9 inhibitors including injection site adverse *** flu-like symptoms were discussed with the patient.  I am setting up a followup in about 1-1/2 to 2 months' time and in case her symptoms do not improve or worsen and PFT is unrevealing and the patient changes her mind, my recommendation would be at this time to consider right heart catheterization.  Given that she has evidence of coronary artery calcification, atherosclerosis, I discussed the option of baby aspirin for secondary prophylaxis.  The patient tells me, remotely, she has a history of hematuria but no active bleeding at this time.  At this time, we mutually decided to give a  trial of baby aspirin but if she notices any bleeding issues, gastritis, she will let us know and I would recommend to stop baby aspirin at that time.         RICHAR RINCON MD             D: 2018   T: 2018   MT: SAYDA      Name:     GILDA VICTORIA   MRN:      [id number]        Account:      [id number]   :      1945           Service Date: 2018      Document: [id number]         Outpatient Encounter Prescriptions as of 2018   Medication Sig Dispense Refill     aspirin 81 MG EC tablet Take 1 tablet (81 mg) by mouth daily 30 tablet 0     Docusate Sodium (WILSON STOOL SOFTENER PO) Take 3 capsules by mouth daily       ipratropium (ATROVENT) 0.06 % spray Spray 2 sprays into both nostrils 4 times daily as needed for rhinitis 1 Box 1     LORazepam (ATIVAN) 0.5 MG tablet Take 1 tablet (0.5 mg) by mouth nightly as needed for anxiety Take 30 minutes prior to departure.  Do not operate a vehicle after taking this medication 15 tablet 0     omeprazole (PRILOSEC) 20 MG CR capsule Take 1 capsule (20 mg) by mouth 2 times daily 180 capsule 11     pramipexole (MIRAPEX) 0.25 MG tablet Take 1 tablet (0.25 mg) by mouth 2 times daily At 1700 and  tablet 3     triamcinolone (NASACORT) 55 MCG/ACT inhaler Spray 2 sprays into both nostrils daily       zolpidem (AMBIEN) 5 MG tablet Take 1 tablet (5 mg) by mouth nightly as needed for sleep 14 tablet 1     [DISCONTINUED] evolocumab (REPATHA) 140 MG/ML prefilled syringe Inject 1 mL (140 mg) Subcutaneous every 14 days 1 mL 3     ALLERGY SPRAY 24 HOUR 55 MCG/ACT inhaler 1U 2 SPRAYS IEN D 2 Bottle 3     desvenlafaxine succinate (PRISTIQ) 25 MG 24 hr tablet Take 1 tablet (25 mg) by mouth daily (Patient not taking: Reported on 2018) 31 tablet 3     [DISCONTINUED] zolpidem (AMBIEN) 5 MG tablet Take 1 tablet (5 mg) by mouth nightly as needed for sleep 14 tablet 0     No facility-administered encounter medications on file as of 2018.      Again, thank you  for allowing me to participate in the care of your patient.      Sincerely,    Suhail Frias MD     [facility]

## 2018-11-12 DIAGNOSIS — E78.5 HYPERLIPIDEMIA LDL GOAL <130: Primary | ICD-10-CM

## 2018-11-13 ENCOUNTER — TELEPHONE (OUTPATIENT)
Dept: CARDIOLOGY | Facility: CLINIC | Age: 73
End: 2018-11-13

## 2018-11-13 DIAGNOSIS — E78.5 HYPERLIPIDEMIA LDL GOAL <130: Primary | ICD-10-CM

## 2018-11-13 NOTE — TELEPHONE ENCOUNTER
[facility] Prior Authorization Team   Phone: [phone]  Fax: [phone]  PRIOR AUTHORIZATION DENIED    Medication: REPATHA-PA DENIED    Denial Date: 11/13/2018    Denial Rational: PRALUENT IS PREFERRED FORMULARY OPTION.     Appeal Information: IF YOU WISH TO APPEAL THIS DECISION PLEASE COMPLETE LETTER OF MEDICAL NECESSITY FOR ME TO SUBMIT TO APPEALS DEPARTMENT. REQUESTING NEW RX FOR PRALUENT

## 2018-11-13 NOTE — TELEPHONE ENCOUNTER
[facility] Prior Authorization Team   Phone: [phone]  Fax: [phone]  PA Initiation    Medication: REPATHA-PA PENDING  Insurance Company: [employer] - Phone [phone] Fax [phone]  Pharmacy Filling the Rx: [facility] - [place], [place] - [address]  Filling Pharmacy Phone: [phone]  Filling Pharmacy Fax: [phone]  Start Date: 11/13/2018

## 2018-11-14 ENCOUNTER — TELEPHONE (OUTPATIENT)
Dept: CARDIOLOGY | Facility: CLINIC | Age: 73
End: 2018-11-14

## 2018-11-14 NOTE — TELEPHONE ENCOUNTER
[facility] Prior Authorization Team   Phone: [phone]  Fax: [phone]  PA Initiation    Medication: PRALUENT-PA PENDING  Insurance Company: [employer] - Phone [phone] Fax [phone]  Pharmacy Filling the Rx: [facility] - [place], [place] - [address]  Filling Pharmacy Phone: [phone]  Filling Pharmacy Fax: [phone]  Start Date: 11/14/2018

## 2018-11-14 NOTE — TELEPHONE ENCOUNTER
[facility] Prior Authorization Team   Phone: [phone]  Fax: [phone]  MEDICATION APPEAL APPROVED    Medication: PRALUENT-PA APPROVED  Authorization Effective Date: 11/14/2018  Authorization Expiration Date: 11/14/2019  Approved Dose/Quantity: 2/28DS  Reference #: CMM KEY: [id number]   Insurance Company:    Expected CoPay: $596.17     CoPay Card Available:      Foundation Assistance Needed:    Which Pharmacy is filling the prescription (Not needed for infusion/clinic administered): [facility] - [place], [place] - [facility] [address]

## 2018-11-27 ENCOUNTER — TELEPHONE (OUTPATIENT)
Dept: CARDIOLOGY | Facility: CLINIC | Age: 73
End: 2018-11-27

## 2018-11-27 NOTE — TELEPHONE ENCOUNTER
Bill Hutchinson/Len/Tabatha,     I requested a PFT for Ms Sorto, as I signed the progress noted today, I do not see PFT being scheduled or even the order under procedure, but I did check that I put in the order clinic day for PFT. Could you make sure that patient gets the PFT with diffusion capacity before the next clinic visit.   Thanks   Suhail Frias scheduling had contacted patient with the following comments:      Bill Mays,   I just spoke with patient. Her breathing is doing really good so she cancelled her last PFT and does not want to schedule at this time as she is doing good.     No

## 2018-12-03 ENCOUNTER — TELEPHONE (OUTPATIENT)
Dept: CARDIOLOGY | Facility: CLINIC | Age: 73
End: 2018-12-03

## 2018-12-03 DIAGNOSIS — F41.9 ANXIETY: ICD-10-CM

## 2018-12-03 NOTE — TELEPHONE ENCOUNTER
RN called pt to discuss the PASS program (drug at no cost) for Praluent. Pt's insurance covers Praluent but her co-pay is ~ $600/month.  Reviewed the financial criteria and pt stated she does not qualify. Pt is unwilling to pay the monthly co-pay. Will route this to Dr. Frias.

## 2018-12-05 RX ORDER — ESCITALOPRAM OXALATE 10 MG/1
TABLET ORAL
Qty: 45 TABLET | Refills: 3 | OUTPATIENT
Start: 2018-12-05

## 2018-12-10 ENCOUNTER — HOSPITAL ENCOUNTER (OUTPATIENT)
Dept: MAMMOGRAPHY | Facility: CLINIC | Age: 73
Discharge: HOME | End: 2018-12-10
Attending: INTERNAL MEDICINE | Admitting: INTERNAL MEDICINE
Payer: MEDICARE

## 2018-12-10 DIAGNOSIS — Z12.31 VISIT FOR SCREENING MAMMOGRAM: ICD-10-CM

## 2018-12-10 PROCEDURE — 77063 BREAST TOMOSYNTHESIS BI: CPT

## 2018-12-24 ENCOUNTER — TELEPHONE (OUTPATIENT)
Dept: FAMILY MEDICINE | Facility: CLINIC | Age: 73
End: 2018-12-24

## 2018-12-24 NOTE — TELEPHONE ENCOUNTER
Reason for Call:  Other appointment    Detailed comments:   Pt is wanting to get a Cortizone shot.  She says that Dr. Bethea always gives them to her.  Please look into.  Going to book with [facility]     Phone Number Patient can be reached at: Home number on file [phone] (home)    Best Time: Any     Can we leave a detailed message on this number? YES    Call taken on 12/24/2018 at 10:41 AM by Sade Cruz

## 2019-03-04 DIAGNOSIS — K21.9 GASTROESOPHAGEAL REFLUX DISEASE WITHOUT ESOPHAGITIS: ICD-10-CM

## 2019-03-04 NOTE — TELEPHONE ENCOUNTER
"omeprazole (PRILOSEC) 20 MG CR capsule  Last Written Prescription Date:  12/4/17  Last Fill Quantity: 180,  # refills: 11   Last office visit: 9/24/2018 with prescribing provider:  Neema   Future Office Visit:   Next 5 appointments (look out 90 days)    Apr 26, 2019  1:30 PM CDT  Office Visit with Corona Bethea MD  [facility] [address] [id number] [phone]             Requested Prescriptions   Pending Prescriptions Disp Refills     omeprazole (PRILOSEC) 20 MG DR capsule [Pharmacy Med Name: OMEPRAZOLE 20MG CAPSULES] 180 capsule 0     Sig: TAKE 1 CAPSULE(20 MG) BY MOUTH TWICE DAILY    PPI Protocol Failed - 3/4/2019 11:48 AM       Failed - No diagnosis of osteoporosis on record       Passed - Not on Clopidogrel (unless Pantoprazole ordered)       Passed - Recent (12 mo) or future (30 days) visit within the authorizing provider's specialty    Patient had office visit in the last 12 months or has a visit in the next 30 days with authorizing provider or within the authorizing provider's specialty.  See \"Patient Info\" tab in inbasket, or \"Choose Columns\" in Meds & Orders section of the refill encounter.             Passed - Medication is active on med list       Passed - Patient is age 18 or older       Passed - No active pregnacy on record       Passed - No positive pregnancy test in past 12 months          "

## 2019-03-05 NOTE — TELEPHONE ENCOUNTER
Routing refill request to provider for review/approval because:  Med failed protocol - pt has dx of osteoporosis     Marleni CARBAJAL RN

## 2019-03-12 ENCOUNTER — TRANSFERRED RECORDS (OUTPATIENT)
Dept: HEALTH INFORMATION MANAGEMENT | Facility: CLINIC | Age: 74
End: 2019-03-12

## 2019-04-09 ENCOUNTER — TRANSFERRED RECORDS (OUTPATIENT)
Dept: HEALTH INFORMATION MANAGEMENT | Facility: CLINIC | Age: 74
End: 2019-04-09

## 2019-04-26 ENCOUNTER — OFFICE VISIT (OUTPATIENT)
Dept: FAMILY MEDICINE | Facility: CLINIC | Age: 74
End: 2019-04-26
Payer: MEDICARE

## 2019-04-26 VITALS
BODY MASS INDEX: 21.77 KG/M2 | HEART RATE: 93 BPM | SYSTOLIC BLOOD PRESSURE: 114 MMHG | TEMPERATURE: 98.7 F | HEIGHT: 59 IN | OXYGEN SATURATION: 96 % | WEIGHT: 108 LBS | DIASTOLIC BLOOD PRESSURE: 67 MMHG

## 2019-04-26 DIAGNOSIS — F41.9 ANXIETY: Primary | ICD-10-CM

## 2019-04-26 DIAGNOSIS — G47.00 INSOMNIA, UNSPECIFIED TYPE: ICD-10-CM

## 2019-04-26 DIAGNOSIS — E78.5 HYPERLIPIDEMIA WITH TARGET LDL LESS THAN 130: ICD-10-CM

## 2019-04-26 DIAGNOSIS — K59.00 CONSTIPATION, UNSPECIFIED CONSTIPATION TYPE: ICD-10-CM

## 2019-04-26 PROCEDURE — 99214 OFFICE O/P EST MOD 30 MIN: CPT | Performed by: INTERNAL MEDICINE

## 2019-04-26 RX ORDER — CLONAZEPAM 0.5 MG/1
0.5 TABLET, ORALLY DISINTEGRATING ORAL
Qty: 20 TABLET | Refills: 3 | Status: SHIPPED | OUTPATIENT
Start: 2019-04-26 | End: 2020-01-20

## 2019-04-26 ASSESSMENT — ANXIETY QUESTIONNAIRES
1. FEELING NERVOUS, ANXIOUS, OR ON EDGE: SEVERAL DAYS
IF YOU CHECKED OFF ANY PROBLEMS ON THIS QUESTIONNAIRE, HOW DIFFICULT HAVE THESE PROBLEMS MADE IT FOR YOU TO DO YOUR WORK, TAKE CARE OF THINGS AT HOME, OR GET ALONG WITH OTHER PEOPLE: SOMEWHAT DIFFICULT
6. BECOMING EASILY ANNOYED OR IRRITABLE: MORE THAN HALF THE DAYS
2. NOT BEING ABLE TO STOP OR CONTROL WORRYING: SEVERAL DAYS
GAD7 TOTAL SCORE: 12
7. FEELING AFRAID AS IF SOMETHING AWFUL MIGHT HAPPEN: NOT AT ALL
3. WORRYING TOO MUCH ABOUT DIFFERENT THINGS: MORE THAN HALF THE DAYS
5. BEING SO RESTLESS THAT IT IS HARD TO SIT STILL: NEARLY EVERY DAY

## 2019-04-26 ASSESSMENT — MIFFLIN-ST. JEOR: SCORE: 892.57

## 2019-04-26 ASSESSMENT — PATIENT HEALTH QUESTIONNAIRE - PHQ9: 5. POOR APPETITE OR OVEREATING: NEARLY EVERY DAY

## 2019-04-26 NOTE — PATIENT INSTRUCTIONS
Patient Education     Linaclotide oral capsules  Brand Name: Linzess  What is this medicine?  LINACLOTIDE (florentin a KLOE tide) is used to treat irritable bowel syndrome (IBS) with constipation as the main problem. It may also be used for relief of chronic constipation.  How should I use this medicine?  Take this medicine by mouth with a glass of water. Follow the directions on the prescription label. Do not cut, crush or chew this medicine. Take on an empty stomach, at least 30 minutes before your first meal of the day. Take your medicine at regular intervals. Do not take your medicine more often than directed. Do not stop taking except on your doctor's advice.  A special MedGuide will be given to you by the pharmacist with each prescription and refill. Be sure to read this information carefully each time.  Talk to your pediatrician regarding the use of this medicine in children. This medicine is not approved for use in children.  What side effects may I notice from receiving this medicine?  Side effects that you should report to your doctor or health care professional as soon as possible:    allergic reactions like skin rash, itching or hives, swelling of the face, lips, or tongue    black, tarry stools    bloody or watery diarrhea    new or worsening stomach pain    severe or prolonged diarrhea  Side effects that usually do not require medical attention (report to your doctor or health care professional if they continue or are bothersome):    bloating    gas    loose stools  What may interact with this medicine?      certain medicines for bowel problems or bladder incontinence (these can cause constipation)  What if I miss a dose?  If you miss a dose, just skip that dose. Wait until your next dose, and take only that dose. Do not take double or extra doses.  Where should I keep my medicine?  Keep out of the reach of children.  Store at room temperature between 20 and 25 degrees C (68 and 77 degrees F). Keep this  medicine in the original container. Keep tightly closed in a dry place. Do not remove the desiccant packet from the bottle, it helps to protect your medicine from moisture. Throw away any unused medicine after the expiration date.  What should I tell my health care provider before I take this medicine?  They need to know if you have any of these conditions:    history of stool (fecal) impaction    now have diarrhea or have diarrhea often    other medical condition    stomach or intestinal disease, including bowel obstruction or abdominal adhesions    an unusual or allergic reaction to linaclotide, other medicines, foods, dyes, or preservatives    pregnant or trying to get pregnant    breast-feeding  What should I watch for while using this medicine?  Visit your doctor for regular check ups. Tell your doctor if your symptoms do not get better or if they get worse.  Diarrhea is a common side effect of this medicine. It often begins within 2 weeks of starting this medicine. Stop taking this medicine and call your doctor if you get severe diarrhea.  Stop taking this medicine and call your doctor or go to the nearest hospital emergency room right away if you develop unusual or severe stomach-area (abdominal) pain, especially if you also have bright red, bloody stools or black stools that look like tar.  NOTE:This sheet is a summary. It may not cover all possible information. If you have questions about this medicine, talk to your doctor, pharmacist, or health care provider. Copyright  2018 Elsevier           Patient Education     Patient Education    Melatonin Oral capsule    Melatonin Oral capsule, liquid filled    Melatonin Oral disintegrating tablet    Melatonin Oral solution    Melatonin Oral tablet    Melatonin Soft chew    Melatonin Sublingual tablet  Melatonin Oral capsule  What is this medicine?  MELATONIN (aliyah  ZOE nohemy) is a dietary supplement. It is promoted to help maintain normal sleep patterns. The FDA has  not approved this supplement for any medical use.  This supplement may be used for other purposes; ask your health care provider or pharmacist if you have questions.  What should I tell my health care provider before I take this medicine?  They need to know if you have any of these conditions:    asthma    cancer    depression or mental illness    diabetes    hormone problems    if you often drink alcohol    immune system problems    liver disease    organ transplant    seizure disorder    an unusual or allergic reaction to melatonin, other medicines, foods, dyes, or preservatives    pregnant or trying to get pregnant    breast-feeding  How should I use this medicine?  Take this supplement by mouth with a glass of water. Do not take with food. This supplement is usually taken 1 or 2 hours before bedtime. After taking this supplement, limit your activities to those needed to prepare for bed. Follow the directions on the package labeling, or take as directed by your health care professional. Do not take this supplement more often than directed.  Talk to your pediatrician regarding the use of this supplement in children. Special care may be needed. This supplement is not recommended for use in children without a prescription.  Overdosage: If you think you have taken too much of this medicine contact a poison control center or emergency room at once.  NOTE: This medicine is only for you. Do not share this medicine with others.  What if I miss a dose?  If you miss taking your dose at the usual time, skip that dose. If it is almost time for your next dose, take only that dose. Do not take double or extra doses.  What may interact with this medicine?  Do not take this medicine with any of the following medications:    fluvoxamine    ramelteon    tasimelteon  This medicine may also interact with the following medications:    alcohol    atazanavir    caffeine    carbamazepine    certain antibiotics like ciprofloxacin,  enoxacin    certain medicines for depression, anxiety, or psychotic disturbances    cimetidine    female hormones, like estrogens and birth control pills, patches, rings, or injections    methoxsalen    nifedipine    other medications for sleep    other herbal or dietary supplements    phenobarbital    rifampin    smoking tobacco    tamoxifen    treatments for cancer, organ transplant, or immune disorders  This list may not describe all possible interactions. Give your health care provider a list of all the medicines, herbs, non-prescription drugs, or dietary supplements you use. Also tell them if you smoke, drink alcohol, or use illegal drugs. Some items may interact with your medicine.  What should I watch for while using this medicine?  See your doctor if your symptoms do not get better or if they get worse. Do not take this supplement for more than 2 weeks unless your doctor tells you to.  You may get drowsy or dizzy. Do not drive, use machinery, or do anything that needs mental alertness until you know how this medicine affects you. Do not stand or sit up quickly, especially if you are an older patient. This reduces the risk of dizzy or fainting spells. Alcohol may interfere with the effect of this medicine. Avoid alcoholic drinks.  Talk to your doctor before you use this supplement if you are currently being treated for an emotional, mental, or sleep problem. This medicine may interfere with your treatment.  Herbal or dietary supplements are not regulated like medicines. Rigid  standards are not required for dietary supplements. The purity and strength of these products can vary. The safety and effect of this dietary supplement for a certain disease or illness is not well known. This product is not intended to diagnose, treat, cure or prevent any disease.  The Food and Drug Administration suggests the following to help consumers protect themselves:    Always read product labels and follow  directions.    Natural does not mean a product is safe for humans to take.    Look for products that include USP after the ingredient name. This means that the  followed the standards of the [place] Pharmacopoeia.    Supplements made or sold by a nationally known food or drug company are more likely to be made under tight controls. You can write to the company for more information about how the product was made.  What side effects may I notice from receiving this medicine?  Side effects that you should report to your doctor or health care professional as soon as possible:    allergic reactions like skin rash, itching or hives, swelling of the face, lips, or tongue    breathing problems    confusion, forgetful    depressed, nervous, or other mood changes    fast or pounding heartbeat    trouble staying awake or alert during the day  Side effects that usually do not require medical attention (report to your doctor or health care professional if they continue or are bothersome):    drowsiness, dizziness    headache    nightmares    upset stomach  This list may not describe all possible side effects. Call your doctor for medical advice about side effects. You may report side effects to FDA at 8-024-FDA-6318.  Where should I keep my medicine?  Keep out of the reach of children.  Store at room temperature or as directed on the package label. Protect from moisture. Throw away any unused supplement after the expiration date.  NOTE:This sheet is a summary. It may not cover all possible information. If you have questions about this medicine, talk to your doctor, pharmacist, or health care provider. Copyright  2016 Gold Standard

## 2019-04-26 NOTE — PROGRESS NOTES
SUBJECTIVE:   Hanane Sorto is a 73 year old female who presents to clinic today for the following   health issues:      Anxiety Follow-Up    Status since last visit: Improved a little bit    Other associated symptoms:None    Complicating factors:   Significant life event: Yes-  Family stress   Current substance abuse: None  Depression symptoms: No  GELACIO-7 SCORE 2015   Total Score 1 12       GELACIO-7    Amount of exercise or physical activity: 2-3 days/week for an average of greater than 60 minutes    Problems taking medications regularly: Yes,  problems remembering to take    Medication side effects: none    Diet: regular (no restrictions)            Additional history: as documented    Reviewed  and updated as needed this visit by clinical staff  Tobacco  Allergies  Meds  Problems  Med Hx  Surg Hx  Fam Hx         Reviewed and updated as needed this visit by Provider  Tobacco  Allergies  Meds  Problems  Med Hx  Surg Hx  Fam Hx         Patient Active Problem List   Diagnosis     Hyperlipidemia with target LDL less than 130     Anxiety     Osteoporosis     Gross hematuria     Advanced directives, counseling/discussion     Osteoarthritis     Bursitis of hip     Constipation     Gastroesophageal reflux disease without esophagitis     S/P total knee arthroplasty     Fracture of wrist, left, with routine healing, subsequent encounter     Past Surgical History:   Procedure Laterality Date     ARTHROPLASTY KNEE Left 2017    Procedure: ARTHROPLASTY KNEE;  LEFT TOTAL KNEE ARTHROPLASTY;  Surgeon: Lee Ayala MD;  Location: [facility] OR     BUNIONECTOMY      both      SECTION       COLONOSCOPY  8-10     LIPOSUCTION (LOCATION)       RECONSTRUCT BREAST, IMPLANT PROSTHESIS, COMBINED         Social History     Tobacco Use     Smoking status: Former Smoker     Packs/day: 0.25     Start date:      Last attempt to quit:      Years since quittin.3     Smokeless tobacco:  "Never Used   Substance Use Topics     Alcohol use: Yes     Alcohol/week: 0.0 - 2.4 oz     Comment: once every two weeks     Family History   Problem Relation Age of Onset     C.A.D. Father      Lipids Mother      Alzheimer Disease Mother      Breast Cancer Sister         53     Cancer - colorectal Sister         40s     Alcoholism Son          Current Outpatient Medications   Medication Sig Dispense Refill     alirocumab (PRALUENT) 75 MG/ML injectable pen Inject 1 mL (75 mg) Subcutaneous every 14 days 6 mL 3     clonazePAM (KLONOPIN) 0.5 MG ODT Take 1 tablet (0.5 mg) by mouth nightly as needed for anxiety 20 tablet 3     Docusate Sodium (WILSON STOOL SOFTENER PO) Take 3 capsules by mouth daily       linaclotide (LINZESS) 72 MCG capsule Take 1 capsule (72 mcg) by mouth every morning (before breakfast) 31 capsule 3     omeprazole (PRILOSEC) 20 MG DR capsule TAKE 1 CAPSULE(20 MG) BY MOUTH TWICE DAILY 180 capsule 1     pramipexole (MIRAPEX) 0.25 MG tablet Take 1 tablet (0.25 mg) by mouth 2 times daily At 1700 and  tablet 3       ROS:  Constitutional, HEENT, cardiovascular, pulmonary, GI, , musculoskeletal, neuro, skin, endocrine and psych systems are negative, except as otherwise noted.    OBJECTIVE:     /67 (BP Location: Left arm, Cuff Size: Adult Regular)   Pulse 93   Temp 98.7  F (37.1  C) (Tympanic)   Ht 1.486 m (4' 10.5\")   Wt 49 kg (108 lb)   SpO2 96%   Breastfeeding? No   BMI 22.19 kg/m    Body mass index is 22.19 kg/m .  PSYCH: mentation appears normal and affect normal/bright  MENTAL STATUS EXAM:  Appearance/Behavior: No apparent distress  Speech: Normal  Mood/Affect: depressed affect  Insight: Adequate        ASSESSMENT/PLAN:             Hanane was seen today    Diagnoses and all orders for this visit:    Anxiety  Patient has tried SSRIs and SNRTI drugs  She does not tolerate them well  She has tried BuSpar without any help  We will try clonazepam and see if that helps her  She will avoid " alcohol  -     clonazePAM (KLONOPIN) 0.5 MG ODT; Take 1 tablet (0.5 mg) by mouth nightly as needed for anxiety    Constipation, unspecified constipation type  Dulcolax causes bloating and colonoscopy has been done which found polyps which were tubular adenomas  Repeat colonoscopy in 3 years  We will try Linzess  We will increase the dose gradually and I educated her about the side effects  -     linaclotide (LINZESS) 72 MCG capsule; Take 1 capsule (72 mcg) by mouth every morning (before breakfast)    Hyperlipidemia with target LDL less than 130  -     Lipid panel reflex to direct LDL Fasting; Future  -     **Comprehensive metabolic panel FUTURE anytime; Future  She cannot afford the new medications either and does not tolerate statins and Zetia  Insomnia, unspecified type  We discussed about sleep hygiene and melatonin          Corona Bethea MD  [facility]

## 2019-04-27 ASSESSMENT — ANXIETY QUESTIONNAIRES: GAD7 TOTAL SCORE: 12

## 2019-04-30 ENCOUNTER — TELEPHONE (OUTPATIENT)
Dept: FAMILY MEDICINE | Facility: CLINIC | Age: 74
End: 2019-04-30

## 2019-04-30 NOTE — TELEPHONE ENCOUNTER
Prior Authorization Retail Medication Request    Medication/Dose: clonazepam 0.5 mg  ICD code (if different than what is on RX):  f41.9  Previously Tried and Failed:  lexapro 10 mg, lexapro 20 mg  Rationale:  Stable on current medication    Insurance Name:  medicare  Insurance ID:  [id number]      Pharmacy Information (if different than what is on RX)  [facility]  Phone:  [phone]

## 2019-05-03 NOTE — TELEPHONE ENCOUNTER
Prior Authorization Not Needed per Insurance  05/03/2019  Medication: clonazepam 0.5mg - NOT NEEDED  Insurance Company: [employer] - Phone [phone] Fax [phone]  Expected CoPay:      Pharmacy Filling the Rx: @[facility] - [place], [place] - [address] AT [facility] [place] [address]  Pharmacy Notified: Yes  Patient Notified: Yes    Pharmacy received a paid claim on 04/29/2019

## 2019-05-06 ENCOUNTER — TRANSFERRED RECORDS (OUTPATIENT)
Dept: HEALTH INFORMATION MANAGEMENT | Facility: CLINIC | Age: 74
End: 2019-05-06

## 2019-05-28 DIAGNOSIS — E78.5 HYPERLIPIDEMIA WITH TARGET LDL LESS THAN 130: ICD-10-CM

## 2019-05-28 LAB
ALBUMIN SERPL-MCNC: 3.6 G/DL (ref 3.4–5)
ALP SERPL-CCNC: 61 U/L (ref 40–150)
ALT SERPL W P-5'-P-CCNC: 18 U/L (ref 0–50)
ANION GAP SERPL CALCULATED.3IONS-SCNC: 6 MMOL/L (ref 3–14)
AST SERPL W P-5'-P-CCNC: 13 U/L (ref 0–45)
BILIRUB SERPL-MCNC: 0.3 MG/DL (ref 0.2–1.3)
BUN SERPL-MCNC: 17 MG/DL (ref 7–30)
CALCIUM SERPL-MCNC: 9.2 MG/DL (ref 8.5–10.1)
CHLORIDE SERPL-SCNC: 111 MMOL/L (ref 94–109)
CHOLEST SERPL-MCNC: 263 MG/DL
CO2 SERPL-SCNC: 25 MMOL/L (ref 20–32)
CREAT SERPL-MCNC: 0.92 MG/DL (ref 0.52–1.04)
GFR SERPL CREATININE-BSD FRML MDRD: 62 ML/MIN/{1.73_M2}
GLUCOSE SERPL-MCNC: 87 MG/DL (ref 70–99)
HDLC SERPL-MCNC: 45 MG/DL
LDLC SERPL CALC-MCNC: 168 MG/DL
NONHDLC SERPL-MCNC: 218 MG/DL
POTASSIUM SERPL-SCNC: 4.2 MMOL/L (ref 3.4–5.3)
PROT SERPL-MCNC: 6.6 G/DL (ref 6.8–8.8)
SODIUM SERPL-SCNC: 142 MMOL/L (ref 133–144)
TRIGL SERPL-MCNC: 251 MG/DL

## 2019-05-28 PROCEDURE — 80053 COMPREHEN METABOLIC PANEL: CPT | Performed by: INTERNAL MEDICINE

## 2019-05-28 PROCEDURE — 80061 LIPID PANEL: CPT | Performed by: INTERNAL MEDICINE

## 2019-05-28 PROCEDURE — 36415 COLL VENOUS BLD VENIPUNCTURE: CPT | Performed by: INTERNAL MEDICINE

## 2019-06-21 ENCOUNTER — TRANSFERRED RECORDS (OUTPATIENT)
Dept: HEALTH INFORMATION MANAGEMENT | Facility: CLINIC | Age: 74
End: 2019-06-21

## 2019-07-19 ENCOUNTER — TRANSFERRED RECORDS (OUTPATIENT)
Dept: HEALTH INFORMATION MANAGEMENT | Facility: CLINIC | Age: 74
End: 2019-07-19

## 2019-07-23 ENCOUNTER — TRANSFERRED RECORDS (OUTPATIENT)
Dept: HEALTH INFORMATION MANAGEMENT | Facility: CLINIC | Age: 74
End: 2019-07-23

## 2019-07-26 ENCOUNTER — OFFICE VISIT (OUTPATIENT)
Dept: FAMILY MEDICINE | Facility: CLINIC | Age: 74
End: 2019-07-26
Payer: MEDICARE

## 2019-07-26 VITALS
WEIGHT: 106 LBS | HEIGHT: 59 IN | SYSTOLIC BLOOD PRESSURE: 133 MMHG | OXYGEN SATURATION: 99 % | DIASTOLIC BLOOD PRESSURE: 79 MMHG | HEART RATE: 72 BPM | BODY MASS INDEX: 21.37 KG/M2 | TEMPERATURE: 97.8 F

## 2019-07-26 DIAGNOSIS — K59.00 CONSTIPATION, UNSPECIFIED CONSTIPATION TYPE: ICD-10-CM

## 2019-07-26 DIAGNOSIS — Z01.818 PREOP GENERAL PHYSICAL EXAM: Primary | ICD-10-CM

## 2019-07-26 DIAGNOSIS — M25.511 CHRONIC RIGHT SHOULDER PAIN: ICD-10-CM

## 2019-07-26 DIAGNOSIS — G89.29 CHRONIC RIGHT SHOULDER PAIN: ICD-10-CM

## 2019-07-26 LAB
CREAT SERPL-MCNC: 0.9 MG/DL (ref 0.52–1.04)
GFR SERPL CREATININE-BSD FRML MDRD: 63 ML/MIN/{1.73_M2}
HGB BLD-MCNC: 13.4 G/DL (ref 11.7–15.7)
POTASSIUM SERPL-SCNC: 4 MMOL/L (ref 3.4–5.3)

## 2019-07-26 PROCEDURE — 99214 OFFICE O/P EST MOD 30 MIN: CPT | Performed by: NURSE PRACTITIONER

## 2019-07-26 PROCEDURE — 93000 ELECTROCARDIOGRAM COMPLETE: CPT | Performed by: NURSE PRACTITIONER

## 2019-07-26 PROCEDURE — 82565 ASSAY OF CREATININE: CPT | Performed by: NURSE PRACTITIONER

## 2019-07-26 PROCEDURE — 85018 HEMOGLOBIN: CPT | Performed by: NURSE PRACTITIONER

## 2019-07-26 PROCEDURE — 36415 COLL VENOUS BLD VENIPUNCTURE: CPT | Performed by: NURSE PRACTITIONER

## 2019-07-26 PROCEDURE — 84132 ASSAY OF SERUM POTASSIUM: CPT | Performed by: NURSE PRACTITIONER

## 2019-07-26 ASSESSMENT — MIFFLIN-ST. JEOR: SCORE: 883.5

## 2019-07-26 NOTE — PROGRESS NOTES
[address] [address]  [phone]  Dept: [phone]    PRE-OP EVALUATION:  Today's date: 2019    Hanane Sorto (: 1945) presents for pre-operative evaluation assessment as requested by Lee Kulkarni.  He requires evaluation and anesthesia risk assessment prior to undergoing surgery/procedure for treatment of R shoulder pain .    Fax number for surgical facility: [phone]  Primary Physician: Corona Bethea  Type of Anesthesia Anticipated: to be determined    Patient has a Health Care Directive or Living Will:  YES, document scanned    Preop Questions 2019   Who is doing your surgery? Dr. Lee Ayala   What are you having done? Right shoulder hooked bone, RC tear, proximal bicep tendon tear   Date of Surgery/Procedure: 2019   Facility or Hospital where procedure/surgery will be performed: [facility]   1.  Do you have a history of Heart attack, stroke, stent, coronary bypass surgery, or other heart surgery? No   2.  Do you ever have any pain or discomfort in your chest? No   3.  Do you have a history of  Heart Failure? No   4.   Are you troubled by shortness of breath when:  walking on a level surface, or up a slight hill, or at night? No   5.  Do you currently have a cold, bronchitis or other respiratory infection? No   6.  Do you have a cough, shortness of breath, or wheezing? No   7.  Do you sometimes get pains in the calves of your legs when you walk? No   8. Do you or anyone in your family have previous history of blood clots? No   9.  Do you or does anyone in your family have a serious bleeding problem such as prolonged bleeding following surgeries or cuts? No   10. Have you ever had problems with anemia or been told to take iron pills? YES - many years ago   11. Have you had any abnormal blood loss such as black, tarry or bloody stools, or abnormal vaginal bleeding? No   12. Have you ever had a blood  transfusion? No   13. Have you or any of your relatives ever had problems with anesthesia? No   14. Do you have sleep apnea, excessive snoring or daytime drowsiness? No   15. Do you have any prosthetic heart valves? No   16. Do you have prosthetic joints? No   17. Is there any chance that you may be pregnant? No         HPI:     HPI related to upcoming procedure: Has persistent shoulder issues. Had a couple cortisone injections. Has some discomfort of shoulder and is going for surgery.   Feeling run down lately during the day. Has been sleeping well. Has been quite stressed lately which could be playing a role.     See problem list for active medical problems.  Problems all longstanding and stable, except as noted/documented.  See ROS for pertinent symptoms related to these conditions.      MEDICAL HISTORY:     Patient Active Problem List    Diagnosis Date Noted     Fracture of wrist, left, with routine healing, subsequent encounter 08/28/2017     Priority: Medium     S/P total knee arthroplasty 04/24/2017     Priority: Medium     Gastroesophageal reflux disease without esophagitis 11/30/2015     Priority: Medium     Bursitis of hip 11/24/2014     Priority: Medium     Constipation 11/24/2014     Priority: Medium     Osteoarthritis 12/19/2012     Priority: Medium     Advanced directives, counseling/discussion 01/02/2012     Priority: Medium     Advance Directive Initial Visit  Hanane William Endyanne marie presents in person for initial session regarding completion of advanced directive form. She was referred to the facilitator by provider.  She currently has existing advance directive document for review and possible revision.  Plan:  Advanced directive form, healthcare agent information card, advanced care planning information booklet provided to patient.   Designated healthcare agent is identified. Healthcare agent s name is see below.  Designated healthcare agent concerns:  none.  My Hopes and Wishes reviewed and patient and  designated healthcare agent are in agreement.  Finalized wishes are clear to both patient and designated healthcare agent: Yes  Patient and designated healthcare agent are aware that document may be changed at any time in the future.    Advanced directive form: completed at this visit.  Advanced directive form: verified with notary signature..  Original and two copies of advanced directive form given to patient copy sent to  for scanning into EMR and original given to patient and instructed to give copy to designated [facility] and non-[facility] physician where applicable  reviewed previous directive and patient decided to rewrite her directive. directive completed, notarized and documented.  Advance Directive Problem List Overview:   Name Relationship Phone    Primary Health Care Agent Lee Sorto Spouse  [phone]  [phone]         Alternative Health Care Agent Luisito Aleman Son    Sister   [phone]  [phone]  [phone]  [phone]        1/2/2012   MARÍA Mariano LPN         Gross hematuria 09/12/2011     Priority: Medium     Hyperlipidemia with target LDL less than 130      Priority: Medium     Diagnosis updated by automated process. Provider to review and confirm.       Anxiety      Priority: Medium     Osteoporosis      Priority: Medium      Past Medical History:   Diagnosis Date     Anxiety     psych yearly, Dr Shea     Family hx of colon cancer     sister     Floaters     Karmatammy cote     Fracture of wrist, left, with routine healing, subsequent encounter 8/28/2017     GERD (gastroesophageal reflux disease)      Gross hematuria 9/12/2011     Hyperlipidemia LDL goal < 130      Hyperlipidemia LDL goal <130 8/28/2017     Malnutrition (H) 8/28/2017     Osteoarthritis 12/19/2012     Osteoporosis     Dexa     PONV (postoperative nausea and vomiting)      Restless legs      Skin cancer      Past Surgical History:   Procedure Laterality Date      ARTHROPLASTY KNEE Left 2017    Procedure: ARTHROPLASTY KNEE;  LEFT TOTAL KNEE ARTHROPLASTY;  Surgeon: Lee Ayala MD;  Location: [facility] OR     BUNIONECTOMY      both      SECTION       COLONOSCOPY  8-10     LIPOSUCTION (LOCATION)       RECONSTRUCT BREAST, IMPLANT PROSTHESIS, COMBINED       Current Outpatient Medications   Medication Sig Dispense Refill     alirocumab (PRALUENT) 75 MG/ML injectable pen Inject 1 mL (75 mg) Subcutaneous every 14 days 6 mL 3     clonazePAM (KLONOPIN) 0.5 MG ODT Take 1 tablet (0.5 mg) by mouth nightly as needed for anxiety 20 tablet 3     Docusate Sodium (WILSON STOOL SOFTENER PO) Take 3 capsules by mouth daily       linaclotide (LINZESS) 72 MCG capsule Take 1 capsule (72 mcg) by mouth every morning (before breakfast) 31 capsule 3     omeprazole (PRILOSEC) 20 MG DR capsule TAKE 1 CAPSULE(20 MG) BY MOUTH TWICE DAILY 180 capsule 1     pramipexole (MIRAPEX) 0.25 MG tablet Take 1 tablet (0.25 mg) by mouth 2 times daily At 1700 and  tablet 3     OTC products: None, except as noted above    Allergies   Allergen Reactions     Crestor [Rosuvastatin]      Ezetimibe Other (See Comments)     Zetia = leg cramps      Miralax [Polyethylene Glycol] Other (See Comments)     Back pain     Pravastatin Other (See Comments)     Leg cramps       Latex Allergy: NO    Social History     Tobacco Use     Smoking status: Former Smoker     Packs/day: 0.25     Start date:      Last attempt to quit: 1984     Years since quittin.5     Smokeless tobacco: Never Used   Substance Use Topics     Alcohol use: Yes     Alcohol/week: 0.0 - 2.4 oz     Comment: once every two weeks     History   Drug Use No       REVIEW OF SYSTEMS:   Constitutional, neuro, ENT, endocrine, pulmonary, cardiac, gastrointestinal, genitourinary, musculoskeletal, integument and psychiatric systems are negative, except as otherwise noted.    EXAM:   /79 (BP Location: Right arm, Patient Position:  "Sitting, Cuff Size: Adult Regular)   Pulse 72   Temp 97.8  F (36.6  C) (Oral)   Ht 1.486 m (4' 10.5\")   Wt 48.1 kg (106 lb)   SpO2 99%   BMI 21.78 kg/m      GENERAL APPEARANCE: healthy, alert and no distress     EYES: EOMI     HENT: mouth without lesions     RESP: lungs clear to auscultation - no rales, rhonchi or wheezes     CV: regular rates and rhythm, normal S1 S2, no S3 or S4 and no murmur, click or rub     MS: extremities normal- no gross deformities noted, no evidence of inflammation in joints, FROM in all extremities.     SKIN: no suspicious lesions or rashes     NEURO: Normal strength and tone, sensory exam grossly normal, mentation intact and speech normal     PSYCH: mentation appears normal. and affect normal/bright    DIAGNOSTICS:     EKG: appears normal, NSR, normal axis, normal intervals, no acute ST/T changes c/w ischemia, no LVH by voltage criteria, unchanged from previous tracings  Labs Resulted Today:   Results for orders placed or performed in visit on 07/26/19   Hemoglobin   Result Value Ref Range    Hemoglobin 13.4 11.7 - 15.7 g/dL   Potassium   Result Value Ref Range    Potassium 4.0 3.4 - 5.3 mmol/L   Creatinine   Result Value Ref Range    Creatinine 0.90 0.52 - 1.04 mg/dL    GFR Estimate 63 >60 mL/min/[1.73_m2]    GFR Estimate If Black 73 >60 mL/min/[1.73_m2]         Recent Labs   Lab Test 05/28/19  0920 09/24/18  1632 12/04/17  1222  04/27/17  0610  04/25/17  0636 04/24/17  0820   HGB  --  12.7 13.1   < >  --    < > 9.8* 12.9   PLT  --  350 291   < > 268  --   --  327   INR  --   --   --   --   --   --   --  0.91     --   --   --   --   --  140 143   POTASSIUM 4.2  --   --   --   --   --  4.7 4.2   CR 0.92  --   --   --  0.82  --  0.88 0.79    < > = values in this interval not displayed.        IMPRESSION:   Reason for surgery/procedure: R shoulder pain   Diagnosis/reason for consult: medical preop    The proposed surgical procedure is considered INTERMEDIATE risk.    REVISED " CARDIAC RISK INDEX  The patient has the following serious cardiovascular risks for perioperative complications such as (MI, PE, VFib and 3  AV Block):  No serious cardiac risks  INTERPRETATION: 0 risks: Class I (very low risk - 0.4% complication rate)    The patient has the following additional risks for perioperative complications:  No identified additional risks      ICD-10-CM    1. Preop general physical exam Z01.818 EKG 12-lead complete w/read - Clinics     Hemoglobin     Potassium     Creatinine   2. Chronic right shoulder pain M25.511     G89.29    3. Constipation, unspecified constipation type K59.00 linaclotide (LINZESS) 72 MCG capsule   Refilled linzess     RECOMMENDATIONS:         --Patient is to take all scheduled medications on the day of surgery EXCEPT for modifications listed below.      APPROVAL GIVEN to proceed with proposed procedure, without further diagnostic evaluation       Signed Electronically by: SHADE Arndt CNP    Copy of this evaluation report is provided to requesting physician.    [facility] Preop Guidelines    Revised Cardiac Risk Index

## 2019-07-26 NOTE — LETTER
[place] [address] [facility]  [address]  [place]  [address]  Tel: [phone]    July 29, 2019    Hanane Sorto  [address]  [place] [address]        Dear Ms. Sorto,    Your preop labs look great! Best of luck on your surgery!      Rossy Bustamante, SHADE CNP/SML      Enclosure: Lab Results  Results for orders placed or performed in visit on 07/26/19   Hemoglobin   Result Value Ref Range    Hemoglobin 13.4 11.7 - 15.7 g/dL   Potassium   Result Value Ref Range    Potassium 4.0 3.4 - 5.3 mmol/L   Creatinine   Result Value Ref Range    Creatinine 0.90 0.52 - 1.04 mg/dL    GFR Estimate 63 >60 mL/min/[1.73_m2]    GFR Estimate If Black 73 >60 mL/min/[1.73_m2]

## 2019-08-20 ENCOUNTER — OFFICE VISIT (OUTPATIENT)
Dept: FAMILY MEDICINE | Facility: CLINIC | Age: 74
End: 2019-08-20
Payer: MEDICARE

## 2019-08-20 VITALS
WEIGHT: 107 LBS | DIASTOLIC BLOOD PRESSURE: 51 MMHG | SYSTOLIC BLOOD PRESSURE: 121 MMHG | BODY MASS INDEX: 21.57 KG/M2 | OXYGEN SATURATION: 99 % | TEMPERATURE: 97.6 F | HEART RATE: 60 BPM | HEIGHT: 59 IN

## 2019-08-20 DIAGNOSIS — M25.511 CHRONIC RIGHT SHOULDER PAIN: ICD-10-CM

## 2019-08-20 DIAGNOSIS — Z01.818 PREOP GENERAL PHYSICAL EXAM: Primary | ICD-10-CM

## 2019-08-20 DIAGNOSIS — G89.29 CHRONIC RIGHT SHOULDER PAIN: ICD-10-CM

## 2019-08-20 PROCEDURE — 99213 OFFICE O/P EST LOW 20 MIN: CPT | Performed by: NURSE PRACTITIONER

## 2019-08-20 ASSESSMENT — MIFFLIN-ST. JEOR: SCORE: 888.04

## 2019-08-20 NOTE — PROGRESS NOTES
[address] [address]  [phone]  Dept: [phone]    PRE-OP EVALUATION:  Today's date: 2019    Hanane Sorto (: 1945) presents for pre-operative evaluation assessment as requested by Lee Kulkarni.  He requires evaluation and anesthesia risk assessment prior to undergoing surgery/procedure for treatment of R shoulder pain .    Fax number for surgical facility: [phone]  Primary Physician: Corona Bethea  Type of Anesthesia Anticipated: to be determined    Patient has a Health Care Directive or Living Will:  YES, document scanned.    Preop Questions 2019   Who is doing your surgery? Dr. Lee Ayala   What are you having done? rotary cuff tear repair, right   Date of Surgery/Procedure: 2019   Facility or Hospital where procedure/surgery will be performed: [facility]   1.  Do you have a history of Heart attack, stroke, stent, coronary bypass surgery, or other heart surgery? No   2.  Do you ever have any pain or discomfort in your chest? No   3.  Do you have a history of  Heart Failure? No   4.   Are you troubled by shortness of breath when:  walking on a level surface, or up a slight hill, or at night? No   5.  Do you currently have a cold, bronchitis or other respiratory infection? No   6.  Do you have a cough, shortness of breath, or wheezing? No   7.  Do you sometimes get pains in the calves of your legs when you walk? No   8. Do you or anyone in your family have previous history of blood clots? No   9.  Do you or does anyone in your family have a serious bleeding problem such as prolonged bleeding following surgeries or cuts? No   10. Have you ever had problems with anemia or been told to take iron pills? No   11. Have you had any abnormal blood loss such as black, tarry or bloody stools, or abnormal vaginal bleeding? No   12. Have you ever had a blood transfusion? No   13. Have you or any of your relatives ever  had problems with anesthesia? No   14. Do you have sleep apnea, excessive snoring or daytime drowsiness? No   15. Do you have any prosthetic heart valves? No   16. Do you have prosthetic joints? No   17. Is there any chance that you may be pregnant? No         HPI:     HPI related to upcoming procedure: Going for right shoulder surgery after persistent issues. Has tried cortisone injections in the past.   Feeling well lately. Does have a lot of stress in her life currently.       See problem list for active medical problems.  Problems all longstanding and stable, except as noted/documented.  See ROS for pertinent symptoms related to these conditions.      MEDICAL HISTORY:     Patient Active Problem List    Diagnosis Date Noted     Fracture of wrist, left, with routine healing, subsequent encounter 08/28/2017     Priority: Medium     S/P total knee arthroplasty 04/24/2017     Priority: Medium     Gastroesophageal reflux disease without esophagitis 11/30/2015     Priority: Medium     Bursitis of hip 11/24/2014     Priority: Medium     Constipation 11/24/2014     Priority: Medium     Osteoarthritis 12/19/2012     Priority: Medium     Advanced directives, counseling/discussion 01/02/2012     Priority: Medium     Advance Directive Initial Visit  Hanane William Endyanne marie presents in person for initial session regarding completion of advanced directive form. She was referred to the facilitator by provider.  She currently has existing advance directive document for review and possible revision.  Plan:  Advanced directive form, healthcare agent information card, advanced care planning information booklet provided to patient.   Designated healthcare agent is identified. Healthcare agent s name is see below.  Designated healthcare agent concerns:  none.  My Hopes and Wishes reviewed and patient and designated healthcare agent are in agreement.  Finalized wishes are clear to both patient and designated healthcare agent: Yes  Patient and  designated healthcare agent are aware that document may be changed at any time in the future.    Advanced directive form: completed at this visit.  Advanced directive form: verified with notary signature..  Original and two copies of advanced directive form given to patient copy sent to  for scanning into EMR and original given to patient and instructed to give copy to designated [facility] and non-[facility] physician where applicable  reviewed previous directive and patient decided to rewrite her directive. directive completed, notarized and documented.  Advance Directive Problem List Overview:   Name Relationship Phone    Primary Health Care Agent Lee Sorto Spouse  [phone]         Alternative Health Care Agent Luisito Aleman Son    Sister   [phone]        1/2/2012   MARÍA Mariano LPN         Gross hematuria 09/12/2011     Priority: Medium     Hyperlipidemia with target LDL less than 130      Priority: Medium     Diagnosis updated by automated process. Provider to review and confirm.       Anxiety      Priority: Medium     Osteoporosis      Priority: Medium      Past Medical History:   Diagnosis Date     Anxiety     psych yearly, Dr Shea     Family hx of colon cancer     sister     Flokindra     Karma leverentz     Fracture of wrist, left, with routine healing, subsequent encounter 8/28/2017     GERD (gastroesophageal reflux disease)      Gross hematuria 9/12/2011     Hyperlipidemia LDL goal < 130      Hyperlipidemia LDL goal <130 8/28/2017     Malnutrition (H) 8/28/2017     Osteoarthritis 12/19/2012     Osteoporosis     Dexa     PONV (postoperative nausea and vomiting)      Restless legs      Skin cancer      Past Surgical History:   Procedure Laterality Date     ARTHROPLASTY KNEE Left 4/24/2017    Procedure: ARTHROPLASTY KNEE;  LEFT TOTAL KNEE ARTHROPLASTY;  Surgeon: Lee Ayala MD;  Location:   "OR     BIOPSY   approx.    vaginal     BREAST SURGERY  1972    Implants     BUNIONECTOMY      both      SECTION       COLONOSCOPY  8-10     EYE SURGERY  2014    cataract surgery in both eyes     LIPOSUCTION (LOCATION)       RECONSTRUCT BREAST, IMPLANT PROSTHESIS, COMBINED       Current Outpatient Medications   Medication Sig Dispense Refill     clonazePAM (KLONOPIN) 0.5 MG ODT Take 1 tablet (0.5 mg) by mouth nightly as needed for anxiety 20 tablet 3     linaclotide (LINZESS) 72 MCG capsule Take 1 capsule (72 mcg) by mouth every morning (before breakfast) 31 capsule 3     omeprazole (PRILOSEC) 20 MG DR capsule TAKE 1 CAPSULE(20 MG) BY MOUTH TWICE DAILY 180 capsule 1     pramipexole (MIRAPEX) 0.25 MG tablet Take 1 tablet (0.25 mg) by mouth 2 times daily At 1700 and  tablet 3     OTC products: None, except as noted above    Allergies   Allergen Reactions     Crestor [Rosuvastatin]      Ezetimibe Other (See Comments)     Zetia = leg cramps      Miralax [Polyethylene Glycol] Other (See Comments)     Back pain     Pravastatin Other (See Comments)     Leg cramps       Latex Allergy: NO    Social History     Tobacco Use     Smoking status: Former Smoker     Packs/day: 0.50     Years: 15.00     Pack years: 7.50     Types: Cigarettes     Start date:      Last attempt to quit:      Years since quittin.6     Smokeless tobacco: Never Used     Tobacco comment: one pack/ three months   Substance Use Topics     Alcohol use: Yes     Alcohol/week: 0.0 - 2.4 oz     Comment: once every two weeks     History   Drug Use No       REVIEW OF SYSTEMS:   Constitutional, neuro, ENT, endocrine, pulmonary, cardiac, gastrointestinal, genitourinary, musculoskeletal, integument and psychiatric systems are negative, except as otherwise noted.    EXAM:   /51 (BP Location: Left arm, Patient Position: Sitting, Cuff Size: Adult Regular)   Pulse 60   Temp 97.6  F (36.4  C) (Oral)   Ht 1.486 m (4' 10.5\")  "  Wt 48.5 kg (107 lb)   SpO2 99%   BMI 21.98 kg/m      GENERAL APPEARANCE: healthy, alert and no distress     EYES: EOMI, PERRL     HENT: ear canals and TM's normal and nose and mouth without ulcers or lesions     NECK: no adenopathy, no asymmetry, masses, or scars and thyroid normal to palpation     RESP: lungs clear to auscultation - no rales, rhonchi or wheezes     CV: regular rates and rhythm, normal S1 S2, no S3 or S4 and no murmur, click or rub     MS: extremities normal- no gross deformities noted, no evidence of inflammation in joints, FROM in all extremities.     SKIN: no suspicious lesions or rashes     NEURO: Normal strength and tone, sensory exam grossly normal, mentation intact and speech normal     PSYCH: mentation appears normal. and affect normal/bright      DIAGNOSTICS:   Recent labs and EKG completed.     Recent Labs   Lab Test 07/26/19  0930 05/28/19  0920 09/24/18  1632 12/04/17  1222  04/25/17  0636 04/24/17  0820   HGB 13.4  --  12.7 13.1   < > 9.8* 12.9   PLT  --   --  350 291   < >  --  327   INR  --   --   --   --   --   --  0.91   NA  --  142  --   --   --  140 143   POTASSIUM 4.0 4.2  --   --   --  4.7 4.2   CR 0.90 0.92  --   --    < > 0.88 0.79    < > = values in this interval not displayed.        IMPRESSION:   Reason for surgery/procedure: R shoulder  Diagnosis/reason for consult: medical preop    The proposed surgical procedure is considered INTERMEDIATE risk.    REVISED CARDIAC RISK INDEX  The patient has the following serious cardiovascular risks for perioperative complications such as (MI, PE, VFib and 3  AV Block):  No serious cardiac risks  INTERPRETATION: 0 risks: Class I (very low risk - 0.4% complication rate)    The patient has the following additional risks for perioperative complications:  No identified additional risks      ICD-10-CM    1. Preop general physical exam Z01.818    2. Chronic right shoulder pain M25.511     G89.29        RECOMMENDATIONS:         --Patient is  to take all scheduled medications on the day of surgery EXCEPT for modifications listed below.    APPROVAL GIVEN to proceed with proposed procedure, without further diagnostic evaluation       Signed Electronically by: SHADE Arndt CNP    Copy of this evaluation report is provided to requesting physician.    [facility] Preop Guidelines    Revised Cardiac Risk Index

## 2019-08-30 ENCOUNTER — TRANSFERRED RECORDS (OUTPATIENT)
Dept: HEALTH INFORMATION MANAGEMENT | Facility: CLINIC | Age: 74
End: 2019-08-30

## 2019-09-17 ENCOUNTER — TRANSFERRED RECORDS (OUTPATIENT)
Dept: HEALTH INFORMATION MANAGEMENT | Facility: CLINIC | Age: 74
End: 2019-09-17

## 2019-09-27 DIAGNOSIS — G25.81 RESTLESS LEG SYNDROME: ICD-10-CM

## 2019-09-27 NOTE — TELEPHONE ENCOUNTER
"pramipexole (MIRAPEX) 0.25 MG tablet 180 tablet 3 10/30/2018     Last Written Prescription Date:  10/30/2018  Last Fill Quantity: 180,  # refills: 3   Last office visit: 8/20/2019 with prescribing provider:  DELMIS Bustamante    Future Office Visit: Unknown    Requested Prescriptions   Pending Prescriptions Disp Refills     pramipexole (MIRAPEX) 0.25 MG tablet [Pharmacy Med Name: PRAMIPEXOLE 0.25MG TABLETS] 180 tablet 0     Sig: TAKE 1 TABLET BY MOUTH TWICE DAILY AT 5PM AND AT BEDTIME       Antiparkinson's Agents Protocol Failed - 9/27/2019 10:43 AM        Failed - CBC on record in past 12 months     Recent Labs   Lab Test 07/26/19  0930 09/24/18  1632   WBC  --  9.2   RBC  --  4.23   HGB 13.4 12.7   HCT  --  39.2   PLT  --  350                 Passed - Blood pressure under 140/90 in past 12 months     BP Readings from Last 3 Encounters:   08/20/19 121/51   07/26/19 133/79   04/26/19 114/67                 Passed - ALT on record in past 12 months         Recent Labs   Lab Test 05/28/19  0920   ALT 18             Passed - Serum Creatinine on file in past 12 months     Recent Labs   Lab Test 07/26/19  0930  09/13/17  0837   CR 0.90   < >  --    CREAT  --   --  0.8    < > = values in this interval not displayed.             Passed - Medication is active on med list        Passed - Patient is age 18 or older        Passed - No active pregnancy on record        Passed - No positive pregnancy test in the past 12 months        Passed - Recent (6 mo) or future (30 days) visit within the authorizing provider's specialty     Patient had office visit in the last 6 months or has a visit in the next 30 days with authorizing provider or within the authorizing provider's specialty.  See \"Patient Info\" tab in inbasket, or \"Choose Columns\" in Meds & Orders section of the refill encounter.              "

## 2019-09-30 RX ORDER — PRAMIPEXOLE DIHYDROCHLORIDE 0.25 MG/1
TABLET ORAL
Qty: 180 TABLET | Refills: 1 | Status: SHIPPED | OUTPATIENT
Start: 2019-09-30 | End: 2020-03-17

## 2019-09-30 NOTE — TELEPHONE ENCOUNTER
Routing refill request to provider for review/approval because:  Labs not current:  CBC    Marleni CARBAJAL RN

## 2019-10-15 ENCOUNTER — TRANSFERRED RECORDS (OUTPATIENT)
Dept: HEALTH INFORMATION MANAGEMENT | Facility: CLINIC | Age: 74
End: 2019-10-15

## 2019-10-31 ENCOUNTER — TRANSFERRED RECORDS (OUTPATIENT)
Dept: HEALTH INFORMATION MANAGEMENT | Facility: CLINIC | Age: 74
End: 2019-10-31

## 2019-12-15 ENCOUNTER — HEALTH MAINTENANCE LETTER (OUTPATIENT)
Age: 74
End: 2019-12-15

## 2019-12-16 ENCOUNTER — HOSPITAL ENCOUNTER (OUTPATIENT)
Dept: MAMMOGRAPHY | Facility: CLINIC | Age: 74
Discharge: HOME | End: 2019-12-16
Attending: INTERNAL MEDICINE | Admitting: INTERNAL MEDICINE
Payer: MEDICARE

## 2019-12-16 DIAGNOSIS — Z12.31 VISIT FOR SCREENING MAMMOGRAM: ICD-10-CM

## 2019-12-16 PROCEDURE — 77063 BREAST TOMOSYNTHESIS BI: CPT

## 2019-12-21 DIAGNOSIS — K21.9 GASTROESOPHAGEAL REFLUX DISEASE WITHOUT ESOPHAGITIS: ICD-10-CM

## 2019-12-21 NOTE — TELEPHONE ENCOUNTER
"Last Written Prescription Date:  3/05/19  Last Fill Quantity: 180 capsule,  # refills: 1   Last office visit: 4/26/2019 with prescribing provider:  Neema   Future Office Visit:   Next 5 appointments (look out 90 days)    Mar 19, 2020  9:30 AM CDT  [facility] [address] [facility] [id number] [phone]         Requested Prescriptions   Pending Prescriptions Disp Refills     omeprazole (PRILOSEC) 20 MG DR capsule [Pharmacy Med Name: OMEPRAZOLE 20MG CAPSULES] 180 capsule 1     Sig: TAKE 1 CAPSULE(20 MG) BY MOUTH TWICE DAILY       PPI Protocol Failed - 12/21/2019 11:37 AM        Failed - No diagnosis of osteoporosis on record        Passed - Not on Clopidogrel (unless Pantoprazole ordered)        Passed - Recent (12 mo) or future (30 days) visit within the authorizing provider's specialty     Patient has had an office visit with the authorizing provider or a provider within the authorizing providers department within the previous 12 mos or has a future within next 30 days. See \"Patient Info\" tab in inbasket, or \"Choose Columns\" in Meds & Orders section of the refill encounter.              Passed - Medication is active on med list        Passed - Patient is age 18 or older        Passed - No active pregnacy on record        Passed - No positive pregnancy test in past 12 months          "

## 2019-12-23 NOTE — TELEPHONE ENCOUNTER
Routing refill request to provider for review/approval because:  Drug not on the [facility] refill protocol with osteoporosis diagnosis.  Please authorize if appropriate.  Thanks,  Renetta Aguila RN

## 2020-01-20 ENCOUNTER — OFFICE VISIT (OUTPATIENT)
Dept: FAMILY MEDICINE | Facility: CLINIC | Age: 75
End: 2020-01-20
Payer: MEDICARE

## 2020-01-20 VITALS
BODY MASS INDEX: 20.96 KG/M2 | SYSTOLIC BLOOD PRESSURE: 119 MMHG | HEIGHT: 59 IN | DIASTOLIC BLOOD PRESSURE: 72 MMHG | OXYGEN SATURATION: 98 % | WEIGHT: 104 LBS | HEART RATE: 102 BPM | TEMPERATURE: 98 F

## 2020-01-20 DIAGNOSIS — F45.8 BRUXISM (TEETH GRINDING): Primary | ICD-10-CM

## 2020-01-20 DIAGNOSIS — F41.9 ANXIETY: ICD-10-CM

## 2020-01-20 DIAGNOSIS — E78.5 HYPERLIPIDEMIA WITH TARGET LDL LESS THAN 130: ICD-10-CM

## 2020-01-20 PROCEDURE — 99214 OFFICE O/P EST MOD 30 MIN: CPT | Performed by: INTERNAL MEDICINE

## 2020-01-20 RX ORDER — CLONAZEPAM 0.5 MG/1
0.5 TABLET, ORALLY DISINTEGRATING ORAL
Qty: 20 TABLET | Refills: 0 | Status: SHIPPED | OUTPATIENT
Start: 2020-01-20 | End: 2020-11-09

## 2020-01-20 ASSESSMENT — ANXIETY QUESTIONNAIRES
6. BECOMING EASILY ANNOYED OR IRRITABLE: SEVERAL DAYS
IF YOU CHECKED OFF ANY PROBLEMS ON THIS QUESTIONNAIRE, HOW DIFFICULT HAVE THESE PROBLEMS MADE IT FOR YOU TO DO YOUR WORK, TAKE CARE OF THINGS AT HOME, OR GET ALONG WITH OTHER PEOPLE: VERY DIFFICULT
1. FEELING NERVOUS, ANXIOUS, OR ON EDGE: NEARLY EVERY DAY
GAD7 TOTAL SCORE: 12
2. NOT BEING ABLE TO STOP OR CONTROL WORRYING: NOT AT ALL
3. WORRYING TOO MUCH ABOUT DIFFERENT THINGS: NOT AT ALL
5. BEING SO RESTLESS THAT IT IS HARD TO SIT STILL: NEARLY EVERY DAY
7. FEELING AFRAID AS IF SOMETHING AWFUL MIGHT HAPPEN: MORE THAN HALF THE DAYS

## 2020-01-20 ASSESSMENT — PATIENT HEALTH QUESTIONNAIRE - PHQ9: 5. POOR APPETITE OR OVEREATING: NEARLY EVERY DAY

## 2020-01-20 ASSESSMENT — MIFFLIN-ST. JEOR: SCORE: 869.43

## 2020-01-20 NOTE — PROGRESS NOTES
"Subjective     Hanane Sorto is a 74 year old female who presents to clinic today for the following health issues:    HPI      Chief Complaint   Patient presents with     Anxiety     per pt MY CHART sched notes = \"I have been suffering anxiety for quite some time. Dr. Bethea prescribed medication to help me sleep at night. I am in need of something to take during the day. I have actually dislocated my jaw because of this.\"        Anxiety Follow-Up    How are you doing with your anxiety since your last visit? Worsened     Are you having other symptoms that might be associated with anxiety? Yes:  JAW PAIN, INSOMNIA    Have you had a significant life event? Job Concerns and OTHER: family CONTINUING Are you feeling depressed? No    Do you have any concerns with your use of alcohol or other drugs? No     Patient having problems with anxiety but has flared over the last couple weeks, she has been using clonazepam on a daily basis at night for her sleep, she has also bruxism and jaw pain secondary to GERD she seen her dentist.  She denies any chest pain, palpitations or difficulty breathing but she describes that due to her anxiety is trying to \"gasp for air\" like symptoms.  She does that at night as well  when she is in a crowd of people.  She continues to play tennis on a regular basis, denies any chest pain or shortness of breath with exertion, denies any palpitations, denies any dizziness or lightheadedness;has hyperlipidemia but she is intolerant to statins as she reports      Social History     Tobacco Use     Smoking status: Former Smoker     Packs/day: 0.50     Years: 15.00     Pack years: 7.50     Types: Cigarettes     Start date:      Last attempt to quit: 1975     Years since quittin.1     Smokeless tobacco: Never Used     Tobacco comment: one pack/ three months   Substance Use Topics     Alcohol use: Yes     Alcohol/week: 0.0 - 4.0 standard drinks     Comment: once every two weeks     Drug use: No "     GELACIO-7 SCORE 2015   Total Score 1 12 12     No flowsheet data found.  No flowsheet data found.  GELACIO-7  2020   1. Feeling nervous, anxious, or on edge 3   2. Not being able to stop or control worrying 0   3. Worrying too much about different things 0   4. Trouble relaxing 3   5. Being so restless that it is hard to sit still 3   6. Becoming easily annoyed or irritable 1   7. Feeling afraid, as if something awful might happen 2   GELACIO-7 Total Score 12   If you checked any problems, how difficult have they made it for you to do your work, take care of things at home, or get along with other people? Very difficult         How many servings of fruits and vegetables do you eat daily?  2-3    On average, how many sweetened beverages do you drink each day (Examples: soda, juice, sweet tea, etc.  Do NOT count diet or artificially sweetened beverages)?   2    How many days per week do you exercise enough to make your heart beat faster? 3 or less    How many minutes a day do you exercise enough to make your heart beat faster? 60 or more    How many days per week do you miss taking your medication? 0 Has started using reminder alarm on phone.         Patient Active Problem List   Diagnosis     Hyperlipidemia with target LDL less than 130     Anxiety     Osteoporosis     Gross hematuria     Advanced directives, counseling/discussion     Osteoarthritis     Bursitis of hip     Constipation     Gastroesophageal reflux disease without esophagitis     S/P total knee arthroplasty     Fracture of wrist, left, with routine healing, subsequent encounter     Past Surgical History:   Procedure Laterality Date     ARTHROPLASTY KNEE Left 2017    Procedure: ARTHROPLASTY KNEE;  LEFT TOTAL KNEE ARTHROPLASTY;  Surgeon: Lee Ayala MD;  Location: [facility]     BIOPSY   approx.    vaginal     BREAST SURGERY      Implants     BUNIONECTOMY      both      SECTION       COLONOSCOPY   8-10     EYE SURGERY  2014    cataract surgery in both eyes     LIPOSUCTION (LOCATION)       RECONSTRUCT BREAST, IMPLANT PROSTHESIS, COMBINED         Social History     Tobacco Use     Smoking status: Former Smoker     Packs/day: 0.50     Years: 15.00     Pack years: 7.50     Types: Cigarettes     Start date:      Last attempt to quit:      Years since quittin.1     Smokeless tobacco: Never Used     Tobacco comment: one pack/ three months   Substance Use Topics     Alcohol use: Yes     Alcohol/week: 0.0 - 4.0 standard drinks     Comment: once every two weeks     Family History   Problem Relation Age of Onset     C.A.D. Father      Coronary Artery Disease Father      Hypertension Father      Lipids Mother      Alzheimer Disease Mother      Depression Mother      Osteoporosis Mother         RA     Breast Cancer Sister         53     Cancer - colorectal Sister         40s     Alcoholism Son      Hyperlipidemia Sister      Breast Cancer Sister         had lumpectomy and radiation     Colon Cancer Sister                  Current Outpatient Medications   Medication Sig Dispense Refill     clonazePAM (KLONOPIN) 0.5 MG ODT Take 1 tablet (0.5 mg) by mouth nightly as needed for anxiety 20 tablet 0     omeprazole (PRILOSEC) 20 MG DR capsule TAKE 1 CAPSULE(20 MG) BY MOUTH TWICE DAILY 180 capsule 3     pramipexole (MIRAPEX) 0.25 MG tablet TAKE 1 TABLET BY MOUTH TWICE DAILY AT 5PM AND AT BEDTIME 180 tablet 1     sertraline (ZOLOFT) 50 MG tablet Take 1 tablet (50 mg) by mouth daily 30 tablet 1     linaclotide (LINZESS) 72 MCG capsule Take 1 capsule (72 mcg) by mouth every morning (before breakfast) (Patient not taking: Reported on 2019) 31 capsule 3     Allergies   Allergen Reactions     Crestor [Rosuvastatin]      Ezetimibe Other (See Comments)     Zetia = leg cramps      Miralax [Polyethylene Glycol] Other (See Comments)     Back pain     Pravastatin Other (See Comments)     Leg cramps      Recent  "Labs   Lab Test 07/26/19  0930 05/28/19  0920 03/09/18  1427 02/05/18  0927  02/16/17  1155 11/14/16  1231  05/20/15  1149   LDL  --  168*  --  162*  --  238* 210*   < >  --    HDL  --  45*  --  42*  --  52 40*   < >  --    TRIG  --  251*  --  204*  --  149 215*   < >  --    ALT  --  18  --   --   --   --  44  --  20   CR 0.90 0.92  --   --    < >  --  0.80   < > 0.88   GFRESTIMATED 63 62  --   --    < >  --  71   < > 63   GFRESTBLACK 73 72  --   --    < >  --  85   < > 77   POTASSIUM 4.0 4.2  --   --    < >  --  4.3   < > 4.7   TSH  --   --  2.08  --   --   --   --   --  2.40    < > = values in this interval not displayed.      BP Readings from Last 3 Encounters:   01/20/20 119/72   08/20/19 121/51   07/26/19 133/79    Wt Readings from Last 3 Encounters:   01/20/20 47.2 kg (104 lb)   08/20/19 48.5 kg (107 lb)   07/26/19 48.1 kg (106 lb)                  Reviewed and updated as needed this visit by Provider  Tobacco  Med Hx  Surg Hx  Soc Hx        Review of Systems   ROS COMP: Constitutional, HEENT, cardiovascular, pulmonary, gi and gu systems are negative, except as otherwise noted.      Objective    /72 (BP Location: Right arm, Patient Position: Chair, Cuff Size: Adult Regular)   Pulse 102   Temp 98  F (36.7  C) (Oral)   Ht 1.486 m (4' 10.5\")   Wt 47.2 kg (104 lb)   SpO2 98%   BMI 21.37 kg/m    Body mass index is 21.37 kg/m .  Physical Exam   GENERAL: healthy, alert and no distress  EYES: Eyes grossly normal to inspection, PERRL and conjunctivae and sclerae normal  HENT: ear canals and TM's normal, nose and mouth without ulcers or lesions  NECK: no adenopathy, no asymmetry, masses, or scars and thyroid normal to palpation  RESP: lungs clear to auscultation - no rales, rhonchi or wheezes  CV: regular rate and rhythm, normal S1 S2, no S3 or S4, no murmur, click or rub, no peripheral edema and peripheral pulses strong  ABDOMEN: soft, nontender, no hepatosplenomegaly, no masses and bowel sounds " normal  MS: no gross musculoskeletal defects noted, no edema  SKIN: no suspicious lesions or rashes  NEURO: Normal strength and tone, mentation intact and speech normal  PSYCH: mentation appears normal, affect normal/bright    Diagnostic Test Results:  Labs reviewed in Epic        Assessment & Plan   Problem List Items Addressed This Visit        Endocrine    Hyperlipidemia with target LDL less than 130       Other    Anxiety    Relevant Medications    sertraline (ZOLOFT) 50 MG tablet    clonazePAM (KLONOPIN) 0.5 MG ODT      Other Visit Diagnoses     Bruxism (teeth grinding)    -  Primary        Discussed side effects of medications.   Use clonazepam sparingly; discussed side effects.  Close follow-up with dentist for mouthguard.  Other options we can try gabapentin; may help with her anxiety and bruxism symptoms; we will see if Zoloft is helping   She uses pramipexole for restless leg syndrome which helps.  Denies any side effects from such.  Advised her to take statins with coenzyme Q 10 that might help with her muscle symptoms.  Continue with lifestyle changes exercise activities as tolerated.  Follow-up in 4 weeks and as needed.    See Patient Instructions  Return in about 4 weeks (around 2/17/2020) for PCP, anxiety.    Vicki Castrejon MD  [facility]

## 2020-01-21 ENCOUNTER — TRANSFERRED RECORDS (OUTPATIENT)
Dept: HEALTH INFORMATION MANAGEMENT | Facility: CLINIC | Age: 75
End: 2020-01-21

## 2020-01-21 ASSESSMENT — ANXIETY QUESTIONNAIRES: GAD7 TOTAL SCORE: 12

## 2020-03-17 ENCOUNTER — MYC REFILL (OUTPATIENT)
Dept: FAMILY MEDICINE | Facility: CLINIC | Age: 75
End: 2020-03-17

## 2020-03-17 DIAGNOSIS — G25.81 RESTLESS LEG SYNDROME: ICD-10-CM

## 2020-03-18 RX ORDER — PRAMIPEXOLE DIHYDROCHLORIDE 0.25 MG/1
0.12 TABLET ORAL AT BEDTIME
Qty: 180 TABLET | Refills: 1 | Status: SHIPPED | OUTPATIENT
Start: 2020-03-18 | End: 2020-10-12

## 2020-03-18 NOTE — TELEPHONE ENCOUNTER
"pramipexole (MIRAPEX) 0.25 MG tablet    Last Written Prescription Date:  9/30/2019  Last Fill Quantity: 180,  # refills: 1   Last office visit: 4/26/2019 with prescribing provider:  Dr. Bethea  Future Office Visit:  Unknown     Requested Prescriptions   Pending Prescriptions Disp Refills     pramipexole (MIRAPEX) 0.25 MG tablet 180 tablet 1       Antiparkinson's Agents Protocol Failed - 3/18/2020 10:38 AM        Failed - CBC on record in past 12 months     Recent Labs   Lab Test 07/26/19  0930 09/24/18  1632   WBC  --  9.2   RBC  --  4.23   HGB 13.4 12.7   HCT  --  39.2   PLT  --  350                 Passed - Blood pressure under 140/90 in past 12 months     BP Readings from Last 3 Encounters:   01/20/20 119/72   08/20/19 121/51   07/26/19 133/79                 Passed - ALT on record in past 12 months         Recent Labs   Lab Test 05/28/19  0920   ALT 18             Passed - Serum Creatinine on file in past 12 months     Recent Labs   Lab Test 07/26/19  0930  09/13/17  0837   CR 0.90   < >  --    CREAT  --   --  0.8    < > = values in this interval not displayed.       Ok to refill medication if creatinine is low          Passed - Medication is active on med list        Passed - Patient is age 18 or older        Passed - No active pregnancy on record        Passed - No positive pregnancy test in the past 12 months        Passed - Recent (6 mo) or future (30 days) visit within the authorizing provider's specialty     Patient had office visit in the last 6 months or has a visit in the next 30 days with authorizing provider or within the authorizing provider's specialty.  See \"Patient Info\" tab in inbasket, or \"Choose Columns\" in Meds & Orders section of the refill encounter.                 "

## 2020-03-25 ENCOUNTER — MYC MEDICAL ADVICE (OUTPATIENT)
Dept: FAMILY MEDICINE | Facility: CLINIC | Age: 75
End: 2020-03-25

## 2020-03-25 DIAGNOSIS — G25.81 RESTLESS LEG SYNDROME: ICD-10-CM

## 2020-03-26 RX ORDER — PRAMIPEXOLE DIHYDROCHLORIDE 0.25 MG/1
TABLET ORAL
Qty: 180 TABLET | Refills: 1 | Status: SHIPPED | OUTPATIENT
Start: 2020-03-26 | End: 2020-09-29

## 2020-03-26 NOTE — TELEPHONE ENCOUNTER
See pt's mychart., on 3/18, pt was sent a script for 0.125mg pramipexol at bedtime,but previously pt has been getting/taking 0.25mg twice daily.     Please review and verify what dosing should be    Tia Shen RN

## 2020-04-29 DIAGNOSIS — T14.8XXA BRUISING: ICD-10-CM

## 2020-04-29 LAB
ERYTHROCYTE [DISTWIDTH] IN BLOOD BY AUTOMATED COUNT: 13.5 % (ref 10–15)
HCT VFR BLD AUTO: 41.5 % (ref 35–47)
HGB BLD-MCNC: 13.4 G/DL (ref 11.7–15.7)
INR PPP: 0.95 (ref 0.86–1.14)
MCH RBC QN AUTO: 30.9 PG (ref 26.5–33)
MCHC RBC AUTO-ENTMCNC: 32.3 G/DL (ref 31.5–36.5)
MCV RBC AUTO: 96 FL (ref 78–100)
PLATELET # BLD AUTO: 394 10E9/L (ref 150–450)
RBC # BLD AUTO: 4.33 10E12/L (ref 3.8–5.2)
WBC # BLD AUTO: 8.2 10E9/L (ref 4–11)

## 2020-04-29 PROCEDURE — 85610 PROTHROMBIN TIME: CPT | Performed by: INTERNAL MEDICINE

## 2020-04-29 PROCEDURE — 36415 COLL VENOUS BLD VENIPUNCTURE: CPT | Performed by: INTERNAL MEDICINE

## 2020-04-29 PROCEDURE — 80053 COMPREHEN METABOLIC PANEL: CPT | Performed by: INTERNAL MEDICINE

## 2020-04-29 PROCEDURE — 85027 COMPLETE CBC AUTOMATED: CPT | Performed by: INTERNAL MEDICINE

## 2020-04-30 ENCOUNTER — MYC MEDICAL ADVICE (OUTPATIENT)
Dept: FAMILY MEDICINE | Facility: CLINIC | Age: 75
End: 2020-04-30

## 2020-04-30 LAB
ALBUMIN SERPL-MCNC: 3.7 G/DL (ref 3.4–5)
ALP SERPL-CCNC: 95 U/L (ref 40–150)
ALT SERPL W P-5'-P-CCNC: 22 U/L (ref 0–50)
ANION GAP SERPL CALCULATED.3IONS-SCNC: 7 MMOL/L (ref 3–14)
AST SERPL W P-5'-P-CCNC: 14 U/L (ref 0–45)
BILIRUB SERPL-MCNC: 0.3 MG/DL (ref 0.2–1.3)
BUN SERPL-MCNC: 19 MG/DL (ref 7–30)
CALCIUM SERPL-MCNC: 9.4 MG/DL (ref 8.5–10.1)
CHLORIDE SERPL-SCNC: 109 MMOL/L (ref 94–109)
CO2 SERPL-SCNC: 25 MMOL/L (ref 20–32)
CREAT SERPL-MCNC: 0.9 MG/DL (ref 0.52–1.04)
GFR SERPL CREATININE-BSD FRML MDRD: 63 ML/MIN/{1.73_M2}
GLUCOSE SERPL-MCNC: 115 MG/DL (ref 70–99)
POTASSIUM SERPL-SCNC: 3.9 MMOL/L (ref 3.4–5.3)
PROT SERPL-MCNC: 7.2 G/DL (ref 6.8–8.8)
SODIUM SERPL-SCNC: 141 MMOL/L (ref 133–144)

## 2020-05-01 NOTE — TELEPHONE ENCOUNTER
To PCP:     Please see message f/u from recent Lab Result note    Would you like Video visit to discuss further?     Thank you,   January ESPINAL RN

## 2020-05-03 ENCOUNTER — TELEPHONE (OUTPATIENT)
Dept: FAMILY MEDICINE | Facility: CLINIC | Age: 75
End: 2020-05-03

## 2020-05-03 NOTE — TELEPHONE ENCOUNTER
Reason for call:  Other   Patient called regarding (reason for call): appointment  Additional comments: Patient talked to Dr. Bethea and they told her to schedule for a video appt. There are no current openings left. Can this patient be squeezed in somehow? They do not want to schedule into the week of the 22nd.     Phone number to reach patient:  Home number on file  (home)  [phone]    Best Time:  any    Can we leave a detailed message on this number?  YES    Travel screening: Not Applicable

## 2020-05-04 ENCOUNTER — VIRTUAL VISIT (OUTPATIENT)
Dept: FAMILY MEDICINE | Facility: CLINIC | Age: 75
End: 2020-05-04
Payer: MEDICARE

## 2020-05-04 DIAGNOSIS — E78.5 HYPERLIPIDEMIA WITH TARGET LDL LESS THAN 130: Primary | ICD-10-CM

## 2020-05-04 DIAGNOSIS — T14.8XXA BRUISING: ICD-10-CM

## 2020-05-04 PROCEDURE — 99213 OFFICE O/P EST LOW 20 MIN: CPT | Mod: 95 | Performed by: INTERNAL MEDICINE

## 2020-05-04 RX ORDER — FLUNISOLIDE 0.25 MG/ML
SOLUTION NASAL
COMMUNITY
Start: 2020-04-21 | End: 2020-12-18

## 2020-05-04 NOTE — PROGRESS NOTES
"Hanane Sorto is a 74 year old female who is being evaluated via a billable video visit.      The patient has been notified of following:     \"This video visit will be conducted via a call between you and your physician/provider. We have found that certain health care needs can be provided without the need for an in-person physical exam.  This service lets us provide the care you need with a video conversation.  If a prescription is necessary we can send it directly to your pharmacy.  If lab work is needed we can place an order for that and you can then stop by our lab to have the test done at a later time.    Video visits are billed at different rates depending on your insurance coverage.  Please reach out to your insurance provider with any questions.    If during the course of the call the physician/provider feels a video visit is not appropriate, you will not be charged for this service.\"    Patient has given verbal consent for Video visit? Yes    How would you like to obtain your AVS? Idania     Patient would like the video invitation sent by: Text to cell phone: [phone]    Will anyone else be joining your video visit? No      Subjective     Hanane Sorto is a 74 year old female who presents to clinic today for the following health issues:    HPI  Patient has noticed bilateral spots on her arms and is concerned about those, she is not taking any aspirin or fish oil or any vitamins.  She has been trying to exercise and her joint pains are stable.  Her anxiety is not controlled.  She still not able to take statin drugs       Video Start Time: 10.40 AM        Patient Active Problem List   Diagnosis     Hyperlipidemia with target LDL less than 130     Anxiety     Osteoporosis     Gross hematuria     Advanced directives, counseling/discussion     Osteoarthritis     Bursitis of hip     Constipation     Gastroesophageal reflux disease without esophagitis     S/P total knee arthroplasty     Fracture of wrist, " left, with routine healing, subsequent encounter     Past Surgical History:   Procedure Laterality Date     ARTHROPLASTY KNEE Left 2017    Procedure: ARTHROPLASTY KNEE;  LEFT TOTAL KNEE ARTHROPLASTY;  Surgeon: Lee Ayala MD;  Location: [facility] OR     BIOPSY   approx.    vaginal     BREAST SURGERY      Implants     BUNIONECTOMY      both      SECTION       COLONOSCOPY  8-10     EYE SURGERY  2014    cataract surgery in both eyes     LIPOSUCTION (LOCATION)       RECONSTRUCT BREAST, IMPLANT PROSTHESIS, COMBINED         Social History     Tobacco Use     Smoking status: Former Smoker     Packs/day: 0.50     Years: 15.00     Pack years: 7.50     Types: Cigarettes     Start date:      Last attempt to quit:      Years since quittin.3     Smokeless tobacco: Never Used     Tobacco comment: one pack/ three months   Substance Use Topics     Alcohol use: Yes     Alcohol/week: 0.0 - 4.0 standard drinks     Comment: once every two weeks     Family History   Problem Relation Age of Onset     C.A.D. Father      Coronary Artery Disease Father      Hypertension Father      Lipids Mother      Alzheimer Disease Mother      Depression Mother      Osteoporosis Mother         RA     Breast Cancer Sister         53     Cancer - colorectal Sister         40s     Alcoholism Son      Hyperlipidemia Sister      Breast Cancer Sister         had lumpectomy and radiation     Colon Cancer Sister                  Current Outpatient Medications   Medication Sig Dispense Refill     clonazePAM (KLONOPIN) 0.5 MG ODT Take 1 tablet (0.5 mg) by mouth nightly as needed for anxiety 20 tablet 0     escitalopram (LEXAPRO) 10 MG tablet Take 1 tablet (10 mg) by mouth daily 90 tablet 3     flunisolide (NASALIDE) 25 MCG/ACT (0.025%) SOLN spray U 2 SPRAYS IEN BID       omeprazole (PRILOSEC) 20 MG DR capsule TAKE 1 CAPSULE(20 MG) BY MOUTH TWICE DAILY 180 capsule 3     pramipexole (MIRAPEX) 0.25 MG tablet TAKE  "1 TABLET BY MOUTH TWICE DAILY AT 5PM AND AT BEDTIME 180 tablet 1     pramipexole (MIRAPEX) 0.25 MG tablet Take 0.5 tablets (0.125 mg) by mouth At Bedtime 180 tablet 1       Reviewed and updated as needed this visit by Provider  Meds         Review of Systems   10 point ROS of systems including Constitutional, Eyes, Respiratory, Cardiovascular, Gastroenterology, Genitourinary, Integumentary, Muscularskeletal, Psychiatric were all negative except for pertinent positives noted in my HPI.        Objective    There were no vitals taken for this visit.  Estimated body mass index is 21.37 kg/m  as calculated from the following:    Height as of 1/20/20: 1.486 m (4' 10.5\").    Weight as of 1/20/20: 47.2 kg (104 lb).  Physical Exam     GENERAL: healthy, alert and no distress  EYES: Eyes grossly normal to inspection, conjunctivae and sclerae normal  RESP: no audible wheeze, cough, or visible cyanosis.  No visible retractions or increased work of breathing.  Able to speak fully in complete sentences.  NEURO: Cranial nerves grossly intact, mentation intact and speech normal  PSYCH: mentation appears normal, affect normal/bright, judgement and insight intact, normal speech and appearance well-groomed      The skin examination reveals bilateral ecchymotic or senile purpuric spots on forearms on the dorsum aspect        Assessment & Plan     Hanane was seen today for derm problem.    Diagnoses and all orders for this visit:    Hyperlipidemia with target LDL less than 130  -     CARDIOLOGY EVAL ADULT REFERRAL; Future    Bruising  -     CARDIOLOGY EVAL ADULT REFERRAL; Future           I discussed with patient about the senile purpura also called is actinic purpura and sent her reading material on that  I also discussed with her about the new medication for cholesterol called Nucala and provided her with reading material on this injectable drug  Patient has not been able to take any other medication including statin, Zetia  She is " interested in that option and we will arrange a preventive cardiology appointment prior to starting this because of th cost of the medication and side effects    No follow-ups on file.    Corona Bethea MD  [facility]      Video-Visit Details    Type of service:  Video Visit    Video End Time: 10.57    Originating Location (pt. Location): Home    Distant Location (provider location):  [facility]     Platform used for Video Visit: Doximity    No follow-ups on file.       Corona Bethea MD

## 2020-05-04 NOTE — PATIENT INSTRUCTIONS
Patient Education     Medicine for Cholesterol Control  Cholesterol is a waxy substance in your bloodstream. If there is too much of it in your blood, it can build up in the walls of your arteries. Over time, this buildup can lead to coronary disease. Coronary disease can put you at risk for a heart attack or stroke. It can also put you at risk for disease of the arteries in your legs and other places in your body. Medicine can give you the extra help you need to control your cholesterol.    How medicine helps  Different kinds of medicines help with cholesterol levels. Some help lower your LDL (bad cholesterol). Some help raise your HDL (good cholesterol). Other medicines lower your triglyceride levels. And some do all three. It may take some time to find the right medicine for you. Taking medicine will be only one part of your cholesterol control plan. You will still need to eat right and get regular exercise.  Talk with your healthcare provider to find out your risks for having a heart attack. Your provider can tell you what goals to use to see if your treatment is working. These goals may vary based on your health issues or family history. Also ask your provider how often your cholesterol should be checked as part of your treatment plan. You may need to fast before getting your cholesterol checked.  Taking your medicine  It is important to:    Tell your healthcare provider about any other medicines you take. This includes over-the-counter medicines. It also includes vitamins and herbs.    Take your medicine exactly as directed. This helps make sure that it works as it should.    Don't skip a dose.    Don't stop taking it if you feel better.    Don't stop taking it when your cholesterol numbers improve.    Order your refill before your medicine runs out.  Side effects  Medicines can cause side effects. These often occur at the start of taking a new medicine. Side effects can include headache and upset stomach.  Rarely you can have muscle aches. Tell your healthcare provider about any side effects you have.  When to call your healthcare provider  When taking your medicine, let your healthcare provider know if you have:    Yellowing of the whites of eyes    Blurred vision    Muscle aches    Trouble breathing   High-risk groups  Some people may need to take medicines called statins to control their cholesterol. This is in addition to eating a healthy diet and getting regular exercise.  Statins can help you stay healthy. They can also help prevent a heart attack or stroke. You may need to take a statin if you are in one of these groups:    Adults who have had a heart attack or stroke. Or adults who have had peripheral vascular disease, a ministroke (transient ischemic attack), or stable or unstable angina. This group also includes people who have had a procedure to restore blood flow through a blocked artery. These procedures include percutaneous coronary intervention, angioplasty, stent, and open-heart bypass surgery.    Adults who have diabetes. Or adults who are at higher risk of having a heart attack or stroke and have an LDL cholesterol level of 70 to 189 mg/dL    Adults who are 21 years old or older and have an LDL cholesterol level of 190 mg/dL or higher.  If you are in a high-risk group, talk with your healthcare provider about your treatment goals. Make sure you understand why these goals are important, based on your own health history and your family history of heart disease or high cholesterol.  Make a plan to have regular cholesterol checks. You may need to fast before getting this test. Also ask your provider about any side effects your medicines may cause. Let your provider know about any side effects you have. You may need to take more than one medicine to reach the cholesterol goals that you and your provider decide on.  Date Last Reviewed: 10/1/2016    9128-6775 [employer]. [address] [place] [address]  [place] [address]. All rights reserved. This information is not intended as a substitute for professional medical care. Always follow your healthcare professional's instructions.           Patient Education     Mepolizumab Solution for injection  What is this medicine?  MEPOLIZUMAB (me brownmohan DAVIS ue mab) is used to help treat severe asthma. It should be used in combination with other asthma treatments.  This medicine may be used for other purposes; ask your health care provider or pharmacist if you have questions.  What should I tell my health care provider before I take this medicine?  They need to know if you have any of these conditions:    parasitic (helminth) infection    an unusual or allergic reaction to mepolizumab, hamster proteins, other medicines, foods, dyes, or preservatives    pregnant or trying to get pregnant    breast-feeding  How should I use this medicine?  This medicine is for injection under the skin. It is give by a health care professional in a hospital or clinic setting.  Talk to your pediatrician regarding the use of this medicine in children. While this drug may be prescribed for children as young as 12 years for selected conditions, precautions do apply.  Overdosage: If you think you have taken too much of this medicine contact a poison control center or emergency room at once.  NOTE: This medicine is only for you. Do not share this medicine with others.  What if I miss a dose?  Keep appointments for follow-up doses as directed. It is important not to miss your dose. Call your doctor or health care professional if you are unable to keep an appointment.  What may interact with this medicine?  Interactions are not expected.  This list may not describe all possible interactions. Give your health care provider a list of all the medicines, herbs, non-prescription drugs, or dietary supplements you use. Also tell them if you smoke, drink alcohol, or use illegal drugs. Some items may interact  with your medicine.  What should I watch for while using this medicine?  Tell your doctor or healthcare professional if your symptoms do not start to get better or if they get worse.  Talk with your doctor if you have not had chickenpox or the vaccine for chickenpox.  Do not stop taking your other asthma medicines unless instructed to do so by your doctor or health care professional.  Your condition will be monitored carefully while you are receiving this medicine.  What side effects may I notice from receiving this medicine?  Side effects that you should report to your doctor or health care professional as soon as possible:    allergic reactions like skin rash, itching or hives, swelling of the face, lips, or tongue    breathing problems    painful rash or blisters    signs and symptoms of low blood pressure like dizziness; feeling faint or lightheaded, falls    signs and symptoms of infection like fever or chills; cough; sore throat; pain or trouble passing urine  Side effects that usually do not require medical attention (Report these to your doctor or health care professional if they continue or are bothersome.):    back pain    headache    pain, redness, or irritation at the site where injected    weak or tired  This list may not describe all possible side effects. Call your doctor for medical advice about side effects. You may report side effects to FDA at 8-797-FDA-4417.  Where should I keep my medicine?  This drug is given in a hospital or clinic and will not be stored at home.  NOTE: This sheet is a summary. It may not cover all possible information. If you have questions about this medicine, talk to your doctor, pharmacist, or health care provider.  NOTE:This sheet is a summary. It may not cover all possible information. If you have questions about this medicine, talk to your doctor, pharmacist, or health care provider. Copyright  2016 Gold Standard    Senile purpura is not dangerous and is absolutely  benign; but unless some changes are made recurrence is always on the cards. Limiting sun exposure with sunblock can protect your skin from further sun damage. It is also important for you to avoid exposure to the sun rays and if outdoors, wear covering clothes and apply sunscreen lotions to the exposed body parts. Talk to your dermatologist about ways to reduce the appearance of skin lesions.       skin condition which is benign and easy bruising. Sometimes it is also known as Actinic purpura. Senile purpura is a condition which is mostly seen in older adults. This condition develops as the blood vessels become fragile due to age leading to easy bruising with even minor falls causing significant bruises. Senile purpura is different than conditions that cause easy bruising, due to bleeding disorders. It is absolutely common, and affects about 10% of people over the age of 50. This percentage increases with age.

## 2020-06-01 ENCOUNTER — VIRTUAL VISIT (OUTPATIENT)
Dept: CARDIOLOGY | Facility: CLINIC | Age: 75
End: 2020-06-01
Attending: INTERNAL MEDICINE
Payer: MEDICARE

## 2020-06-01 ENCOUNTER — CARE COORDINATION (OUTPATIENT)
Dept: CARDIOLOGY | Facility: CLINIC | Age: 75
End: 2020-06-01

## 2020-06-01 VITALS — BODY MASS INDEX: 21.57 KG/M2 | WEIGHT: 105 LBS | SYSTOLIC BLOOD PRESSURE: 123 MMHG | DIASTOLIC BLOOD PRESSURE: 83 MMHG

## 2020-06-01 DIAGNOSIS — I25.9 ASYMPTOMATIC CORONARY HEART DISEASE: Primary | ICD-10-CM

## 2020-06-01 DIAGNOSIS — E78.5 HYPERLIPIDEMIA WITH TARGET LDL LESS THAN 130: ICD-10-CM

## 2020-06-01 DIAGNOSIS — T14.8XXA BRUISING: ICD-10-CM

## 2020-06-01 PROCEDURE — 99214 OFFICE O/P EST MOD 30 MIN: CPT | Mod: 95 | Performed by: INTERNAL MEDICINE

## 2020-06-01 NOTE — PROGRESS NOTES
"Hanane Sorto is a 74 year old female who is being evaluated via a billable video visit.      The patient has been notified of following:     \"This video visit will be conducted via a call between you and your physician/provider. We have found that certain health care needs can be provided without the need for an in-person physical exam.  This service lets us provide the care you need with a video conversation.  If a prescription is necessary we can send it directly to your pharmacy.  If lab work is needed we can place an order for that and you can then stop by our lab to have the test done at a later time.    Video visits are billed at different rates depending on your insurance coverage.  Please reach out to your insurance provider with any questions.    If during the course of the call the physician/provider feels a video visit is not appropriate, you will not be charged for this service.\"    Patient has given verbal consent for Video visit? Yes    How would you like to obtain your AVS? [employer]     [phone]  Pt reported wt today 105#, BP-123/83  JARON Ward    Patient would like the video invitation sent by: Text to cell phone: [phone]    Will anyone else be joining your video visit? No        Video-Visit Details    Type of service:  Video Visit  Video call duration 15 minutes    Originating Location (pt. Location):home  Distant Location (provider location):  [facility]     Platform used for Video Visit: [employer]      Ms. Sorto is a very pleasant 74-year-old female with asymptomatic carotid disease on the basis of abnormal calcium score in 2016, negative stress test in 2018, personal history of significant dyslipidemia with LDL as high as 238 in the past, history of intolerance to several statins including pravastatin and other antilipid therapy like Zetia, family history of premature coronary disease.  I saw the patient in 2018 and at that time due to " significant dyslipidemia, evidence of coronary artery disease even though asymptomatic and intolerance to statins I recommend a trial of PCSK9 elevator and I will prescribe Repatha.  Patient tells me that the medication came out to be too cost prohibitive and she did not try it.  [facility] patient tells me she feels quite well.  She regularly plays golf and tennis with her , no exertional symptoms like chest discomfort or shortness of breath.  During last clinic visit we also discussed option of using baby aspirin for secondary prevention given underlying coronary calcification.  Patient has not yet tried aspirin 2.  She has not had any major bleeding issues and is willing to retry aspirin.    Examination  General patient appears pleasant, comfortable  Neck visible portion no JVD or lumps noted  Respite system respiratory normal, no audible wheezing  Neurological alert, oriented  Psych normal affect  Skin no obvious rash  HEENT no pallor    Assessment and plan  A pleasant 74-year-old female with the significant dyslipidemia with LDL as high as 238 in the past now 168, history of intolerance to several statins including Zetia pravastatin, personal history of asymptomatic carotid disease on the basis of abnormal calcium score in 2016, family history of premature coronary disease.  Remarkably patient has no symptoms concerning for angina.  I did discuss with patient about importance of controlling risk factors for further worsening of coronary disease mainly dyslipidemia.  We discussed option of trying PCSK9 inhibitors.  In the last year or so the prices have come down hopefully this will not be cost prohibitive for patient.  I have given a prescription for Praluent which is alirocumab 75 mg subcutaneously every 2 weeks.  Common adverse of PCSK9 ABDs were discussed with patient.  We also discussed option of trying baby aspirin 81 mg daily for second prevention common adverse of the fourth of aspirin including  but limited risk of gastritis, peptic ulcer disease, increasing score bleeding were discussed with patient.  Patient will try both these 2 medications.  I recommend follow-up in 3 months with an JEANA.  I also touch base with my nursing team to help her get approval for the PCSK9 inhibitor      Suhail Frias MD

## 2020-06-01 NOTE — LETTER
6/1/2020    Corona Bethea MD  [address] [facility] [address]  [facility] [address]    RE: Hanane Sorto       Dear Colleague,    I had the pleasure of seeing Hanane Sorto in the [facility].    Hanane Sorto is a 74 year old female who is being evaluated via a billable video visit.        Ms. Sorto is a very pleasant 74-year-old female with asymptomatic carotid disease on the basis of abnormal calcium score in 2016, negative stress test in 2018, personal history of significant dyslipidemia with LDL as high as 238 in the past, history of intolerance to several statins including pravastatin and other antilipid therapy like Zetia, family history of premature coronary disease.  I saw the patient in 2018 and at that time due to significant dyslipidemia, evidence of coronary artery disease even though asymptomatic and intolerance to statins I recommend a trial of PCSK9 elevator and I will prescribe Repatha.  Patient tells me that the medication came out to be too cost prohibitive and she did not try it.  [employer] patient tells me she feels quite well.  She regularly plays golf and tennis with her , no exertional symptoms like chest discomfort or shortness of breath.  During last clinic visit we also discussed option of using baby aspirin for secondary prevention given underlying coronary calcification.  Patient has not yet tried aspirin 2.  She has not had any major bleeding issues and is willing to retry aspirin.    Examination  General patient appears pleasant, comfortable  Neck visible portion no JVD or lumps noted  Respite system respiratory normal, no audible wheezing  Neurological alert, oriented  Psych normal affect  Skin no obvious rash  HEENT no pallor    Assessment and plan  A pleasant 74-year-old female with the significant dyslipidemia with LDL as high as 238 in the past now 168, history of intolerance to several statins including Zetia pravastatin, personal history of  asymptomatic carotid disease on the basis of abnormal calcium score in 2016, family history of premature coronary disease.  Remarkably patient has no symptoms concerning for angina.  I did discuss with patient about importance of controlling risk factors for further worsening of coronary disease mainly dyslipidemia.  We discussed option of trying PCSK9 inhibitors.  In the last year or so the prices have come down hopefully this will not be cost prohibitive for patient.  I have given a prescription for Praluent which is alirocumab 75 mg subcutaneously every 2 weeks.  Common adverse of PCSK9 ABDs were discussed with patient.  We also discussed option of trying baby aspirin 81 mg daily for second prevention common adverse of the fourth of aspirin including but limited risk of gastritis, peptic ulcer disease, increasing score bleeding were discussed with patient.  Patient will try both these 2 medications.  I recommend follow-up in 3 months with an JEANA.  I also touch base with my nursing team to help her get approval for the PCSK9 inhibitor      Thank you for allowing me to participate in the care of your patient.    Sincerely,     Suhail Frias MD     [facility]

## 2020-06-02 NOTE — PROGRESS NOTES
Received [employer] message from pt:      Will route to [facility] to get Prlauent PA details and will then call pt with information.    KANDICE Rodriguez 2:03 PM 6/2/2020

## 2020-06-02 NOTE — TELEPHONE ENCOUNTER
Prior Authorization Approval    Authorization Effective Date: 3/4/2020  Authorization Expiration Date: 6/2/2021  Medication: Praluent 75MG/ML Pen (APPROVED)  Approved Dose/Quantity: 2ml  Reference #:     Insurance Company: Medicare Blue RX - Phone [phone] Fax [phone]  Expected CoPay:       CoPay Card Available: No    Foundation Assistance Needed: [facility]  Which Pharmacy is filling the prescription (Not needed for infusion/clinic administered): [facility] - [facility] [address]   Pharmacy Notified:    Patient Notified:      Cost is $241.68. The cost is high due to the Medicare Part D donut hole. There is a liliana available through [employer]. I would need to know the number of people in her household and total annual adjusted gross income. (Typically, needs to be under $69,000 for a household of 2 to qualify).

## 2020-06-02 NOTE — TELEPHONE ENCOUNTER
PA Initiation    Medication: Praluent 75MG/ML Pen (PENDING)  Insurance Company: Medicare Blue RX - Phone [phone] Fax [phone]  Pharmacy Filling the Rx: [facility] - [place], [place] - [address]  Filling Pharmacy Phone:    Filling Pharmacy Fax:    Start Date: 6/2/2020

## 2020-06-02 NOTE — PROGRESS NOTES
Received return call from pt.      Called pt back, no answer, LVM requesting to know a good time to callback tomorrow so that we can discuss the Praluent that Dr. Frias prescribed.    KANDICE Rodriguez 4:56 PM 6/2/2020

## 2020-06-02 NOTE — PROGRESS NOTES
Called pt, no answer, LVM requesting return call to review Praluent PA approval, copay cost etc.      KANDICE Rodriguez 2:17 PM 6/2/2020

## 2020-06-03 NOTE — PROGRESS NOTES
Received VM from pt that she would like to be called around 2pm tomorrow, 6/4/20, to discuss Praluent.    Reminder sent to call pt then.    KANDICE Rodriguez 4:28 PM 6/3/2020

## 2020-06-04 NOTE — PROGRESS NOTES
Called and spoke with pt.  Discussed Praluent PA approval and current copay cost since she is in the donut hole. Reviewed that [employer] Jose Miguel could be an option if she meets income criteria and offered to have pharmacy liaison complete applicaition for the jose miguel that would cover up to $2500 in copay costs.  Pt requested to think about whether or not she wants to give herself an injection every 14 days first.  Encouraged pt to watch injection training video on [employer] website and then callback once she has decided- she agrees to call by early next week.    KANDICE Rodriguez 2:32 PM 6/4/2020

## 2020-06-15 ENCOUNTER — TELEPHONE (OUTPATIENT)
Dept: CARDIOLOGY | Facility: CLINIC | Age: 75
End: 2020-06-15

## 2020-06-15 NOTE — PROGRESS NOTES
Received update that pt LVM on 6/11/20 stating that she does not want to pursue coverage for Praluent, as she does not want to take an injectable cholesterol medication at this time.     Routed to Dr. Frias and his nurse team as ZULEIKA Rodriguez RN 11:20 AM 6/15/2020

## 2020-06-15 NOTE — TELEPHONE ENCOUNTER
ALISON Frias-  After getting approval for the PCSK-9 inhibitor, she has decided NOT to take it-she saw the video and is not interested in giving herself injections. I realize she is intolerant to most statins and resins. I asked if she was interested in exploring other options,though they are limited, and she said she would be.You can view her My chart responses.  Your thoughts? Would a referral to the [place] lipid clinic be of any value?? Thanks, Jerry

## 2020-06-22 NOTE — TELEPHONE ENCOUNTER
Left message to return call Tabatha Gamble RN on 6/22/2020 at 2:02 PM        It is unfortunate that she has declined PCSK9 inhibitor she has been intolerant to statins and zetia. I am ok with referral to lipid clinic but not sure how helpful it will be without patient agreeing to pharmacotherapy.    Thanks  Suhail

## 2020-06-24 NOTE — TELEPHONE ENCOUNTER
Left message that Dr. Frias I recommending a lipids specialist though there is not much to offer in terms of medication management.I told her that if she has questions about this referral or otherwise we would be happy to talk to her.

## 2020-07-21 ENCOUNTER — TRANSFERRED RECORDS (OUTPATIENT)
Dept: HEALTH INFORMATION MANAGEMENT | Facility: CLINIC | Age: 75
End: 2020-07-21

## 2020-08-29 ENCOUNTER — TRANSFERRED RECORDS (OUTPATIENT)
Dept: HEALTH INFORMATION MANAGEMENT | Facility: CLINIC | Age: 75
End: 2020-08-29

## 2020-09-23 ENCOUNTER — TRANSFERRED RECORDS (OUTPATIENT)
Dept: HEALTH INFORMATION MANAGEMENT | Facility: CLINIC | Age: 75
End: 2020-09-23

## 2020-09-28 DIAGNOSIS — G25.81 RESTLESS LEG SYNDROME: ICD-10-CM

## 2020-09-29 RX ORDER — PRAMIPEXOLE DIHYDROCHLORIDE 0.25 MG/1
TABLET ORAL
Qty: 180 TABLET | Refills: 0 | Status: SHIPPED | OUTPATIENT
Start: 2020-09-29 | End: 2020-10-13

## 2020-09-29 NOTE — TELEPHONE ENCOUNTER
Prescription approved per [facility] Refill Protocol.  Andie LANDIS RN

## 2020-10-11 ASSESSMENT — ACTIVITIES OF DAILY LIVING (ADL): CURRENT_FUNCTION: NO ASSISTANCE NEEDED

## 2020-10-12 ENCOUNTER — MYC MEDICAL ADVICE (OUTPATIENT)
Dept: FAMILY MEDICINE | Facility: CLINIC | Age: 75
End: 2020-10-12

## 2020-10-12 ENCOUNTER — OFFICE VISIT (OUTPATIENT)
Dept: FAMILY MEDICINE | Facility: CLINIC | Age: 75
End: 2020-10-12
Payer: MEDICARE

## 2020-10-12 VITALS
HEIGHT: 57 IN | WEIGHT: 109.8 LBS | DIASTOLIC BLOOD PRESSURE: 59 MMHG | SYSTOLIC BLOOD PRESSURE: 119 MMHG | HEART RATE: 69 BPM | BODY MASS INDEX: 23.69 KG/M2 | OXYGEN SATURATION: 98 % | TEMPERATURE: 97.1 F

## 2020-10-12 DIAGNOSIS — E78.5 HYPERLIPIDEMIA WITH TARGET LDL LESS THAN 130: ICD-10-CM

## 2020-10-12 DIAGNOSIS — M54.12 CERVICAL RADICULOPATHY: ICD-10-CM

## 2020-10-12 DIAGNOSIS — M25.511 CHRONIC RIGHT SHOULDER PAIN: ICD-10-CM

## 2020-10-12 DIAGNOSIS — G47.00 INSOMNIA, UNSPECIFIED TYPE: ICD-10-CM

## 2020-10-12 DIAGNOSIS — K21.9 GASTROESOPHAGEAL REFLUX DISEASE WITHOUT ESOPHAGITIS: ICD-10-CM

## 2020-10-12 DIAGNOSIS — Z00.00 ENCOUNTER FOR ANNUAL WELLNESS EXAM IN MEDICARE PATIENT: Primary | ICD-10-CM

## 2020-10-12 DIAGNOSIS — G25.81 RESTLESS LEG SYNDROME: ICD-10-CM

## 2020-10-12 DIAGNOSIS — G89.29 CHRONIC RIGHT SHOULDER PAIN: ICD-10-CM

## 2020-10-12 DIAGNOSIS — M81.0 AGE-RELATED OSTEOPOROSIS WITHOUT CURRENT PATHOLOGICAL FRACTURE: ICD-10-CM

## 2020-10-12 DIAGNOSIS — F41.9 ANXIETY: ICD-10-CM

## 2020-10-12 DIAGNOSIS — K59.00 CONSTIPATION, UNSPECIFIED CONSTIPATION TYPE: ICD-10-CM

## 2020-10-12 LAB
ANION GAP SERPL CALCULATED.3IONS-SCNC: 5 MMOL/L (ref 3–14)
BUN SERPL-MCNC: 20 MG/DL (ref 7–30)
CALCIUM SERPL-MCNC: 10.1 MG/DL (ref 8.5–10.1)
CHLORIDE SERPL-SCNC: 111 MMOL/L (ref 94–109)
CHOLEST SERPL-MCNC: 275 MG/DL
CO2 SERPL-SCNC: 24 MMOL/L (ref 20–32)
CREAT SERPL-MCNC: 0.94 MG/DL (ref 0.52–1.04)
GFR SERPL CREATININE-BSD FRML MDRD: 59 ML/MIN/{1.73_M2}
GLUCOSE SERPL-MCNC: 99 MG/DL (ref 70–99)
HDLC SERPL-MCNC: 52 MG/DL
LDLC SERPL CALC-MCNC: 192 MG/DL
NONHDLC SERPL-MCNC: 223 MG/DL
POTASSIUM SERPL-SCNC: 4.8 MMOL/L (ref 3.4–5.3)
SODIUM SERPL-SCNC: 140 MMOL/L (ref 133–144)
TRIGL SERPL-MCNC: 157 MG/DL

## 2020-10-12 PROCEDURE — 80048 BASIC METABOLIC PNL TOTAL CA: CPT | Performed by: INTERNAL MEDICINE

## 2020-10-12 PROCEDURE — 80061 LIPID PANEL: CPT | Performed by: INTERNAL MEDICINE

## 2020-10-12 PROCEDURE — G0439 PPPS, SUBSEQ VISIT: HCPCS | Performed by: INTERNAL MEDICINE

## 2020-10-12 RX ORDER — PRAMIPEXOLE DIHYDROCHLORIDE 0.25 MG/1
0.12 TABLET ORAL AT BEDTIME
Qty: 180 TABLET | Refills: 3 | Status: SHIPPED | OUTPATIENT
Start: 2020-10-12 | End: 2020-10-15

## 2020-10-12 ASSESSMENT — ACTIVITIES OF DAILY LIVING (ADL): CURRENT_FUNCTION: NO ASSISTANCE NEEDED

## 2020-10-12 ASSESSMENT — MIFFLIN-ST. JEOR: SCORE: 862.05

## 2020-10-12 NOTE — TELEPHONE ENCOUNTER
Also received fax from the pharmacy regarding Pramipexole RX dose    Pharmacy wanting clarification if there was meant to be a dose decrease

## 2020-10-12 NOTE — PROGRESS NOTES
"SUBJECTIVE:   Hanane Sorto is a 75 year old female who presents for Preventive Visit.    Patient is very pleasant and spunky 75 years old who has hyperlipidemia and asymptomatic coronary artery disease by the CT exam  She is not compliant with aspirin because of worsening heartburn  She does not have any new symptoms  Her constipation however is getting worse although she takes fiber and MiraLAX    She has been walking every day  She is also exercising with her sister 2 or 3 times a week  Her joints hurt and she has neck issues for which she is seeing a C-spine surgeon next week Dr. Barrios    Patient has been advised of split billing requirements and indicates understanding: Yes   Are you in the first 12 months of your Medicare coverage?  No    Healthy Habits:     In general, how would you rate your overall health?  Good    Frequency of exercise:  2-3 days/week    Duration of exercise:  15-30 minutes    Do you usually eat at least 4 servings of fruit and vegetables a day, include whole grains    & fiber and avoid regularly eating high fat or \"junk\" foods?  No    Taking medications regularly:  Yes    Medication side effects:  Lightheadedness    Ability to successfully perform activities of daily living:  No assistance needed    Home Safety:  No safety concerns identified    Hearing Impairment:  No hearing concerns    In the past 6 months, have you been bothered by leaking of urine?  No    In general, how would you rate your overall mental or emotional health?  Good      PHQ-2 Total Score: 0    Additional concerns today:  Yes    Do you feel safe in your environment? Yes    Have you ever done Advance Care Planning? (For example, a Health Directive, POLST, or a discussion with a medical provider or your loved ones about your wishes): Yes, advance care planning is on file.      Fall risk  Fallen 2 or more times in the past year?: No  Any fall with injury in the past year?: No    Cognitive Screening   1) Repeat 3 items " (Leader, Season, Table)    2) Clock draw: NORMAL  3) 3 item recall: Recalls 3 objects  Results: 3 items recalled: COGNITIVE IMPAIRMENT LESS LIKELY    Mini-CogTM Copyright CARLIE Wrihgt. Licensed by the author for use in [facility]; reprinted with permission (breann@[facility]). All rights reserved.      Do you have sleep apnea, excessive snoring or daytime drowsiness?: no    Reviewed and updated as needed this visit by clinical staff  Tobacco  Allergies  Meds  Problems  Med Hx  Surg Hx  Fam Hx          Reviewed and updated as needed this visit by Provider  Tobacco  Allergies  Meds  Problems  Med Hx  Surg Hx  Fam Hx         Social History     Tobacco Use     Smoking status: Former Smoker     Packs/day: 0.50     Years: 15.00     Pack years: 7.50     Types: Cigarettes     Start date:      Quit date:      Years since quittin.8     Smokeless tobacco: Never Used     Tobacco comment: one pack/ three months   Substance Use Topics     Alcohol use: Yes     Alcohol/week: 0.0 - 4.0 standard drinks     Comment: once every two weeks     If you drink alcohol do you typically have >3 drinks per day or >7 drinks per week? No    No flowsheet data found.      Current providers sharing in care for this patient include:   Patient Care Team:  Corona Bethea MD as PCP - General (Internal Medicine)  Corona Bethea MD as Assigned PCP    The following health maintenance items are reviewed in Epic and correct as of today:  Health Maintenance   Topic Date Due     ZOSTER IMMUNIZATION (2 of 3) 2010     DEXA  2020     FALL RISK ASSESSMENT  2021     MEDICARE ANNUAL WELLNESS VISIT  10/12/2021     MAMMO SCREENING  2021     DTAP/TDAP/TD IMMUNIZATION (2 - Td) 2022     COLORECTAL CANCER SCREENING  2023     LIPID  10/12/2025     ADVANCE CARE PLANNING  10/12/2025     HEPATITIS C SCREENING  Completed     PHQ-2  Completed     INFLUENZA VACCINE  Completed     Pneumococcal Vaccine: 65+ Years   Completed     Pneumococcal Vaccine: Pediatrics (0 to 5 Years) and At-Risk Patients (6 to 64 Years)  Aged Out     IPV IMMUNIZATION  Aged Out     MENINGITIS IMMUNIZATION  Aged Out     HEPATITIS B IMMUNIZATION  Aged Out     Patient Active Problem List   Diagnosis     Hyperlipidemia with target LDL less than 130     Anxiety     Osteoporosis     Gross hematuria     Advanced directives, counseling/discussion     Osteoarthritis     Bursitis of hip     Constipation     Gastroesophageal reflux disease without esophagitis     S/P total knee arthroplasty     Fracture of wrist, left, with routine healing, subsequent encounter     Past Surgical History:   Procedure Laterality Date     ARTHROPLASTY KNEE Left 2017    Procedure: ARTHROPLASTY KNEE;  LEFT TOTAL KNEE ARTHROPLASTY;  Surgeon: Lee Ayala MD;  Location: [facility] OR     BIOPSY   approx.    vaginal     BREAST SURGERY      Implants     BUNIONECTOMY      both      SECTION       COLONOSCOPY  8-10     EYE SURGERY  2014    cataract surgery in both eyes     LIPOSUCTION (LOCATION)       RECONSTRUCT BREAST, IMPLANT PROSTHESIS, COMBINED         Social History     Tobacco Use     Smoking status: Former Smoker     Packs/day: 0.50     Years: 15.00     Pack years: 7.50     Types: Cigarettes     Start date:      Quit date:      Years since quittin.8     Smokeless tobacco: Never Used     Tobacco comment: one pack/ three months   Substance Use Topics     Alcohol use: Yes     Alcohol/week: 0.0 - 4.0 standard drinks     Comment: once every two weeks     Family History   Problem Relation Age of Onset     C.A.D. Father      Coronary Artery Disease Father      Hypertension Father      Lipids Mother      Alzheimer Disease Mother      Depression Mother      Osteoporosis Mother         RA     Breast Cancer Sister         53     Cancer - colorectal Sister         40s     Alcoholism Son      Hyperlipidemia Sister      Breast Cancer Sister          "had lumpectomy and radiation     Colon Cancer Sister                  Current Outpatient Medications   Medication Sig Dispense Refill     clonazePAM (KLONOPIN) 0.5 MG ODT Take 1 tablet (0.5 mg) by mouth nightly as needed for anxiety 20 tablet 0     flunisolide (NASALIDE) 25 MCG/ACT (0.025%) SOLN spray U 2 SPRAYS IEN BID       linaclotide (LINZESS) 72 MCG capsule Take 1 capsule (72 mcg) by mouth every morning (before breakfast) 90 capsule 1     omeprazole (PRILOSEC) 20 MG DR capsule TAKE 1 CAPSULE(20 MG) BY MOUTH TWICE DAILY 180 capsule 3     pramipexole (MIRAPEX) 0.25 MG tablet Take 0.5 tablets (0.125 mg) by mouth At Bedtime 180 tablet 3     pramipexole (MIRAPEX) 0.25 MG tablet TAKE 1 TABLET BY MOUTH TWICE DAILY AT 5PM AND AT BEDTIME 180 tablet 0     aspirin (ASA) 81 MG EC tablet Take 1 tablet (81 mg) by mouth daily 30 tablet 3         Review of Systems  10 point ROS of systems including Constitutional, Eyes, Respiratory, Cardiovascular, Gastroenterology, Genitourinary, Integumentary, Muscularskeletal, Psychiatric were all negative except for pertinent positives noted in my HPI.      OBJECTIVE:   /59 (BP Location: Left arm, Patient Position: Sitting, Cuff Size: Adult Regular)   Pulse 69   Temp 97.1  F (36.2  C) (Temporal)   Ht 1.44 m (4' 8.69\")   Wt 49.8 kg (109 lb 12.8 oz)   SpO2 98%   Breastfeeding No   BMI 24.02 kg/m   Estimated body mass index is 24.02 kg/m  as calculated from the following:    Height as of this encounter: 1.44 m (4' 8.69\").    Weight as of this encounter: 49.8 kg (109 lb 12.8 oz).  Physical Exam  GENERAL APPEARANCE: healthy, alert and no distress  EYES: Eyes grossly normal to inspection, PERRL and conjunctivae and sclerae normal  HENT: ear canals and TM's normal, nose and mouth without ulcers or lesions, oropharynx clear and oral mucous membranes moist  NECK: no adenopathy, no asymmetry, masses, or scars and thyroid normal to palpation  RESP: lungs clear to auscultation - no " rales, rhonchi or wheezes  BREAST: Implants are noted normal without masses, tenderness or nipple discharge and no palpable axillary masses or adenopathy  CV: regular rate and rhythm, normal S1 S2, no S3 or S4, no murmur, click or rub, no peripheral edema and peripheral pulses strong  ABDOMEN: soft, nontender, no hepatosplenomegaly, no masses and bowel sounds normal  MS: no musculoskeletal defects are noted and gait is age appropriate without ataxia  SKIN: no suspicious lesions or rashes  NEURO: Normal strength and tone, sensory exam grossly normal, mentation intact and speech normal  PSYCH: mentation appears normal and affect normal/bright        ASSESSMENT / PLAN:   Hanane was seen today for physical.    Diagnoses and all orders for this visit:    Encounter for annual wellness exam in Medicare patient    Restless leg syndrome  -     pramipexole (MIRAPEX) 0.25 MG tablet; Take 0.5 tablets (0.125 mg) by mouth At Bedtime    Cervical radiculopathy    Chronic right shoulder pain    Gastroesophageal reflux disease without esophagitis  -     omeprazole (PRILOSEC) 20 MG DR capsule; TAKE 1 CAPSULE(20 MG) BY MOUTH TWICE DAILY    Anxiety    Insomnia, unspecified type    Hyperlipidemia with target LDL less than 130  -     Lipid panel reflex to direct LDL Fasting  -     Basic metabolic panel    Age-related osteoporosis without current pathological fracture  -     DX Hip/Pelvis/Spine; Future    Constipation, unspecified constipation type  -     linaclotide (LINZESS) 72 MCG capsule; Take 1 capsule (72 mcg) by mouth every morning (before breakfast)    Patient will try Linzess for her constipation and her colonoscopy is also coming up  She will resume aspirin  But if her heartburn continues we might have to do endoscopy as well  She will stop nonsteroidals  She will take Shingrix at the pharmacy and is up-to-date on other shots  She will do a DEXA scan and mammogram      Patient has been advised of split billing requirements and  "indicates understanding: Yes  COUNSELING:  Reviewed preventive health counseling, as reflected in patient instructions       Regular exercise       Healthy diet/nutrition       Vision screening    Estimated body mass index is 24.02 kg/m  as calculated from the following:    Height as of this encounter: 1.44 m (4' 8.69\").    Weight as of this encounter: 49.8 kg (109 lb 12.8 oz).        She reports that she quit smoking about 45 years ago. Her smoking use included cigarettes. She started smoking about 60 years ago. She has a 7.50 pack-year smoking history. She has never used smokeless tobacco.      Appropriate preventive services were discussed with this patient, including applicable screening as appropriate for cardiovascular disease, diabetes, osteopenia/osteoporosis, and glaucoma.  As appropriate for age/gender, discussed screening for colorectal cancer, prostate cancer, breast cancer, and cervical cancer. Checklist reviewing preventive services available has been given to the patient.    Reviewed patients plan of care and provided an AVS. The Basic Care Plan (routine screening as documented in Health Maintenance) for Hanane meets the Care Plan requirement. This Care Plan has been established and reviewed with the Patient.    Counseling Resources:  ATP IV Guidelines  Pooled Cohorts Equation Calculator  Breast Cancer Risk Calculator  Breast Cancer: Medication to Reduce Risk  FRAX Risk Assessment  ICSI Preventive Guidelines  Dietary Guidelines for Americans, 2010  [employer]'s MyPlate  ASA Prophylaxis  Lung CA Screening    Corona Bethea MD  [facility]    Identified Health Risks:  "

## 2020-10-13 ENCOUNTER — TRANSFERRED RECORDS (OUTPATIENT)
Dept: HEALTH INFORMATION MANAGEMENT | Facility: CLINIC | Age: 75
End: 2020-10-13

## 2020-10-13 RX ORDER — PRAMIPEXOLE DIHYDROCHLORIDE 0.25 MG/1
TABLET ORAL
Qty: 180 TABLET | Refills: 3 | Status: SHIPPED | OUTPATIENT
Start: 2020-10-13 | End: 2020-10-15

## 2020-10-13 NOTE — TELEPHONE ENCOUNTER
Pended script per pt request.  Dose was changed to half a pramipexole at bedtime yesterday, and previous was 1 tablet bid.     Pt writes:  The wrong prescription was sent to my pharmacist today for pramipexole.  My prescription has always been 0.25mg 1 tablet by mouth twice daily at 5pm and at bedtime.  The pharmacist needs a new prescription for  120 pills per refill for 12 months.

## 2020-10-14 ENCOUNTER — MYC MEDICAL ADVICE (OUTPATIENT)
Dept: FAMILY MEDICINE | Facility: CLINIC | Age: 75
End: 2020-10-14

## 2020-10-14 DIAGNOSIS — G25.81 RESTLESS LEG SYNDROME: ICD-10-CM

## 2020-10-15 RX ORDER — PRAMIPEXOLE DIHYDROCHLORIDE 0.25 MG/1
TABLET ORAL
Qty: 180 TABLET | Refills: 3 | Status: SHIPPED | OUTPATIENT
Start: 2020-10-15 | End: 2021-10-22

## 2020-10-26 ENCOUNTER — TRANSFERRED RECORDS (OUTPATIENT)
Dept: HEALTH INFORMATION MANAGEMENT | Facility: CLINIC | Age: 75
End: 2020-10-26

## 2020-12-14 ENCOUNTER — HOSPITAL ENCOUNTER (OUTPATIENT)
Dept: BONE DENSITY | Facility: CLINIC | Age: 75
Discharge: HOME | End: 2020-12-14
Attending: INTERNAL MEDICINE | Admitting: INTERNAL MEDICINE
Payer: MEDICARE

## 2020-12-14 DIAGNOSIS — M81.0 AGE-RELATED OSTEOPOROSIS WITHOUT CURRENT PATHOLOGICAL FRACTURE: ICD-10-CM

## 2020-12-14 PROCEDURE — 77085 DXA BONE DENSITY AXL VRT FX: CPT

## 2020-12-15 ENCOUNTER — MYC MEDICAL ADVICE (OUTPATIENT)
Dept: FAMILY MEDICINE | Facility: CLINIC | Age: 75
End: 2020-12-15

## 2020-12-15 DIAGNOSIS — M81.0 AGE-RELATED OSTEOPOROSIS WITHOUT CURRENT PATHOLOGICAL FRACTURE: Primary | ICD-10-CM

## 2020-12-15 RX ORDER — ALENDRONATE SODIUM 70 MG/1
70 TABLET ORAL
Qty: 12 TABLET | Refills: 3 | Status: SHIPPED | OUTPATIENT
Start: 2020-12-15 | End: 2021-01-08 | Stop reason: ALTCHOICE

## 2020-12-15 NOTE — TELEPHONE ENCOUNTER
"Pt called clinic and I relayed Dr. Bethea's result comments on Dexa.      Patient wonders if would be easier to just get injection.     Routing to triage. Pt would like a call.  [phone] (Mobile)      \"We need to begin fosamax for dexa which is much worse.    If you agree, we can fax a prescription to the pharmacy (which one?).    Happy to do virtual visit too to discuss if prefers.     Fosamax is to be taken with 8 ounces of water.   Do not bend over for 30 minutes after taking it.   Empty stomach intake may be helpful.   Tell  your dentist that her taking this medication..   If the stomach remains upset after taking it, IV injection can be arranged for you.\"  "

## 2020-12-15 NOTE — TELEPHONE ENCOUNTER
PCP,    Please disregard message below about injection. Pt agrees to Fosamax- please send.  Pharm pended    Thank you,  Moises LUTZ RN

## 2020-12-16 ENCOUNTER — MYC MEDICAL ADVICE (OUTPATIENT)
Dept: FAMILY MEDICINE | Facility: CLINIC | Age: 75
End: 2020-12-16

## 2020-12-17 NOTE — TELEPHONE ENCOUNTER
Called patient and she is agreeable to this   Added as phone visit 10:45am tomorrow morning    Marleni CARBAJAL RN

## 2020-12-17 NOTE — PROGRESS NOTES
Subjective     Hanane Sorto is a 75 year old female who presents to clinic today for the following health issues:    HPI         {SUPERLIST (Optional):404675}  {additonal problems for provider to add (Optional):571707}  The T-score is -1.2 from L1 to L4. There has been a 2.3%  decrease in bone density since the prior examination. This is not  statistically significant. Lateral image of the spine demonstrates a  mildly biconcave appearance of T11 but no definite evidence of acute  compression.     Hips: The left hip T-score is -1.9. The right hip T-score is -1.8.  Bone mineral density in the worst hip is 0.773 gm/cm2. There has been  a 11.0% decrease in bone density since the prior examination. This is  statistically significant.  Review of Systems   {ROS COMP (Optional):600230}      Objective    There were no vitals taken for this visit.  There is no height or weight on file to calculate BMI.  Physical Exam   {Exam List (Optional):431118}    {Diagnostic Test Results (Optional):489601}        {PROVIDER CHARTING PREFERENCE:024813}

## 2020-12-18 ENCOUNTER — VIRTUAL VISIT (OUTPATIENT)
Dept: FAMILY MEDICINE | Facility: CLINIC | Age: 75
End: 2020-12-18
Payer: MEDICARE

## 2020-12-18 DIAGNOSIS — E55.9 VITAMIN D DEFICIENCY: ICD-10-CM

## 2020-12-18 DIAGNOSIS — M81.0 AGE-RELATED OSTEOPOROSIS WITHOUT CURRENT PATHOLOGICAL FRACTURE: Primary | ICD-10-CM

## 2020-12-18 PROCEDURE — 36415 COLL VENOUS BLD VENIPUNCTURE: CPT | Performed by: INTERNAL MEDICINE

## 2020-12-18 PROCEDURE — 82306 VITAMIN D 25 HYDROXY: CPT | Performed by: INTERNAL MEDICINE

## 2020-12-18 PROCEDURE — 99213 OFFICE O/P EST LOW 20 MIN: CPT | Mod: 95 | Performed by: INTERNAL MEDICINE

## 2020-12-18 RX ORDER — RALOXIFENE HYDROCHLORIDE 60 MG/1
60 TABLET, FILM COATED ORAL DAILY
Qty: 90 TABLET | Refills: 3 | Status: SHIPPED | OUTPATIENT
Start: 2020-12-18 | End: 2021-04-13

## 2020-12-18 NOTE — PROGRESS NOTES
"Hanane Sorto is a 75 year old female who is being evaluated via a billable video visit.      The patient has been notified of following:     \"This video visit will be conducted via a call between you and your physician/provider. We have found that certain health care needs can be provided without the need for an in-person physical exam.  This service lets us provide the care you need with a video conversation.  If a prescription is necessary we can send it directly to your pharmacy.  If lab work is needed we can place an order for that and you can then stop by our lab to have the test done at a later time.    Video visits are billed at different rates depending on your insurance coverage.  Please reach out to your insurance provider with any questions.    If during the course of the call the physician/provider feels a video visit is not appropriate, you will not be charged for this service.\"    Patient has given verbal consent for Video visit? Yes  How would you like to obtain your AVS? MyChart  If you are dropped from the video visit, the video invite should be resent to: Text to cell phone: [phone]  Will anyone else be joining your video visit? No    Subjective     Hanane Sorto is a 75 year old female who presents today via video visit for the following health issues:    HPI      Patient is here for follow up of her osteoporosis  dexa scan shows  The left hip T-score is -1.9. The right hip T-score is -1.8.  Bone mineral density in the worst hip is 0.773 gm/cm2. There has been  a 11.0% decrease in bone density since the prior examination. This is  statistically significant.                                                                      IMPRESSION:  1. Mild osteopenia in the lumbar spine. Moderate osteopenia in the  hips bilaterally. Mild to moderate decrease in bone density in the  hips. No significant compression fracture seen on the lateral image.  Mildly biconcave appearance of T11 is likely " chronic.  2. The probability of major osteoporotic fracture is 12.0% and  probability of hip fracture is 3.1% within the next 10 years according  to FRAX risk assessment.  Medication Followup of alendronate (FOSAMAX) 70 MG tablet    Taking Medication as prescribed: NO    Side Effects:  GERD    Medication Helping Symptoms:  Not sure     Patient is not comfortable taking Fosamax or IV  Wishes to discuss other options  Started to take Calcium and Vitamin D .     Video Start Time: 10.26        Review of Systems   10 point ROS of systems including Constitutional, Eyes, Respiratory, Cardiovascular, Gastroenterology, Genitourinary, Integumentary, Muscularskeletal, Psychiatric were all negative except for pertinent positives noted in my HPI.        Objective           Vitals:  No vitals were obtained today due to virtual visit.    Physical Exam     GENERAL: Healthy, alert and no distress  EYES: Eyes grossly normal to inspection.  No discharge or erythema, or obvious scleral/conjunctival abnormalities.  RESP: No audible wheeze, cough, or visible cyanosis.  No visible retractions or increased work of breathing.    NEURO: Cranial nerves grossly intact.  Mentation and speech appropriate for age.  PSYCH: Mentation appears normal, affect normal/bright, judgement and insight intact, normal speech and appearance well-groomed.              Assessment & Plan     Hanane was seen today for recheck medication.    Diagnoses and all orders for this visit:    Age-related osteoporosis without current pathological fracture  -     raloxifene (EVISTA) 60 MG tablet; Take 1 tablet (60 mg) by mouth daily  -     DX Hip/Pelvis/Spine; Future    Vitamin D deficiency  -     Vitamin D Deficiency; Future            Patient has family hx of breast cancer  We will begin Evista due to marked reduction in T score at hip  Hip exercises advised also  Continue Calcium  May notice constipation  Will Repeat Dexa in 1 year  Side effects of Jagruti are leg cramps,  DVT risks  and hot flashes.  Protective effect on breast cancer also discussed.      Return today (on 12/18/2020) for Non fasting repeat labs.    Corona Bethea MD  [facility]      Video-Visit Details    Type of service:  Video Visit   Did not work last 3  Minutes and we switched to audio at 10.34    Video End Time:10.38    Originating Location (pt. Location): Home    Distant Location (provider location):  [facility]     Platform used for Video Visit: Doximity

## 2020-12-21 LAB — DEPRECATED CALCIDIOL+CALCIFEROL SERPL-MC: 34 UG/L (ref 20–75)

## 2020-12-23 DIAGNOSIS — R30.0 DYSURIA: ICD-10-CM

## 2020-12-23 LAB
ALBUMIN UR-MCNC: NEGATIVE MG/DL
APPEARANCE UR: CLEAR
BACTERIA #/AREA URNS HPF: ABNORMAL /HPF
BILIRUB UR QL STRIP: NEGATIVE
COLOR UR AUTO: YELLOW
GLUCOSE UR STRIP-MCNC: NEGATIVE MG/DL
HGB UR QL STRIP: ABNORMAL
KETONES UR STRIP-MCNC: NEGATIVE MG/DL
LEUKOCYTE ESTERASE UR QL STRIP: NEGATIVE
NITRATE UR QL: NEGATIVE
PH UR STRIP: 5 PH (ref 5–7)
RBC #/AREA URNS AUTO: ABNORMAL /HPF
SOURCE: ABNORMAL
SP GR UR STRIP: >1.03 (ref 1–1.03)
UROBILINOGEN UR STRIP-ACNC: 0.2 EU/DL (ref 0.2–1)
WBC #/AREA URNS AUTO: ABNORMAL /HPF

## 2020-12-23 PROCEDURE — 81001 URINALYSIS AUTO W/SCOPE: CPT | Performed by: INTERNAL MEDICINE

## 2021-01-19 ENCOUNTER — HOSPITAL ENCOUNTER (OUTPATIENT)
Dept: MAMMOGRAPHY | Facility: CLINIC | Age: 76
Discharge: HOME | End: 2021-01-19
Attending: INTERNAL MEDICINE | Admitting: INTERNAL MEDICINE
Payer: MEDICARE

## 2021-01-19 DIAGNOSIS — Z12.31 SCREENING MAMMOGRAM, ENCOUNTER FOR: ICD-10-CM

## 2021-01-19 PROCEDURE — 77067 SCR MAMMO BI INCL CAD: CPT

## 2021-01-26 ENCOUNTER — TRANSFERRED RECORDS (OUTPATIENT)
Dept: HEALTH INFORMATION MANAGEMENT | Facility: CLINIC | Age: 76
End: 2021-01-26

## 2021-02-09 ENCOUNTER — TELEPHONE (OUTPATIENT)
Dept: FAMILY MEDICINE | Facility: CLINIC | Age: 76
End: 2021-02-09

## 2021-02-09 ENCOUNTER — TRANSFERRED RECORDS (OUTPATIENT)
Dept: HEALTH INFORMATION MANAGEMENT | Facility: CLINIC | Age: 76
End: 2021-02-09

## 2021-02-09 DIAGNOSIS — K21.00 GASTROESOPHAGEAL REFLUX DISEASE WITH ESOPHAGITIS WITHOUT HEMORRHAGE: Primary | ICD-10-CM

## 2021-02-09 RX ORDER — FAMOTIDINE 40 MG/1
40 TABLET, FILM COATED ORAL DAILY
Qty: 90 TABLET | Refills: 3 | Status: SHIPPED | OUTPATIENT
Start: 2021-02-09 | End: 2021-10-22

## 2021-02-09 NOTE — TELEPHONE ENCOUNTER
Reason for Call:  Other Medication Change    Detailed comments: Ainyah saw her the patient today at the [facility] and based on her FRAX score she is known to have Osteopenia. So they recommend switching patient from Omeprazole to famotidine for her reflux   This was prescribed and just wanted to run this by Provider to make sure she is ok with this    Phone Number Patient can be reached at: Aniyah with [facility]    Best Time: daytime    Call taken on 2/9/2021 at 3:12 PM by Marissa Layton

## 2021-02-10 NOTE — TELEPHONE ENCOUNTER
Aniyah at [facility] notified and notifeid PCP already sent in script     Called patient to make sure she is aware as well - she is agreeable to plan     Med list updated     Marleni CARBAJAL RN

## 2021-04-06 ENCOUNTER — TRANSFERRED RECORDS (OUTPATIENT)
Dept: HEALTH INFORMATION MANAGEMENT | Facility: CLINIC | Age: 76
End: 2021-04-06

## 2021-04-13 ENCOUNTER — OFFICE VISIT (OUTPATIENT)
Dept: CARDIOLOGY | Facility: CLINIC | Age: 76
End: 2021-04-13
Attending: INTERNAL MEDICINE
Payer: MEDICARE

## 2021-04-13 VITALS
WEIGHT: 108.7 LBS | BODY MASS INDEX: 22.82 KG/M2 | DIASTOLIC BLOOD PRESSURE: 83 MMHG | HEIGHT: 58 IN | OXYGEN SATURATION: 98 % | HEART RATE: 61 BPM | SYSTOLIC BLOOD PRESSURE: 133 MMHG

## 2021-04-13 DIAGNOSIS — I25.9 ASYMPTOMATIC CORONARY HEART DISEASE: Primary | ICD-10-CM

## 2021-04-13 DIAGNOSIS — Z82.49 FAMILY HISTORY OF ISCHEMIC HEART DISEASE: ICD-10-CM

## 2021-04-13 DIAGNOSIS — E78.5 HYPERLIPIDEMIA WITH TARGET LDL LESS THAN 130: ICD-10-CM

## 2021-04-13 PROCEDURE — 99214 OFFICE O/P EST MOD 30 MIN: CPT | Performed by: NURSE PRACTITIONER

## 2021-04-13 ASSESSMENT — MIFFLIN-ST. JEOR: SCORE: 869.87

## 2021-04-13 NOTE — PROGRESS NOTES
Cardiology Clinic Progress Note  Hanane Sorto MRN# [id number]   YOB: 1945 Age: 75 year old   Primary Cardiologist: Dr. Frias Reason for visit: Cardiology follow up            Assessment and Plan:   Hanane Sorto is a very pleasant 75 year old female with a history of asympatomatic coronary artery disease, abnormal calcium score 2016, hyperlipidemia (intolerant to statins/zetia) and family history of premature coronary artery disease.     I saw Hanane today for cardiology follow up. She continues to do well from a cardiovascular standpoint. Playing tennis 2 x a week with no exertional symptoms. Most recent lipid panel in October showed continued elevation in LDL at 192. We again reviewed secondary prevention today with management of her hyperlipidemia and aspirin. She maintains consistent that she does not want to start PSK9 inhibitors. At this time she is willing to start aspirin 81mg daily, we reviewed potential SE and indication for use. In collaboration together today plan to start aspirin 81mg daily. Counseled on lifestyle modifications today. Plan for follow up with Dr. Frias in 6 months, sooner cardiology follow up if needed.     1. Coronary artery disease based on abnormal calcium score in 2016 - calcium score of 28.56. Stress echocardiogram in 2018 negative for ischemia, no wall motion abnormalities, EF 55-60%. Asymptomatic with no signs/symptoms of angina.   2. Hyperlipidemia - Lipid panel 10/2020 total cholesterol 275, HDl 52,  and triglycerides 157.   - Intolerant to statins and zetia   - Declines PSK9 inhibitors   - Counseled on lifestyle modifications  3. Family history of premature coronary artery disease        Changes today: aspirin 81mg daily     Follow up plan:     Follow up with Dr. Frias in 6 months with lipid panel prior.         History of Presenting Illness:    Hanane Sorto is a very pleasant 75 year old female with a history of asympatomatic coronary artery disease,  abnormal calcium score 2016, hyperlipidemia (intolerant to statins/zetia) and family history of premature coronary artery disease.     Hanane follows with Dr. Frias for asymptomatic coronary artery disease based on abnormal calcium score in 2016 of 28.56. Stress echocardiogram in 2018 negative for ischemia, no wall motion abnormalities, EF 55-60%. Her LDL has been significantly elevated with history of intolerance to multiple statins (including pravastatin) and zetia. Consideration for PSK9 inhibitors have been reviewed multiple times with her and she declines. It was initially cost prohibitive but now she just doesn't want to proceed with this medication. During her last visit with Dr. Frias in June she was doing well with no symptoms of angina. Again PSK9 inhibitors were reviewed and ultimately declined. Starting aspirin for secondary prevention was also reviewed which at the time she states she was willing to try but she never started.     Patient is here today for cardiology follow up.     Patient reports feeling good. Denies chest pain or chest tightness. Denies shortness of breath at rest. Denies exertional dyspnea. Energy levels are good. Not sleeping the best at night which has been a chronic problem for her, notes she is waking up every few hours. Denies orthopnea or PND. Denies dizziness, lightheadedness or other presyncopal symptoms. Denies tachycardia or palpitations. Taking medications daily, she continues to decline PSK9 inhibitors. She also did not start aspirin as recommended during her last visit.     Labs 10/2020 show stable kidney function and electrolytes. Lipid panel shows total cholesterol 275, HDl 52,  and triglycerides 157. Blood pressure 133/83 and HR 61 in clinic today.    Appetite is good. Eating meals at home. Typically one meal daily. Notes she eats a lot of vegetables. Playing tennis 2 x per week for exercise. Will walk when the weather is nicer. 1 glass of wine on Fridays. Denies  tobacco use.         Social History      Social History     Socioeconomic History     Marital status:      Spouse name: Not on file     Number of children: Not on file     Years of education: Not on file     Highest education level: Not on file   Occupational History     Not on file   Social Needs     Financial resource strain: Not on file     Food insecurity     Worry: Not on file     Inability: Not on file     Transportation needs     Medical: Not on file     Non-medical: Not on file   Tobacco Use     Smoking status: Former Smoker     Packs/day: 0.50     Years: 15.00     Pack years: 7.50     Types: Cigarettes     Start date:      Quit date:      Years since quittin.3     Smokeless tobacco: Never Used     Tobacco comment: one pack/ three months   Substance and Sexual Activity     Alcohol use: Yes     Alcohol/week: 0.0 - 4.0 standard drinks     Comment: once every two weeks     Drug use: No     Sexual activity: Not Currently     Partners: Male     Birth control/protection: Post-menopausal   Lifestyle     Physical activity     Days per week: Not on file     Minutes per session: Not on file     Stress: Not on file   Relationships     Social connections     Talks on phone: Not on file     Gets together: Not on file     Attends [employer] service: Not on file     Active member of club or organization: Not on file     Attends meetings of clubs or organizations: Not on file     Relationship status: Not on file     Intimate partner violence     Fear of current or ex partner: Not on file     Emotionally abused: Not on file     Physically abused: Not on file     Forced sexual activity: Not on file   Other Topics Concern      Service Not Asked     Blood Transfusions Not Asked     Caffeine Concern Not Asked     Occupational Exposure Not Asked     Hobby Hazards Not Asked     Sleep Concern Not Asked     Stress Concern Not Asked     Weight Concern Not Asked     Special Diet Not Asked     Back Care Not  "Asked     Exercise Yes     Bike Helmet Not Asked     Seat Belt Not Asked     Self-Exams Not Asked     Parent/sibling w/ CABG, MI or angioplasty before 65F 55M? No   Social History Narrative     Not on file            Review of Systems:   Skin:  Negative     Eyes:  Positive for glasses  ENT:  Positive for postnasal drainage  Respiratory:  Positive for dyspnea on exertion;cough  Cardiovascular:    Positive for;lightheadedness  Gastroenterology: Positive for constipation  Genitourinary:  not assessed    Musculoskeletal:  Positive for neck pain;back pain  Neurologic:  Positive for headaches  Psychiatric:  Positive for sleep disturbances  Heme/Lymph/Imm:  Positive for allergies  Endocrine:  Negative           Physical Exam:   Vitals: /83 (BP Location: Right arm, Patient Position: Sitting, Cuff Size: Adult Regular)   Pulse 61   Ht 1.461 m (4' 9.5\")   Wt 49.3 kg (108 lb 11.2 oz)   SpO2 98%   BMI 23.12 kg/m     Wt Readings from Last 4 Encounters:   04/13/21 49.3 kg (108 lb 11.2 oz)   10/12/20 49.8 kg (109 lb 12.8 oz)   06/01/20 47.6 kg (105 lb)   01/20/20 47.2 kg (104 lb)     GEN: well nourished, in no acute distress.  HEENT:  Pupils equal, round. Sclerae nonicteric.   NECK: Supple, no masses appreciated.   C/V:  Regular rate and rhythm, no murmur, rub or gallop.    RESP: Respirations are unlabored. Clear to auscultation bilaterally without wheezing, rales, or rhonchi.  GI: Abdomen soft, nontender.  EXTREM: No LE edema.  NEURO: Alert and oriented, cooperative.  SKIN: Warm and dry.        Data:     LIPID RESULTS:  Lab Results   Component Value Date    CHOL 275 (H) 10/12/2020    HDL 52 10/12/2020     (H) 10/12/2020    TRIG 157 (H) 10/12/2020    CHOLHDLRATIO 5.6 (H) 12/22/2014     LIVER ENZYME RESULTS:  Lab Results   Component Value Date    AST 14 04/29/2020    ALT 22 04/29/2020     CBC RESULTS:  Lab Results   Component Value Date    WBC 8.2 04/29/2020    RBC 4.33 04/29/2020    HGB 13.4 04/29/2020    HCT 41.5 " 04/29/2020    MCV 96 04/29/2020    MCH 30.9 04/29/2020    MCHC 32.3 04/29/2020    RDW 13.5 04/29/2020     04/29/2020     BMP RESULTS:  Lab Results   Component Value Date     10/12/2020    POTASSIUM 4.8 10/12/2020    CHLORIDE 111 (H) 10/12/2020    CO2 24 10/12/2020    ANIONGAP 5 10/12/2020    GLC 99 10/12/2020    BUN 20 10/12/2020    CR 0.94 10/12/2020    GFRESTIMATED 59 (L) 10/12/2020    GFRESTBLACK 69 10/12/2020    LIO 10.1 10/12/2020      INR RESULTS:  Lab Results   Component Value Date    INR 0.95 04/29/2020    INR 0.91 04/24/2017            Medications     Current Outpatient Medications   Medication Sig Dispense Refill     clonazePAM (KLONOPIN) 0.5 MG ODT Take 1 tablet (0.5 mg) by mouth nightly as needed for anxiety 20 tablet 5     famotidine (PEPCID) 40 MG tablet Take 1 tablet (40 mg) by mouth daily 90 tablet 3     pramipexole (MIRAPEX) 0.25 MG tablet TAKE 1 TABLET BY MOUTH TWICE DAILY AT 5PM AND AT BEDTIME 180 tablet 3     Calcium Carbonate-Vitamin D3 600-400 MG-UNIT TABS Take 1 tablet by mouth            Past Medical History     Past Medical History:   Diagnosis Date     Anxiety     psych yearly, Dr Shea     Family hx of colon cancer     sister     Floaters     Karma leverentz     Fracture of wrist, left, with routine healing, subsequent encounter 8/28/2017     GERD (gastroesophageal reflux disease)      Gross hematuria 9/12/2011     Hyperlipidemia LDL goal < 130      Hyperlipidemia LDL goal <130 8/28/2017     Malnutrition (H) 8/28/2017     Osteoarthritis 12/19/2012     Osteoporosis     Dexa     PONV (postoperative nausea and vomiting)      Restless legs      Skin cancer      Past Surgical History:   Procedure Laterality Date     ARTHROPLASTY KNEE Left 4/24/2017    Procedure: ARTHROPLASTY KNEE;  LEFT TOTAL KNEE ARTHROPLASTY;  Surgeon: Lee Ayala MD;  Location: [facility] OR     BIOPSY  1968 approx.    vaginal     BREAST SURGERY  1972    Implants     BUNIONECTOMY  2011    both       SECTION       COLONOSCOPY  -10     EYE SURGERY  2014    cataract surgery in both eyes     LIPOSUCTION (LOCATION)       RECONSTRUCT BREAST, IMPLANT PROSTHESIS, COMBINED       Family History   Problem Relation Age of Onset     C.A.D. Father      Coronary Artery Disease Father      Hypertension Father      Lipids Mother      Alzheimer Disease Mother      Depression Mother      Osteoporosis Mother         RA     Breast Cancer Sister         53     Cancer - colorectal Sister         40s     Alcoholism Son      Hyperlipidemia Sister      Breast Cancer Sister         had lumpectomy and radiation     Colon Cancer Sister                     Allergies   Crestor [rosuvastatin], Ezetimibe, Hmg-coa-r inhibitors, Miralax [polyethylene glycol], and Pravastatin    30 minutes spent on the date of the encounter doing chart review, history and exam, documentation and further activities as noted above      SHADE Mckeon [facility]  Pager: [phone]

## 2021-04-13 NOTE — PATIENT INSTRUCTIONS
1. Start aspirin 81mg daily   2. Follow up with Dr. Frias in 6 months   3. Please call with any questions/concerns [phone]

## 2021-04-13 NOTE — LETTER
4/13/2021    Corona Bethea MD  [address]  [facility] [address]    RE: Hanane Sorto       Dear Colleague,    I had the pleasure of seeing Hanane Sorto in the [facility].    Cardiology Clinic Progress Note  Hanane Sorto MRN# [id number]   YOB: 1945 Age: 75 year old   Primary Cardiologist: Dr. Frias Reason for visit: Cardiology follow up            Assessment and Plan:   Hanane Sorto is a very pleasant 75 year old female with a history of asympatomatic coronary artery disease, abnormal calcium score 2016, hyperlipidemia (intolerant to statins/zetia) and family history of premature coronary artery disease.     I saw Hanane today for cardiology follow up. She continues to do well from a cardiovascular standpoint. Playing tennis 2 x a week with no exertional symptoms. Most recent lipid panel in October showed continued elevation in LDL at 192. We again reviewed secondary prevention today with management of her hyperlipidemia and aspirin. She maintains consistent that she does not want to start PSK9 inhibitors. At this time she is willing to start aspirin 81mg daily, we reviewed potential SE and indication for use. In collaboration together today plan to start aspirin 81mg daily. Counseled on lifestyle modifications today. Plan for follow up with Dr. Frias in 6 months, sooner cardiology follow up if needed.     1. Coronary artery disease based on abnormal calcium score in 2016 - calcium score of 28.56. Stress echocardiogram in 2018 negative for ischemia, no wall motion abnormalities, EF 55-60%. Asymptomatic with no signs/symptoms of angina.   2. Hyperlipidemia - Lipid panel 10/2020 total cholesterol 275, HDl 52,  and triglycerides 157.   - Intolerant to statins and zetia   - Declines PSK9 inhibitors   - Counseled on lifestyle modifications  3. Family history of premature coronary artery disease        Changes today: aspirin 81mg daily      Follow up plan:     Follow up with Dr. Frias in 6 months with lipid panel prior.         History of Presenting Illness:    Hanane Sorto is a very pleasant 75 year old female with a history of asympatomatic coronary artery disease, abnormal calcium score 2016, hyperlipidemia (intolerant to statins/zetia) and family history of premature coronary artery disease.     Hanane follows with Dr. Frias for asymptomatic coronary artery disease based on abnormal calcium score in 2016 of 28.56. Stress echocardiogram in 2018 negative for ischemia, no wall motion abnormalities, EF 55-60%. Her LDL has been significantly elevated with history of intolerance to multiple statins (including pravastatin) and zetia. Consideration for PSK9 inhibitors have been reviewed multiple times with her and she declines. It was initially cost prohibitive but now she just doesn't want to proceed with this medication. During her last visit with Dr. Frias in June she was doing well with no symptoms of angina. Again PSK9 inhibitors were reviewed and ultimately declined. Starting aspirin for secondary prevention was also reviewed which at the time she states she was willing to try but she never started.     Patient is here today for cardiology follow up.     Patient reports feeling good. Denies chest pain or chest tightness. Denies shortness of breath at rest. Denies exertional dyspnea. Energy levels are good. Not sleeping the best at night which has been a chronic problem for her, notes she is waking up every few hours. Denies orthopnea or PND. Denies dizziness, lightheadedness or other presyncopal symptoms. Denies tachycardia or palpitations. Taking medications daily, she continues to decline PSK9 inhibitors. She also did not start aspirin as recommended during her last visit.     Labs 10/2020 show stable kidney function and electrolytes. Lipid panel shows total cholesterol 275, HDl 52,  and triglycerides 157. Blood pressure 133/83 and HR 61 in  clinic today.    Appetite is good. Eating meals at home. Typically one meal daily. Notes she eats a lot of vegetables. Playing tennis 2 x per week for exercise. Will walk when the weather is nicer. 1 glass of wine on . Denies tobacco use.         Social History      Social History     Socioeconomic History     Marital status:      Spouse name: Not on file     Number of children: Not on file     Years of education: Not on file     Highest education level: Not on file   Occupational History     Not on file   Social Needs     Financial resource strain: Not on file     Food insecurity     Worry: Not on file     Inability: Not on file     Transportation needs     Medical: Not on file     Non-medical: Not on file   Tobacco Use     Smoking status: Former Smoker     Packs/day: 0.50     Years: 15.00     Pack years: 7.50     Types: Cigarettes     Start date:      Quit date:      Years since quittin.3     Smokeless tobacco: Never Used     Tobacco comment: one pack/ three months   Substance and Sexual Activity     Alcohol use: Yes     Alcohol/week: 0.0 - 4.0 standard drinks     Comment: once every two weeks     Drug use: No     Sexual activity: Not Currently     Partners: Male     Birth control/protection: Post-menopausal   Lifestyle     Physical activity     Days per week: Not on file     Minutes per session: Not on file     Stress: Not on file   Relationships     Social connections     Talks on phone: Not on file     Gets together: Not on file     Attends [employer] service: Not on file     Active member of club or organization: Not on file     Attends meetings of clubs or organizations: Not on file     Relationship status: Not on file     Intimate partner violence     Fear of current or ex partner: Not on file     Emotionally abused: Not on file     Physically abused: Not on file     Forced sexual activity: Not on file   Other Topics Concern      Service Not Asked     Blood Transfusions Not  "Asked     Caffeine Concern Not Asked     Occupational Exposure Not Asked     Hobby Hazards Not Asked     Sleep Concern Not Asked     Stress Concern Not Asked     Weight Concern Not Asked     Special Diet Not Asked     Back Care Not Asked     Exercise Yes     Bike Helmet Not Asked     Seat Belt Not Asked     Self-Exams Not Asked     Parent/sibling w/ CABG, MI or angioplasty before 65F 55M? No   Social History Narrative     Not on file            Review of Systems:   Skin:  Negative     Eyes:  Positive for glasses  ENT:  Positive for postnasal drainage  Respiratory:  Positive for dyspnea on exertion;cough  Cardiovascular:    Positive for;lightheadedness  Gastroenterology: Positive for constipation  Genitourinary:  not assessed    Musculoskeletal:  Positive for neck pain;back pain  Neurologic:  Positive for headaches  Psychiatric:  Positive for sleep disturbances  Heme/Lymph/Imm:  Positive for allergies  Endocrine:  Negative           Physical Exam:   Vitals: /83 (BP Location: Right arm, Patient Position: Sitting, Cuff Size: Adult Regular)   Pulse 61   Ht 1.461 m (4' 9.5\")   Wt 49.3 kg (108 lb 11.2 oz)   SpO2 98%   BMI 23.12 kg/m     Wt Readings from Last 4 Encounters:   04/13/21 49.3 kg (108 lb 11.2 oz)   10/12/20 49.8 kg (109 lb 12.8 oz)   06/01/20 47.6 kg (105 lb)   01/20/20 47.2 kg (104 lb)     GEN: well nourished, in no acute distress.  HEENT:  Pupils equal, round. Sclerae nonicteric.   NECK: Supple, no masses appreciated.   C/V:  Regular rate and rhythm, no murmur, rub or gallop.    RESP: Respirations are unlabored. Clear to auscultation bilaterally without wheezing, rales, or rhonchi.  GI: Abdomen soft, nontender.  EXTREM: No LE edema.  NEURO: Alert and oriented, cooperative.  SKIN: Warm and dry.        Data:     LIPID RESULTS:  Lab Results   Component Value Date    CHOL 275 (H) 10/12/2020    HDL 52 10/12/2020     (H) 10/12/2020    TRIG 157 (H) 10/12/2020    CHOLHDLRATIO 5.6 (H) 12/22/2014 "     LIVER ENZYME RESULTS:  Lab Results   Component Value Date    AST 14 04/29/2020    ALT 22 04/29/2020     CBC RESULTS:  Lab Results   Component Value Date    WBC 8.2 04/29/2020    RBC 4.33 04/29/2020    HGB 13.4 04/29/2020    HCT 41.5 04/29/2020    MCV 96 04/29/2020    MCH 30.9 04/29/2020    MCHC 32.3 04/29/2020    RDW 13.5 04/29/2020     04/29/2020     BMP RESULTS:  Lab Results   Component Value Date     10/12/2020    POTASSIUM 4.8 10/12/2020    CHLORIDE 111 (H) 10/12/2020    CO2 24 10/12/2020    ANIONGAP 5 10/12/2020    GLC 99 10/12/2020    BUN 20 10/12/2020    CR 0.94 10/12/2020    GFRESTIMATED 59 (L) 10/12/2020    GFRESTBLACK 69 10/12/2020    LIO 10.1 10/12/2020      INR RESULTS:  Lab Results   Component Value Date    INR 0.95 04/29/2020    INR 0.91 04/24/2017            Medications     Current Outpatient Medications   Medication Sig Dispense Refill     clonazePAM (KLONOPIN) 0.5 MG ODT Take 1 tablet (0.5 mg) by mouth nightly as needed for anxiety 20 tablet 5     famotidine (PEPCID) 40 MG tablet Take 1 tablet (40 mg) by mouth daily 90 tablet 3     pramipexole (MIRAPEX) 0.25 MG tablet TAKE 1 TABLET BY MOUTH TWICE DAILY AT 5PM AND AT BEDTIME 180 tablet 3     Calcium Carbonate-Vitamin D3 600-400 MG-UNIT TABS Take 1 tablet by mouth            Past Medical History     Past Medical History:   Diagnosis Date     Anxiety     psych yearly, Dr Shea     Family hx of colon cancer     sister     Floaters     Karma leverentz     Fracture of wrist, left, with routine healing, subsequent encounter 8/28/2017     GERD (gastroesophageal reflux disease)      Gross hematuria 9/12/2011     Hyperlipidemia LDL goal < 130      Hyperlipidemia LDL goal <130 8/28/2017     Malnutrition (H) 8/28/2017     Osteoarthritis 12/19/2012     Osteoporosis     Dexa     PONV (postoperative nausea and vomiting)      Restless legs      Skin cancer      Past Surgical History:   Procedure Laterality Date     ARTHROPLASTY KNEE Left  2017    Procedure: ARTHROPLASTY KNEE;  LEFT TOTAL KNEE ARTHROPLASTY;  Surgeon: Lee Ayala MD;  Location: [facility] OR     BIOPSY   approx.    vaginal     BREAST SURGERY      Implants     BUNIONECTOMY      both      SECTION       COLONOSCOPY  8-10     EYE SURGERY  2014    cataract surgery in both eyes     LIPOSUCTION (LOCATION)       RECONSTRUCT BREAST, IMPLANT PROSTHESIS, COMBINED       Family History   Problem Relation Age of Onset     C.A.D. Father      Coronary Artery Disease Father      Hypertension Father      Lipids Mother      Alzheimer Disease Mother      Depression Mother      Osteoporosis Mother         RA     Breast Cancer Sister         53     Cancer - colorectal Sister         40s     Alcoholism Son      Hyperlipidemia Sister      Breast Cancer Sister         had lumpectomy and radiation     Colon Cancer Sister                     Allergies   Crestor [rosuvastatin], Ezetimibe, Hmg-coa-r inhibitors, Miralax [polyethylene glycol], and Pravastatin    30 minutes spent on the date of the encounter doing chart review, history and exam, documentation and further activities as noted above      SHADE Mckeon [facility]  Pager: [phone]    cc:   Suhail Frias MD  [address]  [place] [address]

## 2021-05-25 ENCOUNTER — TRANSFERRED RECORDS (OUTPATIENT)
Dept: HEALTH INFORMATION MANAGEMENT | Facility: CLINIC | Age: 76
End: 2021-05-25

## 2021-06-01 DIAGNOSIS — F41.9 ANXIETY: ICD-10-CM

## 2021-06-02 NOTE — TELEPHONE ENCOUNTER
Routing refill request to provider for review/approval because:  Drug not on the [facility] refill protocol   Last OV notes said 1 year follow up, which would be 11/2021.  Please add appropriate number of refills if any    Pending Prescriptions:                       Disp   Refills    clonazePAM (KLONOPIN) 0.5 MG ODT [Pharmac*20 tab*             Sig: DISSOLVE 1 TABLET(0.5 MG) ON THE TONGUE EVERY           NIGHT AS NEEDED FOR ANXIETY    Last Written Prescription Date:  11/20/20  Last Fill Quantity: 20,  # refills: 5   Last office visit: 10/12/2020 with prescribing provider:  Neema Pimentel Office Visit:      Tia Shen RN  [facility]

## 2021-06-03 ENCOUNTER — TRANSFERRED RECORDS (OUTPATIENT)
Dept: HEALTH INFORMATION MANAGEMENT | Facility: CLINIC | Age: 76
End: 2021-06-03

## 2021-06-03 RX ORDER — CLONAZEPAM 0.5 MG/1
TABLET, ORALLY DISINTEGRATING ORAL
Qty: 20 TABLET | Refills: 0 | Status: SHIPPED | OUTPATIENT
Start: 2021-06-03 | End: 2021-10-21

## 2021-06-10 ENCOUNTER — TRANSFERRED RECORDS (OUTPATIENT)
Dept: HEALTH INFORMATION MANAGEMENT | Facility: CLINIC | Age: 76
End: 2021-06-10

## 2021-06-11 ENCOUNTER — HOSPITAL ENCOUNTER (OUTPATIENT)
Facility: CLINIC | Age: 76
End: 2021-06-11
Attending: ORTHOPAEDIC SURGERY | Admitting: ORTHOPAEDIC SURGERY
Payer: MEDICARE

## 2021-06-13 DIAGNOSIS — Z11.59 ENCOUNTER FOR SCREENING FOR OTHER VIRAL DISEASES: ICD-10-CM

## 2021-07-08 ENCOUNTER — TRANSFERRED RECORDS (OUTPATIENT)
Dept: HEALTH INFORMATION MANAGEMENT | Facility: CLINIC | Age: 76
End: 2021-07-08

## 2021-08-03 ENCOUNTER — TRANSFERRED RECORDS (OUTPATIENT)
Dept: HEALTH INFORMATION MANAGEMENT | Facility: CLINIC | Age: 76
End: 2021-08-03

## 2021-09-04 ENCOUNTER — HEALTH MAINTENANCE LETTER (OUTPATIENT)
Age: 76
End: 2021-09-04

## 2021-10-07 ENCOUNTER — TRANSFERRED RECORDS (OUTPATIENT)
Dept: HEALTH INFORMATION MANAGEMENT | Facility: CLINIC | Age: 76
End: 2021-10-07

## 2021-10-10 ENCOUNTER — TRANSFERRED RECORDS (OUTPATIENT)
Dept: HEALTH INFORMATION MANAGEMENT | Facility: CLINIC | Age: 76
End: 2021-10-10

## 2021-10-20 ASSESSMENT — ENCOUNTER SYMPTOMS
FREQUENCY: 1
CHILLS: 0
HEARTBURN: 1
CONSTIPATION: 1
DYSURIA: 0
WEAKNESS: 1
HEADACHES: 1
NERVOUS/ANXIOUS: 1
PARESTHESIAS: 0
NAUSEA: 0
DIARRHEA: 0
EYE PAIN: 0
COUGH: 0
DIZZINESS: 0
HEMATOCHEZIA: 0
JOINT SWELLING: 0
ARTHRALGIAS: 1
BREAST MASS: 0
FEVER: 0
PALPITATIONS: 0
SORE THROAT: 0
HEMATURIA: 0
SHORTNESS OF BREATH: 1
ABDOMINAL PAIN: 0
MYALGIAS: 1

## 2021-10-20 ASSESSMENT — ACTIVITIES OF DAILY LIVING (ADL): CURRENT_FUNCTION: NO ASSISTANCE NEEDED

## 2021-10-21 PROBLEM — G25.81 RESTLESS LEG SYNDROME: Status: ACTIVE | Noted: 2021-10-21

## 2021-10-22 ENCOUNTER — OFFICE VISIT (OUTPATIENT)
Dept: FAMILY MEDICINE | Facility: CLINIC | Age: 76
End: 2021-10-22
Payer: MEDICARE

## 2021-10-22 VITALS
RESPIRATION RATE: 16 BRPM | TEMPERATURE: 97.1 F | HEIGHT: 58 IN | SYSTOLIC BLOOD PRESSURE: 127 MMHG | WEIGHT: 106 LBS | BODY MASS INDEX: 22.25 KG/M2 | DIASTOLIC BLOOD PRESSURE: 76 MMHG | HEART RATE: 74 BPM | OXYGEN SATURATION: 99 %

## 2021-10-22 DIAGNOSIS — K21.9 GASTROESOPHAGEAL REFLUX DISEASE WITHOUT ESOPHAGITIS: ICD-10-CM

## 2021-10-22 DIAGNOSIS — Z80.0 FAMILY HISTORY OF COLON CANCER: ICD-10-CM

## 2021-10-22 DIAGNOSIS — Z96.652 STATUS POST TOTAL LEFT KNEE REPLACEMENT: ICD-10-CM

## 2021-10-22 DIAGNOSIS — F41.9 ANXIETY: ICD-10-CM

## 2021-10-22 DIAGNOSIS — M47.819 ARTHRITIS OF SPINE: ICD-10-CM

## 2021-10-22 DIAGNOSIS — E78.5 HYPERLIPIDEMIA WITH TARGET LDL LESS THAN 130: ICD-10-CM

## 2021-10-22 DIAGNOSIS — Z00.00 ENCOUNTER FOR ANNUAL WELLNESS VISIT (AWV) IN MEDICARE PATIENT: Primary | ICD-10-CM

## 2021-10-22 DIAGNOSIS — M81.0 AGE-RELATED OSTEOPOROSIS WITHOUT CURRENT PATHOLOGICAL FRACTURE: ICD-10-CM

## 2021-10-22 DIAGNOSIS — G25.81 RESTLESS LEG SYNDROME: ICD-10-CM

## 2021-10-22 PROBLEM — M47.812 ARTHRITIS OF NECK: Status: ACTIVE | Noted: 2021-10-22

## 2021-10-22 PROCEDURE — G0439 PPPS, SUBSEQ VISIT: HCPCS | Performed by: INTERNAL MEDICINE

## 2021-10-22 PROCEDURE — 99214 OFFICE O/P EST MOD 30 MIN: CPT | Mod: 25 | Performed by: INTERNAL MEDICINE

## 2021-10-22 RX ORDER — CLONAZEPAM 0.5 MG/1
TABLET, ORALLY DISINTEGRATING ORAL
Qty: 20 TABLET | Refills: 3 | Status: SHIPPED | OUTPATIENT
Start: 2021-10-22 | End: 2022-08-30

## 2021-10-22 RX ORDER — FAMOTIDINE 40 MG/1
40 TABLET, FILM COATED ORAL DAILY
Qty: 90 TABLET | Refills: 3 | Status: SHIPPED | OUTPATIENT
Start: 2021-10-22 | End: 2022-03-18 | Stop reason: ALTCHOICE

## 2021-10-22 RX ORDER — GABAPENTIN 100 MG/1
CAPSULE ORAL
Qty: 270 CAPSULE | Refills: 3 | Status: SHIPPED | OUTPATIENT
Start: 2021-10-22 | End: 2021-11-26

## 2021-10-22 RX ORDER — PRAMIPEXOLE DIHYDROCHLORIDE 0.25 MG/1
TABLET ORAL
Qty: 180 TABLET | Refills: 3 | Status: SHIPPED | OUTPATIENT
Start: 2021-10-22 | End: 2022-11-07

## 2021-10-22 RX ORDER — PANTOPRAZOLE SODIUM 40 MG/1
40 TABLET, DELAYED RELEASE ORAL DAILY
Qty: 90 TABLET | Refills: 3 | Status: SHIPPED | OUTPATIENT
Start: 2021-10-22 | End: 2021-11-26

## 2021-10-22 ASSESSMENT — ACTIVITIES OF DAILY LIVING (ADL): CURRENT_FUNCTION: NO ASSISTANCE NEEDED

## 2021-10-22 ASSESSMENT — MIFFLIN-ST. JEOR: SCORE: 852.62

## 2021-10-22 NOTE — PROGRESS NOTES
"awv  SUBJECTIVE:   Hanane Sorto is a 76 year old female who presents for Preventive Visit.    This very pleasant 76 years old lady has been going to [facility] for epidural injections for neck and back  She also had left knee replacement  And her hips and knees are fine  She feels anxious at times  And restless legs are stable on Mirapex  But her heartburn is not stable on famotidine at times it flares up  No dysphagia  Patient has been advised of split billing requirements and indicates understanding: Yes   Are you in the first 12 months of your Medicare coverage?  No    Healthy Habits:     In general, how would you rate your overall health?  Fair    Frequency of exercise:  2-3 days/week    Duration of exercise:  Less than 15 minutes    Do you usually eat at least 4 servings of fruit and vegetables a day, include whole grains    & fiber and avoid regularly eating high fat or \"junk\" foods?  No    Taking medications regularly:  No    Barriers to taking medications:  Problems remembering to take them    Medication side effects:  None    Ability to successfully perform activities of daily living:  No assistance needed    Home Safety:  No safety concerns identified    Hearing Impairment:  No hearing concerns    In the past 6 months, have you been bothered by leaking of urine?  No    In general, how would you rate your overall mental or emotional health?  Good      PHQ-2 Total Score: 0    Additional concerns today:  Yes    Do you feel safe in your environment? Yes    Have you ever done Advance Care Planning? (For example, a Health Directive, POLST, or a discussion with a medical provider or your loved ones about your wishes): Yes, patient states has an Advance Care Planning document and will bring a copy to the clinic.       Fall risk  Fallen 2 or more times in the past year?: No  Any fall with injury in the past year?: No    Cognitive Screening   1) Repeat 3 items (Leader, Season, Table)    2) Clock draw: NORMAL  3) 3 " item recall: Recalls 3 objects  Results: 3 items recalled: COGNITIVE IMPAIRMENT LESS LIKELY    Mini-CogTM Copyright S Kyle. Licensed by the author for use in [facility]; reprinted with permission ([email or web address]@.[email or web address]). All rights reserved.      Do you have sleep apnea, excessive snoring or daytime drowsiness?: no    Reviewed and updated as needed this visit by clinical staff  Tobacco  Allergies  Meds  Problems  Med Hx  Surg Hx  Fam Hx  Soc Hx          Reviewed and updated as needed this visit by Provider    Meds  Problems  Med Hx  Surg Hx  Fam Hx         Social History     Tobacco Use     Smoking status: Former Smoker     Packs/day: 0.50     Years: 15.00     Pack years: 7.50     Types: Cigarettes     Start date:      Quit date:      Years since quittin.8     Smokeless tobacco: Never Used     Tobacco comment: one pack/ three months   Substance Use Topics     Alcohol use: Yes     Alcohol/week: 0.0 - 4.0 standard drinks     Comment: one glass of wine a week     If you drink alcohol do you typically have >3 drinks per day or >7 drinks per week? No    Alcohol Use 10/22/2021   Prescreen: >3 drinks/day or >7 drinks/week? -   Prescreen: >3 drinks/day or >7 drinks/week? No               Current providers sharing in care for this patient include:   Patient Care Team:  Corona Bethea MD as PCP - General (Internal Medicine)  Corona Bethea MD as Assigned PCP  Julia Cespedes APRN CNP as Assigned Heart and Vascular Provider    The following health maintenance items are reviewed in Epic and correct as of today:  Health Maintenance Due   Topic Date Due     ZOSTER IMMUNIZATION (2 of 3) 2010     BP Readings from Last 3 Encounters:   10/22/21 (!) 153/86   21 133/83   10/12/20 119/59    Wt Readings from Last 3 Encounters:   10/22/21 48.1 kg (106 lb)   21 49.3 kg (108 lb 11.2 oz)   10/12/20 49.8 kg (109 lb 12.8 oz)                  Patient Active Problem List   Diagnosis      Hyperlipidemia with target LDL less than 130     Anxiety     Osteoporosis     Gross hematuria     Advanced directives, counseling/discussion     Osteoarthritis     Bursitis of hip     Constipation     Gastroesophageal reflux disease without esophagitis     S/P total knee arthroplasty     Fracture of wrist, left, with routine healing, subsequent encounter     Restless leg syndrome     Arthritis of neck     Past Surgical History:   Procedure Laterality Date     ARTHROPLASTY KNEE Left 2017    Procedure: ARTHROPLASTY KNEE;  LEFT TOTAL KNEE ARTHROPLASTY;  Surgeon: Lee Ayala MD;  Location: [facility] OR     BIOPSY   approx.    vaginal     BREAST SURGERY      Implants     BUNIONECTOMY      both      SECTION       COLONOSCOPY  -10     EYE SURGERY  2014    cataract surgery in both eyes     LIPOSUCTION (LOCATION)       RECONSTRUCT BREAST, IMPLANT PROSTHESIS, COMBINED         Social History     Tobacco Use     Smoking status: Former Smoker     Packs/day: 0.50     Years: 15.00     Pack years: 7.50     Types: Cigarettes     Start date:      Quit date:      Years since quittin.8     Smokeless tobacco: Never Used     Tobacco comment: one pack/ three months   Substance Use Topics     Alcohol use: Yes     Alcohol/week: 0.0 - 4.0 standard drinks     Comment: one glass of wine a week     Family History   Problem Relation Age of Onset     Lipids Mother      Alzheimer Disease Mother      Depression Mother      Osteoporosis Mother         RA     C.A.MESSI Father      Coronary Artery Disease Father      Hypertension Father      Diabetes Father      Breast Cancer Sister         53     Leukemia Sister      Hyperlipidemia Sister      Colon Cancer Sister              Cerebrovascular Disease Brother          Current Outpatient Medications   Medication Sig Dispense Refill     Calcium Carbonate-Vitamin D3 600-400 MG-UNIT TABS Take 1 tablet by mouth       clonazePAM (KLONOPIN) 0.5 MG ODT  "DISSOLVE 1 TABLET(0.5 MG) ON THE TONGUE EVERY NIGHT AS NEEDED FOR ANXIETY 20 tablet 3     famotidine (PEPCID) 40 MG tablet Take 1 tablet (40 mg) by mouth daily 90 tablet 3     gabapentin (NEURONTIN) 100 MG capsule 100 mg bed time, increase to 200 mg bed time after 5 days, then 300 mg bed time 270 capsule 3     pantoprazole (PROTONIX) 40 MG EC tablet Take 1 tablet (40 mg) by mouth daily 90 tablet 3     pramipexole (MIRAPEX) 0.25 MG tablet TAKE 1 TABLET BY MOUTH TWICE DAILY AT 5PM AND AT BEDTIME 180 tablet 3     Allergies   Allergen Reactions     Crestor [Rosuvastatin]      Ezetimibe Other (See Comments)     Zetia = leg cramps      Hmg-Coa-R Inhibitors Other (See Comments)     Leg cramping     Miralax [Polyethylene Glycol] Other (See Comments)     Back pain     Pravastatin Other (See Comments)     Leg cramps          Mammogram Screening - Patient over age 75, has elected to continue with screening.  Pertinent mammograms are reviewed under the imaging tab.    Review of Systems  10 point ROS of systems including Constitutional, Eyes, Respiratory, Cardiovascular, Gastroenterology, Genitourinary, Integumentary, Muscularskeletal, Psychiatric were all negative except for pertinent positives noted in my HPI.      OBJECTIVE:   BP (!) 153/86 (BP Location: Right arm, Patient Position: Sitting, Cuff Size: Adult Regular)   Pulse 74   Temp 97.1  F (36.2  C) (Temporal)   Resp 16   Ht 1.461 m (4' 9.5\")   Wt 48.1 kg (106 lb)   SpO2 99%   BMI 22.54 kg/m   Estimated body mass index is 22.54 kg/m  as calculated from the following:    Height as of this encounter: 1.461 m (4' 9.5\").    Weight as of this encounter: 48.1 kg (106 lb).  Physical Exam  GENERAL APPEARANCE: healthy, alert and no distress  EYES: Eyes grossly normal to inspection, PERRL and conjunctivae and sclerae normal  HENT: ear canals and TM's normal, nose and mouth without ulcers or lesions, oropharynx clear and oral mucous membranes moist  NECK: no adenopathy, no " asymmetry, masses, or scars and thyroid normal to palpation  RESP: lungs clear to auscultation - no rales, rhonchi or wheezes  BREAST: normal without masses, tenderness or nipple discharge and no palpable axillary masses or adenopathy  CV: regular rate and rhythm, normal S1 S2, no S3 or S4, no murmur, click or rub, no peripheral edema and peripheral pulses strong  ABDOMEN: soft, nontender, no hepatosplenomegaly, no masses and bowel sounds normal  MS: no musculoskeletal defects are noted and gait is age appropriate without ataxia  SKIN: no suspicious lesions or rashes  NEURO: Normal strength and tone, sensory exam grossly normal, mentation intact and speech normal  PSYCH: mentation appears normal and affect normal/bright      Lumbar spine: The T-score is -1.2 from L1 to L4. There has been a 2.3%  decrease in bone density since the prior examination. This is not  statistically significant. Lateral image of the spine demonstrates a  mildly biconcave appearance of T11 but no definite evidence of acute  compression.     Hips: The left hip T-score is -1.9. The right hip T-score is -1.8.  Bone mineral density in the worst hip is 0.773 gm/cm2. There has been  a 11.0% decrease in bone density since the prior examination. This is  statistically significant.    ASSESSMENT / PLAN:   Hanane was seen today for physical.    Diagnoses and all orders for this visit:    Encounter for annual wellness visit (AWV) in Medicare patient  Very pleasant 76 years old who had colonoscopy this year because of family history of colon cancer  Mammography also done in January and up-to-date on immunizations except Shingrix  She cannot exercise much because of back pain  Anxiety  -     clonazePAM (KLONOPIN) 0.5 MG ODT; DISSOLVE 1 TABLET(0.5 MG) ON THE TONGUE EVERY NIGHT AS NEEDED FOR ANXIETY  He uses it occasionally  Restless leg syndrome  -     pramipexole (MIRAPEX) 0.25 MG tablet; TAKE 1 TABLET BY MOUTH TWICE DAILY AT 5PM AND AT BEDTIME  Doing  "well  Hyperlipidemia with target LDL less than 130  -     Comprehensive metabolic panel; Future  -     Lipid panel reflex to direct LDL Fasting; Future  Cannot tolerate statins and is seeing preventive cardiology on November 2  Gastroesophageal reflux disease without esophagitis  -     famotidine (PEPCID) 40 MG tablet; Take 1 tablet (40 mg) by mouth daily  -     CBC with Platelets  ; Future  -     pantoprazole (PROTONIX) 40 MG EC tablet; Take 1 tablet (40 mg) by mouth daily  We will use Protonix for 2 weeks followed by Pepcid daily and as needed use of Protonix in addition to famotidine  Age-related osteoporosis without current pathological fracture  Bone density has improved on calcium and vitamin D but would also be spuriously high because of arthritis  Arthritis of spine  -     gabapentin (NEURONTIN) 100 MG capsule; 100 mg bed time, increase to 200 mg bed time after 5 days, then 300 mg bed time  We discussed the side effects of gabapentin and we will start  Status post total left knee replacement  Stable  Family history of colon cancer  As above  Other orders  -     REVIEW OF HEALTH MAINTENANCE PROTOCOL ORDERS          COUNSELING:  Advise Shingrix  Bone health  Estimated body mass index is 22.54 kg/m  as calculated from the following:    Height as of this encounter: 1.461 m (4' 9.5\").    Weight as of this encounter: 48.1 kg (106 lb).        She reports that she quit smoking about 46 years ago. Her smoking use included cigarettes. She started smoking about 61 years ago. She has a 7.50 pack-year smoking history. She has never used smokeless tobacco.      Appropriate preventive services were discussed with this patient, including applicable screening as appropriate for cardiovascular disease, diabetes, osteopenia/osteoporosis, and glaucoma.  As appropriate for age/gender, discussed screening for colorectal cancer,, breast cancer, . Checklist reviewing preventive services available has been given to the " patient.    Reviewed patients plan of care and provided an AVS. The Basic Care Plan (routine screening as documented in Health Maintenance) for Hanane meets the Care Plan requirement. This Care Plan has been established and reviewed with the Patient.    Counseling Resources:  ATP IV Guidelines  Pooled Cohorts Equation Calculator  Breast Cancer Risk Calculator  Breast Cancer: Medication to Reduce Risk  FRAX Risk Assessment  ICSI Preventive Guidelines  Dietary Guidelines for Americans, 2010  [employer]'s MyPlate  ASA Prophylaxis  Lung CA Screening    Corona Bethea MD  [facility]    Identified Health Risks:

## 2021-11-02 ENCOUNTER — LAB (OUTPATIENT)
Dept: LAB | Facility: CLINIC | Age: 76
End: 2021-11-02
Payer: MEDICARE

## 2021-11-02 DIAGNOSIS — K21.9 GASTROESOPHAGEAL REFLUX DISEASE WITHOUT ESOPHAGITIS: ICD-10-CM

## 2021-11-02 DIAGNOSIS — E78.5 HYPERLIPIDEMIA WITH TARGET LDL LESS THAN 130: ICD-10-CM

## 2021-11-02 LAB
ERYTHROCYTE [DISTWIDTH] IN BLOOD BY AUTOMATED COUNT: 14 % (ref 10–15)
HCT VFR BLD AUTO: 39.5 % (ref 35–47)
HGB BLD-MCNC: 12.4 G/DL (ref 11.7–15.7)
MCH RBC QN AUTO: 30.3 PG (ref 26.5–33)
MCHC RBC AUTO-ENTMCNC: 31.4 G/DL (ref 31.5–36.5)
MCV RBC AUTO: 97 FL (ref 78–100)
PLATELET # BLD AUTO: 338 10E3/UL (ref 150–450)
RBC # BLD AUTO: 4.09 10E6/UL (ref 3.8–5.2)
WBC # BLD AUTO: 6.2 10E3/UL (ref 4–11)

## 2021-11-02 PROCEDURE — 36415 COLL VENOUS BLD VENIPUNCTURE: CPT

## 2021-11-02 PROCEDURE — 85027 COMPLETE CBC AUTOMATED: CPT

## 2021-11-02 PROCEDURE — 80061 LIPID PANEL: CPT

## 2021-11-02 PROCEDURE — 80053 COMPREHEN METABOLIC PANEL: CPT

## 2021-11-03 ENCOUNTER — TRANSFERRED RECORDS (OUTPATIENT)
Dept: HEALTH INFORMATION MANAGEMENT | Facility: CLINIC | Age: 76
End: 2021-11-03
Payer: MEDICARE

## 2021-11-03 LAB
ALBUMIN SERPL-MCNC: 3.8 G/DL (ref 3.4–5)
ALP SERPL-CCNC: 91 U/L (ref 40–150)
ALT SERPL W P-5'-P-CCNC: 23 U/L (ref 0–50)
ANION GAP SERPL CALCULATED.3IONS-SCNC: 4 MMOL/L (ref 3–14)
AST SERPL W P-5'-P-CCNC: 10 U/L (ref 0–45)
BILIRUB SERPL-MCNC: 0.3 MG/DL (ref 0.2–1.3)
BUN SERPL-MCNC: 22 MG/DL (ref 7–30)
CALCIUM SERPL-MCNC: 9.6 MG/DL (ref 8.5–10.1)
CHLORIDE BLD-SCNC: 112 MMOL/L (ref 94–109)
CHOLEST SERPL-MCNC: 270 MG/DL
CO2 SERPL-SCNC: 26 MMOL/L (ref 20–32)
CREAT SERPL-MCNC: 0.89 MG/DL (ref 0.52–1.04)
FASTING STATUS PATIENT QL REPORTED: YES
GFR SERPL CREATININE-BSD FRML MDRD: 63 ML/MIN/1.73M2
GLUCOSE BLD-MCNC: 84 MG/DL (ref 70–99)
HDLC SERPL-MCNC: 44 MG/DL
LDLC SERPL CALC-MCNC: 188 MG/DL
NONHDLC SERPL-MCNC: 226 MG/DL
POTASSIUM BLD-SCNC: 4.7 MMOL/L (ref 3.4–5.3)
PROT SERPL-MCNC: 6.6 G/DL (ref 6.8–8.8)
SODIUM SERPL-SCNC: 142 MMOL/L (ref 133–144)
TRIGL SERPL-MCNC: 190 MG/DL

## 2021-11-05 ENCOUNTER — OFFICE VISIT (OUTPATIENT)
Dept: CARDIOLOGY | Facility: CLINIC | Age: 76
End: 2021-11-05
Payer: MEDICARE

## 2021-11-05 VITALS
SYSTOLIC BLOOD PRESSURE: 137 MMHG | HEIGHT: 57 IN | DIASTOLIC BLOOD PRESSURE: 84 MMHG | HEART RATE: 57 BPM | WEIGHT: 107 LBS | BODY MASS INDEX: 23.08 KG/M2

## 2021-11-05 DIAGNOSIS — I25.10 CORONARY ARTERY DISEASE INVOLVING NATIVE CORONARY ARTERY OF NATIVE HEART WITHOUT ANGINA PECTORIS: Primary | ICD-10-CM

## 2021-11-05 DIAGNOSIS — E78.5 HYPERLIPIDEMIA WITH TARGET LDL LESS THAN 130: ICD-10-CM

## 2021-11-05 PROCEDURE — 99213 OFFICE O/P EST LOW 20 MIN: CPT | Performed by: INTERNAL MEDICINE

## 2021-11-05 RX ORDER — TRIAMCINOLONE ACETONIDE 55 UG/1
2 SPRAY, METERED NASAL DAILY
COMMUNITY
End: 2022-04-21

## 2021-11-05 ASSESSMENT — MIFFLIN-ST. JEOR: SCORE: 849.23

## 2021-11-05 NOTE — LETTER
11/5/2021    Corona Bethea MD  [address]  [facility] [address]    RE: Hanane Sorto       Dear Colleague,    I had the pleasure of seeing Hanane Sorto in the [facility].  HPI and Plan:     Ms. Sorto is a very pleasant 76-year-old female with asymptomatic coronary artery disease on the basis of abnormal calcium score in 2016, negative stress test in 2018, personal history of significant dyslipidemia with LDL as high as 238 in the past, history of intolerance to several statins including pravastatin and other antilipid therapy like Zetia, family history of premature coronary disease.  Patient also declined PCSK9 inhibitors.  Today she is coming for routine follow-up.  No specific cardiac complaints.  Her main health issue is ongoing arthritis involving the legs knees and back.  Despite the arthritis issue patient has been fairly active playing doubles tennis at least twice a week and some of the sessions involves a drill where she has to kind of run.  No exertional symptoms like chest discomfort or shortness of breath.  Due to elevated coronary artery calcium score patient has also been recommended to start baby aspirin but she has been hesitant to use it.    Assessment plan  A pleasant 76-year-old female with asymptomatic coronary artery disease on the basis of abnormal calcium score in the past, significant dyslipidemia with significantly elevated LDL and patient has history of intolerance to statins and Zetia and also declined PCSK9 inhibitors.  Overall cardiac status wise she is doing well without any symptoms of angina congestive heart failure.  Cardiac auscultation was benign today.  We again talk about rationale and importance of treating dyslipidemia.  We talked about PCSK9 inhibitors.  At this time patient has not made up her mind but will let us know if she changes her mind and we can prescribe one of the PCSK9 inhibitors.  We also discussed  about aspirin, risk benefits with risks including but not limited to risk of gastritis, peptic ulcer disease, increase bleeding risk.  Patient at this time is not sure whether she would like to try aspirin.  If she is willing to try I would recommend 81 mg of aspirin every day and if she notices any side effects like as noted above she can stop aspirin let us know.  Otherwise we can see her back in 1 year, sooner if she notes any change in clinical status especially any exertion related symptoms.    Orders Placed This Encounter   Procedures     Follow-Up with Cardiologist       Orders Placed This Encounter   Medications     triamcinolone (NASACORT) 55 MCG/ACT nasal aerosol     Sig: Spray 2 sprays into both nostrils daily       There are no discontinued medications.      Encounter Diagnoses   Name Primary?     Hyperlipidemia with target LDL less than 130      Coronary artery disease involving native coronary artery of native heart without angina pectoris Yes       CURRENT MEDICATIONS:  Current Outpatient Medications   Medication Sig Dispense Refill     Calcium Carbonate-Vitamin D3 600-400 MG-UNIT TABS Take 1 tablet by mouth       clonazePAM (KLONOPIN) 0.5 MG ODT DISSOLVE 1 TABLET(0.5 MG) ON THE TONGUE EVERY NIGHT AS NEEDED FOR ANXIETY 20 tablet 3     famotidine (PEPCID) 40 MG tablet Take 1 tablet (40 mg) by mouth daily 90 tablet 3     gabapentin (NEURONTIN) 100 MG capsule 100 mg bed time, increase to 200 mg bed time after 5 days, then 300 mg bed time (Patient taking differently: 100 mg bed time, increase to 200 mg bed time after 5 days, then 300 mg bed time    pt advises she is taking 2 capsules at night  11/5/2021) 270 capsule 3     pramipexole (MIRAPEX) 0.25 MG tablet TAKE 1 TABLET BY MOUTH TWICE DAILY AT 5PM AND AT BEDTIME 180 tablet 3     triamcinolone (NASACORT) 55 MCG/ACT nasal aerosol Spray 2 sprays into both nostrils daily       pantoprazole (PROTONIX) 40 MG EC tablet Take 1 tablet (40 mg) by mouth daily 90  tablet 3       ALLERGIES     Allergies   Allergen Reactions     Crestor [Rosuvastatin]      Ezetimibe Other (See Comments)     Zetia = leg cramps      Hmg-Coa-R Inhibitors Other (See Comments)     Leg cramping     Miralax [Polyethylene Glycol] Other (See Comments)     Back pain     Pravastatin Other (See Comments)     Leg cramps        PAST MEDICAL HISTORY:  Past Medical History:   Diagnosis Date     Anxiety     psych yearly, Dr Shea     Family hx of colon cancer     sister     Floaters     Karma mitchz     Fracture of wrist, left, with routine healing, subsequent encounter 2017     GERD (gastroesophageal reflux disease)      Gross hematuria 2011     Hyperlipidemia LDL goal < 130      Hyperlipidemia LDL goal <130 2017     Malnutrition (H) 2017     Osteoarthritis 2012     Osteoporosis     Dexa     PONV (postoperative nausea and vomiting)      Restless legs      Skin cancer        PAST SURGICAL HISTORY:  Past Surgical History:   Procedure Laterality Date     ARTHROPLASTY KNEE Left 2017    Procedure: ARTHROPLASTY KNEE;  LEFT TOTAL KNEE ARTHROPLASTY;  Surgeon: Lee Ayala MD;  Location: [facility] OR     BIOPSY   approx.    vaginal     BREAST SURGERY      Implants     BUNIONECTOMY      both      SECTION       COLONOSCOPY  8-10     EYE SURGERY  2014    cataract surgery in both eyes     LIPOSUCTION (LOCATION)       RECONSTRUCT BREAST, IMPLANT PROSTHESIS, COMBINED         FAMILY HISTORY:  Family History   Problem Relation Age of Onset     Lipids Mother      Alzheimer Disease Mother      Depression Mother      Osteoporosis Mother         TOM PALMER Father      Coronary Artery Disease Father      Hypertension Father      Diabetes Father      Breast Cancer Sister         53     Leukemia Sister      Hyperlipidemia Sister      Colon Cancer Sister              Cerebrovascular Disease Brother        SOCIAL HISTORY:  Social History     Socioeconomic History      Marital status:      Spouse name: None     Number of children: None     Years of education: None     Highest education level: None   Occupational History     None   Tobacco Use     Smoking status: Former Smoker     Packs/day: 0.50     Years: 15.00     Pack years: 7.50     Types: Cigarettes     Start date:      Quit date:      Years since quittin.8     Smokeless tobacco: Never Used     Tobacco comment: one pack/ three months   Substance and Sexual Activity     Alcohol use: Yes     Alcohol/week: 0.0 - 4.0 standard drinks     Comment: one glass of wine a week     Drug use: No     Sexual activity: Not Currently     Partners: Male     Birth control/protection: Post-menopausal   Other Topics Concern      Service Not Asked     Blood Transfusions Not Asked     Caffeine Concern Not Asked     Occupational Exposure Not Asked     Hobby Hazards Not Asked     Sleep Concern Not Asked     Stress Concern Not Asked     Weight Concern Not Asked     Special Diet Not Asked     Back Care Not Asked     Exercise Yes     Bike Helmet Not Asked     Seat Belt Not Asked     Self-Exams Not Asked     Parent/sibling w/ CABG, MI or angioplasty before 65F 55M? No   Social History Narrative     None     Social Determinants of Health     Financial Resource Strain:      Difficulty of Paying Living Expenses:    Food Insecurity:      Worried About Running Out of Food in the Last Year:      Ran Out of Food in the Last Year:    Transportation Needs:      Lack of Transportation (Medical):      Lack of Transportation (Non-Medical):    Physical Activity:      Days of Exercise per Week:      Minutes of Exercise per Session:    Stress:      Feeling of Stress :    Social Connections:      Frequency of Communication with Friends and Family:      Frequency of Social Gatherings with Friends and Family:      Attends [employer] Services:      Active Member of Clubs or Organizations:      Attends Club or Organization Meetings:       "Marital Status:    Intimate Partner Violence:      Fear of Current or Ex-Partner:      Emotionally Abused:      Physically Abused:      Sexually Abused:        Review of Systems:  Skin:  Negative       Eyes:  Negative      ENT:  Positive for nasal congestion    Respiratory: Denies any particular exertional shortness of breath  Cardiovascular:  Negative      Gastroenterology: Negative heartburn    Genitourinary:  Negative      Musculoskeletal:  Positive for arthritis;neck pain    Neurologic:  Negative      Psychiatric:  Positive for anxiety    Heme/Lymph/Imm:  Positive for allergies    Endocrine:  Negative        Physical Exam:  Vitals: /84 (BP Location: Left arm, Cuff Size: Adult Small)   Pulse 57   Ht 1.448 m (4' 9\")   Wt 48.5 kg (107 lb)   BMI 23.15 kg/m      General patient appears comfortable  Neck normal JVP  Cardiovascular system S1-S2 normal no murmur rub or gallop  Respiratory system clear to auscultation  Extremities no edema  Neurological alert, oriented      Suhail Frias MD   [facility]      cc:   Julia Cespedes, SHADE CNP  [address]  [place] [id number]  "

## 2021-11-05 NOTE — PROGRESS NOTES
HPI and Plan:     Ms. Sorto is a very pleasant 76-year-old female with asymptomatic coronary artery disease on the basis of abnormal calcium score in 2016, negative stress test in 2018, personal history of significant dyslipidemia with LDL as high as 238 in the past, history of intolerance to several statins including pravastatin and other antilipid therapy like Zetia, family history of premature coronary disease.  Patient also declined PCSK9 inhibitors.  Today she is coming for routine follow-up.  No specific cardiac complaints.  Her main health issue is ongoing arthritis involving the legs knees and back.  Despite the arthritis issue patient has been fairly active playing doubles tennis at least twice a week and some of the sessions involves a drill where she has to kind of run.  No exertional symptoms like chest discomfort or shortness of breath.  Due to elevated coronary artery calcium score patient has also been recommended to start baby aspirin but she has been hesitant to use it.    Assessment plan  A pleasant 76-year-old female with asymptomatic coronary artery disease on the basis of abnormal calcium score in the past, significant dyslipidemia with significantly elevated LDL and patient has history of intolerance to statins and Zetia and also declined PCSK9 inhibitors.  Overall cardiac status wise she is doing well without any symptoms of angina congestive heart failure.  Cardiac auscultation was benign today.  We again talk about rationale and importance of treating dyslipidemia.  We talked about PCSK9 inhibitors.  At this time patient has not made up her mind but will let us know if she changes her mind and we can prescribe one of the PCSK9 inhibitors.  We also discussed about aspirin, risk benefits with risks including but not limited to risk of gastritis, peptic ulcer disease, increase bleeding risk.  Patient at this time is not sure whether she would like to try aspirin.  If she is willing to try I  would recommend 81 mg of aspirin every day and if she notices any side effects like as noted above she can stop aspirin let us know.  Otherwise we can see her back in 1 year, sooner if she notes any change in clinical status especially any exertion related symptoms.    Orders Placed This Encounter   Procedures     Follow-Up with Cardiologist       Orders Placed This Encounter   Medications     triamcinolone (NASACORT) 55 MCG/ACT nasal aerosol     Sig: Spray 2 sprays into both nostrils daily       There are no discontinued medications.      Encounter Diagnoses   Name Primary?     Hyperlipidemia with target LDL less than 130      Coronary artery disease involving native coronary artery of native heart without angina pectoris Yes       CURRENT MEDICATIONS:  Current Outpatient Medications   Medication Sig Dispense Refill     Calcium Carbonate-Vitamin D3 600-400 MG-UNIT TABS Take 1 tablet by mouth       clonazePAM (KLONOPIN) 0.5 MG ODT DISSOLVE 1 TABLET(0.5 MG) ON THE TONGUE EVERY NIGHT AS NEEDED FOR ANXIETY 20 tablet 3     famotidine (PEPCID) 40 MG tablet Take 1 tablet (40 mg) by mouth daily 90 tablet 3     gabapentin (NEURONTIN) 100 MG capsule 100 mg bed time, increase to 200 mg bed time after 5 days, then 300 mg bed time (Patient taking differently: 100 mg bed time, increase to 200 mg bed time after 5 days, then 300 mg bed time    pt advises she is taking 2 capsules at night  11/5/2021) 270 capsule 3     pramipexole (MIRAPEX) 0.25 MG tablet TAKE 1 TABLET BY MOUTH TWICE DAILY AT 5PM AND AT BEDTIME 180 tablet 3     triamcinolone (NASACORT) 55 MCG/ACT nasal aerosol Spray 2 sprays into both nostrils daily       pantoprazole (PROTONIX) 40 MG EC tablet Take 1 tablet (40 mg) by mouth daily 90 tablet 3       ALLERGIES     Allergies   Allergen Reactions     Crestor [Rosuvastatin]      Ezetimibe Other (See Comments)     Zetia = leg cramps      Hmg-Coa-R Inhibitors Other (See Comments)     Leg cramping     Miralax [Polyethylene  Glycol] Other (See Comments)     Back pain     Pravastatin Other (See Comments)     Leg cramps        PAST MEDICAL HISTORY:  Past Medical History:   Diagnosis Date     Anxiety     psych yearly, Dr Shea     Family hx of colon cancer     sister     Floaters     Karma leverentz     Fracture of wrist, left, with routine healing, subsequent encounter 2017     GERD (gastroesophageal reflux disease)      Gross hematuria 2011     Hyperlipidemia LDL goal < 130      Hyperlipidemia LDL goal <130 2017     Malnutrition (H) 2017     Osteoarthritis 2012     Osteoporosis     Dexa     PONV (postoperative nausea and vomiting)      Restless legs      Skin cancer        PAST SURGICAL HISTORY:  Past Surgical History:   Procedure Laterality Date     ARTHROPLASTY KNEE Left 2017    Procedure: ARTHROPLASTY KNEE;  LEFT TOTAL KNEE ARTHROPLASTY;  Surgeon: Lee Ayala MD;  Location: [facility]     BIOPSY   approx.    vaginal     BREAST SURGERY      Implants     BUNIONECTOMY      both      SECTION       COLONOSCOPY  8-10     EYE SURGERY  2014    cataract surgery in both eyes     LIPOSUCTION (LOCATION)       RECONSTRUCT BREAST, IMPLANT PROSTHESIS, COMBINED         FAMILY HISTORY:  Family History   Problem Relation Age of Onset     Lipids Mother      Alzheimer Disease Mother      Depression Mother      Osteoporosis Mother         RA     C.AALFREDO Father      Coronary Artery Disease Father      Hypertension Father      Diabetes Father      Breast Cancer Sister         53     Leukemia Sister      Hyperlipidemia Sister      Colon Cancer Sister              Cerebrovascular Disease Brother        SOCIAL HISTORY:  Social History     Socioeconomic History     Marital status:      Spouse name: None     Number of children: None     Years of education: None     Highest education level: None   Occupational History     None   Tobacco Use     Smoking status: Former Smoker      Packs/day: 0.50     Years: 15.00     Pack years: 7.50     Types: Cigarettes     Start date:      Quit date:      Years since quittin.8     Smokeless tobacco: Never Used     Tobacco comment: one pack/ three months   Substance and Sexual Activity     Alcohol use: Yes     Alcohol/week: 0.0 - 4.0 standard drinks     Comment: one glass of wine a week     Drug use: No     Sexual activity: Not Currently     Partners: Male     Birth control/protection: Post-menopausal   Other Topics Concern      Service Not Asked     Blood Transfusions Not Asked     Caffeine Concern Not Asked     Occupational Exposure Not Asked     Hobby Hazards Not Asked     Sleep Concern Not Asked     Stress Concern Not Asked     Weight Concern Not Asked     Special Diet Not Asked     Back Care Not Asked     Exercise Yes     Bike Helmet Not Asked     Seat Belt Not Asked     Self-Exams Not Asked     Parent/sibling w/ CABG, MI or angioplasty before 65F 55M? No   Social History Narrative     None     Social Determinants of Health     Financial Resource Strain:      Difficulty of Paying Living Expenses:    Food Insecurity:      Worried About Running Out of Food in the Last Year:      Ran Out of Food in the Last Year:    Transportation Needs:      Lack of Transportation (Medical):      Lack of Transportation (Non-Medical):    Physical Activity:      Days of Exercise per Week:      Minutes of Exercise per Session:    Stress:      Feeling of Stress :    Social Connections:      Frequency of Communication with Friends and Family:      Frequency of Social Gatherings with Friends and Family:      Attends Protestant Services:      Active Member of Clubs or Organizations:      Attends Club or Organization Meetings:      Marital Status:    Intimate Partner Violence:      Fear of Current or Ex-Partner:      Emotionally Abused:      Physically Abused:      Sexually Abused:        Review of Systems:  Skin:  Negative       Eyes:  Negative      ENT:   "Positive for nasal congestion    Respiratory: Denies any particular exertional shortness of breath  Cardiovascular:  Negative      Gastroenterology: Negative heartburn    Genitourinary:  Negative      Musculoskeletal:  Positive for arthritis;neck pain    Neurologic:  Negative      Psychiatric:  Positive for anxiety    Heme/Lymph/Imm:  Positive for allergies    Endocrine:  Negative        Physical Exam:  Vitals: /84 (BP Location: Left arm, Cuff Size: Adult Small)   Pulse 57   Ht 1.448 m (4' 9\")   Wt 48.5 kg (107 lb)   BMI 23.15 kg/m      General patient appears comfortable  Neck normal JVP  Cardiovascular system S1-S2 normal no murmur rub or gallop  Respiratory system clear to auscultation  Extremities no edema  Neurological alert, oriented      CC  Julia Cespedes, SHADE CNP  [address]  [place] [id number]                  "

## 2021-11-12 ENCOUNTER — TRANSFERRED RECORDS (OUTPATIENT)
Dept: HEALTH INFORMATION MANAGEMENT | Facility: CLINIC | Age: 76
End: 2021-11-12
Payer: MEDICARE

## 2021-11-26 ENCOUNTER — OFFICE VISIT (OUTPATIENT)
Dept: FAMILY MEDICINE | Facility: CLINIC | Age: 76
End: 2021-11-26
Payer: MEDICARE

## 2021-11-26 VITALS
HEART RATE: 90 BPM | BODY MASS INDEX: 23.31 KG/M2 | DIASTOLIC BLOOD PRESSURE: 86 MMHG | WEIGHT: 107.7 LBS | OXYGEN SATURATION: 100 % | RESPIRATION RATE: 16 BRPM | SYSTOLIC BLOOD PRESSURE: 137 MMHG | TEMPERATURE: 97.1 F

## 2021-11-26 DIAGNOSIS — M70.62 TROCHANTERIC BURSITIS OF LEFT HIP: Primary | ICD-10-CM

## 2021-11-26 PROCEDURE — 99213 OFFICE O/P EST LOW 20 MIN: CPT | Performed by: PHYSICIAN ASSISTANT

## 2021-11-26 NOTE — PATIENT INSTRUCTIONS
Take tylenol as needed for pain up to 1000mg three times daily, do not exceed 3000mg in 24 hour period    Ice 2- 3 times per day

## 2021-11-26 NOTE — PROGRESS NOTES
Assessment and Plan:     (M70.62) Trochanteric bursitis of left hip  (primary encounter diagnosis)  Comment: has had before, onset of current episode about one month ago  Plan: [facility]  Tylenol prn, ice and try PT  If not better in one month make follow-up appt        Mindy Suggs PA-C        Kalyn Koo is a 76 year old who presents for the following health issues  HPI     Has history of left hip bursitis  Has had an injection in the past  She thinks she has it again  She has pain over left hip that radiates down lateral thigh x one month  No trauma/injury, able to bear weight, no skin changes/swelling or redness, no abdominal pain    Review of Systems   See above      Objective    /86 (BP Location: Left arm, Patient Position: Sitting, Cuff Size: Adult Regular)   Pulse 90   Temp 97.1  F (36.2  C) (Temporal)   Resp 16   Wt 48.9 kg (107 lb 11.2 oz)   SpO2 100%   BMI 23.31 kg/m    Body mass index is 23.31 kg/m .     Physical Exam     GENERAL: healthy, alert and no distress  MS: extremities- no gross deformities noted, no edema  Full ROM w/out pain bilat hips with IR/ER and flexion/extension  Pain with palpation of left troch bursitis with extension of pain into IT band, no swelling

## 2021-12-01 ENCOUNTER — TRANSFERRED RECORDS (OUTPATIENT)
Dept: HEALTH INFORMATION MANAGEMENT | Facility: CLINIC | Age: 76
End: 2021-12-01
Payer: MEDICARE

## 2021-12-03 ENCOUNTER — THERAPY VISIT (OUTPATIENT)
Dept: PHYSICAL THERAPY | Facility: CLINIC | Age: 76
End: 2021-12-03
Payer: MEDICARE

## 2021-12-03 DIAGNOSIS — M70.62 TROCHANTERIC BURSITIS OF LEFT HIP: ICD-10-CM

## 2021-12-03 PROCEDURE — 97110 THERAPEUTIC EXERCISES: CPT | Mod: GP

## 2021-12-03 PROCEDURE — 97161 PT EVAL LOW COMPLEX 20 MIN: CPT | Mod: GP

## 2021-12-03 ASSESSMENT — ACTIVITIES OF DAILY LIVING (ADL)
WALKING_APPROXIMATELY_10_MINUTES: SLIGHT DIFFICULTY
WALKING_INITIALLY: NO DIFFICULTY AT ALL
GOING_UP_1_FLIGHT_OF_STAIRS: NO DIFFICULTY AT ALL
GETTING_INTO_AND_OUT_OF_A_BATHTUB: NO DIFFICULTY AT ALL
SITTING_FOR_15_MINUTES: NO DIFFICULTY AT ALL
STANDING_FOR_15_MINUTES: NO DIFFICULTY AT ALL
HOS_ADL_SCORE(%): 85.29
ROLLING_OVER_IN_BED: SLIGHT DIFFICULTY
HOW_WOULD_YOU_RATE_YOUR_CURRENT_LEVEL_OF_FUNCTION_DURING_YOUR_USUAL_ACTIVITIES_OF_DAILY_LIVING_FROM_0_TO_100_WITH_100_BEING_YOUR_LEVEL_OF_FUNCTION_PRIOR_TO_YOUR_HIP_PROBLEM_AND_0_BEING_THE_INABILITY_TO_PERFORM_ANY_OF_YOUR_USUAL_DAILY_ACTIVITIES?: 90
WALKING_15_MINUTES_OR_GREATER: MODERATE DIFFICULTY
HOS_ADL_ITEM_SCORE_TOTAL: 58
GOING_DOWN_1_FLIGHT_OF_STAIRS: NO DIFFICULTY AT ALL
HOS_ADL_COUNT: 17
WALKING_UP_STEEP_HILLS: SLIGHT DIFFICULTY
LIGHT_TO_MODERATE_WORK: NO DIFFICULTY AT ALL
DEEP_SQUATTING: MODERATE DIFFICULTY
HOS_ADL_HIGHEST_POTENTIAL_SCORE: 68
TWISTING/PIVOTING_ON_INVOLVED_LEG: SLIGHT DIFFICULTY
WALKING_DOWN_STEEP_HILLS: SLIGHT DIFFICULTY
STEPPING_UP_AND_DOWN_CURBS: NO DIFFICULTY AT ALL
PUTTING_ON_SOCKS_AND_SHOES: SLIGHT DIFFICULTY
GETTING_INTO_AND_OUT_OF_AN_AVERAGE_CAR: NO DIFFICULTY AT ALL
RECREATIONAL_ACTIVITIES: NO DIFFICULTY AT ALL
HEAVY_WORK: SLIGHT DIFFICULTY

## 2021-12-03 NOTE — PROGRESS NOTES
Physical Therapy Initial Evaluation  Therapist Impression: Hanane is a 76 year old year old female referred to physical therapy by Mindy Suggs PA-C for treatment of L hip pain. Patient presents to physical therapy with c/o L hip and lateral thigh pain. Subjective history and objective findings are consistent with diagnosis of trochanteric bursitis vs. Gluteal tendinopathy. Due to these impairments, patient has difficulty with walking, sleeping. Patient will benefit from skilled PT emphasizing symptom management and strengthening to address impairments/limitations in order to reach patient's goals, facilitate return to prior level of function, and maximize participation.    KEY FINDINGS:  1. TTP over GT and throughout glute med/max  2. B hip weakness  3. Hip ROM normal, L painful at IR/ER end range    Subjective:  The history is provided by the patient. No  was used.   Patient Health History  Hanane Sorto being seen for bursitis of the left hip.     Problem began: 10/8/2021.   Problem occurred: This is the second time.  Do not know   Pain is reported as 5/10 on pain scale.  General health as reported by patient is fair and good.  Pertinent medical history includes: osteoporosis and rheumatoid arthritis.     Medical allergies: none.   Surgeries include:  Orthopedic surgery.           Primary job tasks include:  Driving and repetitive tasks.                  Therapist Generated HPI Evaluation  Problem details: Pt presents with L hip pain, believes it is bursitis, she has had this before. It started over a month ago. Has also been dealing with back issues, so has a hard time differentiating between the two pains. The pain comes and goes, will notice it at night with position changes. Plays tennis 2x/week, pain doesn't bother her then. Is also being treated for LBP, not in PT for this. .         Type of problem:  Left hip.    This is a recurrent condition.  Condition occurred with:   Insidious onset.  Where condition occurred: for unknown reasons.  Patient reports pain:  Lateral.  Pain is described as aching and is intermittent.  Radiates to: lateral thigh pain - no numbness/tingling. Pain timing: not time dependent.  Since onset symptoms are gradually worsening.  Symptoms are exacerbated by standing (walking more than 1.5 miles)  and relieved by ice and NSAID's (stretching).    Past treatment: had injection first time - this was very helpful.   Barriers include:  None as reported by patient.                        Objective:  System         Lumbar/SI Evaluation  ROM:  AROM Lumbar: normal (no change in hip sxs)                                                          Hip Evaluation  Hip PROM:  Hip PROM:  Left Hip:    Normal (painful w/ both IR and ER end range)  Right Hip:  Normal                          Hip Strength:    Flexion:   Left: 4/5   Pain:  Right: 4/5   Pain:                    Extension:  Left: 4/5  Pain:Right: 4/5    Pain:    Abduction:  Left: 4/5     Pain:Right: 4+/5    Pain:    Internal Rotation:  Left: 5-/5    Pain:Right: 5-/5   Pain:  External Rotation:  Left: 5-/5   Pain:  Right: 5-/5   Pain:  Knee Flexion:  Left: 5/5   Pain:Right: 5/5   Pain:  Knee Extension:  Left: 5/5   Pain:Right: 5/5    Pain:        Hip Special Testing:   Special tests hip not assessed: Quad tightness L > R.  Left hip positive for the following special tests:  Rosario and Fadir/Labrum  Left hip negative for the following special tests:  SLR  Right hip negative for the following special tests:  Rosario or Fadir/Labrum    Hip Palpation:    Left hip tenderness present at:   Greater Trachanter and Gluteus Medius (and glute max)               General     ROS    Assessment/Plan:    Patient is a 76 year old female with left side hip complaints.    Patient has the following significant findings with corresponding treatment plan.                Diagnosis 1:  L hip pain  Pain -  hot/cold therapy, manual therapy, self  management, education, directional preference exercise and home program  Decreased strength - therapeutic exercise, therapeutic activities and home program  Impaired balance - neuro re-education, therapeutic activities and home program  Decreased function - therapeutic activities and home program    Therapy Evaluation Codes:     Cumulative Therapy Evaluation is: Low complexity.    Previous and current functional limitations:  (See Goal Flow Sheet for this information)    Short term and Long term goals: (See Goal Flow Sheet for this information)     Communication ability:  Patient appears to be able to clearly communicate and understand verbal and written communication and follow directions correctly.  Treatment Explanation - The following has been discussed with the patient:   RX ordered/plan of care  Anticipated outcomes  Possible risks and side effects  This patient would benefit from PT intervention to resume normal activities.   Rehab potential is excellent.    Frequency:  1 X week, once daily  Duration:  for 4 weeks tapering to 2 X a month over 8 weeks  Discharge Plan:  Achieve all LTG.  Independent in home treatment program.  Reach maximal therapeutic benefit.    Please refer to the daily flowsheet for treatment today, total treatment time and time spent performing 1:1 timed codes.

## 2021-12-03 NOTE — PROGRESS NOTES
[facility]    OUTPATIENT Physical Therapy ORTHOPEDIC EVALUATION  PLAN OF TREATMENT FOR OUTPATIENT REHABILITATION  (COMPLETE FOR INITIAL CLAIMS ONLY)  Patient's Last Name, First Name, M.I.  YOB: 1945  Endyanne marieHanane    Provider s Name:  [facility]   Medical Record No.  [id number]   Start of Care Date:  12/03/21   Onset Date:   11/26/21 (MD order)   Type:     _X__PT   ___OT Medical Diagnosis:    Encounter Diagnosis   Name Primary?     Trochanteric bursitis of left hip         Treatment Diagnosis:  L hip pain (bursitis vs glute tendinopathy)        Goals:     12/03/21 0500   Body Part   Goals listed below are for L hip   Goal #1   Goal #1 ambulation   Previous Functional Level No restrictions   Current Functional Level Minutes patient can walk   Performance Level 15 minutes w/ pain up to 5/10   STG Target Performance Minutes patient will be able to walk   Performance Level 20 min w/ pain no greater than 3/10   Rationale for safe household ambulation;for safe outdoor household ambulation;for safe community ambulation;to promote a healthy and active lifestyle   Due Date 12/31/21    LTG Target Performance Minutes patient will be able to  walk   Performance Level 45 min w/ pain no greater than 1/10   Rationale for safe household ambulation;for safe outdoor household ambulation;for safe community ambulation;to promote a healthy and active lifestyle   Due Date 02/25/22       Therapy Frequency:  1x/week  Predicted Duration of Therapy Intervention:  for 4 weeks tapering to 2x/month for 8 weeks    Trish Sevilla PT                 I CERTIFY THE NEED FOR THESE SERVICES FURNISHED UNDER        THIS PLAN OF TREATMENT AND WHILE UNDER MY CARE     (Physician attestation of this document indicates review and certification of the therapy plan).                       Certification  Date From:  12/03/21   Certification Date To:  03/02/22    Referring Provider:  Mindy Suggs PA-C    Initial Assessment        See Epic Evaluation SOC Date: 12/03/21

## 2022-01-07 ENCOUNTER — MYC MEDICAL ADVICE (OUTPATIENT)
Dept: FAMILY MEDICINE | Facility: CLINIC | Age: 77
End: 2022-01-07
Payer: MEDICARE

## 2022-01-12 ENCOUNTER — VIRTUAL VISIT (OUTPATIENT)
Dept: FAMILY MEDICINE | Facility: CLINIC | Age: 77
End: 2022-01-12
Payer: MEDICARE

## 2022-01-12 DIAGNOSIS — J01.01 ACUTE RECURRENT MAXILLARY SINUSITIS: Primary | ICD-10-CM

## 2022-01-12 PROCEDURE — 99212 OFFICE O/P EST SF 10 MIN: CPT | Mod: 95 | Performed by: PHYSICIAN ASSISTANT

## 2022-01-12 RX ORDER — AZITHROMYCIN 250 MG/1
TABLET, FILM COATED ORAL
Qty: 6 TABLET | Refills: 0 | Status: SHIPPED | OUTPATIENT
Start: 2022-01-12 | End: 2022-01-17

## 2022-01-12 NOTE — PATIENT INSTRUCTIONS
Take tylenol as needed for pain up to 1000mg three times daily, do not exceed 3000mg in 24 hour period    Start Z pack today

## 2022-01-12 NOTE — PROGRESS NOTES
Hanane is a 76 year old who is being evaluated via a billable telephone visit.      What phone number would you like to be contacted at? [phone]  How would you like to obtain your [place]? [place]    Assessment and Plan:     (J01.01) Acute recurrent maxillary sinusitis  (primary encounter diagnosis)  Comment: did not tolerate augmentin, covid negative in  on 1/3  Plan: azithromycin (ZITHROMAX) 250 MG tablet        Symptomatic cares, follow-up if does not resolve      Mindy Suggs PA-C        Subjective   Hanane is a 76 year old who presents for the following health issues  1:55  HPI   Sinus infection    Was seen in UC on 1/3/22 for sinusitis  Has had congestion, facial pressure, ear pain, headache x 4 weeks  Was Rxed augmentin and worsened chronic constipation   Was tested for covid and negative  Z-pack has helped in the past, would like Rx  She denies fever/chills, trouble handling secretions, chest pain, sob      Review of Systems   See above      Objective           Vitals:  No vitals were obtained today due to virtual visit.    Physical Exam   healthy, alert and no distress  PSYCH: Alert and oriented times 3; coherent speech, normal   rate and volume, able to articulate logical thoughts, able   to abstract reason, no tangential thoughts, no hallucinations   or delusions  Her affect is normal  RESP: No cough, no audible wheezing, able to talk in full sentences  Remainder of exam unable to be completed due to telephone visits      Phone call duration: 5 minutes

## 2022-01-17 ENCOUNTER — OFFICE VISIT (OUTPATIENT)
Dept: FAMILY MEDICINE | Facility: CLINIC | Age: 77
End: 2022-01-17
Payer: MEDICARE

## 2022-01-17 VITALS
TEMPERATURE: 97.7 F | WEIGHT: 106 LBS | HEART RATE: 105 BPM | HEIGHT: 57 IN | DIASTOLIC BLOOD PRESSURE: 83 MMHG | SYSTOLIC BLOOD PRESSURE: 137 MMHG | RESPIRATION RATE: 16 BRPM | OXYGEN SATURATION: 97 % | BODY MASS INDEX: 22.87 KG/M2

## 2022-01-17 DIAGNOSIS — J01.01 ACUTE RECURRENT MAXILLARY SINUSITIS: Primary | ICD-10-CM

## 2022-01-17 PROCEDURE — 99213 OFFICE O/P EST LOW 20 MIN: CPT | Performed by: INTERNAL MEDICINE

## 2022-01-17 RX ORDER — DOXYCYCLINE 100 MG/1
100 CAPSULE ORAL 2 TIMES DAILY
Qty: 14 CAPSULE | Refills: 0 | Status: SHIPPED | OUTPATIENT
Start: 2022-01-17 | End: 2022-04-21

## 2022-01-17 ASSESSMENT — MIFFLIN-ST. JEOR: SCORE: 844.69

## 2022-01-17 ASSESSMENT — PAIN SCALES - GENERAL: PAINLEVEL: NO PAIN (0)

## 2022-01-17 NOTE — PATIENT INSTRUCTIONS
(J01.01) Acute recurrent maxillary sinusitis  (primary encounter diagnosis)  Comment: Recommend obtaining CT sinus and continue to use nasal sprays and twice per day dosing of neti-pot.  Also recommend use of doxycycline if you are unable to obtain relief in the next 5 days.  Follow-up in ENT.  [facility] phone #[phone]   Plan: CT Sinus w/o Contrast, doxycycline hyclate         (VIBRAMYCIN) 100 MG capsule

## 2022-01-17 NOTE — PROGRESS NOTES
"[facility]  CLINIC PROGRESS NOTE    Subjective:  Sinusitis   Hanane Sorto has had symptoms of sinusitis since November.  Has had COVID test on 01/02 and negative.  She did take 3 days of Augmentin, but caused discomfort.  She did take azithromycin for completion - last dose was today.  No fever, no shortness of breath or wheezing.  She has headache and dental pressure as well ear pain.  She is taking triamcinolone and azelastine nasal spray from ENT.     Past medical history, medications, allergies, social history, family history reviewed and updated in EPIC as of 1/17/2022 .    ROS  CONSTITUTIONAL: no fatigue, no unexpected change in weight  SKIN: no worrisome rashes, no worrisome moles, no worrisome lesions  EYES: no acute vision problems or changes  ENT: as above   RESP: no significant cough, no shortness of breath  CV: no chest pain, no palpitations, no new or worsening peripheral edema  GI: no nausea, no vomiting, no constipation, no diarrhea  : no frequency, no dysuria, no hematuria  MS: no claudication, no myalgias, no joint aches  PSYCHIATRIC: no changes in mood or affect      Objective:  Vitals  /83 (BP Location: Right arm, Patient Position: Sitting, Cuff Size: Adult Regular)   Pulse 105   Temp 97.7  F (36.5  C) (Temporal)   Resp 16   Ht 1.448 m (4' 9\")   Wt 48.1 kg (106 lb)   SpO2 97%   BMI 22.94 kg/m    GEN: Alert Oriented x3 NAD  HEENT: Atraumatic, normocephalic, neck supple, no thyromegaly, negative cervical adenopathy, + left maxillary sinus tenderness with palpation  TM: TM bilaterally pearly and grey with normal light reflex  CV: RRR no murmurs or rubs  PULM: CTA no wheezes or crackles  ABD: Soft, nontender nondistended  SKIN: No visible skin lesion or ulcerations  EXT: No edema bilateral lower extremities  NEURO: Gait and station deferred, No focal neurologic deficits  PSYCH: Mood good, affect mood congruent    No images are attached to the encounter.    No results found " for this or any previous visit (from the past 24 hour(s)).    Assessment/Plan:  Patient Instructions   (J01.01) Acute recurrent maxillary sinusitis  (primary encounter diagnosis)  Comment: Recommend obtaining CT sinus and continue to use nasal sprays and twice per day dosing of neti-pot.  Also recommend use of doxycycline if you are unable to obtain relief in the next 5 days.  Follow-up in ENT.  [facility] phone #[phone]   Plan: CT Sinus w/o Contrast, doxycycline hyclate         (VIBRAMYCIN) 100 MG capsule               Follow up in 3 months    Disclaimer: This note consists of symbols derived from keyboarding, dictation and/or voice recognition software. As a result, there may be errors in the script that have gone undetected. Please consider this when interpreting information found in this chart.    Dominguez Dale MD  [phone]

## 2022-01-19 ENCOUNTER — MYC MEDICAL ADVICE (OUTPATIENT)
Dept: FAMILY MEDICINE | Facility: CLINIC | Age: 77
End: 2022-01-19
Payer: MEDICARE

## 2022-01-19 DIAGNOSIS — Z96.653 STATUS POST TOTAL BILATERAL KNEE REPLACEMENT: Primary | ICD-10-CM

## 2022-01-20 RX ORDER — AMOXICILLIN 500 MG/1
2000 CAPSULE ORAL ONCE
Qty: 4 CAPSULE | Refills: 0 | Status: SHIPPED | OUTPATIENT
Start: 2022-01-20 | End: 2023-02-02

## 2022-01-20 NOTE — TELEPHONE ENCOUNTER
Please advice on patients request for prophylactic abx prior to dental appt. Should she start an e-visit?

## 2022-02-01 ENCOUNTER — HOSPITAL ENCOUNTER (OUTPATIENT)
Dept: CT IMAGING | Facility: CLINIC | Age: 77
Discharge: HOME | End: 2022-02-01
Attending: INTERNAL MEDICINE | Admitting: INTERNAL MEDICINE
Payer: MEDICARE

## 2022-02-01 ENCOUNTER — TRANSFERRED RECORDS (OUTPATIENT)
Dept: HEALTH INFORMATION MANAGEMENT | Facility: CLINIC | Age: 77
End: 2022-02-01

## 2022-02-01 DIAGNOSIS — J01.01 ACUTE RECURRENT MAXILLARY SINUSITIS: ICD-10-CM

## 2022-02-01 PROCEDURE — G1004 CDSM NDSC: HCPCS

## 2022-02-02 NOTE — RESULT ENCOUNTER NOTE
Bill Koo,    I have had the opportunity to review your recent results and an interpretation is as follows:  Your CT does show mucosal thickening and air-fluid levels consistent with sinusitis.   I would recommend follow up in ENT as we discussed    Sincerely,  Dominguez Dale MD

## 2022-02-08 ENCOUNTER — HOSPITAL ENCOUNTER (OUTPATIENT)
Dept: MAMMOGRAPHY | Facility: CLINIC | Age: 77
Discharge: HOME | End: 2022-02-08
Attending: INTERNAL MEDICINE | Admitting: INTERNAL MEDICINE
Payer: MEDICARE

## 2022-02-08 DIAGNOSIS — Z12.31 VISIT FOR SCREENING MAMMOGRAM: ICD-10-CM

## 2022-02-08 PROCEDURE — 77067 SCR MAMMO BI INCL CAD: CPT

## 2022-03-02 ENCOUNTER — TELEPHONE (OUTPATIENT)
Dept: ORTHOPEDICS | Facility: CLINIC | Age: 77
End: 2022-03-02
Payer: MEDICARE

## 2022-03-02 NOTE — TELEPHONE ENCOUNTER
[facility] Call Center    Phone Message    May a detailed message be left on voicemail: yes     Reason for Call: Other: Patient just started taking liquid prednisone and wanted to make sure it wouldn't interfere with her cortisone injection with Dr. Dobbs tomorrow.     Please reach out to patient to discuss.    Action Taken: Message routed to:  Other: [employer]    Travel Screening: Not Applicable

## 2022-03-02 NOTE — TELEPHONE ENCOUNTER
Spoke to patient discussed she recently prescribed an oral Medrol Dose Pack to start on 3/3/22 due to a chronic sinus infection.  Patient has not started this time.  Advised that I would discuss with Dr Dobbs and return phone call.    Neymar Kennedy ATC

## 2022-03-02 NOTE — TELEPHONE ENCOUNTER
Per Dr. Dobbs - spoke with patient and let her know to hold off on taking the Medrol Pack. We will see her tomorrow and go from there. Appreciative. No further questions.     Lesvia Mehta, ATC

## 2022-03-03 ENCOUNTER — OFFICE VISIT (OUTPATIENT)
Dept: ORTHOPEDICS | Facility: CLINIC | Age: 77
End: 2022-03-03
Attending: INTERNAL MEDICINE
Payer: MEDICARE

## 2022-03-03 DIAGNOSIS — M70.62 TROCHANTERIC BURSITIS, LEFT HIP: Primary | ICD-10-CM

## 2022-03-03 PROCEDURE — 20611 DRAIN/INJ JOINT/BURSA W/US: CPT | Mod: LT | Performed by: FAMILY MEDICINE

## 2022-03-03 RX ADMIN — TRIAMCINOLONE ACETONIDE 40 MG: 40 INJECTION, SUSPENSION INTRA-ARTICULAR; INTRAMUSCULAR at 15:07

## 2022-03-03 NOTE — LETTER
3/3/2022         RE: Hanane Sorto  [address]  [place] [address]        Dear Colleague,    Thank you for referring your patient, Hanane Sorto, to the [facility] [place]. Please see a copy of my visit note below.    PROCEDURE ENCOUNTER    [facility]  Orthopedics  Cb Dobbs DO  March 3, 2022     Name: Hanane Sorto  MRN: [id number]  Age: 76 year old  : 1945    Referring provider: Dr. Corona Bethea MD  Diagnosis: Trochanteric Bursitis of Left Hip    Trochanteric Hip Injection - Ultrasound Guided  The patient was informed of the risks and the benefits of the procedure and a written consent was signed.  The patient s left lateral hip was prepped with chlorhexidine in sterile fashion.   40 mg of triamcinolone suspension was drawn up into a 5 mL syringe with 4 mL of 1% lidocaine.  Injection was performed using sterile technique.  Under ultrasound guidance a 3.5-inch 22-gauge needle was used to enter the abran-trochanteric tissue.  Needle placement was visualized and documented with ultrasound which was deemed necessary to ensure medication did not enter the tendon itself, which could potentially cause tendon damage.   Injection performed long axis to the probe with probe in short axis to the greater trochanter.  Expansion of the abran-trochanteric tissue was visualized under ultrasound upon injection.  Images were permanently stored for the patient's record.  There were no complications. The patient tolerated the procedure well. There was negligible bleeding.   Cb Dobbs DO CAM  Primary Care Sports Medicine  [facility]  Large Joint Injection/Arthocentesis: L greater trochanteric bursa    Date/Time: 3/3/2022 3:07 PM  Performed by: Cb Dobbs DO  Authorized by: Cb Dobbs DO     Indications:  Pain and osteoarthritis  Needle Size:  22 G  Guidance: ultrasound    Approach:  Anterolateral  Location:  Hip      Site:  L greater  trochanteric bursa  Medications:  40 mg triamcinolone 40 MG/ML  Outcome:  Tolerated well, no immediate complications  Procedure discussed: discussed risks, benefits, and alternatives    Consent Given by:  Patient  Prep: patient was prepped and draped in usual sterile fashion                    Again, thank you for allowing me to participate in the care of your patient.        Sincerely,        Cb Dobbs, DO

## 2022-03-03 NOTE — PROGRESS NOTES
PROCEDURE ENCOUNTER    [facility]  Orthopedics  Cb Dobbs DO  March 3, 2022     Name: Hanane Sorto  MRN: [id number]  Age: 76 year old  : 1945    Referring provider: Dr. Corona Bethea MD  Diagnosis: Trochanteric Bursitis of Left Hip    Trochanteric Hip Injection - Ultrasound Guided  The patient was informed of the risks and the benefits of the procedure and a written consent was signed.  The patient s left lateral hip was prepped with chlorhexidine in sterile fashion.   40 mg of triamcinolone suspension was drawn up into a 5 mL syringe with 4 mL of 1% lidocaine.  Injection was performed using sterile technique.  Under ultrasound guidance a 3.5-inch 22-gauge needle was used to enter the abran-trochanteric tissue.  Needle placement was visualized and documented with ultrasound which was deemed necessary to ensure medication did not enter the tendon itself, which could potentially cause tendon damage.   Injection performed long axis to the probe with probe in short axis to the greater trochanter.  Expansion of the abran-trochanteric tissue was visualized under ultrasound upon injection.  Images were permanently stored for the patient's record.  There were no complications. The patient tolerated the procedure well. There was negligible bleeding.   Cb Dobbs DO CAM  Primary Care Sports Medicine  [facility]  Large Joint Injection/Arthocentesis: L greater trochanteric bursa    Date/Time: 3/3/2022 3:07 PM  Performed by: Cb Dobbs DO  Authorized by: Cb Dobbs DO     Indications:  Pain and osteoarthritis  Needle Size:  22 G  Guidance: ultrasound    Approach:  Anterolateral  Location:  Hip      Site:  L greater trochanteric bursa  Medications:  40 mg triamcinolone 40 MG/ML  Outcome:  Tolerated well, no immediate complications  Procedure discussed: discussed risks, benefits, and alternatives    Consent Given by:  Patient  Prep: patient was prepped and draped in usual sterile  fashion

## 2022-03-03 NOTE — PATIENT INSTRUCTIONS
Thank you for choosing [facility]    DR MARCUS'S CLINIC LOCATIONS  [place] [address] [address], [address]th [place]   [place], [place], [address] [place] [address]   [phone]       APPOINTMENTS: [phone]    CARE QUESTIONS: [phone], #3    BILLING QUESTIONS: [phone]    FAX NUMBER: [phone]        Follow up: 3 months      Steroid Injection Information:    A corticosteroid injection was administered at your visit today.  The area of injection may be sore, slightly swollen for 1-2 days afterward.  Immediately after injection, you may have an area of numbness, which is caused by the local anesthetic, lidocaine (similar to novacaine) in the shot.  This medicine will wear off in about 4 hours.  In addition to the lidocaine, a steroid medication was injected, called triamcinolone acetate.  This medication is intended to provide long-acting antiinflammatory / pain relief.  It may take 2-5 days for this medication to provide noticeable relief.      After an injection, Dr. Marcus recommends:      Protecting the area wearing a bandage or gauze pad for at least 24 hours.      Using ice; ice bag or frozen vegetables over the injection site every one to two hours when able (for 15 minutes at a time).        Avoid any strenuous activity even if the knee is already feeling better immediately afterward. Light stretching is encouraged but refrain from home exercise program and increasing activity level for about 48 hours.      Avoid soaking in a hot tub, bath, or pool for 48 hours after injection. Showering is fine!      Watch for signs of infection, including increasing pain, redness and swelling that last more than 48 hours after injection.           Trochanteric Bursitis / Gluteal Tendinopathy    WHAT IS TROCHANTERIC BURSITIS?    Bursitis is irritation or inflammation of the bursa. A bursa is a fluid-filled sac that acts as a cushion  between tendons, bones, and skin. There is a bump on the outer side of the upper part of the thigh bone (femur) called the greater trochanter. The trochanteric bursa is located over the greater trochanter. When this bursa is inflamed it is called trochanteric bursitis.          WHAT IS THE CAUSE?     The trochanteric bursa may be inflamed by a group of muscles or tendons rubbing over the bursa and causing friction against the thigh bone. Your iliotibial band goes from the iliac crest of your pelvis down the outer side of your thigh and attaches just below the knee. A tight iliotibial band can lead to trochanteric bursitis. This injury can occur with running, walking, or bicycling, especially when the bicycle seat is too high.    Trochanteric bursitis may also be caused by a fall, by a spine disorder, by differences in the length of your legs, or as a complication of hip surgery.    WHAT ARE THE SYMPTOMS?    You have pain on the upper outer area of your thigh or on the side of your hip. The pain is worse when you walk, bicycle, or go up or down stairs. You have pain when you move your thigh bone and feel tenderness in the area over the greater trochanter.    HOW IS IT DIAGNOSED?    Your healthcare provider will ask about your symptoms and examine your hip and thigh.    HOW IS IT TREATED?    To treat this condition:    Put an ice pack, gel pack, or package of frozen vegetables wrapped in a cloth on the painful area every 3 to 4 hours for up to 20 minutes at a time until the pain goes away.  Take an anti-inflammatory medicine, such as ibuprofen, as directed by your provider. Nonsteroidal anti-inflammatory medicines (NSAIDs) may cause stomach bleeding and other problems. These risks increase with age. Read the label and take as directed. Unless recommended by your healthcare provider, do not take for more than 10 days.  Follow your provider s instructions for doing exercises to help you recover.    While you are  recovering from your injury you will need to change your sport or activity to one that does not make your condition worse. For example, you may need to swim instead of running or bicycling. If you are bicycling, you may need to lower your bicycle seat.    A bursa that is only mildly inflamed and has just started to hurt may improve within a few weeks. A bursa that is significantly inflamed and has been painful for a long time may take up to a few months to improve. You need to stop doing the activities that cause pain until your bursa has healed.    Follow your healthcare provider's instructions. Ask your provider:    How and when you will hear your test results  How long it will take to recover  What activities you should avoid and when you can return to your normal activities  How to take care of yourself at home  What symptoms or problems you should watch for and what to do if you have them  Make sure you know when you should come back for a checkup.    HOW CAN I HELP PREVENT TROCHANTERIC BURSITIS?    Trochanteric bursitis is best prevented by warming up properly and stretching the muscles on the outer side of your upper thigh.    Developed by [employer].  Published by [employer].  Copyright  2014 [employer] and/or one of its subsidiaries. All rights reserved.    You can do the first 3 stretches to begin stretching the muscles that run along the outside of your hip. You can do the strengthening exercises when the sharp pain lessens.    STRETCHING EXERCISES    Gluteal stretch: Lie on your back with both knees bent. Rest the ankle on your injured side over the knee of your other leg. Grasp the thigh of the leg on the uninjured side and pull toward your chest. You will feel a stretch along the buttocks on the injured side and possibly along the outside of your hip. Hold the stretch for 15 to 30 seconds. Repeat 3 times.    Iliotibial band stretch, standing: Cross your uninjured leg in front of the other  leg and bend down and reach toward the inside of your back foot. Do not bend your knees. Hold this position for 15 to 30 seconds. Return to the starting position. Repeat 3 times.  Iliotibial band stretch, side-leaning: Stand sideways near a wall with your injured side closest to the wall. Place a hand on the wall for support. Cross the leg farther from the wall over the other leg. Keep the foot closest to the wall flat on the floor. Lean your hips into the wall. Hold the stretch for 15 to 30 seconds. Repeat 3 times.    STRENGTHENING EXERCISES    Straight leg raise: Lie on your back with your legs straight out in front of you. Bend the knee on your uninjured side and place the foot flat on the floor. Tighten the thigh muscle on your injured side and lift your leg about 8 inches off the floor. Keep your leg straight and your thigh muscle tight. Slowly lower your leg back down to the floor. Do 2 sets of 15.    Prone hip extension: Lie on your stomach with your legs straight out behind you. Fold your arms under your head and rest your head on your arms. Draw your belly button in towards your spine and tighten your abdominal muscles. Tighten the buttocks and thigh muscles of the leg on your injured side and lift the leg off the floor about 8 inches. Keep your leg straight. Hold for 5 seconds. Then lower your leg and relax. Do 2 sets of 15.    Side-lying leg lift: Lie on your uninjured side. Tighten the front thigh muscles on your injured leg and lift that leg 8 to 10 inches (20 to 25 centimeters) away from the other leg. Keep the leg straight and lower it slowly. Do 2 sets of 15.    Wall squat with a ball: Stand with your back, shoulders, and head against a wall. Look straight ahead. Keep your shoulders relaxed and your feet 3 feet (90 centimeters) from the wall and shoulder's width apart. Place a soccer or basketball-sized ball behind your back. Keeping your back against the wall, slowly squat down to a 45-degree angle.  Your thighs will not yet be parallel to the floor. Hold this position for 10 seconds and then slowly slide back up the wall. Repeat 10 times. Build up to 2 sets of 15.    Clam exercise: Lie on your uninjured side with your hips and knees bent and feet together. Slowly raise your top leg toward the ceiling while keeping your heels touching each other. Hold for 2 seconds and lower slowly. Do 2 sets of 15 repetitions.    Side plank: Lie on your side with your legs, hips, and shoulders in a straight line. Prop yourself up onto your forearm with your elbow directly under your shoulder. Lift your hips off the floor and balance on your forearm and the outside of your foot. Try to hold this position for 15 seconds and then slowly lower your hip to the ground. Switch sides and repeat. Work up to holding for 1 minute. This exercise can be made easier by starting with your knees and hips flexed toward your chest.    The plank: Lie on your stomach resting on our forearms. With your legs straight, lift your hips off the floor until they are in line with your shoulders. Support yourself on your forearms and toes. Hold this position for 15 seconds. (If this is too difficult, you can modify it by placing your knees on the floor.) Repeat 3 times. Work up to increasing your hold time to 30 to 60 seconds.    Developed by [employer].  Published by [employer].  Copyright  2014 [employer] and/or one of its subsidiaries. All rights reserved.

## 2022-03-04 RX ORDER — TRIAMCINOLONE ACETONIDE 40 MG/ML
40 INJECTION, SUSPENSION INTRA-ARTICULAR; INTRAMUSCULAR
Status: DISCONTINUED | OUTPATIENT
Start: 2022-03-03 | End: 2022-04-21

## 2022-03-17 DIAGNOSIS — K21.9 GASTROESOPHAGEAL REFLUX DISEASE WITHOUT ESOPHAGITIS: ICD-10-CM

## 2022-03-18 NOTE — TELEPHONE ENCOUNTER
Patient responded to [employer] message:    Famotidine is not as effective.  Have been going back to the Omeprazole.       PCP please advise if patient to go back on omeprazole PCP changed from Omeprazole to Famotidine due to osteopenia    Ry Carver RN  [facility]

## 2022-03-18 NOTE — TELEPHONE ENCOUNTER
See 2/9/21 encounter, PCP changed from Omeprazole to Famotidine due to osteopenia     Sent [employer] message to verify if pt is taking Famotidine (refills on file) or Omeprazole     Zully Rivera RN  [facility]       omeprazole (PRILOSEC) 20 MG DR capsule (Discontinued) 180 capsule 3 10/12/2020 2/9/2021 No   Sig: TAKE 1 CAPSULE(20 MG) BY MOUTH TWICE DAILY   Sent to pharmacy as: Omeprazole 20 MG Oral Capsule Delayed Release (priLOSEC)   Class: E-Prescribe   Reason for Discontinue: Alternate therapy   Order: [id number]   E-Prescribing Status: Receipt confirmed by pharmacy (10/12/2020  8:28 AM CDT)   This Order Has Been Discontinued    Order Status Reason By On   Discontinued Alternate therapy Marleni Newton RN 2/9/21 5585

## 2022-03-23 ENCOUNTER — APPOINTMENT (OUTPATIENT)
Dept: GENERAL RADIOLOGY | Facility: CLINIC | Age: 77
End: 2022-03-23
Attending: EMERGENCY MEDICINE
Payer: MEDICARE

## 2022-03-23 ENCOUNTER — NURSE TRIAGE (OUTPATIENT)
Dept: FAMILY MEDICINE | Facility: CLINIC | Age: 77
End: 2022-03-23
Payer: MEDICARE

## 2022-03-23 ENCOUNTER — HOSPITAL ENCOUNTER (EMERGENCY)
Facility: CLINIC | Age: 77
Discharge: HOME | End: 2022-03-23
Attending: EMERGENCY MEDICINE | Admitting: EMERGENCY MEDICINE
Payer: MEDICARE

## 2022-03-23 VITALS
HEIGHT: 57 IN | BODY MASS INDEX: 23.08 KG/M2 | RESPIRATION RATE: 16 BRPM | HEART RATE: 85 BPM | DIASTOLIC BLOOD PRESSURE: 59 MMHG | OXYGEN SATURATION: 99 % | SYSTOLIC BLOOD PRESSURE: 142 MMHG | TEMPERATURE: 98.3 F | WEIGHT: 107 LBS

## 2022-03-23 DIAGNOSIS — F41.9 ANXIETY: ICD-10-CM

## 2022-03-23 DIAGNOSIS — R06.02 SHORTNESS OF BREATH: ICD-10-CM

## 2022-03-23 LAB
ANION GAP SERPL CALCULATED.3IONS-SCNC: 8 MMOL/L (ref 3–14)
ATRIAL RATE - MUSE: 83 BPM
BASOPHILS # BLD AUTO: 0 10E3/UL (ref 0–0.2)
BASOPHILS NFR BLD AUTO: 0 %
BUN SERPL-MCNC: 17 MG/DL (ref 7–30)
CALCIUM SERPL-MCNC: 9.5 MG/DL (ref 8.5–10.1)
CHLORIDE BLD-SCNC: 110 MMOL/L (ref 94–109)
CO2 SERPL-SCNC: 23 MMOL/L (ref 20–32)
CREAT SERPL-MCNC: 0.99 MG/DL (ref 0.52–1.04)
D DIMER PPP FEU-MCNC: 0.47 UG/ML FEU (ref 0–0.5)
DIASTOLIC BLOOD PRESSURE - MUSE: NORMAL MMHG
EOSINOPHIL # BLD AUTO: 0.2 10E3/UL (ref 0–0.7)
EOSINOPHIL NFR BLD AUTO: 2 %
ERYTHROCYTE [DISTWIDTH] IN BLOOD BY AUTOMATED COUNT: 13.8 % (ref 10–15)
GFR SERPL CREATININE-BSD FRML MDRD: 59 ML/MIN/1.73M2
GLUCOSE BLD-MCNC: 97 MG/DL (ref 70–99)
HCT VFR BLD AUTO: 41.8 % (ref 35–47)
HGB BLD-MCNC: 13.3 G/DL (ref 11.7–15.7)
IMM GRANULOCYTES # BLD: 0 10E3/UL
IMM GRANULOCYTES NFR BLD: 0 %
INTERPRETATION ECG - MUSE: NORMAL
LYMPHOCYTES # BLD AUTO: 2.1 10E3/UL (ref 0.8–5.3)
LYMPHOCYTES NFR BLD AUTO: 21 %
MCH RBC QN AUTO: 30 PG (ref 26.5–33)
MCHC RBC AUTO-ENTMCNC: 31.8 G/DL (ref 31.5–36.5)
MCV RBC AUTO: 94 FL (ref 78–100)
MONOCYTES # BLD AUTO: 0.8 10E3/UL (ref 0–1.3)
MONOCYTES NFR BLD AUTO: 8 %
NEUTROPHILS # BLD AUTO: 6.6 10E3/UL (ref 1.6–8.3)
NEUTROPHILS NFR BLD AUTO: 69 %
NRBC # BLD AUTO: 0 10E3/UL
NRBC BLD AUTO-RTO: 0 /100
NT-PROBNP SERPL-MCNC: 134 PG/ML (ref 0–1800)
P AXIS - MUSE: 44 DEGREES
PLATELET # BLD AUTO: 263 10E3/UL (ref 150–450)
POTASSIUM BLD-SCNC: 4.2 MMOL/L (ref 3.4–5.3)
PR INTERVAL - MUSE: 136 MS
QRS DURATION - MUSE: 86 MS
QT - MUSE: 382 MS
QTC - MUSE: 448 MS
R AXIS - MUSE: 7 DEGREES
RBC # BLD AUTO: 4.44 10E6/UL (ref 3.8–5.2)
SODIUM SERPL-SCNC: 141 MMOL/L (ref 133–144)
SYSTOLIC BLOOD PRESSURE - MUSE: NORMAL MMHG
T AXIS - MUSE: 29 DEGREES
TROPONIN I SERPL HS-MCNC: 5 NG/L
VENTRICULAR RATE- MUSE: 83 BPM
WBC # BLD AUTO: 9.7 10E3/UL (ref 4–11)

## 2022-03-23 PROCEDURE — 82310 ASSAY OF CALCIUM: CPT | Performed by: EMERGENCY MEDICINE

## 2022-03-23 PROCEDURE — 36415 COLL VENOUS BLD VENIPUNCTURE: CPT | Performed by: EMERGENCY MEDICINE

## 2022-03-23 PROCEDURE — 83880 ASSAY OF NATRIURETIC PEPTIDE: CPT | Performed by: EMERGENCY MEDICINE

## 2022-03-23 PROCEDURE — 99285 EMERGENCY DEPT VISIT HI MDM: CPT | Mod: 25

## 2022-03-23 PROCEDURE — 85014 HEMATOCRIT: CPT | Performed by: EMERGENCY MEDICINE

## 2022-03-23 PROCEDURE — 85379 FIBRIN DEGRADATION QUANT: CPT | Performed by: EMERGENCY MEDICINE

## 2022-03-23 PROCEDURE — 93005 ELECTROCARDIOGRAM TRACING: CPT

## 2022-03-23 PROCEDURE — 84484 ASSAY OF TROPONIN QUANT: CPT | Performed by: EMERGENCY MEDICINE

## 2022-03-23 PROCEDURE — 71046 X-RAY EXAM CHEST 2 VIEWS: CPT

## 2022-03-23 RX ORDER — CLONAZEPAM 0.5 MG/1
0.5 TABLET ORAL 2 TIMES DAILY PRN
Qty: 10 TABLET | Refills: 0 | Status: SHIPPED | OUTPATIENT
Start: 2022-03-23 | End: 2022-04-21

## 2022-03-23 ASSESSMENT — ENCOUNTER SYMPTOMS
NERVOUS/ANXIOUS: 1
COUGH: 0
LIGHT-HEADEDNESS: 1
FEVER: 0
SHORTNESS OF BREATH: 1

## 2022-03-23 NOTE — TELEPHONE ENCOUNTER
From patient's MyChart message:    I am having issues again with anxiety.  Shortness of breath. Can I get another prescription.  Took something years ago that seemed to help.

## 2022-03-23 NOTE — TELEPHONE ENCOUNTER
"Writer called patient, to triage information from [facility].   Patient reports they are hyperventilation: currently having symptoms.   CC: anxiety , trouble sleeping   Onset: getting worse but onset a  few months   Recurrent: yes- patient on was prescribed antianxiety medicaitosn  Patient prescribed clonazepam 0.5 mg - took it 3 nights ago but has not helped.   Patient reports they get light headed when hyperventilating     Triaged per [facility] Triage Protocol, gave care advice which patient plans to follow.  \"I suppose I can\", \"ill give it a shot\"    Ry Carver RN  [facility]        Reason for Disposition    Difficulty breathing and persists > 10 minutes and not relieved by reassurance provided by triager    Additional Information    Negative: Severe difficulty breathing (e.g., struggling for each breath, speaks in single words)    Negative: Bluish (or gray) lips or face    Negative: Difficult to awaken or acting confused  (e.g., disoriented, slurred speech)    Negative: Hysterical or combative behavior    Negative: Sounds like a life-threatening emergency to the triager    Negative: Chest pain    Negative: Palpitations, skipped heart beat, or rapid heart beat    Negative: Cough is main symptom    Negative: Suicide thoughts, threats, attempts, or questions    Negative: Depression is main problem or symptom (e.g., feelings of sadness or hopelessness)    Protocols used: ANXIETY AND PANIC ATTACK-A-OH      "

## 2022-03-23 NOTE — ED PROVIDER NOTES
History   Chief Complaint:  Anxiety     HPI   Hanane Sorto is a 76 year old female with history of anxiety and hyperlipidemia who presents accompanied by her  for evaluation of anxiety. The patient reports a longstanding history of anxiety and notes that over the last six weeks she has had increased feelings of anxiety. Since then she has also had constant feelings of shortness of breath and lightheadedness. These symptoms are not worse with exertion and tend to worsen when laying down or sitting. She has been taking Klonopin without significant improvement of her symptoms. She notes that she has previously had these symptoms associated with her anxiety, and she had similar symptoms starting as early as high school. Today she contacted her clinic regarding these recent symptoms and advised to come into the ED for evaluation. She denies any fever, cough, chest pain, or leg swelling. She notes that she had previously been on another medication for her anxiety about two years ago which she found helpful, and per chart review she was on Zoloft at that time.     Review of Systems   Constitutional: Negative for fever.   Respiratory: Positive for shortness of breath. Negative for cough.    Cardiovascular: Negative for chest pain and leg swelling.   Neurological: Positive for light-headedness.   Psychiatric/Behavioral: The patient is nervous/anxious.    All other systems reviewed and are negative.      Allergies:  Crestor  Ezetimibe  Hmg-Coa-R Inhibitors  Miralax   Pravastatin    Medications:  Klonopin   Vibramycin  Omeprazole  Mirapex     Past Medical History:     Anxiety  GERD   Hyperlipidemia  Osteoarthritis  Osteoporosis  Skin cancer   Restless legs       Past Surgical History:    Left knee arthroplasty   Breast surgery   Vaginal biopsy    section   Bunionectomy   Colonoscopy   Cataract surgery   Liposuction   Reconstruct breast, implant prosthesis, combined      Family History:    Lipids - Mother  "  Alzheimer disease - Mother   Depression - Mother  Osteoporosis - Mother   CAD - Father   Hypertension - Father   Diabetes - Father   Breast cancer - Sister   Leukemia - Sister   Hyperlipidemia - Sister   Colon cancer - Sister   CVA - Brother     Social History:  Marital status:   The patient presents to the ED accompanied by her .     Physical Exam     Patient Vitals for the past 24 hrs:   BP Temp Temp src Pulse Resp SpO2 Height Weight   03/23/22 1452 (!) 142/59 98.3  F (36.8  C) Temporal 85 16 99 % 1.448 m (4' 9\") 48.5 kg (107 lb)       Physical Exam  General/Appearance: appears stated age, well-groomed, appears comfortable  Eyes: EOMI, no scleral injection, no icterus  ENT: MMM  Neck: supple, nl ROM, no stiffness  Cardiovascular: RRR, nl S1S2, no m/r/g, 2+ pulses in all 4 extremities, cap refill <2sec  Respiratory: CTAB, good air movement throughout, no wheezes/rhonchi/rales, no increased WOB, no retractions  MSK: LEWIS, good tone, no bony abnormality  Skin: warm and well-perfused, no rash, no edema, no ecchymosis, nl turgor  Neuro: GCS 15, alert and oriented, no gross focal neuro deficits  Psych: interacts appropriately  Heme: no petechia, no purpura, no active bleeding    Emergency Department Course   ECG  ECG obtained at 17:05:03, ECG read at 1710  Normal sinus rhythm, Normal ECG.  Rate 83 bpm. TX interval 136 ms. QRS duration 86 ms. QT/QTc 382/448 ms. P-R-T axes 44 7 29.     Imaging:  XR Chest 2 Views   Final Result   IMPRESSION: No significant change. Heart size and prominent vessels are normal. Lungs appear mildly hyperinflated but otherwise clear. Right pericardial fat pad stable.      Report per radiology    Laboratory:  Labs Ordered and Resulted from Time of ED Arrival to Time of ED Departure   BASIC METABOLIC PANEL - Abnormal       Result Value    Sodium 141      Potassium 4.2      Chloride 110 (*)     Carbon Dioxide (CO2) 23      Anion Gap 8      Urea Nitrogen 17      Creatinine 0.99      " Calcium 9.5      Glucose 97      GFR Estimate 59 (*)    D DIMER QUANTITATIVE - Normal    D-Dimer Quantitative 0.47     TROPONIN I - Normal    Troponin I High Sensitivity 5     NT PROBNP INPATIENT - Normal    N terminal Pro BNP Inpatient 134     CBC WITH PLATELETS AND DIFFERENTIAL    WBC Count 9.7      RBC Count 4.44      Hemoglobin 13.3      Hematocrit 41.8      MCV 94      MCH 30.0      MCHC 31.8      RDW 13.8      Platelet Count 263      % Neutrophils 69      % Lymphocytes 21      % Monocytes 8      % Eosinophils 2      % Basophils 0      % Immature Granulocytes 0      NRBCs per 100 WBC 0      Absolute Neutrophils 6.6      Absolute Lymphocytes 2.1      Absolute Monocytes 0.8      Absolute Eosinophils 0.2      Absolute Basophils 0.0      Absolute Immature Granulocytes 0.0      Absolute NRBCs 0.0         Emergency Department Course:     Reviewed:  I reviewed nursing notes, vitals and past medical history    Assessments:  1632:  I obtained history and examined the patient as noted above.     1758: I updated and reassessed the patient.     Disposition:  The patient was discharged to home.     Impression & Plan       Medical Decision Making:  This patient is a pleasant 76-year-old female who presents with increased anxiety over the past couple weeks but also with a sense of breathlessness and some lightheadedness.  These symptoms have been chronic for the past 6 weeks.  She states she has had episodes of these ever since she can remember as a teenager.  Her ER work-up has been unremarkable for any obvious cardiac or pulmonary abnormality.  Her D-dimer was negative and follow-up chest x-ray was clear.  Her troponin was negative and her EKG was nonischemic.  Her EKG also did not show any evidence of dysrhythmia, prolonged QT, Wellens sign, Brugada sign, delta wave.  Labs are unremarkable without significant anemia to explain her shortness of breath.  At this point in time is not totally clear was causing it but I think  we have ruled out life-threatening and acute causes and can safely discharge her home.  I can see through chart review that she used to be on 50 mg of Zoloft so I will be happy to write that prescription for her and she can continue to follow-up with your PCP.     Diagnosis:    ICD-10-CM    1. Shortness of breath  R06.02    2. Anxiety  F41.9        Discharge Medications:  New Prescriptions    CLONAZEPAM (KLONOPIN) 0.5 MG TABLET    Take 1 tablet (0.5 mg) by mouth 2 times daily as needed for anxiety    SERTRALINE (ZOLOFT) 50 MG TABLET    Take 1 tablet (50 mg) by mouth daily       Scribe Disclosure:  I, Alberto Addison, am serving as a scribe at 4:32 PM on 3/23/2022 to document services personally performed by Dr. Arriaga, based on my observations and the provider's statements to me.           Kristina Arriaga MD  03/23/22 7617

## 2022-03-23 NOTE — ED TRIAGE NOTES
Pt reports feeling anxious for the past 6 weeks, pt called her care team today and they told pt to come in to ED. Pt states lightheaded as well.

## 2022-04-07 ENCOUNTER — TRANSFERRED RECORDS (OUTPATIENT)
Dept: HEALTH INFORMATION MANAGEMENT | Facility: CLINIC | Age: 77
End: 2022-04-07
Payer: MEDICARE

## 2022-04-21 ENCOUNTER — HOSPITAL ENCOUNTER (OUTPATIENT)
Facility: CLINIC | Age: 77
Setting detail: OBSERVATION
Discharge: HOME | End: 2022-04-23
Attending: EMERGENCY MEDICINE | Admitting: INTERNAL MEDICINE
Payer: MEDICARE

## 2022-04-21 ENCOUNTER — APPOINTMENT (OUTPATIENT)
Dept: MRI IMAGING | Facility: CLINIC | Age: 77
End: 2022-04-21
Attending: EMERGENCY MEDICINE
Payer: MEDICARE

## 2022-04-21 ENCOUNTER — APPOINTMENT (OUTPATIENT)
Dept: CT IMAGING | Facility: CLINIC | Age: 77
End: 2022-04-21
Attending: EMERGENCY MEDICINE
Payer: MEDICARE

## 2022-04-21 ENCOUNTER — NURSE TRIAGE (OUTPATIENT)
Dept: FAMILY MEDICINE | Facility: CLINIC | Age: 77
End: 2022-04-21
Payer: MEDICARE

## 2022-04-21 DIAGNOSIS — R42 DIZZINESS: ICD-10-CM

## 2022-04-21 LAB
ALBUMIN SERPL-MCNC: 3.4 G/DL (ref 3.4–5)
ALBUMIN UR-MCNC: NEGATIVE MG/DL
ALP SERPL-CCNC: 86 U/L (ref 40–150)
ALT SERPL W P-5'-P-CCNC: 19 U/L (ref 0–50)
ANION GAP SERPL CALCULATED.3IONS-SCNC: 5 MMOL/L (ref 3–14)
APPEARANCE UR: CLEAR
APTT PPP: 34 SECONDS (ref 22–38)
AST SERPL W P-5'-P-CCNC: 12 U/L (ref 0–45)
ATRIAL RATE - MUSE: 58 BPM
BASOPHILS # BLD AUTO: 0 10E3/UL (ref 0–0.2)
BASOPHILS NFR BLD AUTO: 1 %
BILIRUB SERPL-MCNC: 0.3 MG/DL (ref 0.2–1.3)
BILIRUB UR QL STRIP: NEGATIVE
BUN SERPL-MCNC: 22 MG/DL (ref 7–30)
CALCIUM SERPL-MCNC: 9.6 MG/DL (ref 8.5–10.1)
CHLORIDE BLD-SCNC: 113 MMOL/L (ref 94–109)
CO2 SERPL-SCNC: 23 MMOL/L (ref 20–32)
COLOR UR AUTO: ABNORMAL
CREAT SERPL-MCNC: 0.9 MG/DL (ref 0.52–1.04)
DIASTOLIC BLOOD PRESSURE - MUSE: NORMAL MMHG
EOSINOPHIL # BLD AUTO: 0.2 10E3/UL (ref 0–0.7)
EOSINOPHIL NFR BLD AUTO: 3 %
ERYTHROCYTE [DISTWIDTH] IN BLOOD BY AUTOMATED COUNT: 13.9 % (ref 10–15)
GFR SERPL CREATININE-BSD FRML MDRD: 66 ML/MIN/1.73M2
GLUCOSE BLD-MCNC: 104 MG/DL (ref 70–99)
GLUCOSE UR STRIP-MCNC: NEGATIVE MG/DL
HCT VFR BLD AUTO: 39.9 % (ref 35–47)
HGB BLD-MCNC: 12.8 G/DL (ref 11.7–15.7)
HGB UR QL STRIP: NEGATIVE
HOLD SPECIMEN: NORMAL
IMM GRANULOCYTES # BLD: 0 10E3/UL
IMM GRANULOCYTES NFR BLD: 1 %
INR PPP: 1 (ref 0.85–1.15)
INTERPRETATION ECG - MUSE: NORMAL
KETONES UR STRIP-MCNC: NEGATIVE MG/DL
LEUKOCYTE ESTERASE UR QL STRIP: NEGATIVE
LYMPHOCYTES # BLD AUTO: 1.2 10E3/UL (ref 0.8–5.3)
LYMPHOCYTES NFR BLD AUTO: 22 %
MCH RBC QN AUTO: 30.1 PG (ref 26.5–33)
MCHC RBC AUTO-ENTMCNC: 32.1 G/DL (ref 31.5–36.5)
MCV RBC AUTO: 94 FL (ref 78–100)
MONOCYTES # BLD AUTO: 0.6 10E3/UL (ref 0–1.3)
MONOCYTES NFR BLD AUTO: 10 %
MUCOUS THREADS #/AREA URNS LPF: PRESENT /LPF
NEUTROPHILS # BLD AUTO: 3.5 10E3/UL (ref 1.6–8.3)
NEUTROPHILS NFR BLD AUTO: 63 %
NITRATE UR QL: NEGATIVE
NRBC # BLD AUTO: 0 10E3/UL
NRBC BLD AUTO-RTO: 0 /100
P AXIS - MUSE: 29 DEGREES
PH UR STRIP: 7.5 [PH] (ref 5–7)
PLATELET # BLD AUTO: 308 10E3/UL (ref 150–450)
POTASSIUM BLD-SCNC: 3.8 MMOL/L (ref 3.4–5.3)
PR INTERVAL - MUSE: 136 MS
PROT SERPL-MCNC: 6.3 G/DL (ref 6.8–8.8)
QRS DURATION - MUSE: 92 MS
QT - MUSE: 440 MS
QTC - MUSE: 431 MS
R AXIS - MUSE: 3 DEGREES
RBC # BLD AUTO: 4.25 10E6/UL (ref 3.8–5.2)
RBC URINE: <1 /HPF
SARS-COV-2 RNA RESP QL NAA+PROBE: NEGATIVE
SODIUM SERPL-SCNC: 141 MMOL/L (ref 133–144)
SP GR UR STRIP: 1 (ref 1–1.03)
SQUAMOUS EPITHELIAL: <1 /HPF
SYSTOLIC BLOOD PRESSURE - MUSE: NORMAL MMHG
T AXIS - MUSE: 12 DEGREES
TROPONIN I SERPL HS-MCNC: <3 NG/L
UROBILINOGEN UR STRIP-MCNC: NORMAL MG/DL
VENTRICULAR RATE- MUSE: 58 BPM
WBC # BLD AUTO: 5.5 10E3/UL (ref 4–11)
WBC URINE: <1 /HPF

## 2022-04-21 PROCEDURE — 81001 URINALYSIS AUTO W/SCOPE: CPT | Performed by: EMERGENCY MEDICINE

## 2022-04-21 PROCEDURE — 96376 TX/PRO/DX INJ SAME DRUG ADON: CPT

## 2022-04-21 PROCEDURE — 250N000013 HC RX MED GY IP 250 OP 250 PS 637: Performed by: INTERNAL MEDICINE

## 2022-04-21 PROCEDURE — 96374 THER/PROPH/DIAG INJ IV PUSH: CPT

## 2022-04-21 PROCEDURE — 99291 CRITICAL CARE FIRST HOUR: CPT | Mod: 25

## 2022-04-21 PROCEDURE — 255N000002 HC RX 255 OP 636: Performed by: EMERGENCY MEDICINE

## 2022-04-21 PROCEDURE — C9803 HOPD COVID-19 SPEC COLLECT: HCPCS

## 2022-04-21 PROCEDURE — 85730 THROMBOPLASTIN TIME PARTIAL: CPT | Performed by: EMERGENCY MEDICINE

## 2022-04-21 PROCEDURE — 258N000003 HC RX IP 258 OP 636: Performed by: EMERGENCY MEDICINE

## 2022-04-21 PROCEDURE — 85610 PROTHROMBIN TIME: CPT | Performed by: EMERGENCY MEDICINE

## 2022-04-21 PROCEDURE — 85025 COMPLETE CBC W/AUTO DIFF WBC: CPT | Performed by: EMERGENCY MEDICINE

## 2022-04-21 PROCEDURE — 96361 HYDRATE IV INFUSION ADD-ON: CPT

## 2022-04-21 PROCEDURE — 70496 CT ANGIOGRAPHY HEAD: CPT

## 2022-04-21 PROCEDURE — 250N000011 HC RX IP 250 OP 636: Performed by: EMERGENCY MEDICINE

## 2022-04-21 PROCEDURE — 36415 COLL VENOUS BLD VENIPUNCTURE: CPT | Performed by: EMERGENCY MEDICINE

## 2022-04-21 PROCEDURE — 250N000009 HC RX 250: Performed by: EMERGENCY MEDICINE

## 2022-04-21 PROCEDURE — 99292 CRITICAL CARE ADDL 30 MIN: CPT

## 2022-04-21 PROCEDURE — 0042T CT HEAD PERFUSION WITH CONTRAST: CPT

## 2022-04-21 PROCEDURE — 93005 ELECTROCARDIOGRAM TRACING: CPT

## 2022-04-21 PROCEDURE — A9585 GADOBUTROL INJECTION: HCPCS | Performed by: EMERGENCY MEDICINE

## 2022-04-21 PROCEDURE — 84484 ASSAY OF TROPONIN QUANT: CPT | Performed by: EMERGENCY MEDICINE

## 2022-04-21 PROCEDURE — 70553 MRI BRAIN STEM W/O & W/DYE: CPT

## 2022-04-21 PROCEDURE — 80053 COMPREHEN METABOLIC PANEL: CPT | Performed by: EMERGENCY MEDICINE

## 2022-04-21 PROCEDURE — 70450 CT HEAD/BRAIN W/O DYE: CPT

## 2022-04-21 PROCEDURE — U0005 INFEC AGEN DETEC AMPLI PROBE: HCPCS | Performed by: EMERGENCY MEDICINE

## 2022-04-21 PROCEDURE — G0378 HOSPITAL OBSERVATION PER HR: HCPCS

## 2022-04-21 PROCEDURE — 250N000011 HC RX IP 250 OP 636

## 2022-04-21 PROCEDURE — 99220 PR INITIAL OBSERVATION CARE,LEVEL III: CPT | Performed by: INTERNAL MEDICINE

## 2022-04-21 PROCEDURE — 96375 TX/PRO/DX INJ NEW DRUG ADDON: CPT

## 2022-04-21 RX ORDER — DIAZEPAM 10 MG/2ML
2.5 INJECTION, SOLUTION INTRAMUSCULAR; INTRAVENOUS
Status: DISCONTINUED | OUTPATIENT
Start: 2022-04-21 | End: 2022-04-22

## 2022-04-21 RX ORDER — PRAMIPEXOLE DIHYDROCHLORIDE 0.25 MG/1
0.25 TABLET ORAL
Status: DISCONTINUED | OUTPATIENT
Start: 2022-04-21 | End: 2022-04-23 | Stop reason: HOSPADM

## 2022-04-21 RX ORDER — MECLIZINE HYDROCHLORIDE 25 MG/1
25 TABLET ORAL EVERY 6 HOURS PRN
Status: DISCONTINUED | OUTPATIENT
Start: 2022-04-21 | End: 2022-04-22

## 2022-04-21 RX ORDER — LORAZEPAM 2 MG/ML
0.5 INJECTION INTRAMUSCULAR ONCE
Status: COMPLETED | OUTPATIENT
Start: 2022-04-21 | End: 2022-04-21

## 2022-04-21 RX ORDER — ONDANSETRON 4 MG/1
4 TABLET, ORALLY DISINTEGRATING ORAL EVERY 6 HOURS PRN
Status: DISCONTINUED | OUTPATIENT
Start: 2022-04-21 | End: 2022-04-23 | Stop reason: HOSPADM

## 2022-04-21 RX ORDER — DIAZEPAM 10 MG/2ML
2.5 INJECTION, SOLUTION INTRAMUSCULAR; INTRAVENOUS ONCE
Status: COMPLETED | OUTPATIENT
Start: 2022-04-21 | End: 2022-04-21

## 2022-04-21 RX ORDER — ONDANSETRON 2 MG/ML
INJECTION INTRAMUSCULAR; INTRAVENOUS
Status: COMPLETED
Start: 2022-04-21 | End: 2022-04-21

## 2022-04-21 RX ORDER — ONDANSETRON 2 MG/ML
4 INJECTION INTRAMUSCULAR; INTRAVENOUS EVERY 6 HOURS PRN
Status: DISCONTINUED | OUTPATIENT
Start: 2022-04-21 | End: 2022-04-23 | Stop reason: HOSPADM

## 2022-04-21 RX ORDER — HYDROMORPHONE HCL IN WATER/PF 6 MG/30 ML
0.2 PATIENT CONTROLLED ANALGESIA SYRINGE INTRAVENOUS ONCE
Status: COMPLETED | OUTPATIENT
Start: 2022-04-21 | End: 2022-04-21

## 2022-04-21 RX ORDER — ONDANSETRON 2 MG/ML
4 INJECTION INTRAMUSCULAR; INTRAVENOUS EVERY 6 HOURS PRN
Status: DISCONTINUED | OUTPATIENT
Start: 2022-04-21 | End: 2022-04-21

## 2022-04-21 RX ORDER — GADOBUTROL 604.72 MG/ML
5 INJECTION INTRAVENOUS ONCE
Status: COMPLETED | OUTPATIENT
Start: 2022-04-21 | End: 2022-04-21

## 2022-04-21 RX ORDER — CLONAZEPAM 0.5 MG/1
0.5 TABLET ORAL
Status: DISCONTINUED | OUTPATIENT
Start: 2022-04-21 | End: 2022-04-23 | Stop reason: HOSPADM

## 2022-04-21 RX ORDER — PANTOPRAZOLE SODIUM 40 MG/1
40 TABLET, DELAYED RELEASE ORAL
Status: DISCONTINUED | OUTPATIENT
Start: 2022-04-21 | End: 2022-04-23 | Stop reason: HOSPADM

## 2022-04-21 RX ORDER — IOPAMIDOL 755 MG/ML
125 INJECTION, SOLUTION INTRAVASCULAR ONCE
Status: COMPLETED | OUTPATIENT
Start: 2022-04-21 | End: 2022-04-21

## 2022-04-21 RX ORDER — OXYMETAZOLINE HYDROCHLORIDE 0.05 G/100ML
1 SPRAY NASAL AT BEDTIME
COMMUNITY
End: 2022-05-20

## 2022-04-21 RX ADMIN — LORAZEPAM 0.5 MG: 2 INJECTION INTRAMUSCULAR; INTRAVENOUS at 09:54

## 2022-04-21 RX ADMIN — ONDANSETRON 4 MG: 2 INJECTION INTRAMUSCULAR; INTRAVENOUS at 15:10

## 2022-04-21 RX ADMIN — PRAMIPEXOLE DIHYDROCHLORIDE 0.25 MG: 0.25 TABLET ORAL at 18:17

## 2022-04-21 RX ADMIN — GADOBUTROL 5 ML: 604.72 INJECTION INTRAVENOUS at 11:54

## 2022-04-21 RX ADMIN — PANTOPRAZOLE SODIUM 40 MG: 40 TABLET, DELAYED RELEASE ORAL at 18:17

## 2022-04-21 RX ADMIN — HYDROMORPHONE HYDROCHLORIDE 0.2 MG: 0.2 INJECTION, SOLUTION INTRAMUSCULAR; INTRAVENOUS; SUBCUTANEOUS at 11:41

## 2022-04-21 RX ADMIN — DIAZEPAM 2.5 MG: 5 INJECTION INTRAMUSCULAR; INTRAVENOUS at 11:43

## 2022-04-21 RX ADMIN — IOPAMIDOL 125 ML: 755 INJECTION, SOLUTION INTRAVENOUS at 08:28

## 2022-04-21 RX ADMIN — HYDROMORPHONE HYDROCHLORIDE 0.2 MG: 0.2 INJECTION, SOLUTION INTRAMUSCULAR; INTRAVENOUS; SUBCUTANEOUS at 15:10

## 2022-04-21 RX ADMIN — PROCHLORPERAZINE EDISYLATE 5 MG: 5 INJECTION INTRAMUSCULAR; INTRAVENOUS at 15:46

## 2022-04-21 RX ADMIN — SODIUM CHLORIDE 100 ML: 900 INJECTION INTRAVENOUS at 08:29

## 2022-04-21 RX ADMIN — DIAZEPAM 2.5 MG: 5 INJECTION INTRAMUSCULAR; INTRAVENOUS at 10:53

## 2022-04-21 RX ADMIN — SODIUM CHLORIDE 1000 ML: 9 INJECTION, SOLUTION INTRAVENOUS at 09:54

## 2022-04-21 ASSESSMENT — ENCOUNTER SYMPTOMS
LIGHT-HEADEDNESS: 1
DIZZINESS: 1
NAUSEA: 1
WEAKNESS: 0

## 2022-04-21 ASSESSMENT — ACTIVITIES OF DAILY LIVING (ADL)
ADLS_ACUITY_SCORE: 7

## 2022-04-21 NOTE — ED NOTES
Patient reports she normally takes her restless leg medication now and she is wanting to take her own medications. MD notified. Orders received.

## 2022-04-21 NOTE — ED NOTES
Patient lying quietly on cart with family at bedside. Patient VSS.     Patient reports improving HA and dizziness and nausea. Rates HA at 6/10.     Patient and family updated on continued pOC and waits.     Continue to monitor.

## 2022-04-21 NOTE — ED NOTES
Patient attempted ambulation with me and family at bedside. Upon standing patient dizziness worsened and she was unable to take a step without being fully supported. Dizziness improved upon sitting back down.

## 2022-04-21 NOTE — ED NOTES
Patient and family updated on continued POC and waits. Deny needs at present. Continue to monitor.

## 2022-04-21 NOTE — PROVIDER NOTIFICATION
MD Notification    Notified Person: MD    Notified Person Name: Dr. Hagen    Notification Date/Time:  4/21/22   6411    Notification Interaction:  text    Purpose of Notification: Pt requesting Pramipexole mentioned take 2 times daily.  Passed swallow screen. Can you please order diet as well? Thanks     Orders Received:    Comments:

## 2022-04-21 NOTE — ED NOTES
Patient returned from imaging exam. Family at bedside. Patient placed back on NIBP and SaO2 monitoring. VSS. Patient's FiO2 weaned from 4 LPM to 2 LPM NC. Patient and family updated on continued pOC and waits.     Lights dimmed for comfort. Bed in lowest position. Side rails up x 2. Call light in reach. Patient placed on kali hugger for comfort.

## 2022-04-21 NOTE — PROGRESS NOTES
RECEIVING UNIT ED HANDOFF REVIEW    ED Nurse Handoff Report was reviewed by: Taylor Lewis RN on April 21, 2022 at 4:00 PM

## 2022-04-21 NOTE — ED PROVIDER NOTES
Venous  History   Chief Complaint:  Dizziness        The history is provided by the patient.      Hanane Sorto is a 76 year old female with history of hyperlipidemia who presents for evaluation of dizziness and balance problems. The patient reports that she felt well last night and went to bed around 2330. She woke up during the night to take off a sweatshirt, but did not get out of bed and walk. This morning the patient woke up at 0630, and she noticed that she was very light-headed. She felt that her balance was off when getting up to walk. The patient says she was unable to walk, clarifying that this was due to balance and dizziness, and not weakness. She reports feeling nauseated as well, but not like she was going to pass out. She denies diplopia. The patient does not take a daily aspirin.     Review of Systems   Eyes:        (-) diplopia   Gastrointestinal: Positive for nausea.   Musculoskeletal: Positive for gait problem (balance).   Neurological: Positive for dizziness and light-headedness. Negative for weakness.   All other systems reviewed and are negative.        Allergies:  Crestor [Rosuvastatin]  Ezetimibe  Hmg-Coa-R Inhibitors  Miralax [Polyethylene Glycol]  Pravastatin    Medications:  Clonopin  Vibramycin  Prilosec  Mirapex  Zoloft  Nasacort  Atrovent     Past Medical History:     Anxiety  Floaters  Fracture of wrist  GERD  Hyperlipidemia  Malnutrition  Osteoarthritis  Osteoporosis  Restless legs  Skin cancer  Bursitis of hip  Arthritis of neck    Spondylolisthesis Lumbar Region  Stenosis spinal cervical  Pancreas cyst    Past Surgical History:    Arthroplasty knee  Vaginal biopsy  Breast implants  Bunionectomy    section  Cataract surgery, both eyes  Liposuction   Reconstruct breast    Tonsillectomy   Esophagogastroduodenoscopy with Biopsy  EGD with ultrasound    Family History:    Alzheimer disease  Depression  RA  Coronary artery disease  Hypertension   Diabetes  Breast  "cancer  Leukemia  Hyperlipidemia   Colon cancer    Social History:  The patient presents with a family member.  Comes from home by private car.      Physical Exam     Patient Vitals for the past 24 hrs:   BP Temp Temp src Pulse Resp SpO2 Height Weight   04/21/22 1234 120/62 -- -- 73 14 100 % -- --   04/21/22 1130 (!) 151/71 -- -- 75 -- 94 % -- --   04/21/22 1129 -- -- -- -- -- 92 % -- --   04/21/22 1128 -- -- -- -- -- (!) 89 % -- --   04/21/22 1115 -- -- -- -- -- 90 % -- --   04/21/22 1100 133/70 -- -- 80 -- 91 % -- --   04/21/22 1030 (!) 140/54 -- -- 72 -- -- -- --   04/21/22 1000 (!) 156/71 -- -- 72 -- -- -- --   04/21/22 0955 (!) 144/78 -- -- 70 -- -- -- --   04/21/22 0930 120/74 -- -- 63 -- -- -- --   04/21/22 0846 (!) 149/68 -- -- 75 -- 97 % -- --   04/21/22 0845 (!) 143/70 -- -- 77 -- 98 % -- --   04/21/22 0815 135/55 -- -- -- -- -- -- --   04/21/22 0759 134/50 98.1  F (36.7  C) Temporal 74 16 97 % 1.473 m (4' 10\") 49 kg (108 lb)       Physical Exam    General: Resting comfortably on the gurney, appears dizzy  Head:  The scalp, face, and head appear normal  Eyes:  The pupils are equal, round, and reactive to light    There is no nystagmus, there is no diplopia     Extraocular muscles are intact    Conjunctivae and sclerae are normal  ENT:    The nose is normal    Pinnae are normal    The oropharynx is normal    Uvula is in the midline  Neck:  Normal range of motion    There is no rigidity noted    There is no midline cervical spine pain/tenderness    Trachea is in the midline    No mass is detected  CV:  Regular rate and underlying rhythm     Normal S1/S2, no S3/S4    No pathological murmur detected  Resp:  Lungs are clear    There is no tachypnea    Non-labored    No rales    No wheezing   GI:  Abdomen is soft, there is no rigidity, there is a well healed low abdominal surgical scar    No distension    No tympani    No rebound tenderness     Non-surgical without peritoneal features  MS:  Normal muscular " tone    Symmetric motor strength    No major joint effusions    No asymmetric leg swelling, no calf tenderness  Skin:  No rash or acute skin lesions noted  Neuro: Speech is normal and fluent    No aphasia, normal strength in all extremities, there is some dysmetria involving the right arm on finger to nose testing, the left arm is normal   Psych:  Awake. Alert.      Normal affect.  Appropriate interactions.  Lymph: No anterior cervical lymphadenopathy noted      Emergency Department Course   ECG  ECG obtained at 0820, ECG read at 0845  Sinus bradycardia  Otherwise normal ECG   No significant change as compared to prior, dated 3/23/22.  Rate 58 bpm. NH interval 136 ms. QRS duration 92 ms. QT/QTc 440/431 ms. P-R-T axes 29 3 12.     Imaging:  MR Brain w/o & w Contrast   Final Result   IMPRESSION:   No evidence of acute ischemia or hemorrhage.      SEBASTIAN VALENCIA MD            SYSTEM ID:  [id number]      CT Head Perfusion w Contrast   Final Result   IMPRESSION:    1. Left vertebral artery is slightly smaller in size than the right in   the neck. This may be an anatomic variant.   2. Intracranial segment of the left vertebral artery appears to   terminate primarily in the left PICA. There appears to be a small   continuation of the left vertebral artery towards the basilar artery   after the left PICA origin, although this vessel is faintly enhancing   and appears to have scattered calcification. This vessel appears to be   occluded before it connects with the basilar artery. This is age   indeterminate and given the calcification is favored to be more   chronic process from intracranial atherosclerotic disease, although an   acute component of stenosis/occlusion is not excluded. Possible   ischemia would be better assessed with brain MRI.   3. Right vertebral artery is widely patent supplying the basilar   artery.   4. No vascular cutoff of the proximal ACAs or MCAs. No intracranial   aneurysm.   5. Mild atherosclerotic  disease of the carotid bifurcations   bilaterally without significant stenosis.   6. CT perfusion images of the head are unremarkable.      Results discussed with Parker Moeller at 8:48 AM on 4/21/2022.      CHENTE GOOD MD            SYSTEM ID:  [id number]      CTA Head Neck with Contrast   Final Result   IMPRESSION:    1. Left vertebral artery is slightly smaller in size than the right in   the neck. This may be an anatomic variant.   2. Intracranial segment of the left vertebral artery appears to   terminate primarily in the left PICA. There appears to be a small   continuation of the left vertebral artery towards the basilar artery   after the left PICA origin, although this vessel is faintly enhancing   and appears to have scattered calcification. This vessel appears to be   occluded before it connects with the basilar artery. This is age   indeterminate and given the calcification is favored to be more   chronic process from intracranial atherosclerotic disease, although an   acute component of stenosis/occlusion is not excluded. Possible   ischemia would be better assessed with brain MRI.   3. Right vertebral artery is widely patent supplying the basilar   artery.   4. No vascular cutoff of the proximal ACAs or MCAs. No intracranial   aneurysm.   5. Mild atherosclerotic disease of the carotid bifurcations   bilaterally without significant stenosis.   6. CT perfusion images of the head are unremarkable.      Results discussed with Parker Moeller at 8:48 AM on 4/21/2022.      CHENTE GOOD MD            SYSTEM ID:  [id number]      CT Head w/o Contrast   Final Result   IMPRESSION:   No evidence of acute intracranial hemorrhage, mass, or   herniation.       CHENTE GOOD MD            SYSTEM ID:  [id number]      Report per radiology    Laboratory:  Labs Ordered and Resulted from Time of ED Arrival to Time of ED Departure   COMPREHENSIVE METABOLIC PANEL - Abnormal       Result Value    Sodium 141      Potassium 3.8       Chloride 113 (*)     Carbon Dioxide (CO2) 23      Anion Gap 5      Urea Nitrogen 22      Creatinine 0.90      Calcium 9.6      Glucose 104 (*)     Alkaline Phosphatase 86      AST 12      ALT 19      Protein Total 6.3 (*)     Albumin 3.4      Bilirubin Total 0.3      GFR Estimate 66     ROUTINE UA WITH MICROSCOPIC REFLEX TO CULTURE - Abnormal    Color Urine Straw      Appearance Urine Clear      Glucose Urine Negative      Bilirubin Urine Negative      Ketones Urine Negative      Specific Gravity Urine 1.005      Blood Urine Negative      pH Urine 7.5 (*)     Protein Albumin Urine Negative      Urobilinogen Urine Normal      Nitrite Urine Negative      Leukocyte Esterase Urine Negative      Mucus Urine Present (*)     RBC Urine <1      WBC Urine <1      Squamous Epithelials Urine <1     INR - Normal    INR 1.00     PARTIAL THROMBOPLASTIN TIME - Normal    aPTT 34     TROPONIN I - Normal    Troponin I High Sensitivity <3     COVID-19 VIRUS (CORONAVIRUS) BY PCR - Normal    SARS CoV2 PCR Negative     CBC WITH PLATELETS AND DIFFERENTIAL    WBC Count 5.5      RBC Count 4.25      Hemoglobin 12.8      Hematocrit 39.9      MCV 94      MCH 30.1      MCHC 32.1      RDW 13.9      Platelet Count 308      % Neutrophils 63      % Lymphocytes 22      % Monocytes 10      % Eosinophils 3      % Basophils 1      % Immature Granulocytes 1      NRBCs per 100 WBC 0      Absolute Neutrophils 3.5      Absolute Lymphocytes 1.2      Absolute Monocytes 0.6      Absolute Eosinophils 0.2      Absolute Basophils 0.0      Absolute Immature Granulocytes 0.0      Absolute NRBCs 0.0     GLUCOSE MONITOR NURSING POCT        Emergency Department Course:     Reviewed:  I reviewed nursing notes, vitals, past medical history and Care Everywhere    Assessments/Consults:  0816 Tier 2 code stroke called, patient went to CT.     0846 I spoke with Dr. Garza, radiology, regarding the patient's imaging.     0847 I spoke with Dr. Garcia, stroke neurology. He  briefly saw the patient.     0855 Received a call form Dr. Garza, radiology. CT profusion images are normal.     0856 I spoke with Dr. Garcia, stroke neurology. Plan for MRI brain.     0903 I obtained history and examined the patient as noted above.     1130 I reassessed the patient at this time. Still complaining of waves of dizziness. She feels like the valium helped, but it is back. She has a mild frontal headache.     1137 I updated the patient and we discussed admission to the hospital.    1145 I consulted with Dr. Hagen, hospitalist, regarding the patient's history and presentation here in the emergency department who accepted the patient for admission.         Interventions:  0954 NS 1 L IV  0954 Ativan 0.5 mg IV  1053 Valium 2.5 mg IV  1141 Dilaudid 0.2 mg IV  1143 Valium 2.5 mg IV    Disposition:  The patient was admitted to the hospital under the care of Dr. Hagen.     Impression & Plan     Medical Decision Making:  This patient presents to the emergency department with acute onset of, disequilibrium, some trouble walking, and just not feeling well.  She immediately underwent code stroke activation and it was noted that she had a possible abnormality in her vertebral artery as noted above.  The patient was given benzodiazepines and small titrated doses to help with waves of dizziness.  The patient ultimately underwent MRI of the brain to look for acute infarct in the posterior circulation which was negative.  The differential diagnosis included brainstem stroke, cerebellar stroke, vertebrobasilar insufficiency, and peripheral vertigo.  At this juncture, the patient is still symptomatic.  She was seen in consultation with stroke neurology and the hospitalist service.  She will require an observation stay at this time as she is still feeling a bit unsteady and dizzy.  There is no obvious resting or inducible nystagmus to definitively indicate a peripheral vertiginous etiology.  Fortunately, there is no  evidence of acute stroke process.  The patient will be placed in observation with the hospitalist at this time.      Critical Care Time: was 40 minutes for this patient excluding procedures    Diagnosis:  Dizziness      Scribe Disclosure:  I, [place], am serving as a scribe at 8:28 AM on 4/21/2022 to document services personally performed by Parker Moeller MD based on my observations and the provider's statements to me.          Parker Moeller MD  04/21/22 5450

## 2022-04-21 NOTE — H&P
[facility]    History and Physical - Hospitalist Service       Date of Admission:  4/21/2022    Assessment & Plan      Hanane Sorto is a 76 year old female with a history of hyperlipidemia who presented with complaints of sudden onset dizziness when she woke up in the morning.  The patient reports that she was normal when she went to bed last night.  This morning when she got up she suddenly started feeling off balance.  Her  noticed that she was unsteady on her feet.  She therefore presented to the ED.    Sudden onset dizziness  Likely peripheral vertigo like BPPV, vestibular neuritis  -Head CT, CT perfusion MRI of the brain unremarkable  -Fall precaution  -PT evaluation for Zina-Hallpike/Epley's   -Symptomatic treatment   -Fall precaution    Anxiety  -Resume PTA as needed Klonopin    GERD.    -Continue PTA PPI         Diet: NPO for Medical/Clinical Reasons Except for: No Exceptions    DVT Prophylaxis: Pneumatic Compression Devices  Mancia Catheter: Not present  Central Lines: None  Cardiac Monitoring: None  Code Status:  Full code, discussed with patient and her     Clinically Significant Risk Factors Present on Admission                      Disposition Plan   Expected Discharge:  1 to 2 days after evaluation by PT and safe disposition plan formulated  Anticipated discharge location:  Awaiting care coordination huddle  Delays:         The patient's care was discussed with the Patient, Patient's Family and ED Team.    Enid Hagen MD  Hospitalist Service  [facility]  Securely message with the Vocera Web Console (learn more here)  Text page via PeerJ Paging/Directory         ______________________________________________________________________    Chief Complaint   Sudden onset dizziness    History is obtained from the patient, electronic health record and emergency department physician    History of Present Illness   Hanane Sorto is a 76 year old  female with a history of hyperlipidemia who presented with complaints of sudden onset dizziness when she woke up in the morning.  The patient reports that she was normal when she went to bed last night.  This morning when she got up she suddenly started feeling off balance.  Her  noticed that she was unsteady on her feet.  She therefore presented to the ED.    She does complain of nausea but no vomiting.  She denies any chest pain, palpitation, shortness of breath, syncope/loss of consciousness, unilateral weakness or numbness or tingling.  She denies any tinnitus, hearing loss, recent viral infection    When she presented to the ED temperature 98.1 pulse rate 80, blood pressure 134/50, respiratory 16, O2 sat 99% on room air.  Labs unremarkable CBC, unremarkable BMP, high-sensitivity troponin negative, UA not suggestive of infection, SARS COV 2 PCR negative.EKG with sinus bradycardia with a heart rate of 58, otherwise unremarkable.  Code stroke was called in the ED due to her being off balance, Head CT with no acute intracranial hemorrhage mass or herniation.  CTA head and neck, left vertebral artery slightly smaller in size than the right, likely anatomic variant.  Intracranial segment of left vertebral artery appears to terminate primarily in the left [place], appears to be small continuation of left vertebral artery towards the basilar artery after the left peak region, appears to have scattered calcification, age indeterminant however due to calcification more chronic process, right vertebral artery is widely patent supplying the basilar artery and no vascular cutoff on proximal ICA or MCA.  CT perfusion images of the head are unremarkable  MRI of the brain negative for any intracranial abnormality, no evidence of stroke    Review of Systems    The 10 point Review of Systems is negative other than noted in the HPI or here.     Past Medical History    I have reviewed this patient's medical history and updated  it with pertinent information if needed.   Past Medical History:   Diagnosis Date     Anxiety     psych yearly, Dr Shea     Family hx of colon cancer     sister     Floaters     Karma leverentz     Fracture of wrist, left, with routine healing, subsequent encounter 2017     GERD (gastroesophageal reflux disease)      Gross hematuria 2011     Hyperlipidemia LDL goal < 130      Hyperlipidemia LDL goal <130 2017     Malnutrition (H) 2017     Osteoarthritis 2012     Osteoporosis     Dexa     PONV (postoperative nausea and vomiting)      Restless legs      Skin cancer        Past Surgical History   I have reviewed this patient's surgical history and updated it with pertinent information if needed.  Past Surgical History:   Procedure Laterality Date     ARTHROPLASTY KNEE Left 2017    Procedure: ARTHROPLASTY KNEE;  LEFT TOTAL KNEE ARTHROPLASTY;  Surgeon: Lee Ayala MD;  Location: [facility] OR     BIOPSY   approx.    vaginal     BREAST SURGERY      Implants     BUNIONECTOMY      both      SECTION       COLONOSCOPY  8-10     EYE SURGERY  2014    cataract surgery in both eyes     LIPOSUCTION (LOCATION)       RECONSTRUCT BREAST, IMPLANT PROSTHESIS, COMBINED         Social History   I have reviewed this patient's social history and updated it with pertinent information if needed.  Social History     Tobacco Use     Smoking status: Former Smoker     Packs/day: 0.50     Years: 15.00     Pack years: 7.50     Types: Cigarettes     Start date:      Quit date:      Years since quittin.3     Smokeless tobacco: Never Used     Tobacco comment: one pack/ three months   Substance Use Topics     Alcohol use: Yes     Alcohol/week: 0.0 - 4.0 standard drinks     Comment: one glass of wine a week     Drug use: No       Family History   I have reviewed this patient's family history and updated it with pertinent information if needed.  Family History   Problem  Relation Age of Onset     Lipids Mother      Alzheimer Disease Mother      Depression Mother      Osteoporosis Mother         RA     [email or web address] Father      Coronary Artery Disease Father      Hypertension Father      Diabetes Father      Breast Cancer Sister         53     Leukemia Sister      Hyperlipidemia Sister      Colon Cancer Sister              Cerebrovascular Disease Brother        Prior to Admission Medications   Prior to Admission Medications   Prescriptions Last Dose Informant Patient Reported? Taking?   Calcium-Phosphorus-Vitamin D (CALCIUM GUMMIES PO) 2022 at Unknown time Self Yes Yes   Sig: Take 2 Gum by mouth daily   cholecalciferol (VITAMIN D3) 25 mcg (1000 units) capsule 2022 at Unknown time Self Yes Yes   Sig: Take 1 capsule by mouth daily   clonazePAM (KLONOPIN) 0.5 MG ODT Past Week at Unknown time Self No Yes   Sig: DISSOLVE 1 TABLET(0.5 MG) ON THE TONGUE EVERY NIGHT AS NEEDED FOR ANXIETY   omeprazole (PRILOSEC) 20 MG DR capsule 2022 at Unknown time Self No Yes   Sig: TAKE 1 CAPSULE(20 MG) BY MOUTH TWICE DAILY   oxymetazoline (AFRIN) 0.05 % nasal spray 2022 at Unknown time Self Yes Yes   Sig: Spray 1 spray in nostril At Bedtime   pramipexole (MIRAPEX) 0.25 MG tablet 2022 at Unknown time Self No Yes   Sig: TAKE 1 TABLET BY MOUTH TWICE DAILY AT 5PM AND AT BEDTIME      Facility-Administered Medications: None     Allergies   Allergies   Allergen Reactions     Crestor [Rosuvastatin]      Ezetimibe Other (See Comments)     Zetia = leg cramps      Hmg-Coa-R Inhibitors Other (See Comments)     Leg cramping     Miralax [Polyethylene Glycol] Other (See Comments)     Back pain     Pravastatin Other (See Comments)     Leg cramps        Physical Exam   Vital Signs: Temp: 98.1  F (36.7  C) Temp src: Temporal BP: 120/62 Pulse: 73   Resp: 14 SpO2: 100 % O2 Device: Nasal cannula Oxygen Delivery: 4 LPM  Weight: 108 lbs 0 oz    Exam:  Constitutional: Awake, alert and no distress. Appears  comfortable  Head: Normocephalic. No masses, lesions, tenderness or abnormalities  ENT: ENT exam normal, no neck nodes or sinus tenderness  Cardiovascular: RRR.  No murmurs, no rubs or JVD  Respiratory: Normal WOB,b/l equal air entry, no wheezes or crackles   Gastrointestinal: Abdomen soft, non-tender. BS normal. No masses, organomegaly  : Deferred  Extremities : No edema , no clubbing or cyanosis    Neurologic: Cranial nerves II-XII grossly intact , power symmetrical, Reflexes normal and symmetric. Sensation grossly WNL.      Data   Data reviewed today: I reviewed all medications, new labs and imaging results over the last 24 hours.  Recent Labs   Lab 04/21/22  0822   WBC 5.5   HGB 12.8   MCV 94      INR 1.00      POTASSIUM 3.8   CHLORIDE 113*   CO2 23   BUN 22   CR 0.90   ANIONGAP 5   LIO 9.6   *   ALBUMIN 3.4   PROTTOTAL 6.3*   BILITOTAL 0.3   ALKPHOS 86   ALT 19   AST 12     Recent Results (from the past 24 hour(s))   CT Head w/o Contrast    Narrative    CT SCAN OF THE HEAD WITHOUT CONTRAST  4/21/2022 8:33 AM     HISTORY: Code stroke. Headache and dizziness.    TECHNIQUE:  Axial images of the head and coronal reformations without  IV contrast material. Radiation dose for this scan was reduced using  automated exposure control, adjustment of the mA and/or kV according  to patient size, or iterative reconstruction technique.    COMPARISON: None.    FINDINGS: There is no evidence of intracranial hemorrhage, mass, acute  infarct or anomaly. The ventricles are normal in size, shape and  configuration. The brain parenchyma and subarachnoid spaces are  normal.     The visualized portions of the sinuses and mastoids appear normal. The  bony calvarium and bones of the skull base appear intact.       Impression    IMPRESSION:   No evidence of acute intracranial hemorrhage, mass, or  herniation.     CHENTE GOOD MD         SYSTEM ID:  [id number]   CTA Head Neck with Contrast    Narrative    CT  ANGIOGRAM OF THE HEAD AND NECK WITH CONTRAST  CT HEAD PERFUSION WITH CONTRAST  4/21/2022 8:35 AM     HISTORY: Code Stroke. Headache and dizziness.    TECHNIQUE:  CT angiography with an injection of 75mL Isovue-370  (accession [id number]), 125mL Isovue-370 total (accession [id number]) IV  with scans through the head and neck. Images were transferred to a  separate 3-D workstation where multiplanar reformations and 3-D images  were created. Estimates of carotid stenoses are made relative to the  distal internal carotid artery diameters except as noted. Radiation  dose for this scan was reduced using automated exposure control,  adjustment of the mA and/or kV according to patient size, or iterative  reconstruction technique.     Perfusion scans were performed with injection of additional IV  contrast. These images were processed on a separate 3-D workstation.     COMPARISON: None.     CT HEAD FINDINGS: No contrast enhancing lesions. CT perfusion images  of the head are unremarkable.     CT ANGIOGRAM HEAD FINDINGS:  The intracranial left vertebral artery is  patent at its origin. Left PICA origin is widely patent. There is is  diminutive enhancement of a structure extending anterior, superior,  and medially from the left PICA origin with questionable  calcification. This structure loses opacification in the premedullary  cistern before connecting with the basilar artery. The right vertebral  artery is widely patent supplying the basilar artery. Left AICA is  visualized arising from the basilar artery.    No definite vascular cutoff of the proximal ACAs or MCAs. No  intracranial aneurysm. No significant stenosis of the anterior  circulation.    The P1 segment of the left posterior cerebral artery is not visualized  and is likely hypoplastic or congenitally absent. The left posterior  cerebral artery is supplied by the patent left posterior communicating  artery.     CT ANGIOGRAM NECK FINDINGS:   Normal origin of the great  vessels from the aortic arch.     Right carotid artery: The right common and internal carotid arteries  are patent. Mild atherosclerotic disease at the carotid bifurcation  without stenosis.     Left carotid artery: The left common and internal carotid arteries are  patent. Mild atherosclerotic disease at the carotid bifurcation  without stenosis.     Vertebral arteries: Vertebral arteries are patent without evidence of  dissection. No significant stenosis. Left vertebral artery appears  slightly smaller in size compared to the right which may be an  anatomic variant.    Other findings: Multilevel degenerative changes in the spine.       Impression    IMPRESSION:   1. Left vertebral artery is slightly smaller in size than the right in  the neck. This may be an anatomic variant.  2. Intracranial segment of the left vertebral artery appears to  terminate primarily in the left PICA. There appears to be a small  continuation of the left vertebral artery towards the basilar artery  after the left PICA origin, although this vessel is faintly enhancing  and appears to have scattered calcification. This vessel appears to be  occluded before it connects with the basilar artery. This is age  indeterminate and given the calcification is favored to be more  chronic process from intracranial atherosclerotic disease, although an  acute component of stenosis/occlusion is not excluded. Possible  ischemia would be better assessed with brain MRI.  3. Right vertebral artery is widely patent supplying the basilar  artery.  4. No vascular cutoff of the proximal ACAs or MCAs. No intracranial  aneurysm.  5. Mild atherosclerotic disease of the carotid bifurcations  bilaterally without significant stenosis.  6. CT perfusion images of the head are unremarkable.    Results discussed with Parker Moeller at 8:48 AM on 4/21/2022.    CHENTE GOOD MD         SYSTEM ID:  [id number]   CT Head Perfusion w Contrast    Narrative    CT ANGIOGRAM OF THE  HEAD AND NECK WITH CONTRAST  CT HEAD PERFUSION WITH CONTRAST  4/21/2022 8:35 AM     HISTORY: Code Stroke. Headache and dizziness.    TECHNIQUE:  CT angiography with an injection of 75mL Isovue-370  (accession [id number]), 125mL Isovue-370 total (accession [id number]) IV  with scans through the head and neck. Images were transferred to a  separate 3-D workstation where multiplanar reformations and 3-D images  were created. Estimates of carotid stenoses are made relative to the  distal internal carotid artery diameters except as noted. Radiation  dose for this scan was reduced using automated exposure control,  adjustment of the mA and/or kV according to patient size, or iterative  reconstruction technique.     Perfusion scans were performed with injection of additional IV  contrast. These images were processed on a separate 3-D workstation.     COMPARISON: None.     CT HEAD FINDINGS: No contrast enhancing lesions. CT perfusion images  of the head are unremarkable.     CT ANGIOGRAM HEAD FINDINGS:  The intracranial left vertebral artery is  patent at its origin. Left PICA origin is widely patent. There is is  diminutive enhancement of a structure extending anterior, superior,  and medially from the left PICA origin with questionable  calcification. This structure loses opacification in the premedullary  cistern before connecting with the basilar artery. The right vertebral  artery is widely patent supplying the basilar artery. Left AICA is  visualized arising from the basilar artery.    No definite vascular cutoff of the proximal ACAs or MCAs. No  intracranial aneurysm. No significant stenosis of the anterior  circulation.    The P1 segment of the left posterior cerebral artery is not visualized  and is likely hypoplastic or congenitally absent. The left posterior  cerebral artery is supplied by the patent left posterior communicating  artery.     CT ANGIOGRAM NECK FINDINGS:   Normal origin of the great vessels from the  aortic arch.     Right carotid artery: The right common and internal carotid arteries  are patent. Mild atherosclerotic disease at the carotid bifurcation  without stenosis.     Left carotid artery: The left common and internal carotid arteries are  patent. Mild atherosclerotic disease at the carotid bifurcation  without stenosis.     Vertebral arteries: Vertebral arteries are patent without evidence of  dissection. No significant stenosis. Left vertebral artery appears  slightly smaller in size compared to the right which may be an  anatomic variant.    Other findings: Multilevel degenerative changes in the spine.       Impression    IMPRESSION:   1. Left vertebral artery is slightly smaller in size than the right in  the neck. This may be an anatomic variant.  2. Intracranial segment of the left vertebral artery appears to  terminate primarily in the left PICA. There appears to be a small  continuation of the left vertebral artery towards the basilar artery  after the left PICA origin, although this vessel is faintly enhancing  and appears to have scattered calcification. This vessel appears to be  occluded before it connects with the basilar artery. This is age  indeterminate and given the calcification is favored to be more  chronic process from intracranial atherosclerotic disease, although an  acute component of stenosis/occlusion is not excluded. Possible  ischemia would be better assessed with brain MRI.  3. Right vertebral artery is widely patent supplying the basilar  artery.  4. No vascular cutoff of the proximal ACAs or MCAs. No intracranial  aneurysm.  5. Mild atherosclerotic disease of the carotid bifurcations  bilaterally without significant stenosis.  6. CT perfusion images of the head are unremarkable.    Results discussed with Parker Moeller at 8:48 AM on 4/21/2022.    CHENTE GOOD MD         SYSTEM ID:  [id number]   MR Brain w/o & w Contrast    Narrative    MRI BRAIN WITHOUT AND WITH CONTRAST   4/21/2022 12:32 PM    HISTORY: Dizziness, disequilibrium. Question acute stroke.     TECHNIQUE:  Multiplanar, multisequence MRI of the brain without and  with gadobutrol (GADAVIST) injection 5 mL.    COMPARISON: Head CT 4/21/22.    FINDINGS:  Mild parenchymal volume loss is present. A few scattered white matter  T2 hyperintensities are considered to be within normal limits for age.  No evidence of acute ischemia, hemorrhage, mass, mass effect, or  hydrocephalus. No abnormal enhancement or diffusion restriction.    Marrow signal is within normal limits. Upper cervical spine  degenerative change. Trace paranasal sinus mucosal thickening. The  visualized tympanic and mastoid cavities are unremarkable. Bilateral  lens replacements.      Impression    IMPRESSION:  No evidence of acute ischemia or hemorrhage.    SEBASTIAN VALENCIA MD         SYSTEM ID:  [id number]

## 2022-04-21 NOTE — ED NOTES
Patient reports headache increased to 10/10, dizziness increasing to 10/10 and now also having nausea. MD notified and at bedside.

## 2022-04-21 NOTE — TELEPHONE ENCOUNTER
"Received a call from the patient and her  stating the patient is experiencing extreme dizziness, light headedness, and nausea. Patient states she feels groggy and clammy. Patient feels like she is unable to get her balance and unable to walk without assistance otherwise she would fall. Patient did take her vital signs: HR 86 and /89, patient denies feeling like she has a fever. No chest pain and no difficulty breathing. Patient last ate yesterday evening and was drinking some water while talking to the triage nurse on the phone.     1. DESCRIPTION: \"Describe your dizziness.\"      Feels groggy, feels like she cannot get her balance  2. LIGHTHEADED: \"Do you feel lightheaded?\" (e.g., somewhat faint, woozy, weak upon standing)      Has light headedness, clammy, feels nauseous  3. VERTIGO: \"Do you feel like either you or the room is spinning or tilting?\" (i.e. vertigo)      Room is not spinning  4. SEVERITY: \"How bad is it?\"  \"Do you feel like you are going to faint?\" \"Can you stand and walk?\"    - SEVERE - unable to stand, requires support to walk, feels like passing out now.   5. ONSET:  \"When did the dizziness begin?\"      Just started this morning  6. AGGRAVATING FACTORS: \"Does anything make it worse?\" (e.g., standing, change in head position)      Cannot stand or walk without felling like she is going to fall  7. HEART RATE: \"Can you tell me your heart rate?\" \"How many beats in 15 seconds?\"  (Note: not all patients can do this)        HR 86, /89  8. CAUSE: \"What do you think is causing the dizziness?\"      Unknown  9. RECURRENT SYMPTOM: \"Have you had dizziness before?\" If so, ask: \"When was the last time?\" \"What happened that time?\"      No  10. OTHER SYMPTOMS: \"Do you have any other symptoms?\" (e.g., fever, chest pain, vomiting, diarrhea, bleeding)        Nauseous, no chest pain, no fever, no shortness of breathe    Triage protocol recommending ED/UC/office with PCP approval. Clinic has no " appointments available until this afternoon and  does not open until 10 AM. Triage nurse directed patient to go into the emergency room. Patient and  will be going to [facility] ED.    Reason for Disposition    SEVERE dizziness (e.g., unable to stand, requires support to walk, feels like passing out now)    Additional Information    Negative: Shock suspected (e.g., cold/pale/clammy skin, too weak to stand, low BP, rapid pulse)    Negative: Difficult to awaken or acting confused (e.g., disoriented, slurred speech)    Negative: Fainted, and still feels dizzy afterwards    Negative: Severe difficulty breathing (e.g., struggling for each breath, speaks in single words)    Negative: Overdose (accidental or intentional) of medications    Negative: New neurologic deficit that is present now: * Weakness of the face, arm, or leg on one side of the body * Numbness of the face, arm, or leg on one side of the body * Loss of speech or garbled speech    Negative: Heart beating < 50 beats per minute OR > 140 beats per minute    Negative: Sounds like a life-threatening emergency to the triager    Negative: Chest pain    Negative: Rectal bleeding, bloody stool, or tarry-black stool    Negative: Vomiting is the main symptom    Negative: Diarrhea is the main symptom    Negative: Headache is the main symptom    Negative: Heat exhaustion suspected (i.e., dehydration from heat exposure)    Negative: Patient states that he/she is having an anxiety/panic attack    Protocols used: DIZZINESS-A-OH    Shabnam Manzanares RN

## 2022-04-21 NOTE — ED NOTES
Patient complaints of worsening HA, dizziness and N/V. Patient requests pain and nausea medication. MD Moeller updated. VORB.    Patient lying on cart with eyes closed. HOB elevated 40 degrees. Patient pale and slightly diaphoretic. Patient medicated as ordered. Patient placed back on NIBP and SaO2 monitoring. Respirations are shallow. Patient SaO2 87% on RA. Patient placed back on 2 LPM NC. SaO2 recovered to 98%. Patient medicated as ordered.    Patient resting quietly on cart. Patient and family updated on continued POC and waits. Continue to monitor.

## 2022-04-21 NOTE — PHARMACY-ADMISSION MEDICATION HISTORY
Pharmacy Medication History  Admission medication history interview status for the 4/21/2022  admission is complete. See EPIC admission navigator for prior to admission medications     Location of Interview: Patient room  Medication history sources: Patient    Significant changes made to the medication list:  Added D3, Afrin.  Removed Zoloft.    In the past week, patient estimated taking medication this percent of the time: greater than 90%      Medication reconciliation completed by provider prior to medication history? No    Time spent in this activity: 10    Prior to Admission medications    Medication Sig Last Dose Taking? Auth Provider   Calcium-Phosphorus-Vitamin D (CALCIUM GUMMIES PO) Take 2 Gum by mouth daily 4/20/2022 at Unknown time Yes Unknown, Entered By History   cholecalciferol (VITAMIN D3) 25 mcg (1000 units) capsule Take 1 capsule by mouth daily 4/20/2022 at Unknown time Yes Unknown, Entered By History   clonazePAM (KLONOPIN) 0.5 MG ODT DISSOLVE 1 TABLET(0.5 MG) ON THE TONGUE EVERY NIGHT AS NEEDED FOR ANXIETY Past Week at Unknown time Yes Corona Bethea MD   omeprazole (PRILOSEC) 20 MG DR capsule TAKE 1 CAPSULE(20 MG) BY MOUTH TWICE DAILY 4/21/2022 at Unknown time Yes Corona Bethea MD   oxymetazoline (AFRIN) 0.05 % nasal spray Spray 1 spray in nostril At Bedtime 4/20/2022 at Unknown time Yes Unknown, Entered By History   pramipexole (MIRAPEX) 0.25 MG tablet TAKE 1 TABLET BY MOUTH TWICE DAILY AT 5PM AND AT BEDTIME 4/21/2022 at Unknown time Yes Corona Bethea MD       The information provided in this note is only as accurate as the sources available at the time of update(s)

## 2022-04-21 NOTE — CONSULTS
"      [facility]    Stroke Consult Note    Reason for Consult: Stroke Code     Chief Complaint: Dizziness    HPI  Hanane Sorto is a 76 year old female w hx HLD, comes in with dizziness.    LKN when went to bed 2330, 0630 woke up unsteady with difficulty ambulating. In ED weakness in all extremities, ataxic gait.    Imaging Findings  CTH no infarct or mass  CTA H+N L vert small mostly terminates in PICA with tiny occluded vessel going to basilar  CTP unremarkable    Intravenous Thrombolysis  Not given due to:   - unclear or unfavorable risk-benefit profile for extended window thrombolysis beyond the conventional 4.5 hour time window    Endovascular Treatment  Not initiated due to absence of proximal vessel occlusion    Impression     75yo woman with dizziness, global weakness, questionable R facial droop, small posterior circulation vessel 2/2 large vessel disease due to occlusion of non dominant L vert vs toxic metabolic. No target for IR, MRI brain to ro infarct.    Recommendations  -MRI brain w wo  -tox metabolic eastman per ED    Patient Follow-up    - final recommendation pending work-up    Thank you for this consult. We will continue to follow.     The Stroke Staff is Dr. Chio.    Jairon Garcia MD  Vascular Neurology Fellow  To page me or covering stroke neurology team member, click here: AMCOM   Choose \"On Call\" tab at top, then search dropdown box for \"Neurology Adult\", select location, press Enter, then look for stroke/neuro ICU/telestroke.    ______________________________________________________    Clinically Significant Risk Factors Present on Admission                    Past Medical History   Past Medical History:   Diagnosis Date     Anxiety     psych yearly, Dr Shea     Family hx of colon cancer     sister     Floaters     Karma leverentz     Fracture of wrist, left, with routine healing, subsequent encounter 8/28/2017     GERD (gastroesophageal reflux disease)      Gross " hematuria 2011     Hyperlipidemia LDL goal < 130      Hyperlipidemia LDL goal <130 2017     Malnutrition (H) 2017     Osteoarthritis 2012     Osteoporosis     Dexa     PONV (postoperative nausea and vomiting)      Restless legs      Skin cancer      Past Surgical History   Past Surgical History:   Procedure Laterality Date     ARTHROPLASTY KNEE Left 2017    Procedure: ARTHROPLASTY KNEE;  LEFT TOTAL KNEE ARTHROPLASTY;  Surgeon: Lee Ayala MD;  Location: [facility] OR     BIOPSY   approx.    vaginal     BREAST SURGERY      Implants     BUNIONECTOMY      both      SECTION       COLONOSCOPY  8-10     EYE SURGERY  2014    cataract surgery in both eyes     LIPOSUCTION (LOCATION)       RECONSTRUCT BREAST, IMPLANT PROSTHESIS, COMBINED       Medications   Home Meds  Prior to Admission medications    Medication Sig Start Date End Date Taking? Authorizing Provider   Calcium Carbonate-Vitamin D3 600-400 MG-UNIT TABS Take 1 tablet by mouth    Reported, Patient   clonazePAM (KLONOPIN) 0.5 MG ODT DISSOLVE 1 TABLET(0.5 MG) ON THE TONGUE EVERY NIGHT AS NEEDED FOR ANXIETY 10/22/21   Corona Bethea MD   clonazePAM (KLONOPIN) 0.5 MG tablet Take 1 tablet (0.5 mg) by mouth 2 times daily as needed for anxiety 3/23/22   Kristina Arriaga MD   doxycycline hyclate (VIBRAMYCIN) 100 MG capsule Take 1 capsule (100 mg) by mouth 2 times daily 22   JostinlDominguez mason MD   omeprazole (PRILOSEC) 20 MG DR capsule TAKE 1 CAPSULE(20 MG) BY MOUTH TWICE DAILY 3/18/22   Corona Bethea MD   pramipexole (MIRAPEX) 0.25 MG tablet TAKE 1 TABLET BY MOUTH TWICE DAILY AT 5PM AND AT BEDTIME 10/22/21   Corona Bethea MD   sertraline (ZOLOFT) 50 MG tablet Take 1 tablet (50 mg) by mouth daily 3/23/22   Kristina Arriaga MD   triamcinolone (NASACORT) 55 MCG/ACT nasal aerosol Spray 2 sprays into both nostrils daily    Reported, Patient       Scheduled Meds    triamcinolone  40 mg          Infusion Meds      PRN Meds      Allergies   Allergies   Allergen Reactions     Crestor [Rosuvastatin]      Ezetimibe Other (See Comments)     Zetia = leg cramps      Hmg-Coa-R Inhibitors Other (See Comments)     Leg cramping     Miralax [Polyethylene Glycol] Other (See Comments)     Back pain     Pravastatin Other (See Comments)     Leg cramps      Family History   Family History   Problem Relation Age of Onset     Lipids Mother      Alzheimer Disease Mother      Depression Mother      Osteoporosis Mother         RA     C.A.D. Father      Coronary Artery Disease Father      Hypertension Father      Diabetes Father      Breast Cancer Sister         53     Leukemia Sister      Hyperlipidemia Sister      Colon Cancer Sister              Cerebrovascular Disease Brother      Social History   Social History     Tobacco Use     Smoking status: Former Smoker     Packs/day: 0.50     Years: 15.00     Pack years: 7.50     Types: Cigarettes     Start date:      Quit date:      Years since quittin.3     Smokeless tobacco: Never Used     Tobacco comment: one pack/ three months   Substance Use Topics     Alcohol use: Yes     Alcohol/week: 0.0 - 4.0 standard drinks     Comment: one glass of wine a week     Drug use: No       Review of Systems   The 10 point Review of Systems is negative other than noted in the HPI or here.        PHYSICAL EXAMINATION  Pulse:  [74] 74  Resp:  [16] 16  BP: (134)/(50) 134/50  SpO2:  [97 %] 97 %     Neurologic  Mental Status:  alert, oriented x 3, follows commands, speech clear and fluent, naming and repetition normal  Cranial Nerves:  visual fields intact, PERRL, EOMI with normal smooth pursuit, R facial numbness, very stable R facial droop, hearing not formally tested but intact to conversation  Motor: 4/5 throughout  Reflexes:  toes down-going  Sensory:  light touch sensation intact and symmetric throughout upper and lower extremities, no extinction on double simultaneous  stimulation   Coordination:  Bilateral dysmetria to finger nose  Station/Gait:  deferred    Dysphagia Screen  Per Nursing    Stroke Scales    NIHSS  1a. Level of Consciousness 0-->Alert, keenly responsive   1b. LOC Questions 0-->Answers both questions correctly   1c. LOC Commands 0-->Performs both tasks correctly   2.   Best Gaze 0-->Normal   3.   Visual 0-->No visual loss   4.   Facial Palsy 1-->Minor paralysis (flattened nasolabial fold, asymmetry on smiling)   5a. Motor Arm, Left 1-->Drift, limb holds 90 (or 45) degrees, but drifts down before full 10 seconds, does not hit bed or other support   5b. Motor Arm, Right 1-->Drift, limb holds 90 (or 45) degrees, but drifts down before full 10 secs, does not hit bed or other support   6a. Motor Leg, Left 1-->Drift, leg falls by the end of the 5-sec period but does not hit bed   6b. Motor Leg, right 1-->Drift, leg falls by the end of the 5-sec period but does not hit bed   7.   Limb Ataxia 2-->Present in two limbs   8.   Sensory 1-->Mild-to-moderate sensory loss, patient feels pinprick is less sharp or is dull on the affected side, or there is a loss of superficial pain with pinprick, but patient is aware of being touched   9.   Best Language 0-->No aphasia, normal   10. Dysarthria 0-->Normal   11. Extinction and Inattention  0-->No abnormality   Total 8 (04/21/22 0857)       Modified [place] Score (Pre-morbid)  0    Imaging  I personally reviewed all imaging; relevant findings per HPI.     Lab Results Data   CBC  Recent Labs   Lab 04/21/22 0822   WBC 5.5   RBC 4.25   HGB 12.8   HCT 39.9        Basic Metabolic Panel    Recent Labs   Lab 04/21/22 0822      POTASSIUM 3.8   CHLORIDE 113*   CO2 23   BUN 22   CR 0.90   *   LIO 9.6     Liver Panel  Recent Labs   Lab 04/21/22  0822   PROTTOTAL 6.3*   ALBUMIN 3.4   BILITOTAL 0.3   ALKPHOS 86   AST 12   ALT 19     INR    Recent Labs   Lab Test 04/21/22  0822 04/29/20  1331 04/24/17  0820   INR 1.00 0.95 0.91       Lipid Profile    Recent Labs   Lab Test 11/02/21  0837 10/12/20  0853 05/28/19  0920 11/23/15  0856 12/22/14  0900   CHOL 270* 275* 263*   < > 279*   HDL 44* 52 45*   < > 50*   * 192* 168*   < > 193*   TRIG 190* 157* 251*   < > 182*   CHOLHDLRATIO  --   --   --   --  5.6*    < > = values in this interval not displayed.     A1C  No lab results found.  Troponin I  No results for input(s): TROPONINIS, TROPONINI, GHTROP in the last 168 hours.       Stroke Code Data Data   Stroke Code Data  (for stroke code without tele)  Stroke code activated 04/21/22   0826   First stroke provider response 04/21/22   0826   Last known normal 04/21/22   0630   Time of discovery   (or onset of symptoms) 04/20/22   2330   Head CT read by Stroke Neuro Dr/Provider 04/21/22   0833   Was stroke code de-escalated? Yes 04/21/22 0844

## 2022-04-22 ENCOUNTER — APPOINTMENT (OUTPATIENT)
Dept: PHYSICAL THERAPY | Facility: CLINIC | Age: 77
End: 2022-04-22
Attending: INTERNAL MEDICINE
Payer: MEDICARE

## 2022-04-22 PROCEDURE — 250N000013 HC RX MED GY IP 250 OP 250 PS 637: Performed by: PHYSICIAN ASSISTANT

## 2022-04-22 PROCEDURE — 250N000013 HC RX MED GY IP 250 OP 250 PS 637: Performed by: INTERNAL MEDICINE

## 2022-04-22 PROCEDURE — 97530 THERAPEUTIC ACTIVITIES: CPT | Mod: GP

## 2022-04-22 PROCEDURE — G0378 HOSPITAL OBSERVATION PER HR: HCPCS

## 2022-04-22 PROCEDURE — 99226 PR SUBSEQUENT OBSERVATION CARE,LEVEL III: CPT | Performed by: PHYSICIAN ASSISTANT

## 2022-04-22 PROCEDURE — 97161 PT EVAL LOW COMPLEX 20 MIN: CPT | Mod: GP

## 2022-04-22 RX ORDER — ACETAMINOPHEN 500 MG
1000 TABLET ORAL EVERY 6 HOURS PRN
Status: DISCONTINUED | OUTPATIENT
Start: 2022-04-22 | End: 2022-04-23 | Stop reason: HOSPADM

## 2022-04-22 RX ORDER — MECLIZINE HYDROCHLORIDE 25 MG/1
25-50 TABLET ORAL EVERY 6 HOURS PRN
Qty: 25 TABLET | Refills: 0 | Status: SHIPPED | OUTPATIENT
Start: 2022-04-22 | End: 2022-07-14

## 2022-04-22 RX ORDER — MECLIZINE HYDROCHLORIDE 25 MG/1
25-50 TABLET ORAL EVERY 6 HOURS PRN
Status: DISCONTINUED | OUTPATIENT
Start: 2022-04-22 | End: 2022-04-23 | Stop reason: HOSPADM

## 2022-04-22 RX ADMIN — PANTOPRAZOLE SODIUM 40 MG: 40 TABLET, DELAYED RELEASE ORAL at 16:40

## 2022-04-22 RX ADMIN — PRAMIPEXOLE DIHYDROCHLORIDE 0.25 MG: 0.25 TABLET ORAL at 16:40

## 2022-04-22 RX ADMIN — PRAMIPEXOLE DIHYDROCHLORIDE 0.25 MG: 0.25 TABLET ORAL at 22:40

## 2022-04-22 RX ADMIN — PRAMIPEXOLE DIHYDROCHLORIDE 0.25 MG: 0.25 TABLET ORAL at 09:37

## 2022-04-22 RX ADMIN — PANTOPRAZOLE SODIUM 40 MG: 40 TABLET, DELAYED RELEASE ORAL at 09:37

## 2022-04-22 RX ADMIN — ACETAMINOPHEN 1000 MG: 500 TABLET, FILM COATED ORAL at 09:52

## 2022-04-22 RX ADMIN — MECLIZINE HYDROCHLORIDE 25 MG: 25 TABLET ORAL at 14:45

## 2022-04-22 NOTE — CONSULTS
"[facility]    Neurology Consultation     Date of Admission:  4/21/2022    Assessment & Plan   Hanane Sorto is a 76 year old female who was admitted on 4/21/2022. I was asked to see the patient for dizziness.  Patient's symptoms as well as exam are consistent with vestibular type peripheral vertigo.  At this time I would recommend:    - Vestibular exercises  - Ear exam to exclude infection and to look for wax  - Meclizine can be used 2 or 3 times a day as needed however no more than 1 week or so.  I discussed with the patient the potential side effects of meclizine.  - ENT evaluation as an outpatient if symptoms do not resolve.      Lola Calle MD    Code Status    Full Code    Reason for Consult   Reason for consult: I was asked by KRISTIN Denson to evaluate this patient for dizziness    Primary Care Physician   Corona Bethea    Chief Complaint   Dizziness    History is obtained from the patient and electronic health record    History of Present Illness   Hanane Sorto is a 76 year old female who presents with sudden onset of dizziness upon awakening yesterday.  The patient states that yesterday she woke up and was still somewhat \"groggy,\" she went upstairs and noticed that she had trouble walking, she was like in a boat, she was wobbling, patient  helped her.  The patient had also headache and nausea and vomited a couple of times.  The patient denies hearing loss or ringing in her ears.  She noticed that if she is getting up and then lays down back down she will feel very dizzy.  The nausea will also increase significantly.  Laying down in bed without movement there will be no dizziness.  In emergency room the patient was thought to be \"dehydrated\".  During hospitalization she was given meclizine with minimal improvement in her symptoms.    In emergency room blood pressure was 134/50 with heart rate of 88 O2 sats 99% on room air.  Labs were unremarkable.  MRI of the head " shows no acute changes.  CTA of the head and neck left vertebral artery slightly smaller in size than the right but no stenosis.  The MRI films were reviewed by me.    Past Medical History   I have reviewed this patient's medical history and updated it with pertinent information if needed.   Past Medical History:   Diagnosis Date     Anxiety     psych yearly, Dr Shea     Family hx of colon cancer     sister     Floaters     Karma leverentz     Fracture of wrist, left, with routine healing, subsequent encounter 2017     GERD (gastroesophageal reflux disease)      Gross hematuria 2011     Hyperlipidemia LDL goal < 130      Hyperlipidemia LDL goal <130 2017     Malnutrition (H) 2017     Osteoarthritis 2012     Osteoporosis     Dexa     PONV (postoperative nausea and vomiting)      Restless legs      Skin cancer        Past Surgical History   I have reviewed this patient's surgical history and updated it with pertinent information if needed.  Past Surgical History:   Procedure Laterality Date     ARTHROPLASTY KNEE Left 2017    Procedure: ARTHROPLASTY KNEE;  LEFT TOTAL KNEE ARTHROPLASTY;  Surgeon: Lee Ayala MD;  Location: [facility] OR     BIOPSY   approx.    vaginal     BREAST SURGERY      Implants     BUNIONECTOMY      both      SECTION       COLONOSCOPY  8-10     EYE SURGERY  2014    cataract surgery in both eyes     LIPOSUCTION (LOCATION)       RECONSTRUCT BREAST, IMPLANT PROSTHESIS, COMBINED         Prior to Admission Medications   Prior to Admission Medications   Prescriptions Last Dose Informant Patient Reported? Taking?   Calcium-Phosphorus-Vitamin D (CALCIUM GUMMIES PO) 2022 at Unknown time Self Yes Yes   Sig: Take 2 Gum by mouth daily   cholecalciferol (VITAMIN D3) 25 mcg (1000 units) capsule 2022 at Unknown time Self Yes Yes   Sig: Take 1 capsule by mouth daily   clonazePAM (KLONOPIN) 0.5 MG ODT Past Week at Unknown time Self No Yes    Sig: DISSOLVE 1 TABLET(0.5 MG) ON THE TONGUE EVERY NIGHT AS NEEDED FOR ANXIETY   omeprazole (PRILOSEC) 20 MG DR capsule 2022 at Unknown time Self No Yes   Sig: TAKE 1 CAPSULE(20 MG) BY MOUTH TWICE DAILY   oxymetazoline (AFRIN) 0.05 % nasal spray 2022 at Unknown time Self Yes Yes   Sig: Spray 1 spray in nostril At Bedtime   pramipexole (MIRAPEX) 0.25 MG tablet 2022 at Unknown time Self No Yes   Sig: TAKE 1 TABLET BY MOUTH TWICE DAILY AT 5PM AND AT BEDTIME      Facility-Administered Medications: None     Allergies   Allergies   Allergen Reactions     Crestor [Rosuvastatin]      Ezetimibe Other (See Comments)     Zetia = leg cramps      Hmg-Coa-R Inhibitors Other (See Comments)     Leg cramping     Miralax [Polyethylene Glycol] Other (See Comments)     Back pain     Pravastatin Other (See Comments)     Leg cramps        Social History   I have reviewed this patient's social history and updated it with pertinent information if needed. Hanane Sorto  reports that she quit smoking about 47 years ago. Her smoking use included cigarettes. She started smoking about 62 years ago. She has a 7.50 pack-year smoking history. She has never used smokeless tobacco. She reports current alcohol use. She reports that she does not use drugs.    Family History   I have reviewed this patient's family history and updated it with pertinent information if needed.   Family History   Problem Relation Age of Onset     Lipids Mother      Alzheimer Disease Mother      Depression Mother      Osteoporosis Mother         RA     C.A.D. Father      Coronary Artery Disease Father      Hypertension Father      Diabetes Father      Breast Cancer Sister         53     Leukemia Sister      Hyperlipidemia Sister      Colon Cancer Sister              Cerebrovascular Disease Brother        Review of Systems   The 10 point Review of Systems is negative other than noted in the HPI or here.     Physical Exam   Temp: 97.5  F (36.4  C) Temp  src: Oral BP: 130/68 Pulse: 55   Resp: 16 SpO2: 96 % O2 Device: None (Room air) Oxygen Delivery: 2 LPM  Vital Signs with Ranges  Temp:  [97.5  F (36.4  C)-98.3  F (36.8  C)] 97.5  F (36.4  C)  Pulse:  [55-77] 55  Resp:  [14-18] 16  BP: (103-156)/() 130/68  SpO2:  [90 %-99 %] 96 %  108 lbs 0 oz    Constitutional: Normal  Eyes: No conjunctival erythema  ENT: Neck is supple  Respiratory: CTA  Cardiovascular: RRR  Skin: No obvious lesions  Musculoskeletal: No pedal edema  The patient is alert oriented x3 no acute distress.  Speech is fluent there is no aphasia apraxia or agnosia.  Attention and memory are normal.  Pupils are equal round reactive to light extraocular movements are intact, there is no nystagmus.  Facial muscles are equal bilaterally.  Uvula and tongue are midline.  Sternocleidomastoid and trapezius muscles are normal bilaterally.    Muscular mass tone and strength are normal in all 4 extremities there is no pronator drift.  Reflexes are present symmetric in upper and lower extremities.  Babinski is absent.    Sensory exam is normal to light touch and vibratory sense.    Finger-nose-finger heel-to-shin without dysmetria.  Fine movements are normal.    Gait has normal base.  The patient is able to walk in tandem.  Neuropsychiatric: Appropriate    I asked the patient to sit down and rest soon as she sits it down she became dizzy there was nystagmus to the right only.  The patient laid on back in bed nystagmus was persistent for a few seconds and then resolved.    Data     CBC was normal, electrolytes normal, liver function test normal.  UA negative.  The patient has had orthostatic blood pressure and heart rate and this were negative.

## 2022-04-22 NOTE — PROGRESS NOTES
[facility]    Medicine Progress Note - Hospitalist Service    Date of Admission:  4/21/2022    Assessment & Plan        Hanane Sorto is a 76 year old female with a history of hyperlipidemia who presented with complaints of sudden onset dizziness when she woke up in the morning.  The patient reports that she was normal when she went to bed last night.  This morning when she got up she suddenly started feeling off balance.  Her  noticed that she was unsteady on her feet.  She therefore presented to the ED.     Sudden onset dizziness  Presented with acute onset of dizziness and unsteady gait. Code stroke called in the ED and without evidence of stroke on Head CT, perfusion study or brain MRI. She does have some notable anatomical changes on CTA as described in H&P but unlikely to be related to her symptoms. Felt likely peripheral vertigo like BPPV, vestibular neuritis however negative during vestibular therapy on 4/22.  Patient reports symptoms have not improved since admission, persistent, intermittent dull temporal headache.  No visual changes.  Some intermittent nausea, no vomiting.  Denies aggravating, provoking, or alleviating factors.  No hearing loss.  Continues to have unsteady gait and disequilibrium.  No focal weakness.  - Observation status  - Head CT, CT perfusion MRI of the brain unremarkable  - Fall precautions  - PT evaluation for Zina-Hallpike/Epley's  negative for vestibular neuritis and BPPV, they recommend home with outpatient PT on discharge  - Symptomatic treatment, trial meclizine  - Check orthostatics  - Consult to general neurology as patient is not improving symptomatically and no etiology has been found  - If all work-up is unrevealing then we will refer to dizzy clinic on discharge     ADDENDUM 1700  Called by neurologist who feels symptoms c/w vestibular etiology and recommended vestibular exercises. Recommendations as follows:  - Reconsult PT to educate  patient on vestibular exercises to do at home  - Provider to perform ear exam to exclude infection and to look for wax on 4/23 as able  - Meclizine can be used 2 or 3 times a day as needed however no more than 1 week or so.   - ENT evaluation as an outpatient if symptoms do not resolve.  - Tentative plan for repeat PT eval for vestibular therapy and discharge exercises and then discharge to home with meclizine and ENT consultation, consider dizzy clinic referral     Anxiety  - Resume PTA as needed Klonopin  - Recommend patient consider weaning off of benzodiazepines as this medication is on the BEERS list and risk of falls, worsening dizziness, and overall a high risk medication especially in an elderly patient.  Given acute onset of symptoms this is not likely cause of her dizziness.     GERD: Continue PTA PPI       Diet: Regular Diet Adult    DVT Prophylaxis: Ambulate every shift  Mancia Catheter: Not present  Central Lines: None  Cardiac Monitoring: ACTIVE order. Indication: dizziness  Code Status: Full Code      Disposition Plan   Expected Discharge: 4/23/22   Anticipated discharge location:  Awaiting care coordination huddle  Delays: Symptomatic improvement and general neurology consultation       The patient's care was discussed with the Attending Physician, Dr. Jarrett, Bedside Nurse, Care Coordinator/, Patient and Patient's Family.    Malia Denson PA-C  Hospitalist Service  [facility]  Securely message with the Vocera Web Console (learn more here)  Text page via [employer] Paging/Directory         Clinically Significant Risk Factors Present on Admission                      ______________________________________________________________________    Interval History   Seen and examined with  via telephone.  Patient reports she feels about the same as she did when she came in.  She is continuously dizzy, when dizziness worsens she develops a bilateral temporal dull  ache which she is taking Tylenol for.  No visual changes.  She has noted some nausea, vomited last night but none today.  She continues to have disequilibrium and unsteady gait.  Worked with PT recommended home physical therapy, negative for BPPV and vestibular neuritis.  Work-up thus far unrevealing.  Patient denies any aggravating, leaving, or provoking factors.    Data reviewed today: I reviewed all medications, new labs and imaging results over the last 24 hours. I personally reviewed no images or EKG's today.    Physical Exam   Vital Signs: Temp: 97.5  F (36.4  C) Temp src: Oral BP: 130/68 Pulse: 55   Resp: 16 SpO2: 96 % O2 Device: None (Room air) Oxygen Delivery: 2 LPM  Weight: 108 lbs 0 oz    Physical Exam    General: Awake, alert, very pleasant elderly woman who appears stated age. Looks comfortable sitting up in bed. No acute distress.  HEENT: Normocephalic, atraumatic. Extraocular movements intact without provoking dizziness or nystagmus. Pupils equal round and reactive to light bilaterally. Oropharynx clear without exudates.  Facial movements intact.  Respiratory: Clear to auscultation bilaterally, no rales, wheezing, or rhonchi.  Cardiovascular: Regular rate and rhythm, +S1 and S2, no murmur auscultated. No peripheral edema.   Gastrointestinal: Soft, non-tender, non-distended. Bowel sounds present.  Skin: Warm, dry. No obvious rashes or lesions on exposed skin. Dorsalis pedis pulses palpable bilaterally.  Musculoskeletal: No joint swelling, erythema or tenderness. Moves all extremities equally.  Neurologic: AAO x3. Cranial nerves 2-12 grossly intact, normal strength and sensation. Tongue midline.  Finger-to-nose intact bilaterally.  Full strength of bilateral lower extremities including hip flexion, dorsi and plantar flexion.  Psychiatric: Appropriate mood and affect. No obvious anxiety or depression.      Data   Recent Labs   Lab 04/21/22  0822   WBC 5.5   HGB 12.8   MCV 94      INR 1.00       POTASSIUM 3.8   CHLORIDE 113*   CO2 23   BUN 22   CR 0.90   ANIONGAP 5   LIO 9.6   *   ALBUMIN 3.4   PROTTOTAL 6.3*   BILITOTAL 0.3   ALKPHOS 86   ALT 19   AST 12     No results found for this or any previous visit (from the past 24 hour(s)).  Medications       pantoprazole  40 mg Oral BID AC     pramipexole  0.25 mg Oral BID AC

## 2022-04-22 NOTE — PLAN OF CARE
Goal Outcome Evaluation:      Orientation/Cognitive: A&Ox4  Observation Goals (Met/ Not Met): Partially met  Mobility Level/Assist Equipment:SBA w/ GB  Fall Risk (Y/N): Y  Behavior Concerns: NA  Pain Management: Denies pain  Tele/VS/O2: VSS on RA  ABNL Lab/BG:NA  Diet:Regular  Bowel/Bladder: Continent  Skin Concerns: Bruise on R upper arm  Drains/Devices: PIV:S/L  Tests/Procedures for next shift:  PT consult eval for Zina-Hallpike/Epley's  Anticipated DC date & active delays:  1-2 days  Patient Stated Goal for Today: Not feel dizzy          Observation goals  PRIOR TO DISCHARGE         -diagnostic tests and consults completed and resulted:Not met     -vital signs normal or at patient baseline: Partially met     -returns to baseline functional status:not met     -safe disposition plan has been identified:Not met      Nurse to notify provider when observation goals have been met and patient is ready for discharge.

## 2022-04-22 NOTE — PLAN OF CARE
"Goal Outcome Evaluation:    diagnostic tests and consults completed and resulted PT re-consulted for tomorrow  -vital signs normal or at patient baseline  Yes  -returns to baseline functional status  Yes  -safe disposition plan has been identified Yes    Orientation/Cognitive: A&OX4   Observation Goals (Met/ Not Met): Not met  Mobility Level/Assist Equipment: AX1 with GB and walker  Fall Risk (Y/N): yes  Behavior Concerns: None  Pain Management: Denies pain, tylenol for HA, meclizine helping with dizziness  Tele/VS/O2: VSS on RA, SB-SR  ABNL Lab/BG: None  Diet: Regular diet, poor appetite  Bowel/Bladder: Continent  Skin Concerns: None  Drains/Devices: None  Tests/Procedures for next shift: None  Anticipated DC date & active delays: tomorrow after PT session  Patient Stated Goal for Today: \"to get some answers for the symptoms\"                      "

## 2022-04-22 NOTE — PROGRESS NOTES
diagnostic tests and consults completed and resulted  PT consult pending  -vital signs normal or at patient baseline  Yes  -returns to baseline functional status  Yes  -safe disposition plan has been identified Yes

## 2022-04-22 NOTE — PROGRESS NOTES
Observation goals  PRIOR TO DISCHARGE        Comments: -diagnostic tests and consults completed and resulted - not met  -vital signs normal or at patient baseline - met  -returns to baseline functional status - not met  -safe disposition plan has been identified - not met  Nurse to notify provider when observation goals have been met and patient is ready for discharge.     Orientation/Cognitive: AOX4  Observation Goals (Met/ Not Met): not met  Mobility Level/Assist Equipment: Assist with SBA  Fall Risk (Y/N): Yes  Behavior Concerns: None  Pain Management: Denies  Tele/VS/O2: VSS on RA ex soft BP. Tele- Sinus ayaka.  ABNL Lab/BG: None.   Diet: Reg  Bowel/Bladder: Continent. Voiding in BRX2.  Skin Concerns: None  Drains/Devices: PIV SL.  Tests/Procedures for next shift: None.   Anticipated DC date & active delays: Neuro following. PT/SW consult tomorrow.  Patient Stated Goal for Today:

## 2022-04-22 NOTE — PROGRESS NOTES
04/22/22 1125   Quick Adds   Quick Adds Certification;Vestibular Eval   Type of Visit Initial PT Evaluation   Living Environment   People in Home spouse   Current Living Arrangements house   Home Accessibility stairs within home   Number of Stairs, Within Home, Primary greater than 10 stairs  (12)   Stair Railings, Within Home, Primary railings safe and in good condition;railings on both sides of stairs   Transportation Anticipated family or friend will provide;car, drives self   Self-Care   Usual Activity Tolerance good   Regular Exercise No   Fall history within last six months no   Activity/Exercise/Self-Care Comment Independent with all mobiltiy and ADLs at baseline.   General Information   Onset of Illness/Injury or Date of Surgery 04/21/22   Referring Physician Enid Hagen MD   Patient/Family Therapy Goals Statement (PT) to get rid of dizziness to go home   Pertinent History of Current Problem (include personal factors and/or comorbidities that impact the POC) Patient is 77 YO F who presented to hospital with acute onset of vertigo. Of note, she had recent sinus infection that required 3 rounds of antibiotics. PMH: anxiety, osteoporosis, neck arthritis   Existing Precautions/Restrictions fall   General Observations Patient in supine, agreeable to PT and vestibular assessment.  present during session. Activity orders: ambulate with assist   Cognition   Affect/Mental Status (Cognition) WNL   Orientation Status (Cognition) oriented x 4   Follows Commands (Cognition) WNL   Pain Assessment   Patient Currently in Pain Yes, see Vital Sign flowsheet   Integumentary/Edema   Integumentary/Edema no deficits were identifed   Posture    Posture Forward head position;Protracted shoulders   Range of Motion (ROM)   Range of Motion ROM is WNL   Strength (Manual Muscle Testing)   Strength (Manual Muscle Testing) strength is WNL   Bed Mobility   Bed Mobility no deficits identified   Transfers   Transfers sit-stand  transfer   Sit-Stand Transfer   Sit-Stand [place] (Transfers) contact guard   Assistive Device (Sit-Stand Transfers) walker, front-wheeled   Comment, (Sit-Stand Transfer) From EOB, mild unsteadiness upon standing   Gait/Stairs (Locomotion)   [place] Level (Gait) minimum assist (75% patient effort)   Comment, (Gait/Stairs) Patient ambulated 20' in room, very unsteady with variable step length and guarded posture. Dizzy throughout but symptoms unchanged with activity. BP = 137/72   Balance   Balance Comments Independent sitting balance; Min A for standing balance due to sway   Sensory Examination   Sensory Perception patient reports no sensory changes   Coordination   Coordination no deficits were identified   Muscle Tone   Muscle Tone no deficits were identified   Cervicogenic Screen   Neck ROM Cervical AROM WFL and pain free, denies history of neck or head trauma   Vertebral Artery Test Normal   Alar Ligament Test Normal   Transverse Ligament Test Normal   Oculomotor Exam   Smooth Pursuit Normal   Saccades Normal   VOR Normal   VOR Cancellation Normal   Rapid Head Thrust Normal   Convergence Testing Normal   Infrared Goggle Exam or Frenzel Lense Exam   Exam completed with Infrared Goggles   Spontaneous Nystagmus Negative   Spontaneous Nystagmus Comments Patient frequently looking around during assessment, needed cues on where to focus vision   Gaze Evoked Nystagmus Horizontal R   Gaze Evoked Nystagmus Comments end rage   Head Shake Horizontal Nystagmus Negative   Positional testing Negative   [place]-Hallpike (Right) Negative   [place]-Hallpike (Right) Comments No vertigo or nystagmus   Zina-Hallpike (Left) Negative   Zina-Hallpike (Left) Comments No vertigo or nystagmus   [facility] Supine Roll Test (Right) Negative   [facility] Supine Roll Test (Right) Comments No vertigo or nystagmus   [facility] Supine Roll Test (Left) Negative   [facility] Supine Roll Test (Left) Comments  No vertigo or nystagmus   Clinical Impression   Criteria for  Skilled Therapeutic Intervention Yes, treatment indicated   PT Diagnosis (PT) impaired mobility   Influenced by the following impairments dizziness, impaired balance, decreased activity tolerance   Functional limitations due to impairments decreased independence with transfers and ambulation   Clinical Presentation (PT Evaluation Complexity) Stable/Uncomplicated   Clinical Presentation Rationale medical status, clinical judgement   Clinical Decision Making (Complexity) low complexity   Planned Therapy Interventions (PT) balance training;gait training;home exercise program;neuromuscular re-education;patient/family education;stair training;transfer training   Anticipated Equipment Needs at Discharge (PT) walker, rolling   Risk & Benefits of therapy have been explained evaluation/treatment results reviewed;care plan/treatment goals reviewed;risks/benefits reviewed;current/potential barriers reviewed;participants voiced agreement with care plan;participants included;patient;spouse/significant other   Clinical Impression Comments Patient's signs and symptoms were negative for BPPV and acute vestibular hypofunction.   PT Discharge Planning   PT Discharge Recommendation (DC Rec) home with assist;home with outpatient physical therapy   PT Rationale for DC Rec Patient is below baseline level of independence and significantly limited by constant dizziness and nausea. Patient tested negative for BPPV and VOR testing WNL. Patient will required 24/7 assist and walker for mobility. She may benefit from further OP vestibular assessment due to persistant dizziness.   Therapy Certification   Start of care date 04/22/22   Certification date from 04/22/22   Certification date to 04/28/22   Medical Diagnosis Dizziness   Total Evaluation Time   Total Evaluation Time (Minutes) 35   Physical Therapy Goals   PT Frequency Daily   PT Predicted Duration/Target Date for Goal Attainment 04/28/22   PT Goals Transfers;Gait;Stairs   PT: Transfers  Supervision/stand-by assist;Sit to/from stand;Assistive device  (FWW)   PT: Gait Supervision/stand-by assist;Rolling walker;100 feet   PT: Stairs Minimal assist;Greater than 10 stairs;Rail on both sides

## 2022-04-22 NOTE — PROGRESS NOTES
Observation goals  PRIOR TO DISCHARGE         -diagnostic tests and consults completed and resulted:Not met    -vital signs normal or at patient baseline: Partially met    -returns to baseline functional status:not met    -safe disposition plan has been identified:Not met     Nurse to notify provider when observation goals have been met and patient is ready for discharge.

## 2022-04-22 NOTE — PROGRESS NOTES
diagnostic tests and consults completed and resulted  Neuro consult pending  -vital signs normal or at patient baseline  Yes  -returns to baseline functional status  Yes  -safe disposition plan has been identified Yes

## 2022-04-22 NOTE — PROVIDER NOTIFICATION
MD Notification    Notified Person: PA    Notified Person Name:Malia Denson    Notification Date/Time: 4/22/2022  9:34 AM    Notification Interaction: Text paged    Purpose of Notification: Pt requesting tylenol for HA please.    Orders Received:    Comments:

## 2022-04-22 NOTE — PROGRESS NOTES
[facility]      OUTPATIENT PHYSICAL THERAPY EVALUATION  PLAN OF TREATMENT FOR OUTPATIENT REHABILITATION  (COMPLETE FOR INITIAL CLAIMS ONLY)  Patient's Last Name, First Name, M.I.  YOB: 1945  Hanane Sorto                        Provider's Name  [facility] Medical Record No.  [id number]                               Onset Date:  04/21/22   Start of Care Date:  04/22/22      Type:     _X_PT   ___OT   ___SLP Medical Diagnosis:  Dizziness                        PT Diagnosis:  impaired mobility   Visits from SOC:  1   _________________________________________________________________________________  Plan of Treatment/Functional Goals    Planned Interventions: balance training, gait training, home exercise program, neuromuscular re-education, patient/family education, stair training, transfer training     Goals: See Physical Therapy Goals on Care Plan in [facility] electronic health record.    Therapy Frequency: Daily  Predicted Duration of Therapy Intervention: 04/28/22  _________________________________________________________________________________    I CERTIFY THE NEED FOR THESE SERVICES FURNISHED UNDER        THIS PLAN OF TREATMENT AND WHILE UNDER MY CARE     (Physician co-signature of this document indicates review and certification of the therapy plan).              Certification date from: 04/22/22, Certification date to: 04/28/22    Referring Physician: Enid Hagen MD            Initial Assessment        See Physical Therapy evaluation dated 04/22/22 in [facility] electronic health record.

## 2022-04-23 ENCOUNTER — APPOINTMENT (OUTPATIENT)
Dept: PHYSICAL THERAPY | Facility: CLINIC | Age: 77
End: 2022-04-23
Payer: MEDICARE

## 2022-04-23 VITALS
OXYGEN SATURATION: 95 % | HEIGHT: 58 IN | HEART RATE: 84 BPM | BODY MASS INDEX: 22.67 KG/M2 | WEIGHT: 108 LBS | DIASTOLIC BLOOD PRESSURE: 66 MMHG | TEMPERATURE: 97.7 F | RESPIRATION RATE: 18 BRPM | SYSTOLIC BLOOD PRESSURE: 140 MMHG

## 2022-04-23 PROCEDURE — 250N000013 HC RX MED GY IP 250 OP 250 PS 637: Performed by: INTERNAL MEDICINE

## 2022-04-23 PROCEDURE — 258N000003 HC RX IP 258 OP 636: Performed by: PHYSICIAN ASSISTANT

## 2022-04-23 PROCEDURE — G0378 HOSPITAL OBSERVATION PER HR: HCPCS

## 2022-04-23 PROCEDURE — 99217 PR OBSERVATION CARE DISCHARGE: CPT | Performed by: PHYSICIAN ASSISTANT

## 2022-04-23 PROCEDURE — 250N000013 HC RX MED GY IP 250 OP 250 PS 637: Performed by: PHYSICIAN ASSISTANT

## 2022-04-23 PROCEDURE — 97530 THERAPEUTIC ACTIVITIES: CPT | Mod: GP | Performed by: PHYSICAL THERAPIST

## 2022-04-23 RX ADMIN — SODIUM CHLORIDE 500 ML: 9 INJECTION, SOLUTION INTRAVENOUS at 09:30

## 2022-04-23 RX ADMIN — PANTOPRAZOLE SODIUM 40 MG: 40 TABLET, DELAYED RELEASE ORAL at 08:14

## 2022-04-23 RX ADMIN — MECLIZINE HYDROCHLORIDE 25 MG: 25 TABLET ORAL at 08:14

## 2022-04-23 NOTE — PLAN OF CARE
Goal Outcome Evaluation:    Orientation/Cognitive: A&OX4   Observation Goals (Met/ Not Met): Not met  Mobility Level/Assist Equipment: AX1 with GB and walker  Fall Risk (Y/N): yes  Behavior Concerns: None  Pain Management: Denies pain  Tele/VS/O2: VSS on RA, Tele: SR   ABNL Lab/BG: None  Diet: Regular diet  Bowel/Bladder: Continent  Skin Concerns: None  Drains/Devices: None  Tests/Procedures for next shift: None  Anticipated DC date & active delays: Pending PT     diagnostic tests and consults completed and resulted PT re-consulted for tomorrow  -vital signs normal or at patient baseline  Yes  -returns to baseline functional status  Yes  -safe disposition plan has been identified Yes

## 2022-04-23 NOTE — PROGRESS NOTES
diagnostic tests and consults completed and resulted Yes  -vital signs normal or at patient baseline Yes  -returns to baseline functional status Yes  -safe disposition plan has been identified Yes  Nurse to notify provider when observation goals have been met and patient is ready for discharge    Provider notified of Pt's discharge readiness.

## 2022-04-23 NOTE — DISCHARGE SUMMARY
[facility]    Discharge Summary  Hospitalist    Date of Admission:  4/21/2022  Date of Discharge:  4/23/2022  Discharging Provider: Sung Caldwell PA-C    Discharge Diagnoses   Dizziness    History of Present Illness   Hanane Sorto is an 76 year old female who presented with dizziness.    HPI from admission H&P:  Hanane Sorto is a 76 year old female with a history of hyperlipidemia who presented with complaints of sudden onset dizziness when she woke up in the morning.  The patient reports that she was normal when she went to bed last night.  This morning when she got up she suddenly started feeling off balance.  Her  noticed that she was unsteady on her feet.  She therefore presented to the ED.     She does complain of nausea but no vomiting.  She denies any chest pain, palpitation, shortness of breath, syncope/loss of consciousness, unilateral weakness or numbness or tingling.  She denies any tinnitus, hearing loss, recent viral infection     When she presented to the ED temperature 98.1 pulse rate 80, blood pressure 134/50, respiratory 16, O2 sat 99% on room air.  Labs unremarkable CBC, unremarkable BMP, high-sensitivity troponin negative, UA not suggestive of infection, SARS COV 2 PCR negative.EKG with sinus bradycardia with a heart rate of 58, otherwise unremarkable.  Code stroke was called in the ED due to her being off balance, Head CT with no acute intracranial hemorrhage mass or herniation.  CTA head and neck, left vertebral artery slightly smaller in size than the right, likely anatomic variant.  Intracranial segment of left vertebral artery appears to terminate primarily in the left [place], appears to be small continuation of left vertebral artery towards the basilar artery after the left peak region, appears to have scattered calcification, age indeterminant however due to calcification more chronic process, right vertebral artery is widely patent supplying the basilar  artery and no vascular cutoff on proximal ICA or MCA.  CT perfusion images of the head are unremarkable  MRI of the brain negative for any intracranial abnormality, no evidence of stroke       Hospital Course   Hanane Sorto was admitted on 4/21/2022.  The following problems were addressed during her hospitalization:    Dizziness, suspected BPPV vs orthostasis  Presented with acute onset of dizziness and unsteady gait. Code stroke called in the ED and without evidence of stroke on Head CT, perfusion study or brain MRI. She does have some notable anatomical changes on CTA as described in H&P but unlikely to be related to her symptoms. Felt likely peripheral vertigo like BPPV, vestibular neuritis however negative during vestibular therapy on 4/22.  Patient reports symptoms have not improved since admission, persistent, intermittent dull temporal headache.  No visual changes.  Some intermittent nausea, no vomiting.  Denies aggravating, provoking, or alleviating factors.  No hearing loss.  Continues to have unsteady gait and disequilibrium.  No focal weakness.  * Head CT, CT perfusion MRI of the brain unremarkable  * PT evaluation, recommend home with outpatient PT on discharge  * Orthostatics positive, received 500cc IVF bolus with improvement, dizziness persists at time of discharge but is overall improving  - ENT evaluation as an outpatient if symptoms do not resolve. Referral placed at discharge.    Anxiety  - Resume PTA as needed Klonopin  - Recommend patient consider weaning off of benzodiazepines as this medication is on the BEERS list and risk of falls, worsening dizziness, and overall a high risk medication especially in an elderly patient.  Given acute onset of symptoms this is not likely cause of her dizziness.     GERD: Continue PTA PPI    Sung Caldwell PA-C    Significant Results and Procedures   As noted    Pending Results   These results will be followed up by n/a  Unresulted Labs Ordered in the Past 30  Days of this Admission     No orders found from 3/22/2022 to 4/22/2022.          Code Status   Full Code       Primary Care Physician   Corona Bethea    Physical Exam   Temp: 97.7  F (36.5  C) Temp src: Oral BP: (!) 140/66 Pulse: 84   Resp: 18 SpO2: 95 % O2 Device: None (Room air)    Vitals:    04/21/22 0759   Weight: 49 kg (108 lb)     Vital Signs with Ranges  Temp:  [97.5  F (36.4  C)-98.5  F (36.9  C)] 97.7  F (36.5  C)  Pulse:  [55-85] 84  Resp:  [16-18] 18  BP: (129-140)/(50-68) 140/66  SpO2:  [92 %-98 %] 95 %  No intake/output data recorded.    GEN: well-developed, well-nourished, appears comfortable  PULM: lungs CTA bilaterally, no increased work of breathing, no wheeze, rales, rhonchi  CV: RRR, S1 & S2, no murmur  GI: soft, nontender, nondistended, no guarding or rigidity, +BS in all 4 quadrants  SKIN: warm & dry without rash, wound, or pedal edema    Discharge Disposition   Discharged to home  Condition at discharge: Stable    Consultations This Hospital Stay   PHYSICAL THERAPY ADULT IP CONSULT  CARE MANAGEMENT / SOCIAL WORK IP CONSULT  NEUROLOGY IP CONSULT  PHYSICAL THERAPY ADULT IP CONSULT    Time Spent on this Encounter   I, Sung Caldwell PA-C, personally saw the patient today and spent less than or equal to 30 minutes discharging this patient.    Discharge Orders      Otolaryngology Referral      Reason for your hospital stay    Dizziness, unclear cause. Possibly related to dehydration versus an inner ear disruption. It is expected to improve over time and you have been referred for ongoing outpatient physical therapy to continue treatment/evaluations.     Follow-up and recommended labs and tests     Follow up with primary care provider, Corona Bethea, within 7 days for hospital follow- up.  No follow up labs or test are needed.     Activity    Your activity upon discharge: activity as tolerated     Diet    Follow this diet upon discharge: Orders Placed This Encounter      Regular Diet Adult      Discharge Medications   Current Discharge Medication List      START taking these medications    Details   meclizine (ANTIVERT) 25 MG tablet Take 1-2 tablets (25-50 mg) by mouth every 6 hours as needed for dizziness  Qty: 25 tablet, Refills: 0    Comments: Future refills by PCP Dr. Corona Bethea with phone number [phone].  Associated Diagnoses: Dizziness         CONTINUE these medications which have NOT CHANGED    Details   Calcium-Phosphorus-Vitamin D (CALCIUM GUMMIES PO) Take 2 Gum by mouth daily      cholecalciferol (VITAMIN D3) 25 mcg (1000 units) capsule Take 1 capsule by mouth daily      clonazePAM (KLONOPIN) 0.5 MG ODT DISSOLVE 1 TABLET(0.5 MG) ON THE TONGUE EVERY NIGHT AS NEEDED FOR ANXIETY  Qty: 20 tablet, Refills: 3    Associated Diagnoses: Anxiety      omeprazole (PRILOSEC) 20 MG DR capsule TAKE 1 CAPSULE(20 MG) BY MOUTH TWICE DAILY  Qty: 180 capsule, Refills: 3    Associated Diagnoses: Gastroesophageal reflux disease without esophagitis      oxymetazoline (AFRIN) 0.05 % nasal spray Spray 1 spray in nostril At Bedtime      pramipexole (MIRAPEX) 0.25 MG tablet TAKE 1 TABLET BY MOUTH TWICE DAILY AT 5PM AND AT BEDTIME  Qty: 180 tablet, Refills: 3    Associated Diagnoses: Restless leg syndrome           Allergies   Allergies   Allergen Reactions     Crestor [Rosuvastatin]      Ezetimibe Other (See Comments)     Zetia = leg cramps      Hmg-Coa-R Inhibitors Other (See Comments)     Leg cramping     Miralax [Polyethylene Glycol] Other (See Comments)     Back pain     Pravastatin Other (See Comments)     Leg cramps      Data   Most Recent 3 CBC's:  Recent Labs   Lab Test 04/21/22  0822 03/23/22  1646 11/02/21  0837   WBC 5.5 9.7 6.2   HGB 12.8 13.3 12.4   MCV 94 94 97    263 338      Most Recent 3 BMP's:  Recent Labs   Lab Test 04/21/22  0822 03/23/22  1646 11/02/21  0837    141 142   POTASSIUM 3.8 4.2 4.7   CHLORIDE 113* 110* 112*   CO2 23 23 26   BUN 22 17 22   CR 0.90 0.99 0.89   ANIONGAP  5 8 4   LIO 9.6 9.5 9.6   * 97 84     Most Recent 2 LFT's:  Recent Labs   Lab Test 04/21/22  0822 11/02/21  0837   AST 12 10   ALT 19 23   ALKPHOS 86 91   BILITOTAL 0.3 0.3     Most Recent INR's and Anticoagulation Dosing History:  Anticoagulation Dose History     Recent Dosing and Labs Latest Ref Rng & Units 4/24/2017 4/29/2020 4/21/2022    INR 0.85 - 1.15 0.91 0.95 1.00        Most Recent 3 Troponin's:No lab results found.  Most Recent Cholesterol Panel:  Recent Labs   Lab Test 11/02/21  0837   CHOL 270*   *   HDL 44*   TRIG 190*     Most Recent 6 Bacteria Isolates From Any Culture (See EPIC Reports for Culture Details):  Recent Labs   Lab Test 01/10/18  1041   CULT 10,000 to 50,000 colonies/mL  Escherichia coli  *     Most Recent TSH, T4 and A1c Labs:  Recent Labs   Lab Test 03/09/18  1427   TSH 2.08     Results for orders placed or performed during the hospital encounter of 04/21/22   CT Head w/o Contrast    Narrative    CT SCAN OF THE HEAD WITHOUT CONTRAST  4/21/2022 8:33 AM     HISTORY: Code stroke. Headache and dizziness.    TECHNIQUE:  Axial images of the head and coronal reformations without  IV contrast material. Radiation dose for this scan was reduced using  automated exposure control, adjustment of the mA and/or kV according  to patient size, or iterative reconstruction technique.    COMPARISON: None.    FINDINGS: There is no evidence of intracranial hemorrhage, mass, acute  infarct or anomaly. The ventricles are normal in size, shape and  configuration. The brain parenchyma and subarachnoid spaces are  normal.     The visualized portions of the sinuses and mastoids appear normal. The  bony calvarium and bones of the skull base appear intact.       Impression    IMPRESSION:   No evidence of acute intracranial hemorrhage, mass, or  herniation.     CHENTE GOOD MD         SYSTEM ID:  [id number]   CTA Head Neck with Contrast    Narrative    CT ANGIOGRAM OF THE HEAD AND NECK WITH CONTRAST  CT  HEAD PERFUSION WITH CONTRAST  4/21/2022 8:35 AM     HISTORY: Code Stroke. Headache and dizziness.    TECHNIQUE:  CT angiography with an injection of 75mL Isovue-370  (accession [id number]), 125mL Isovue-370 total (accession [id number]) IV  with scans through the head and neck. Images were transferred to a  separate 3-D workstation where multiplanar reformations and 3-D images  were created. Estimates of carotid stenoses are made relative to the  distal internal carotid artery diameters except as noted. Radiation  dose for this scan was reduced using automated exposure control,  adjustment of the mA and/or kV according to patient size, or iterative  reconstruction technique.     Perfusion scans were performed with injection of additional IV  contrast. These images were processed on a separate 3-D workstation.     COMPARISON: None.     CT HEAD FINDINGS: No contrast enhancing lesions. CT perfusion images  of the head are unremarkable.     CT ANGIOGRAM HEAD FINDINGS:  The intracranial left vertebral artery is  patent at its origin. Left PICA origin is widely patent. There is is  diminutive enhancement of a structure extending anterior, superior,  and medially from the left PICA origin with questionable  calcification. This structure loses opacification in the premedullary  cistern before connecting with the basilar artery. The right vertebral  artery is widely patent supplying the basilar artery. Left AICA is  visualized arising from the basilar artery.    No definite vascular cutoff of the proximal ACAs or MCAs. No  intracranial aneurysm. No significant stenosis of the anterior  circulation.    The P1 segment of the left posterior cerebral artery is not visualized  and is likely hypoplastic or congenitally absent. The left posterior  cerebral artery is supplied by the patent left posterior communicating  artery.     CT ANGIOGRAM NECK FINDINGS:   Normal origin of the great vessels from the aortic arch.     Right carotid  artery: The right common and internal carotid arteries  are patent. Mild atherosclerotic disease at the carotid bifurcation  without stenosis.     Left carotid artery: The left common and internal carotid arteries are  patent. Mild atherosclerotic disease at the carotid bifurcation  without stenosis.     Vertebral arteries: Vertebral arteries are patent without evidence of  dissection. No significant stenosis. Left vertebral artery appears  slightly smaller in size compared to the right which may be an  anatomic variant.    Other findings: Multilevel degenerative changes in the spine.       Impression    IMPRESSION:   1. Left vertebral artery is slightly smaller in size than the right in  the neck. This may be an anatomic variant.  2. Intracranial segment of the left vertebral artery appears to  terminate primarily in the left PICA. There appears to be a small  continuation of the left vertebral artery towards the basilar artery  after the left PICA origin, although this vessel is faintly enhancing  and appears to have scattered calcification. This vessel appears to be  occluded before it connects with the basilar artery. This is age  indeterminate and given the calcification is favored to be more  chronic process from intracranial atherosclerotic disease, although an  acute component of stenosis/occlusion is not excluded. Possible  ischemia would be better assessed with brain MRI.  3. Right vertebral artery is widely patent supplying the basilar  artery.  4. No vascular cutoff of the proximal ACAs or MCAs. No intracranial  aneurysm.  5. Mild atherosclerotic disease of the carotid bifurcations  bilaterally without significant stenosis.  6. CT perfusion images of the head are unremarkable.    Results discussed with Parker Moeller at 8:48 AM on 4/21/2022.    CHENTE GOOD MD         SYSTEM ID:  [id number]   CT Head Perfusion w Contrast    Narrative    CT ANGIOGRAM OF THE HEAD AND NECK WITH CONTRAST  CT HEAD PERFUSION  WITH CONTRAST  4/21/2022 8:35 AM     HISTORY: Code Stroke. Headache and dizziness.    TECHNIQUE:  CT angiography with an injection of 75mL Isovue-370  (accession [id number]), 125mL Isovue-370 total (accession [id number]) IV  with scans through the head and neck. Images were transferred to a  separate 3-D workstation where multiplanar reformations and 3-D images  were created. Estimates of carotid stenoses are made relative to the  distal internal carotid artery diameters except as noted. Radiation  dose for this scan was reduced using automated exposure control,  adjustment of the mA and/or kV according to patient size, or iterative  reconstruction technique.     Perfusion scans were performed with injection of additional IV  contrast. These images were processed on a separate 3-D workstation.     COMPARISON: None.     CT HEAD FINDINGS: No contrast enhancing lesions. CT perfusion images  of the head are unremarkable.     CT ANGIOGRAM HEAD FINDINGS:  The intracranial left vertebral artery is  patent at its origin. Left PICA origin is widely patent. There is is  diminutive enhancement of a structure extending anterior, superior,  and medially from the left PICA origin with questionable  calcification. This structure loses opacification in the premedullary  cistern before connecting with the basilar artery. The right vertebral  artery is widely patent supplying the basilar artery. Left AICA is  visualized arising from the basilar artery.    No definite vascular cutoff of the proximal ACAs or MCAs. No  intracranial aneurysm. No significant stenosis of the anterior  circulation.    The P1 segment of the left posterior cerebral artery is not visualized  and is likely hypoplastic or congenitally absent. The left posterior  cerebral artery is supplied by the patent left posterior communicating  artery.     CT ANGIOGRAM NECK FINDINGS:   Normal origin of the great vessels from the aortic arch.     Right carotid artery: The  right common and internal carotid arteries  are patent. Mild atherosclerotic disease at the carotid bifurcation  without stenosis.     Left carotid artery: The left common and internal carotid arteries are  patent. Mild atherosclerotic disease at the carotid bifurcation  without stenosis.     Vertebral arteries: Vertebral arteries are patent without evidence of  dissection. No significant stenosis. Left vertebral artery appears  slightly smaller in size compared to the right which may be an  anatomic variant.    Other findings: Multilevel degenerative changes in the spine.       Impression    IMPRESSION:   1. Left vertebral artery is slightly smaller in size than the right in  the neck. This may be an anatomic variant.  2. Intracranial segment of the left vertebral artery appears to  terminate primarily in the left PICA. There appears to be a small  continuation of the left vertebral artery towards the basilar artery  after the left PICA origin, although this vessel is faintly enhancing  and appears to have scattered calcification. This vessel appears to be  occluded before it connects with the basilar artery. This is age  indeterminate and given the calcification is favored to be more  chronic process from intracranial atherosclerotic disease, although an  acute component of stenosis/occlusion is not excluded. Possible  ischemia would be better assessed with brain MRI.  3. Right vertebral artery is widely patent supplying the basilar  artery.  4. No vascular cutoff of the proximal ACAs or MCAs. No intracranial  aneurysm.  5. Mild atherosclerotic disease of the carotid bifurcations  bilaterally without significant stenosis.  6. CT perfusion images of the head are unremarkable.    Results discussed with Parker Moeller at 8:48 AM on 4/21/2022.    CHENTE GOOD MD         SYSTEM ID:  [id number]   MR Brain w/o & w Contrast    Narrative    MRI BRAIN WITHOUT AND WITH CONTRAST  4/21/2022 12:32 PM    HISTORY: Dizziness,  disequilibrium. Question acute stroke.     TECHNIQUE:  Multiplanar, multisequence MRI of the brain without and  with gadobutrol (GADAVIST) injection 5 mL.    COMPARISON: Head CT 4/21/22.    FINDINGS:  Mild parenchymal volume loss is present. A few scattered white matter  T2 hyperintensities are considered to be within normal limits for age.  No evidence of acute ischemia, hemorrhage, mass, mass effect, or  hydrocephalus. No abnormal enhancement or diffusion restriction.    Marrow signal is within normal limits. Upper cervical spine  degenerative change. Trace paranasal sinus mucosal thickening. The  visualized tympanic and mastoid cavities are unremarkable. Bilateral  lens replacements.      Impression    IMPRESSION:  No evidence of acute ischemia or hemorrhage.    SEBASTIAN VALENCIA MD         SYSTEM ID:  [id number]

## 2022-04-23 NOTE — PLAN OF CARE
Goal Outcome Evaluation:     diagnostic tests and consults completed and resulted PT re-consulted for tomorrow  -vital signs normal or at patient baseline  Yes  -returns to baseline functional status  Yes  -safe disposition plan has been identified Yes

## 2022-04-23 NOTE — PLAN OF CARE
Goal Outcome Evaluation:    A&OX4, VSS on RA. Orthostatics positive, treated with a bolus. All discharge meds and instructions reviewed with pt and spouse, both verbalized understanding. Pt stable at time of discharge. All belongings returned to pt.

## 2022-04-25 ENCOUNTER — TRANSFERRED RECORDS (OUTPATIENT)
Dept: HEALTH INFORMATION MANAGEMENT | Facility: CLINIC | Age: 77
End: 2022-04-25
Payer: MEDICARE

## 2022-04-25 ENCOUNTER — TELEPHONE (OUTPATIENT)
Dept: OTOLARYNGOLOGY | Facility: CLINIC | Age: 77
End: 2022-04-25
Payer: MEDICARE

## 2022-04-25 ENCOUNTER — TELEPHONE (OUTPATIENT)
Dept: FAMILY MEDICINE | Facility: CLINIC | Age: 77
End: 2022-04-25
Payer: MEDICARE

## 2022-04-25 NOTE — TELEPHONE ENCOUNTER
What type of discharge? Observation  Risk of Hospital admission or ED visit: 8%  Is a TCM episode required? Yes  When should the patient follow up with PCP? 14 days of discharge.    Marleni Newton RN  [facility]

## 2022-04-27 NOTE — TELEPHONE ENCOUNTER
"Hospital/TCU/ED for chronic condition Discharge Protocol    \"Hi, my name is Ivet Corrales RN, a registered nurse, and I am calling from [facility].  I am calling to follow up and see how things are going for you after your recent emergency visit/hospital/TCU stay.\"    Tell me how you are doing now that you are home?\" I'm okay but still waiting to get in somewhere for treatment      Discharge Instructions    \"Let's review your discharge instructions.  What is/are the follow-up recommendations?  Pt. Response: Follow-up with ENT which I did and complete eye exercises.    \"Has an appointment with your primary care provider been scheduled?\"   No (schedule appointment)    \"When you see the provider, I would recommend that you bring your medications with you.\"    Medications    \"Tell me what changed about your medicines when you discharged?\"    Changes to chronic meds?    0-1    \"What questions do you have about your medications?\"    None     New diagnoses of heart failure, COPD, diabetes, or MI?    No              Post Discharge Medication Reconciliation Status: discharge medications reconciled, continue medications without change.    Was MTM referral placed (*Make sure to put transitions as reason for referral)?   No    Call Summary    \"What questions or concerns do you have about your recent visit and your follow-up care?\"     none    \"If you have questions or things don't continue to improve, we encourage you contact us through the main clinic number (give number).  Even if the clinic is not open, triage nurses are available 24/7 to help you.     We would like you to know that our clinic has extended hours (provide information).  We also have urgent care (provide details on closest location and hours/contact info)\"      \"Thank you for your time and take care!\"               "

## 2022-04-27 NOTE — TELEPHONE ENCOUNTER
Patient calling back, states she was able to see her regular ENT provider 4/26 and will not need new appt with new ENT.

## 2022-04-27 NOTE — TELEPHONE ENCOUNTER
Patient Contact     Attempt #: 1     Was call answered?  No.  Left message on voicemail with information to call triage KANDICE Carver, RN  [facility]

## 2022-04-28 NOTE — TELEPHONE ENCOUNTER
TCs - are you able to assist with scheduling pt for a hospital follow up?   Thanks     Marleni CARBAJAL, Triage RN  [facility] Internal Medicine [facility]

## 2022-05-04 ENCOUNTER — OFFICE VISIT (OUTPATIENT)
Dept: FAMILY MEDICINE | Facility: CLINIC | Age: 77
End: 2022-05-04
Payer: MEDICARE

## 2022-05-04 VITALS
HEIGHT: 59 IN | SYSTOLIC BLOOD PRESSURE: 132 MMHG | HEART RATE: 90 BPM | TEMPERATURE: 97 F | WEIGHT: 106 LBS | RESPIRATION RATE: 16 BRPM | DIASTOLIC BLOOD PRESSURE: 84 MMHG | BODY MASS INDEX: 21.37 KG/M2 | OXYGEN SATURATION: 98 %

## 2022-05-04 DIAGNOSIS — R42 DIZZINESS: Primary | ICD-10-CM

## 2022-05-04 DIAGNOSIS — R03.0 BORDERLINE BLOOD PRESSURE: ICD-10-CM

## 2022-05-04 PROCEDURE — 99495 TRANSJ CARE MGMT MOD F2F 14D: CPT | Performed by: INTERNAL MEDICINE

## 2022-05-04 RX ORDER — AMLODIPINE BESYLATE 2.5 MG/1
2.5 TABLET ORAL DAILY
Qty: 90 TABLET | Refills: 0 | Status: SHIPPED | OUTPATIENT
Start: 2022-05-04 | End: 2022-07-14

## 2022-05-04 ASSESSMENT — PAIN SCALES - GENERAL: PAINLEVEL: EXTREME PAIN (8)

## 2022-05-04 NOTE — PROGRESS NOTES
Assessment & Plan     Dizziness  Borderline blood pressure  Unclear etiology. Work up inpatient reviewed. Patient has seen ENT. Symptoms persist.   Recommend neuro outpatient eval.   Recommend proceed with PT-she has appt scheduled.   Ok to trial low dose amlodipine for borderline HTN however discussed if worsening symptoms or not improving, ok to discontinue.  If discontinues amlodipine, she may consider trialing sertraline has prescribed by hospital as next step.   Patient agreeable with above plan  She will follow-up with PCP in 4-6 weeks.  - amLODIPine (NORVASC) 2.5 MG tablet  Dispense: 90 tablet; Refill: 0  - Adult Neurology  Referral        Return in about 6 weeks (around 6/15/2022) for Follow up, with PCP, sooner if symptoms worsen or do not improve.    Suzan Ruby,   [facility] KARSON Koo is a 76 year old who presents for the following health issues     HPI       Hospital Follow-up Visit:    Hospital/Nursing Home/ Rehab Facility: [facility]  Date of Admission: 4/21/22  Date of Discharge: 4/23/22  Reason(s) for Admission: Dizziness      Was your hospitalization related to COVID-19? No   Problems taking medications regularly:  None  Medication changes since discharge: None  Problems adhering to non-medication therapy:  None       PCP: Neema Koo presents for hospital follow-up.   She continues to have same symptoms: dizziness/nausea/headaches.   She has seen her ENT, nonrevealing.  She has appt with PT scheduled, first available end of month.   She has been monitoring her home BP and though SBP between 110-140, DBP in  90s on average. She would like to trial a blood pressure medication. States nothing else has helped, meclizine doesn't help. Has not had elevated BP in the past that she knows of. Was borderline in hospital as well with SBP 140s.   She was prescribed sertraline from hospital but has not tried this yet, reluctant.  "      Review of Systems   Constitutional, HEENT, cardiovascular, pulmonary, gi and gu systems are negative, except as otherwise noted.      Objective    /84 (BP Location: Right arm, Patient Position: Chair, Cuff Size: Adult Large)   Pulse 90   Temp 97  F (36.1  C) (Tympanic)   Resp 16   Ht 1.499 m (4' 11\")   Wt 48.1 kg (106 lb)   SpO2 98%   Breastfeeding No   BMI 21.41 kg/m    Body mass index is 21.41 kg/m .  Physical Exam     GENERAL APPEARANCE: AAOx3, no distress. Well developed.    PSYCH: appropriate mood and affect.           "

## 2022-05-05 ENCOUNTER — DOCUMENTATION ONLY (OUTPATIENT)
Dept: OTHER | Facility: CLINIC | Age: 77
End: 2022-05-05
Payer: MEDICARE

## 2022-05-20 ENCOUNTER — OFFICE VISIT (OUTPATIENT)
Dept: FAMILY MEDICINE | Facility: CLINIC | Age: 77
End: 2022-05-20
Payer: MEDICARE

## 2022-05-20 VITALS
OXYGEN SATURATION: 97 % | HEIGHT: 59 IN | TEMPERATURE: 97.8 F | HEART RATE: 84 BPM | RESPIRATION RATE: 16 BRPM | WEIGHT: 105.7 LBS | BODY MASS INDEX: 21.31 KG/M2 | SYSTOLIC BLOOD PRESSURE: 126 MMHG | DIASTOLIC BLOOD PRESSURE: 88 MMHG

## 2022-05-20 DIAGNOSIS — S81.802A WOUND OF LEFT LOWER EXTREMITY, INITIAL ENCOUNTER: Primary | ICD-10-CM

## 2022-05-20 DIAGNOSIS — Z23 NEED FOR VACCINATION: ICD-10-CM

## 2022-05-20 PROCEDURE — 90714 TD VACC NO PRESV 7 YRS+ IM: CPT | Performed by: INTERNAL MEDICINE

## 2022-05-20 PROCEDURE — 99213 OFFICE O/P EST LOW 20 MIN: CPT | Mod: 25 | Performed by: INTERNAL MEDICINE

## 2022-05-20 PROCEDURE — 90471 IMMUNIZATION ADMIN: CPT | Performed by: INTERNAL MEDICINE

## 2022-05-20 NOTE — PROGRESS NOTES
Prior to immunization administration, verified patients identity using patient s name and date of birth. Please see Immunization Activity for additional information.     Screening Questionnaire for Adult Immunization    Are you sick today?   No   Do you have allergies to medications, food, a vaccine component or latex?   Yes   Have you ever had a serious reaction after receiving a vaccination?   No   Do you have a long-term health problem with heart, lung, kidney, or metabolic disease (e.g., diabetes), asthma, a blood disorder, no spleen, complement component deficiency, a cochlear implant, or a spinal fluid leak?  Are you on long-term aspirin therapy?   No   Do you have cancer, leukemia, HIV/AIDS, or any other immune system problem?   No   Do you have a parent, brother, or sister with an immune system problem?   No   In the past 3 months, have you taken medications that affect  your immune system, such as prednisone, other steroids, or anticancer drugs; drugs for the treatment of rheumatoid arthritis, Crohn s disease, or psoriasis; or have you had radiation treatments?   No   Have you had a seizure, or a brain or other nervous system problem?   No   During the past year, have you received a transfusion of blood or blood    products, or been given immune (gamma) globulin or antiviral drug?   No   For women: Are you pregnant or is there a chance you could become       pregnant during the next month?   No   Have you received any vaccinations in the past 4 weeks?   No     Immunization questionnaire was positive for at least one answer.  Notified Dr. Ruby.        Per orders of Dr. Ruby, injection of Tenivac (Td) given by Pasquale Raphael CMA. Patient instructed to remain in clinic for 15 minutes afterwards, and to report any adverse reaction to me immediately.       Screening performed by Pasquale Raphael CMA on 5/20/2022 at 3:25 PM.

## 2022-05-20 NOTE — PROGRESS NOTES
"  Assessment & Plan     Wound of left lower extremity, initial encounter  Superficial, healing appropriately  Avoid manipulation  Given bactroban sample to apply twice a day for three days due to mild erythema. No indication for oral ab.  Td updated/administered today due to contact with metal object.  Monitor and RTC if worsening/not improving    Return if symptoms worsen or fail to improve.    DO NELDA Velasco [facility] KARSON Koo is a 76 year old who presents for the following health issues     HPI     10 days ago, she was in her garden and left leg hit metal jo ann/sharp edge.   No f/c. No drainage. No warmth. Small area of redness. Sore.     Last td 2022  Review of Systems   Constitutional, HEENT, cardiovascular, pulmonary, gi and gu systems are negative, except as otherwise noted.      Objective    /88 (BP Location: Right arm, Patient Position: Sitting, Cuff Size: Adult Regular)   Pulse 84   Temp 97.8  F (36.6  C)   Resp 16   Ht 1.499 m (4' 11\")   Wt 47.9 kg (105 lb 11.2 oz)   SpO2 97%   BMI 21.35 kg/m    Body mass index is 21.35 kg/m .  Physical Exam     GENERAL APPEARANCE: AAOx3, no distress. Well developed.    EXT: No c/c/e in lower extremities b/l. LLE mid-medial aspect with 1cm scabbed lesion with mild surrounding erythema, no induration, soft, no drainage or purulence. No abscess or cellulitis    PSYCH: appropriate mood and affect.               "

## 2022-07-13 NOTE — PROGRESS NOTES
"[facility]    I had the pleasure of seeing Hanane when she came for concerns re: palpitations.  This 76 year old sees Dr. Frias for her history of:    1.  Asymptomatic CAD - had abnl CAC 2016. Negative stress test 2018  2.  Dyslipidemia - intolerant to many statins and antilipid therapy. Has declined PCSK9 inhibitors  3. Palpitations    Dr. Frias saw her 11/2021 at which time she continued to do well.  Unfortunately, she was dealing with quite a bit of pain from arthritis, but was still able to play doubles tennis at least twice a week.  With this, she has had no angina.  She again refused PCSK9 inhibitors and they reviewed her intolerance to statins and Zetia.  She was recommended to start ASA 81 mg daily.    She was hospitalized 4/21-23/2022 for dizziness, waking up feeling very unsteady on her feet. She Work-up was largely unremarkable, with no clear evidence of stroke noted.  She did have some anatomical changes on CTA, felt unlikely to be related to symptoms.  Overall, it was felt this was likely BPPV.  Symptoms did improve with fluids, suggesting a possible orthostatic component.  It was recommended she wean off benzodiazepines d/t risk of increasing falls and dizziness, but her chronic use of this was not felt to be related to her acute symptoms.    She contacted our scheduling department 7/2022 requesting an appointment d/t episodes of palpitations, noting her heart would \"speed up.\"  No reported episodes of syncope or lightheadedness.  It was recommended she see us in follow-up and I am meeting her today.    Interval History:  Hanane notes that over the last few weeks she has an \"adrenaline rush\" going through her. First noted while she was playing tennis, feeling increased LUCIO with a \"funny sensation\" in the chest.  It did not make her stop playing tennis, but she noted it was 'harder.' Episode lasted ~4-5 minutes and spontaneously resolved. No palpitations, dizziness, lightheadedness " "associated with it.    Since then, she's had multiple episodes of this \"adrenaline rush,\" no longer just with tennis playing, but even just with walking while shopping.  It still does not make her stop, but she states she will feel a \"rush\", along with  LUCIO, a \"funny sensation \" in her chest and left arm discomfort.  She actually noted this occurred last night.  BP was 160/104 and HR was 75 bpm.  Typically, her BP runs 100-110s at home.  She thought about going to the ER last night, but decided not to as she had her appointment today with me.    Denies any problems with edema, orthopnea, PND.  Denies lightheadedness, dizziness or syncope.  No presyncope.  Despite her osteoarthritis (mainly affecting the right arm) she has continued to remain active.      VITALS:  Vitals: /77 (BP Location: Left arm, Patient Position: Sitting)   Pulse 92   Ht 1.499 m (4' 11\")   Wt 47.3 kg (104 lb 3.2 oz)   BMI 21.05 kg/m      Diagnostic Testing:  EKG today, which I overread, showed SR 75 bpm. Normal intervals.  No pathological Q waves noted  Echocardiogram 10/2018-LVEF 50-55%. Grade I DD. Nl RV. No sig valve abnls. Nl aorta.  Stress echocardiogram testing 10/2018- wnl  CT Calcium Score 11/2016 CAC 39 (LM 0, LAD 11, Cx 18, RCA 0), placing her in 53rd percentile compared to age/gender matched controls. Non-cardiac portion RLL 3 mm granuloma  Component      Latest Ref Rng & Units 5/28/2019 10/12/2020 11/2/2021   Cholesterol      <200 mg/dL 263 (H) 275 (H) 270 (H)   Triglycerides      <150 mg/dL 251 (H) 157 (H) 190 (H)   HDL Cholesterol      >=50 mg/dL 45 (L) 52 44 (L)   LDL Cholesterol Calculated      <=100 mg/dL 168 (H) 192 (H) 188 (H)   Non HDL Cholesterol      <130 mg/dL 218 (H) 223 (H) 226 (H)   Patient Fasting > 8hrs?         Yes       Plan:  Angiogram with possible stent implantation  Aspirin 81 mg daily  Rx for sl NTG sent in with instructions for use  Follow-up with me or Dr. Frias following the procedure to review any " "additional testing that may be needed    Assessment/Plan:    1. \"Adrenaline Rush\"     Initially described in the notes as feeling her heart pounding and getting tachycardic, but she confirms that she has heart rates in the 70-80 range when she gets this.    She notes a \"funny sensation and now this is been associated with L arm discomfort.  Symptoms first started ~3 weeks ago while playing tennis, now getting occasionally with more minimal exertion such as playing tennis.  No rest discomfort, and she confirms that her episode last night was occurring while she was busy around the house    She has known coronary disease based on CT calcium score 2016.  Last stress echocardiogram 2018 was normal    PLAN:    Start ASA 81 mg daily    Sl NTG Rx sent in with instructions for use    Was able to discuss this with Dr. Frias, who agreed that given concerning symptoms, would proceed with angiogram and possible stent implantation. Risks, benefits and indications of heart catheterization with possible intervention were discussed with the patient, including but not limited to: peripheral vessel injury, heart attack, stroke, death, dye nephropathy, dye allergy or need for emergent bypass surgery.  We discussed the risk of conscious sedation and the need for a  following the procedure. The patient voiced understanding and agrees to proceed. A consent form will be signed by the procedural physician.    Contrast allergy: NONE  Anticoagulation: NONE   Metformin: NONE  Oral DM meds: NONE  Insulin: NONE  Diuretic: NONE  Use of phosphodiesterase type 5 inhibitor: NONE  Aspirin: started 81 mg daily      Pt informed to be NPO at midnight  Renal issues NONE. Last BUN/Cr 4/2022 wnl 0.90    Pt has transportation and 24 hours post procedure monitoring set up.   Pt aware of no driving for 24 hours post procedure.       Confirms she has no upcoming surgeries or bleeding issues that she is aware of, and understands that if a stent is placed, " "would require period of DAPT.  CBC 4/2022 showed a hemoglobin 12.8 g/dL    She is to limit activity (specifically, no exercise) until angiogram is completed.  She is to seek emergent attention should she have any change in pattern of her \"chest sensation\" or require >2 NTG    Reviewed that she will see us back to review.  If she has no coronary disease requiring intervention, will plan ZioPatch to ensure no arrhythmias are a cause of this sensation    2. Dyslipidemia    Last lipid panel 11/2021 as above with very high  mg/dL    She has been intolerant to multiple statins, with severe leg cramps that limit her activity.  She has not been interested in starting PCSK-9 inhibitors and understands that if we were to find significant coronary disease, lipid lowering would be required.    She notes she \"has a hard time remembering to take pills\" and we reviewed strategies to help this, especially as the consequence of not taking pills regularly could be very severe.      PLAN:    She voiced understanding and understands that she may need to retry statin therapy or start PCSK9 inhibitors if severe coronary disease was found on her angiogram.      Sandy Masterson PA-C, MSPAS      Orders Placed This Encounter   Procedures     EKG 12-lead complete w/read - Clinics (performed today)     Case Request Cath Lab: Coronary Angiogram, Left Heart Catheterization, Percutaneous Coronary Intervention     Orders Placed This Encounter   Medications     aspirin (ASA) 81 MG EC tablet     Sig: Take 1 tablet (81 mg) by mouth daily     nitroGLYcerin (NITROSTAT) 0.4 MG sublingual tablet     Sig: For chest pain place 1 tablet under the tongue every 5 minutes for 3 doses. If symptoms persist 5 minutes after 1st dose call 911.     Dispense:  25 tablet     Refill:  1     Medications Discontinued During This Encounter   Medication Reason     amLODIPine (NORVASC) 2.5 MG tablet      meclizine (ANTIVERT) 25 MG tablet          Encounter Diagnoses "   Name Primary?     Screening for cardiovascular condition Yes     Chest pain due to myocardial ischemia, unspecified ischemic chest pain type        CURRENT MEDICATIONS:  Current Outpatient Medications   Medication Sig Dispense Refill     aspirin (ASA) 81 MG EC tablet Take 1 tablet (81 mg) by mouth daily       Calcium-Phosphorus-Vitamin D (CALCIUM GUMMIES PO) Take 2 Gum by mouth daily       cholecalciferol (VITAMIN D3) 25 mcg (1000 units) capsule Take 1 capsule by mouth daily       clonazePAM (KLONOPIN) 0.5 MG ODT DISSOLVE 1 TABLET(0.5 MG) ON THE TONGUE EVERY NIGHT AS NEEDED FOR ANXIETY 20 tablet 3     nitroGLYcerin (NITROSTAT) 0.4 MG sublingual tablet For chest pain place 1 tablet under the tongue every 5 minutes for 3 doses. If symptoms persist 5 minutes after 1st dose call 911. 25 tablet 1     omeprazole (PRILOSEC) 20 MG DR capsule TAKE 1 CAPSULE(20 MG) BY MOUTH TWICE DAILY 180 capsule 3     pramipexole (MIRAPEX) 0.25 MG tablet TAKE 1 TABLET BY MOUTH TWICE DAILY AT 5PM AND AT BEDTIME 180 tablet 3       ALLERGIES     Allergies   Allergen Reactions     Crestor [Rosuvastatin]      Ezetimibe Other (See Comments)     Zetia = leg cramps      Hmg-Coa-R Inhibitors Other (See Comments)     Leg cramping     Miralax [Polyethylene Glycol] Other (See Comments)     Back pain     Pravastatin Other (See Comments)     Leg cramps          Review of Systems:  Skin:  Negative     Eyes:  Negative    ENT:  Positive for nasal congestion  Respiratory:  Positive for shortness of breath  Cardiovascular:  Negative for;palpitations;syncope or near-syncope;exercise intolerance;fatigue;lightheadedness;dizziness Positive for;chest pain  Gastroenterology: Positive for heartburn;constipation  Genitourinary:  Negative    Musculoskeletal:  Positive for arthritis;neck pain  Neurologic:  Negative    Psychiatric:  Positive for anxiety  Heme/Lymph/Imm:  Positive for allergies  Endocrine:  Negative      Physical Exam:  Vitals: /77 (BP Location:  "Left arm, Patient Position: Sitting)   Pulse 92   Ht 1.499 m (4' 11\")   Wt 47.3 kg (104 lb 3.2 oz)   BMI 21.05 kg/m      Constitutional:  cooperative, alert and oriented, well developed, well nourished, in no acute distress        Skin:  warm and dry to the touch, no apparent skin lesions or masses noted        Head:  normocephalic, no masses or lesions        Eyes:  pupils equal and round;sclera white;conjunctivae and lids unremarkable        ENT:  not assessed this visit        Neck:  JVP normal;carotid pulses are full and equal bilaterally        Chest:  normal breath sounds, clear to auscultation, normal A-P diameter, normal symmetry, normal respiratory excursion, no use of accessory muscles        Cardiac: regular rhythm, normal S1/S2, no S3 or S4, apical impulse not displaced, no murmurs, gallops or rubs                  Abdomen:  abdomen soft        Vascular: pulses full and equal                                      Extremities and Back:  no edema;no deformities, clubbing, cyanosis, erythema observed        Neurological:  no gross motor deficits            PAST MEDICAL HISTORY:  Past Medical History:   Diagnosis Date     Anxiety     psych yearly, Dr Shea     Family hx of colon cancer     sister     Floaters     Karma leverentz     Fracture of wrist, left, with routine healing, subsequent encounter 08/28/2017     GERD (gastroesophageal reflux disease)      Gross hematuria 09/12/2011     Hyperlipidemia LDL goal < 130      Hyperlipidemia LDL goal <130 08/28/2017     Hypertension May 3,2022     Malnutrition (H) 08/28/2017     Osteoarthritis 12/19/2012     Osteoporosis     Dexa     PONV (postoperative nausea and vomiting)      Restless legs      Skin cancer        PAST SURGICAL HISTORY:  Past Surgical History:   Procedure Laterality Date     ARTHROPLASTY KNEE Left 4/24/2017    Procedure: ARTHROPLASTY KNEE;  LEFT TOTAL KNEE ARTHROPLASTY;  Surgeon: Lee Ayala MD;  Location: [facility] OR     BIOPSY  " 1968 approx.    vaginal     BREAST SURGERY  1972    Implants     BUNIONECTOMY      both      SECTION       COLONOSCOPY  8-10     EYE SURGERY  2014    cataract surgery in both eyes     LIPOSUCTION (LOCATION)       RECONSTRUCT BREAST, IMPLANT PROSTHESIS, COMBINED         FAMILY HISTORY:  Family History   Problem Relation Age of Onset     Lipids Mother      Alzheimer Disease Mother      Depression Mother      Osteoporosis Mother         RA     KTAALFREDO Father      Coronary Artery Disease Father      Hypertension Father      Diabetes Father      Breast Cancer Sister      Leukemia Sister      Hyperlipidemia Sister      Colon Cancer Sister              Cerebrovascular Disease Brother        SOCIAL HISTORY:  Social History     Socioeconomic History     Marital status:    Tobacco Use     Smoking status: Former Smoker     Packs/day: 0.50     Years: 15.00     Pack years: 7.50     Types: Cigarettes     Start date: 1960     Quit date: 1975     Years since quittin.5     Smokeless tobacco: Never Used     Tobacco comment: one pack/ three months   Substance and Sexual Activity     Alcohol use: Yes     Comment: one glass of wine a week     Drug use: No     Sexual activity: Not Currently     Partners: Male     Birth control/protection: Post-menopausal   Other Topics Concern     Exercise Yes     Parent/sibling w/ CABG, MI or angioplasty before 65F 55M? No

## 2022-07-14 ENCOUNTER — OFFICE VISIT (OUTPATIENT)
Dept: CARDIOLOGY | Facility: CLINIC | Age: 77
End: 2022-07-14
Payer: MEDICARE

## 2022-07-14 VITALS
BODY MASS INDEX: 21 KG/M2 | DIASTOLIC BLOOD PRESSURE: 77 MMHG | SYSTOLIC BLOOD PRESSURE: 108 MMHG | WEIGHT: 104.2 LBS | HEART RATE: 92 BPM | HEIGHT: 59 IN

## 2022-07-14 DIAGNOSIS — Z13.6 SCREENING FOR CARDIOVASCULAR CONDITION: ICD-10-CM

## 2022-07-14 DIAGNOSIS — I25.9 CHEST PAIN DUE TO MYOCARDIAL ISCHEMIA, UNSPECIFIED ISCHEMIC CHEST PAIN TYPE: Primary | ICD-10-CM

## 2022-07-14 PROCEDURE — 93000 ELECTROCARDIOGRAM COMPLETE: CPT | Performed by: PHYSICIAN ASSISTANT

## 2022-07-14 PROCEDURE — 99214 OFFICE O/P EST MOD 30 MIN: CPT | Performed by: PHYSICIAN ASSISTANT

## 2022-07-14 RX ORDER — NITROGLYCERIN 0.4 MG/1
TABLET SUBLINGUAL
Qty: 25 TABLET | Refills: 1 | Status: SHIPPED | OUTPATIENT
Start: 2022-07-14 | End: 2024-05-20

## 2022-07-14 NOTE — LETTER
"7/14/2022    Corona Bethea MD  [address]  [place] [address]    RE: Hanane Sorto       Dear Colleague,     I had the pleasure of seeing Hanane Sorto in the [facility].  [facility]    I had the pleasure of seeing Hanane when she came for concerns re: palpitations.  This 76 year old sees Dr. Frias for her history of:    1.  Asymptomatic CAD - had abnl CAC 2016. Negative stress test 2018  2.  Dyslipidemia - intolerant to many statins and antilipid therapy. Has declined PCSK9 inhibitors  3. Palpitations    Dr. Frias saw her 11/2021 at which time she continued to do well.  Unfortunately, she was dealing with quite a bit of pain from arthritis, but was still able to play doubles tennis at least twice a week.  With this, she has had no angina.  She again refused PCSK9 inhibitors and they reviewed her intolerance to statins and Zetia.  She was recommended to start ASA 81 mg daily.    She was hospitalized 4/21-23/2022 for dizziness, waking up feeling very unsteady on her feet. She Work-up was largely unremarkable, with no clear evidence of stroke noted.  She did have some anatomical changes on CTA, felt unlikely to be related to symptoms.  Overall, it was felt this was likely BPPV.  Symptoms did improve with fluids, suggesting a possible orthostatic component.  It was recommended she wean off benzodiazepines d/t risk of increasing falls and dizziness, but her chronic use of this was not felt to be related to her acute symptoms.    She contacted our scheduling department 7/2022 requesting an appointment d/t episodes of palpitations, noting her heart would \"speed up.\"  No reported episodes of syncope or lightheadedness.  It was recommended she see us in follow-up and I am meeting her today.    Interval History:  Hanane notes that over the last few weeks she has an \"adrenaline rush\" going through her. First noted while she was playing tennis, feeling increased LUCIO with a \"funny " "sensation\" in the chest.  It did not make her stop playing tennis, but she noted it was 'harder.' Episode lasted ~4-5 minutes and spontaneously resolved. No palpitations, dizziness, lightheadedness associated with it.    Since then, she's had multiple episodes of this \"adrenaline rush,\" no longer just with tennis playing, but even just with walking while shopping.  It still does not make her stop, but she states she will feel a \"rush\", along with  LUCIO, a \"funny sensation \" in her chest and left arm discomfort.  She actually noted this occurred last night.  BP was 160/104 and HR was 75 bpm.  Typically, her BP runs 100-110s at home.  She thought about going to the ER last night, but decided not to as she had her appointment today with me.    Denies any problems with edema, orthopnea, PND.  Denies lightheadedness, dizziness or syncope.  No presyncope.  Despite her osteoarthritis (mainly affecting the right arm) she has continued to remain active.      VITALS:  Vitals: /77 (BP Location: Left arm, Patient Position: Sitting)   Pulse 92   Ht 1.499 m (4' 11\")   Wt 47.3 kg (104 lb 3.2 oz)   BMI 21.05 kg/m      Diagnostic Testing:  EKG today, which I overread, showed SR 75 bpm. Normal intervals.  No pathological Q waves noted  Echocardiogram 10/2018-LVEF 50-55%. Grade I DD. Nl RV. No sig valve abnls. Nl aorta.  Stress echocardiogram testing 10/2018- wnl  CT Calcium Score 11/2016 CAC 39 (LM 0, LAD 11, Cx 18, RCA 0), placing her in 53rd percentile compared to age/gender matched controls. Non-cardiac portion RLL 3 mm granuloma  Component      Latest Ref Rng & Units 5/28/2019 10/12/2020 11/2/2021   Cholesterol      <200 mg/dL 263 (H) 275 (H) 270 (H)   Triglycerides      <150 mg/dL 251 (H) 157 (H) 190 (H)   HDL Cholesterol      >=50 mg/dL 45 (L) 52 44 (L)   LDL Cholesterol Calculated      <=100 mg/dL 168 (H) 192 (H) 188 (H)   Non HDL Cholesterol      <130 mg/dL 218 (H) 223 (H) 226 (H)   Patient Fasting > 8hrs?         " "Yes       Plan:  Angiogram with possible stent implantation  Aspirin 81 mg daily  Rx for sl NTG sent in with instructions for use  Follow-up with me or Dr. Frias following the procedure to review any additional testing that may be needed    Assessment/Plan:    1. \"Adrenaline Rush\"     Initially described in the notes as feeling her heart pounding and getting tachycardic, but she confirms that she has heart rates in the 70-80 range when she gets this.    She notes a \"funny sensation and now this is been associated with L arm discomfort.  Symptoms first started ~3 weeks ago while playing tennis, now getting occasionally with more minimal exertion such as playing tennis.  No rest discomfort, and she confirms that her episode last night was occurring while she was busy around the house    She has known coronary disease based on CT calcium score 2016.  Last stress echocardiogram 2018 was normal    PLAN:    Start ASA 81 mg daily    Sl NTG Rx sent in with instructions for use    Was able to discuss this with Dr. Frias, who agreed that given concerning symptoms, would proceed with angiogram and possible stent implantation. Risks, benefits and indications of heart catheterization with possible intervention were discussed with the patient, including but not limited to: peripheral vessel injury, heart attack, stroke, death, dye nephropathy, dye allergy or need for emergent bypass surgery.  We discussed the risk of conscious sedation and the need for a  following the procedure. The patient voiced understanding and agrees to proceed. A consent form will be signed by the procedural physician.    Contrast allergy: NONE  Anticoagulation: NONE   Metformin: NONE  Oral DM meds: NONE  Insulin: NONE  Diuretic: NONE  Use of phosphodiesterase type 5 inhibitor: NONE  Aspirin: started 81 mg daily      Pt informed to be NPO at midnight  Renal issues NONE. Last BUN/Cr 4/2022 wnl 0.90    Pt has transportation and 24 hours post procedure " "monitoring set up.   Pt aware of no driving for 24 hours post procedure.       Confirms she has no upcoming surgeries or bleeding issues that she is aware of, and understands that if a stent is placed, would require period of DAPT.  CBC 4/2022 showed a hemoglobin 12.8 g/dL    She is to limit activity (specifically, no exercise) until angiogram is completed.  She is to seek emergent attention should she have any change in pattern of her \"chest sensation\" or require >2 NTG    Reviewed that she will see us back to review.  If she has no coronary disease requiring intervention, will plan ZioPatch to ensure no arrhythmias are a cause of this sensation    2. Dyslipidemia    Last lipid panel 11/2021 as above with very high  mg/dL    She has been intolerant to multiple statins, with severe leg cramps that limit her activity.  She has not been interested in starting PCSK-9 inhibitors and understands that if we were to find significant coronary disease, lipid lowering would be required.    She notes she \"has a hard time remembering to take pills\" and we reviewed strategies to help this, especially as the consequence of not taking pills regularly could be very severe.      PLAN:    She voiced understanding and understands that she may need to retry statin therapy or start PCSK9 inhibitors if severe coronary disease was found on her angiogram.      Sandy Masterson PA-C, MSPAS      Orders Placed This Encounter   Procedures     EKG 12-lead complete w/read - Clinics (performed today)     Case Request Cath Lab: Coronary Angiogram, Left Heart Catheterization, Percutaneous Coronary Intervention     Orders Placed This Encounter   Medications     aspirin (ASA) 81 MG EC tablet     Sig: Take 1 tablet (81 mg) by mouth daily     nitroGLYcerin (NITROSTAT) 0.4 MG sublingual tablet     Sig: For chest pain place 1 tablet under the tongue every 5 minutes for 3 doses. If symptoms persist 5 minutes after 1st dose call 911.     Dispense:  25 " tablet     Refill:  1     Medications Discontinued During This Encounter   Medication Reason     amLODIPine (NORVASC) 2.5 MG tablet      meclizine (ANTIVERT) 25 MG tablet          Encounter Diagnoses   Name Primary?     Screening for cardiovascular condition Yes     Chest pain due to myocardial ischemia, unspecified ischemic chest pain type        CURRENT MEDICATIONS:  Current Outpatient Medications   Medication Sig Dispense Refill     aspirin (ASA) 81 MG EC tablet Take 1 tablet (81 mg) by mouth daily       Calcium-Phosphorus-Vitamin D (CALCIUM GUMMIES PO) Take 2 Gum by mouth daily       cholecalciferol (VITAMIN D3) 25 mcg (1000 units) capsule Take 1 capsule by mouth daily       clonazePAM (KLONOPIN) 0.5 MG ODT DISSOLVE 1 TABLET(0.5 MG) ON THE TONGUE EVERY NIGHT AS NEEDED FOR ANXIETY 20 tablet 3     nitroGLYcerin (NITROSTAT) 0.4 MG sublingual tablet For chest pain place 1 tablet under the tongue every 5 minutes for 3 doses. If symptoms persist 5 minutes after 1st dose call 911. 25 tablet 1     omeprazole (PRILOSEC) 20 MG DR capsule TAKE 1 CAPSULE(20 MG) BY MOUTH TWICE DAILY 180 capsule 3     pramipexole (MIRAPEX) 0.25 MG tablet TAKE 1 TABLET BY MOUTH TWICE DAILY AT 5PM AND AT BEDTIME 180 tablet 3       ALLERGIES     Allergies   Allergen Reactions     Crestor [Rosuvastatin]      Ezetimibe Other (See Comments)     Zetia = leg cramps      Hmg-Coa-R Inhibitors Other (See Comments)     Leg cramping     Miralax [Polyethylene Glycol] Other (See Comments)     Back pain     Pravastatin Other (See Comments)     Leg cramps          Review of Systems:  Skin:  Negative     Eyes:  Negative    ENT:  Positive for nasal congestion  Respiratory:  Positive for shortness of breath  Cardiovascular:  Negative for;palpitations;syncope or near-syncope;exercise intolerance;fatigue;lightheadedness;dizziness Positive for;chest pain  Gastroenterology: Positive for heartburn;constipation  Genitourinary:  Negative    Musculoskeletal:  Positive  "for arthritis;neck pain  Neurologic:  Negative    Psychiatric:  Positive for anxiety  Heme/Lymph/Imm:  Positive for allergies  Endocrine:  Negative      Physical Exam:  Vitals: /77 (BP Location: Left arm, Patient Position: Sitting)   Pulse 92   Ht 1.499 m (4' 11\")   Wt 47.3 kg (104 lb 3.2 oz)   BMI 21.05 kg/m      Constitutional:  cooperative, alert and oriented, well developed, well nourished, in no acute distress        Skin:  warm and dry to the touch, no apparent skin lesions or masses noted        Head:  normocephalic, no masses or lesions        Eyes:  pupils equal and round;sclera white;conjunctivae and lids unremarkable        ENT:  not assessed this visit        Neck:  JVP normal;carotid pulses are full and equal bilaterally        Chest:  normal breath sounds, clear to auscultation, normal A-P diameter, normal symmetry, normal respiratory excursion, no use of accessory muscles        Cardiac: regular rhythm, normal S1/S2, no S3 or S4, apical impulse not displaced, no murmurs, gallops or rubs                  Abdomen:  abdomen soft        Vascular: pulses full and equal                                      Extremities and Back:  no edema;no deformities, clubbing, cyanosis, erythema observed        Neurological:  no gross motor deficits            PAST MEDICAL HISTORY:  Past Medical History:   Diagnosis Date     Anxiety     psych yearly, Dr Shea     Family hx of colon cancer     sister     Floaters     Karma leverentz     Fracture of wrist, left, with routine healing, subsequent encounter 08/28/2017     GERD (gastroesophageal reflux disease)      Gross hematuria 09/12/2011     Hyperlipidemia LDL goal < 130      Hyperlipidemia LDL goal <130 08/28/2017     Hypertension May 3,2022     Malnutrition (H) 08/28/2017     Osteoarthritis 12/19/2012     Osteoporosis     Dexa     PONV (postoperative nausea and vomiting)      Restless legs      Skin cancer        PAST SURGICAL HISTORY:  Past Surgical " History:   Procedure Laterality Date     ARTHROPLASTY KNEE Left 2017    Procedure: ARTHROPLASTY KNEE;  LEFT TOTAL KNEE ARTHROPLASTY;  Surgeon: Lee Ayala MD;  Location: [facility] OR     BIOPSY   approx.    vaginal     BREAST SURGERY      Implants     BUNIONECTOMY      both      SECTION       COLONOSCOPY  -10     EYE SURGERY  2014    cataract surgery in both eyes     LIPOSUCTION (LOCATION)       RECONSTRUCT BREAST, IMPLANT PROSTHESIS, COMBINED         FAMILY HISTORY:  Family History   Problem Relation Age of Onset     Lipids Mother      Alzheimer Disease Mother      Depression Mother      Osteoporosis Mother         RA     C.A.D. Father      Coronary Artery Disease Father      Hypertension Father      Diabetes Father      Breast Cancer Sister      Leukemia Sister      Hyperlipidemia Sister      Colon Cancer Sister              Cerebrovascular Disease Brother        SOCIAL HISTORY:  Social History     Socioeconomic History     Marital status:    Tobacco Use     Smoking status: Former Smoker     Packs/day: 0.50     Years: 15.00     Pack years: 7.50     Types: Cigarettes     Start date: 1960     Quit date: 1975     Years since quittin.5     Smokeless tobacco: Never Used     Tobacco comment: one pack/ three months   Substance and Sexual Activity     Alcohol use: Yes     Comment: one glass of wine a week     Drug use: No     Sexual activity: Not Currently     Partners: Male     Birth control/protection: Post-menopausal   Other Topics Concern     Exercise Yes     Parent/sibling w/ CABG, MI or angioplasty before 65F 55M? No         Thank you for allowing me to participate in the care of your patient.    Sincerely,     Kristina Masterson PA-C     [facility]  cc:   Suhail Frias MD  [address]  [place] [address]

## 2022-07-14 NOTE — PATIENT INSTRUCTIONS
"Hanane - it was good to meet you today!    Reviewed the \"funny feeling\" in your chest and L arm that you've been having over the last few weeks. This is happening more frequently and I'm concerned may be related to heart blockages  I've talked to Dr. Frias and he agrees this is concerning!    PLAN:  Start aspirin 81 mg daily   Schedule Angiogram  NO EXERCISE before we figure out what's going on  TO ER if anything changes/gets worse  5.   I sent in Rx for under the tongue nitroglycerin. Use this if you have chest discomfort/L arm discomfort. Take while sitting. If you have to take more than 2, TO ER!!!!!!!!!    My nurses [phone] or Dr. Frias' nurses [phone]  "

## 2022-07-19 ENCOUNTER — TELEPHONE (OUTPATIENT)
Dept: CARDIOLOGY | Facility: CLINIC | Age: 77
End: 2022-07-19

## 2022-07-19 NOTE — TELEPHONE ENCOUNTER
Writer called to Hanane to find out if she has any questions about the upcoming Angiogram on 7\27.    LVM, informed Hanane that she can call our nurse line to ask questions\discuss, but that we will be reaching out to her again for final review on 7\25\22, Monday.    Yamilka Herring RN on 7/19/2022 at 12:16 PM

## 2022-07-23 ENCOUNTER — NURSE TRIAGE (OUTPATIENT)
Dept: NURSING | Facility: CLINIC | Age: 77
End: 2022-07-23

## 2022-07-23 ENCOUNTER — HOSPITAL ENCOUNTER (EMERGENCY)
Facility: CLINIC | Age: 77
Discharge: HOME | End: 2022-07-23
Attending: EMERGENCY MEDICINE | Admitting: EMERGENCY MEDICINE
Payer: MEDICARE

## 2022-07-23 ENCOUNTER — APPOINTMENT (OUTPATIENT)
Dept: GENERAL RADIOLOGY | Facility: CLINIC | Age: 77
End: 2022-07-23
Attending: EMERGENCY MEDICINE
Payer: MEDICARE

## 2022-07-23 VITALS
SYSTOLIC BLOOD PRESSURE: 145 MMHG | OXYGEN SATURATION: 98 % | WEIGHT: 105 LBS | TEMPERATURE: 97.9 F | BODY MASS INDEX: 22.04 KG/M2 | DIASTOLIC BLOOD PRESSURE: 64 MMHG | HEART RATE: 73 BPM | HEIGHT: 58 IN | RESPIRATION RATE: 17 BRPM

## 2022-07-23 DIAGNOSIS — I20.89 STABLE ANGINA (H): ICD-10-CM

## 2022-07-23 LAB
ALBUMIN SERPL-MCNC: 3.6 G/DL (ref 3.4–5)
ALP SERPL-CCNC: 100 U/L (ref 40–150)
ALT SERPL W P-5'-P-CCNC: 22 U/L (ref 0–50)
ANION GAP SERPL CALCULATED.3IONS-SCNC: 6 MMOL/L (ref 3–14)
AST SERPL W P-5'-P-CCNC: 27 U/L (ref 0–45)
ATRIAL RATE - MUSE: 65 BPM
BASOPHILS # BLD AUTO: 0 10E3/UL (ref 0–0.2)
BASOPHILS NFR BLD AUTO: 1 %
BILIRUB SERPL-MCNC: 0.3 MG/DL (ref 0.2–1.3)
BUN SERPL-MCNC: 16 MG/DL (ref 7–30)
CALCIUM SERPL-MCNC: 9.5 MG/DL (ref 8.5–10.1)
CHLORIDE BLD-SCNC: 111 MMOL/L (ref 94–109)
CO2 SERPL-SCNC: 24 MMOL/L (ref 20–32)
CREAT SERPL-MCNC: 0.75 MG/DL (ref 0.52–1.04)
DIASTOLIC BLOOD PRESSURE - MUSE: NORMAL MMHG
EOSINOPHIL # BLD AUTO: 0.2 10E3/UL (ref 0–0.7)
EOSINOPHIL NFR BLD AUTO: 4 %
ERYTHROCYTE [DISTWIDTH] IN BLOOD BY AUTOMATED COUNT: 12.9 % (ref 10–15)
GFR SERPL CREATININE-BSD FRML MDRD: 82 ML/MIN/1.73M2
GLUCOSE BLD-MCNC: 97 MG/DL (ref 70–99)
HCT VFR BLD AUTO: 40.2 % (ref 35–47)
HGB BLD-MCNC: 12.9 G/DL (ref 11.7–15.7)
IMM GRANULOCYTES # BLD: 0 10E3/UL
IMM GRANULOCYTES NFR BLD: 0 %
INTERPRETATION ECG - MUSE: NORMAL
LIPASE SERPL-CCNC: 158 U/L (ref 73–393)
LYMPHOCYTES # BLD AUTO: 1.3 10E3/UL (ref 0.8–5.3)
LYMPHOCYTES NFR BLD AUTO: 24 %
MCH RBC QN AUTO: 29.7 PG (ref 26.5–33)
MCHC RBC AUTO-ENTMCNC: 32.1 G/DL (ref 31.5–36.5)
MCV RBC AUTO: 93 FL (ref 78–100)
MONOCYTES # BLD AUTO: 0.5 10E3/UL (ref 0–1.3)
MONOCYTES NFR BLD AUTO: 9 %
NEUTROPHILS # BLD AUTO: 3.4 10E3/UL (ref 1.6–8.3)
NEUTROPHILS NFR BLD AUTO: 62 %
NRBC # BLD AUTO: 0 10E3/UL
NRBC BLD AUTO-RTO: 0 /100
P AXIS - MUSE: 36 DEGREES
PLAT MORPH BLD: NORMAL
PLATELET # BLD AUTO: 245 10E3/UL (ref 150–450)
POTASSIUM BLD-SCNC: 4.7 MMOL/L (ref 3.4–5.3)
PR INTERVAL - MUSE: 136 MS
PROT SERPL-MCNC: 6.6 G/DL (ref 6.8–8.8)
QRS DURATION - MUSE: 94 MS
QT - MUSE: 388 MS
QTC - MUSE: 403 MS
R AXIS - MUSE: 14 DEGREES
RBC # BLD AUTO: 4.34 10E6/UL (ref 3.8–5.2)
RBC MORPH BLD: NORMAL
SODIUM SERPL-SCNC: 141 MMOL/L (ref 133–144)
SYSTOLIC BLOOD PRESSURE - MUSE: NORMAL MMHG
T AXIS - MUSE: 68 DEGREES
TROPONIN I SERPL HS-MCNC: 19 NG/L
TROPONIN I SERPL HS-MCNC: 20 NG/L
VENTRICULAR RATE- MUSE: 65 BPM
WBC # BLD AUTO: 5.5 10E3/UL (ref 4–11)

## 2022-07-23 PROCEDURE — 84484 ASSAY OF TROPONIN QUANT: CPT | Performed by: EMERGENCY MEDICINE

## 2022-07-23 PROCEDURE — 80053 COMPREHEN METABOLIC PANEL: CPT | Performed by: EMERGENCY MEDICINE

## 2022-07-23 PROCEDURE — 36415 COLL VENOUS BLD VENIPUNCTURE: CPT | Performed by: EMERGENCY MEDICINE

## 2022-07-23 PROCEDURE — 99285 EMERGENCY DEPT VISIT HI MDM: CPT | Mod: 25

## 2022-07-23 PROCEDURE — 83690 ASSAY OF LIPASE: CPT | Performed by: EMERGENCY MEDICINE

## 2022-07-23 PROCEDURE — 71045 X-RAY EXAM CHEST 1 VIEW: CPT

## 2022-07-23 PROCEDURE — 93005 ELECTROCARDIOGRAM TRACING: CPT

## 2022-07-23 PROCEDURE — 85025 COMPLETE CBC W/AUTO DIFF WBC: CPT | Performed by: EMERGENCY MEDICINE

## 2022-07-23 RX ORDER — ISOSORBIDE MONONITRATE 30 MG/1
30 TABLET, EXTENDED RELEASE ORAL DAILY
Qty: 14 TABLET | Refills: 0 | Status: ON HOLD | OUTPATIENT
Start: 2022-07-23 | End: 2022-07-27

## 2022-07-23 ASSESSMENT — ENCOUNTER SYMPTOMS
SHORTNESS OF BREATH: 0
BLOOD IN STOOL: 0
HEADACHES: 1
BACK PAIN: 0
NAUSEA: 1
ABDOMINAL PAIN: 0

## 2022-07-23 NOTE — TELEPHONE ENCOUNTER
"Patient calling in today stating she is having chest pain. Taking nitro 4 times a day now. In the last 2 days the pain has become more intense and lasted longer and that is why she started taking nitroglycerin. Scheduled on 7/27/22 for a coronary angiogram.  In the beginning she was not taking the nitro. She was playing tennis and had chest pain. Now she started having chest pain/pressure that is severe and radiating down the left arm and lasts 3 minutes. Pain and pressure is in the left upper chest and goes down the left arm. She doubled over in pain as the pain has gotten a lot worse and coming more often.   Heartrate is up to 105 when the chest pain comes.   Per RN protocol patient should be seen now in the ER.   Patient agreed to the plan of care.   Tracie Worthington RN on 7/23/2022 at 7:31 AM      Reason for Disposition    Pain also in shoulder(s) or arm(s) or jaw (Exception: pain is clearly made worse by movement)    SEVERE chest pain    [1] Chest pain (or \"angina\") comes and goes AND [2] is happening more often (increasing in frequency) or getting worse (increasing in severity) (Exception: chest pains that last only a few seconds)    Additional Information    Negative: SEVERE difficulty breathing (e.g., struggling for each breath, speaks in single words)    Negative: Difficult to awaken or acting confused (e.g., disoriented, slurred speech)    Negative: Shock suspected (e.g., cold/pale/clammy skin, too weak to stand, low BP, rapid pulse)    Negative: Passed out (i.e., fainted, collapsed and was not responding)    Negative: [1] Chest pain lasts > 5 minutes AND [2] age > 44    Negative: [1] Chest pain lasts > 5 minutes AND [2] age > 30 AND [3] one or more cardiac risk factors (e.g., diabetes, high blood pressure, high cholesterol, smoker, or strong family history of heart disease)    Negative: [1] Chest pain lasts > 5 minutes AND [2] history of heart disease (i.e., angina, heart attack, heart failure, bypass " surgery, takes nitroglycerin)    Negative: [1] Chest pain lasts > 5 minutes AND [2] described as crushing, pressure-like, or heavy    Negative: Heart beating < 50 beats per minute OR > 140 beats per minute    Negative: Visible sweat on face or sweat dripping down face    Negative: Sounds like a life-threatening emergency to the triager    Negative: Followed a chest injury    Protocols used: CHEST PAIN-A-AH

## 2022-07-23 NOTE — ED PROVIDER NOTES
History   Chief Complaint:  Chest Pain       The history is provided by the patient.      Hanane Sorto is a 76 year old female with history of hyperlipidemia who presents with chest pain. The patient states that she was scheduled to get an coronary angiogram on Wednesday due to chest pains that cause pain down her left arm which have been periodic but worsening over the last 3 weeks. As they have been worsening causing nausea and headaches, she has been taking more nitroglycerin to ease pains so she called her doctor who prompted her to visit the ED. She last had pain and took nitroglycerin last night around 8 pm but has no pain now. She has high cholesterol but doesn't take anything for it. She denies any history of smoking but recalls her dad  of a heart attack. She denies any shortness of breath, back pain, abdominal pain, blood in stool or leg swelling.     Review of Systems   Respiratory: Negative for shortness of breath.    Cardiovascular: Positive for chest pain. Negative for leg swelling.   Gastrointestinal: Positive for nausea. Negative for abdominal pain and blood in stool.   Musculoskeletal: Negative for back pain.   Neurological: Positive for headaches.   All other systems reviewed and are negative.      Allergies:  Crestor [Rosuvastatin]  Ezetimibe  Hmg-Coa-R Inhibitors  Miralax [Polyethylene Glycol]  Pravastatin    Medications:  Asprin  Cholecalciferol   Klonopin   Nitrostat   Prilosec   Mirapex    Pepcid      Past Medical History:     Hyperlipidemia   Anxiety   Osteoporosis   Gross hematuria   Osteoarthritis   Bursitis of hip   Constipation   GERD  Depression   Restless leg syndrome  Arthritis of neck   Dizziness     Spondylosis lumbar region   Radiculopathy cervical   Cyst pancreas     Past Surgical History:    Arthroplasty knee   Biopsy, vaginal   Breast implants  Bunionectomy    section   Colonoscopy  Cataracts both eyes   Liposuction    Hemorrhoidectomy   EGD  Tonsillectomy  "    Family History:    Lipids  Alzheimers disease   Depression   RA  CAD hypertension   Diabetes  Breast cancer  Leukemia   Hyperlipidemia   Colon cancer     Social History:  Patient came from home.  Patient is accompanied in the ED with .    Physical Exam     Patient Vitals for the past 24 hrs:   BP Temp Temp src Pulse Resp SpO2 Height Weight   07/23/22 1154 (!) 145/64 -- -- 73 17 98 % -- --   07/23/22 0900 135/56 97.9  F (36.6  C) Oral 73 16 96 % -- --   07/23/22 0805 (!) 148/54 97.9  F (36.6  C) Temporal 74 16 98 % 1.473 m (4' 10\") 47.6 kg (105 lb)       Physical Exam  Vitals and nursing note reviewed.   Constitutional:       Appearance: Normal appearance.   HENT:      Head: Normocephalic and atraumatic.      Right Ear: External ear normal.      Left Ear: External ear normal.   Eyes:      Extraocular Movements: Extraocular movements intact.      Conjunctiva/sclera: Conjunctivae normal.   Cardiovascular:      Rate and Rhythm: Normal rate and regular rhythm.      Pulses: Normal pulses.      Heart sounds: No murmur heard.  Pulmonary:      Effort: Pulmonary effort is normal. No respiratory distress.      Breath sounds: Normal breath sounds. No wheezing, rhonchi or rales.   Abdominal:      General: Abdomen is flat. Bowel sounds are normal. There is no distension.      Palpations: Abdomen is soft.      Tenderness: There is no abdominal tenderness. There is no guarding or rebound.   Musculoskeletal:         General: No deformity or signs of injury.      Cervical back: Normal range of motion and neck supple.   Skin:     General: Skin is warm and dry.      Findings: No rash.   Neurological:      Mental Status: She is alert and oriented to person, place, and time.   Psychiatric:         Behavior: Behavior normal.           Emergency Department Course   ECG  ECG obtained at 0821, ECG read at 0823  Normal sinus rhythm   Nonspecific ST and T wave abnormality   Abnormal ECG  No significant difference from prior ECG " dated 7/14/22.  Rate 65 bpm. RI interval 136 ms. QRS duration 94 ms. QT/QTc 388/403 ms. P-R-T axes 36 14 68.      Imaging:  XR Chest Port 1 View   Final Result   IMPRESSION: No acute cardiopulmonary abnormality.        Report per radiology    Laboratory:  Labs Ordered and Resulted from Time of ED Arrival to Time of ED Departure   COMPREHENSIVE METABOLIC PANEL - Abnormal       Result Value    Sodium 141      Potassium 4.7      Chloride 111 (*)     Carbon Dioxide (CO2) 24      Anion Gap 6      Urea Nitrogen 16      Creatinine 0.75      Calcium 9.5      Glucose 97      Alkaline Phosphatase 100      AST 27      ALT 22      Protein Total 6.6 (*)     Albumin 3.6      Bilirubin Total 0.3      GFR Estimate 82     LIPASE - Normal    Lipase 158     TROPONIN I - Normal    Troponin I High Sensitivity 20     TROPONIN I - Normal    Troponin I High Sensitivity 19     CBC WITH PLATELETS AND DIFFERENTIAL    WBC Count 5.5      RBC Count 4.34      Hemoglobin 12.9      Hematocrit 40.2      MCV 93      MCH 29.7      MCHC 32.1      RDW 12.9      Platelet Count 245      % Neutrophils 62      % Lymphocytes 24      % Monocytes 9      % Eosinophils 4      % Basophils 1      % Immature Granulocytes 0      NRBCs per 100 WBC 0      Absolute Neutrophils 3.4      Absolute Lymphocytes 1.3      Absolute Monocytes 0.5      Absolute Eosinophils 0.2      Absolute Basophils 0.0      Absolute Immature Granulocytes 0.0      Absolute NRBCs 0.0     RBC AND PLATELET MORPHOLOGY    Platelet Assessment        Value: Automated Count Confirmed. Platelet morphology is normal.    RBC Morphology Confirmed RBC Indices          Emergency Department Course:       Reviewed:  I reviewed nursing notes, vitals, past medical history and Care Everywhere    Assessments:  0824 I obtained history and examined the patient as noted above.   1022 I rechecked the patient and explained findings.   1200 I rechecked the patient     Consults:  1017 I consulted with Dr. Rubio  of Cardiology about patient plan of care.    Disposition:  The patient was discharged to home.     Impression & Plan     CMS Diagnoses: None    Medical Decision Makin-year-old female with intermittent episodes of chest pain blood on her left arm.  She is scheduled for a coronary angiogram this week for evaluation these episodes, however they are getting more frequent and severe.  Somewhat concerning for unstable angina, otherwise could be noncardiac etiology such as GERD or esophagitis.  Low suspicion for other etiology such as dissection or PE as the symptoms are atypical.  We will check cardiac work-up and discuss with cardiology about whether they will want to admit to do the angiogram sooner.    1017  D/w Dr Rubio with Cards who recommends discharge and starting her on Imdur 30 mg daily and f/u this week for angiogram as planned.  Pt remains pain free.  Will repeat trop at 2 hr and if neg will discharge.        Diagnosis:    ICD-10-CM    1. Stable angina (H)  I20.8        Discharge Medications:  Discharge Medication List as of 2022 12:15 PM      START taking these medications    Details   isosorbide mononitrate (IMDUR) 30 MG 24 hr tablet Take 1 tablet (30 mg) by mouth daily, Disp-14 tablet, R-0, E-Prescribe             Scribe Disclosure:  I, Faheem Azul, am serving as a scribe at 8:24 AM on 2022 to document services personally performed by Michael Billings MD based on my observations and the provider's statements to me.            Michael Billings MD  [phone]

## 2022-07-23 NOTE — ED TRIAGE NOTES
Chest pain - left sided left arm - denies any now - pt schedule for angio this coming Wednesday - pt concern for more freq chest pain and using nitroglycerin last time last night      Triage Assessment     Row Name 07/23/22 0801       Triage Assessment (Adult)    Airway WDL WDL       Respiratory WDL    Respiratory WDL WDL       Skin Circulation/Temperature WDL    Skin Circulation/Temperature WDL WDL       Cardiac WDL    Cardiac WDL X       Cognitive/Neuro/Behavioral WDL    Cognitive/Neuro/Behavioral WDL WDL

## 2022-07-25 ENCOUNTER — TELEPHONE (OUTPATIENT)
Dept: CARDIOLOGY | Facility: CLINIC | Age: 77
End: 2022-07-25

## 2022-07-25 DIAGNOSIS — I25.10 CORONARY ARTERY DISEASE INVOLVING NATIVE CORONARY ARTERY OF NATIVE HEART WITHOUT ANGINA PECTORIS: Primary | ICD-10-CM

## 2022-07-25 DIAGNOSIS — I25.9 ASYMPTOMATIC CORONARY HEART DISEASE: ICD-10-CM

## 2022-07-25 RX ORDER — LIDOCAINE 40 MG/G
CREAM TOPICAL
Status: CANCELLED | OUTPATIENT
Start: 2022-07-25

## 2022-07-25 RX ORDER — ASPIRIN 81 MG/1
243 TABLET, CHEWABLE ORAL ONCE
Status: CANCELLED | OUTPATIENT
Start: 2022-07-25

## 2022-07-25 RX ORDER — SODIUM CHLORIDE 9 MG/ML
INJECTION, SOLUTION INTRAVENOUS CONTINUOUS
Status: CANCELLED | OUTPATIENT
Start: 2022-07-25

## 2022-07-25 RX ORDER — ASPIRIN 325 MG
325 TABLET ORAL ONCE
Status: CANCELLED | OUTPATIENT
Start: 2022-07-25 | End: 2022-07-25

## 2022-07-25 NOTE — TELEPHONE ENCOUNTER
HEART CATHETERIZATION PREP INSTRUCTIONS  Procedure is scheduled for  7\27\22 at [facility].  Patient arrival time is 0630.  Patient to be NPO after midnight.  Patient is  not on insulin or on any other diabetic medications.   Patient is not on an anticoagulant   Instructed patient to take 325 mg ASA  AM of cath, normally takes 81mg daily.  She verbalized understanding  Patient can take  other meds prior to procedure with small sips of water.  Patient denies any contrast allergy  Patient will need someone available to drive them to the hospital and to drive them home. yes  Patient should have someone available to stay with them for at least 24 hours after the procedure. yes  COVID-19 test to be completed by patient, she verbalized to bring picture.  Patient instructed to take temperature day of cath and to call [facility] if > 100F.  Nursing orders signed and held?   yes    Yamilka Herring RN on 7/25/2022 at 3:20 PM

## 2022-07-27 ENCOUNTER — HOSPITAL ENCOUNTER (OUTPATIENT)
Facility: CLINIC | Age: 77
Discharge: HOME | End: 2022-07-27
Admitting: INTERNAL MEDICINE
Payer: MEDICARE

## 2022-07-27 ENCOUNTER — TELEPHONE (OUTPATIENT)
Dept: CARDIOLOGY | Facility: CLINIC | Age: 77
End: 2022-07-27

## 2022-07-27 VITALS
DIASTOLIC BLOOD PRESSURE: 63 MMHG | HEART RATE: 79 BPM | BODY MASS INDEX: 21.83 KG/M2 | WEIGHT: 104 LBS | OXYGEN SATURATION: 97 % | HEIGHT: 58 IN | RESPIRATION RATE: 18 BRPM | TEMPERATURE: 97.8 F | SYSTOLIC BLOOD PRESSURE: 127 MMHG

## 2022-07-27 DIAGNOSIS — I25.10 CORONARY ARTERY DISEASE INVOLVING NATIVE CORONARY ARTERY OF NATIVE HEART WITHOUT ANGINA PECTORIS: Primary | ICD-10-CM

## 2022-07-27 DIAGNOSIS — Z13.6 SCREENING FOR CARDIOVASCULAR CONDITION: ICD-10-CM

## 2022-07-27 DIAGNOSIS — I25.10 CORONARY ARTERY DISEASE INVOLVING NATIVE CORONARY ARTERY OF NATIVE HEART WITHOUT ANGINA PECTORIS: ICD-10-CM

## 2022-07-27 DIAGNOSIS — I25.9 ASYMPTOMATIC CORONARY HEART DISEASE: ICD-10-CM

## 2022-07-27 PROBLEM — Z98.890 STATUS POST CORONARY ANGIOGRAM: Status: ACTIVE | Noted: 2022-07-27

## 2022-07-27 LAB
ANION GAP SERPL CALCULATED.3IONS-SCNC: 6 MMOL/L (ref 3–14)
BUN SERPL-MCNC: 26 MG/DL (ref 7–30)
CALCIUM SERPL-MCNC: 9.9 MG/DL (ref 8.5–10.1)
CHLORIDE BLD-SCNC: 112 MMOL/L (ref 94–109)
CO2 SERPL-SCNC: 23 MMOL/L (ref 20–32)
CREAT SERPL-MCNC: 0.89 MG/DL (ref 0.52–1.04)
ERYTHROCYTE [DISTWIDTH] IN BLOOD BY AUTOMATED COUNT: 13.1 % (ref 10–15)
GFR SERPL CREATININE-BSD FRML MDRD: 67 ML/MIN/1.73M2
GLUCOSE BLD-MCNC: 109 MG/DL (ref 70–99)
HCT VFR BLD AUTO: 40.3 % (ref 35–47)
HGB BLD-MCNC: 12.9 G/DL (ref 11.7–15.7)
INR PPP: 0.92 (ref 0.85–1.15)
MCH RBC QN AUTO: 29.7 PG (ref 26.5–33)
MCHC RBC AUTO-ENTMCNC: 32 G/DL (ref 31.5–36.5)
MCV RBC AUTO: 93 FL (ref 78–100)
PLATELET # BLD AUTO: 310 10E3/UL (ref 150–450)
POTASSIUM BLD-SCNC: 4 MMOL/L (ref 3.4–5.3)
RBC # BLD AUTO: 4.35 10E6/UL (ref 3.8–5.2)
SODIUM SERPL-SCNC: 141 MMOL/L (ref 133–144)
WBC # BLD AUTO: 5.9 10E3/UL (ref 4–11)

## 2022-07-27 PROCEDURE — 999N000071 HC STATISTIC HEART CATH LAB OR EP LAB

## 2022-07-27 PROCEDURE — 258N000003 HC RX IP 258 OP 636: Performed by: INTERNAL MEDICINE

## 2022-07-27 PROCEDURE — 250N000011 HC RX IP 250 OP 636: Performed by: INTERNAL MEDICINE

## 2022-07-27 PROCEDURE — 99152 MOD SED SAME PHYS/QHP 5/>YRS: CPT | Performed by: INTERNAL MEDICINE

## 2022-07-27 PROCEDURE — C1769 GUIDE WIRE: HCPCS | Performed by: INTERNAL MEDICINE

## 2022-07-27 PROCEDURE — 99153 MOD SED SAME PHYS/QHP EA: CPT | Performed by: INTERNAL MEDICINE

## 2022-07-27 PROCEDURE — 93454 CORONARY ARTERY ANGIO S&I: CPT | Mod: 26 | Performed by: INTERNAL MEDICINE

## 2022-07-27 PROCEDURE — 36415 COLL VENOUS BLD VENIPUNCTURE: CPT | Performed by: INTERNAL MEDICINE

## 2022-07-27 PROCEDURE — 36591 DRAW BLOOD OFF VENOUS DEVICE: CPT

## 2022-07-27 PROCEDURE — 85610 PROTHROMBIN TIME: CPT | Performed by: INTERNAL MEDICINE

## 2022-07-27 PROCEDURE — 93454 CORONARY ARTERY ANGIO S&I: CPT | Performed by: INTERNAL MEDICINE

## 2022-07-27 PROCEDURE — 250N000009 HC RX 250: Performed by: INTERNAL MEDICINE

## 2022-07-27 PROCEDURE — 272N000001 HC OR GENERAL SUPPLY STERILE: Performed by: INTERNAL MEDICINE

## 2022-07-27 PROCEDURE — C1887 CATHETER, GUIDING: HCPCS | Performed by: INTERNAL MEDICINE

## 2022-07-27 PROCEDURE — 93010 ELECTROCARDIOGRAM REPORT: CPT | Performed by: INTERNAL MEDICINE

## 2022-07-27 PROCEDURE — 93005 ELECTROCARDIOGRAM TRACING: CPT

## 2022-07-27 PROCEDURE — 85027 COMPLETE CBC AUTOMATED: CPT | Performed by: INTERNAL MEDICINE

## 2022-07-27 PROCEDURE — C1894 INTRO/SHEATH, NON-LASER: HCPCS | Performed by: INTERNAL MEDICINE

## 2022-07-27 PROCEDURE — 999N000184 HC STATISTIC TELEMETRY

## 2022-07-27 PROCEDURE — 80048 BASIC METABOLIC PNL TOTAL CA: CPT | Performed by: INTERNAL MEDICINE

## 2022-07-27 RX ORDER — VERAPAMIL HYDROCHLORIDE 2.5 MG/ML
INJECTION, SOLUTION INTRAVENOUS
Status: DISCONTINUED | OUTPATIENT
Start: 2022-07-27 | End: 2022-07-27 | Stop reason: HOSPADM

## 2022-07-27 RX ORDER — FENTANYL CITRATE 50 UG/ML
25 INJECTION, SOLUTION INTRAMUSCULAR; INTRAVENOUS
Status: DISCONTINUED | OUTPATIENT
Start: 2022-07-27 | End: 2022-07-27 | Stop reason: HOSPADM

## 2022-07-27 RX ORDER — ATROPINE SULFATE 0.1 MG/ML
0.5 INJECTION INTRAVENOUS
Status: DISCONTINUED | OUTPATIENT
Start: 2022-07-27 | End: 2022-07-27 | Stop reason: HOSPADM

## 2022-07-27 RX ORDER — FENTANYL CITRATE 50 UG/ML
INJECTION, SOLUTION INTRAMUSCULAR; INTRAVENOUS
Status: DISCONTINUED | OUTPATIENT
Start: 2022-07-27 | End: 2022-07-27 | Stop reason: HOSPADM

## 2022-07-27 RX ORDER — FLUMAZENIL 0.1 MG/ML
0.2 INJECTION, SOLUTION INTRAVENOUS
Status: DISCONTINUED | OUTPATIENT
Start: 2022-07-27 | End: 2022-07-27 | Stop reason: HOSPADM

## 2022-07-27 RX ORDER — LIDOCAINE 40 MG/G
CREAM TOPICAL
Status: DISCONTINUED | OUTPATIENT
Start: 2022-07-27 | End: 2022-07-27 | Stop reason: HOSPADM

## 2022-07-27 RX ORDER — ASPIRIN 81 MG/1
243 TABLET, CHEWABLE ORAL ONCE
Status: DISCONTINUED | OUTPATIENT
Start: 2022-07-27 | End: 2022-07-27 | Stop reason: HOSPADM

## 2022-07-27 RX ORDER — ASPIRIN 325 MG
325 TABLET ORAL ONCE
Status: DISCONTINUED | OUTPATIENT
Start: 2022-07-27 | End: 2022-07-27 | Stop reason: HOSPADM

## 2022-07-27 RX ORDER — OXYCODONE HYDROCHLORIDE 5 MG/1
5 TABLET ORAL EVERY 4 HOURS PRN
Status: DISCONTINUED | OUTPATIENT
Start: 2022-07-27 | End: 2022-07-27 | Stop reason: HOSPADM

## 2022-07-27 RX ORDER — SODIUM CHLORIDE 9 MG/ML
INJECTION, SOLUTION INTRAVENOUS CONTINUOUS
Status: DISCONTINUED | OUTPATIENT
Start: 2022-07-27 | End: 2022-07-27 | Stop reason: HOSPADM

## 2022-07-27 RX ORDER — NALOXONE HYDROCHLORIDE 0.4 MG/ML
0.2 INJECTION, SOLUTION INTRAMUSCULAR; INTRAVENOUS; SUBCUTANEOUS
Status: DISCONTINUED | OUTPATIENT
Start: 2022-07-27 | End: 2022-07-27 | Stop reason: HOSPADM

## 2022-07-27 RX ORDER — ISOSORBIDE MONONITRATE 60 MG/1
60 TABLET, EXTENDED RELEASE ORAL DAILY
Qty: 30 TABLET | Refills: 1 | Status: SHIPPED | OUTPATIENT
Start: 2022-07-27 | End: 2022-09-14

## 2022-07-27 RX ORDER — NALOXONE HYDROCHLORIDE 0.4 MG/ML
0.4 INJECTION, SOLUTION INTRAMUSCULAR; INTRAVENOUS; SUBCUTANEOUS
Status: DISCONTINUED | OUTPATIENT
Start: 2022-07-27 | End: 2022-07-27 | Stop reason: HOSPADM

## 2022-07-27 RX ORDER — NITROGLYCERIN 5 MG/ML
VIAL (ML) INTRAVENOUS
Status: DISCONTINUED | OUTPATIENT
Start: 2022-07-27 | End: 2022-07-27 | Stop reason: HOSPADM

## 2022-07-27 RX ORDER — ACETAMINOPHEN 325 MG/1
650 TABLET ORAL EVERY 4 HOURS PRN
Status: DISCONTINUED | OUTPATIENT
Start: 2022-07-27 | End: 2022-07-27 | Stop reason: HOSPADM

## 2022-07-27 RX ORDER — OXYCODONE HYDROCHLORIDE 5 MG/1
10 TABLET ORAL EVERY 4 HOURS PRN
Status: DISCONTINUED | OUTPATIENT
Start: 2022-07-27 | End: 2022-07-27 | Stop reason: HOSPADM

## 2022-07-27 RX ADMIN — SODIUM CHLORIDE: 9 INJECTION, SOLUTION INTRAVENOUS at 07:20

## 2022-07-27 NOTE — PROGRESS NOTES
[facility] Post Procedure Note    Patient Information  Name: Hanane Sorto  Age: 76 year old    Post Procedure  Time patient returned to [facility]: 0905  Concerns/abnormal assessment: none at this time pt to have surgical consult  If abnormal assessment, provider notified: N/A  Plan/Other: post care as ordered, pre op workup discharge to home    Hayder Ramirez RN

## 2022-07-27 NOTE — PROGRESS NOTES
[facility] Discharge Nursing Note    Patient Information  Name: Hanane Sorto  Age: 76 year old    Discharge Education:  Discharge instructions reviewed: Yes  Additional education/resources provided: CV surgery folder  Patient/patient representative verbalizes understanding: Yes  Patient discharging on new medications: No  Medication education completed: N/A    Discharge Plans:   Discharge location: home  Discharge ride contacted: Yes  Approximate discharge time: 1400      Discharge Criteria:  Discharge criteria met and vital signs stable: Yes    Patient Belongs:  Patient belongings returned to patient: Yes    Hayder Ramirez RN

## 2022-07-27 NOTE — PROGRESS NOTES
CV Surgery Brief Progress Note    Consult received for consideration of CABG. Will arrange clinic consult with surgeon. Met with patient and , provided surgery information folder. Defer pre-op testing until she meets with surgeon. Patient had questions regarding what do if recurrent chest pain at home, paged cardiology to ask to review with her.     Afsaneh Willson PA-C  Cardiothoracic Surgery  Pager [phone]

## 2022-07-27 NOTE — TELEPHONE ENCOUNTER
[facility] Call Center    Phone Message    May a detailed message be left on voicemail: yes     Reason for Call: Other: Pt has been waiting for a cardio team member to come see her before she leaves to discuss her med change, Waleska from the care unit at the hospital is calling on her behalf  to help so they can schedule her surgery     Action Taken: Message routed to:  [facility] ([facility] Cardio    Travel Screening: Not Applicable

## 2022-07-27 NOTE — TELEPHONE ENCOUNTER
Called to [facility], spoke with Waleska who advised pt is being discharged now and nothing is needed from Team 1 at this time. CV surgery consult order has been entered by Dr. Frias's team.

## 2022-07-27 NOTE — DISCHARGE INSTRUCTIONS
Cardiac Angiogram Discharge Instructions - Femoral & Radial    After you go home:    Have an adult stay with you until tomorrow.  Drink extra fluids for 2 days.  You may resume your normal diet.  No smoking       For 24 hours - due to the sedation you received:  Relax and take it easy.  Do NOT make any important or legal decisions.  Do NOT drive or operate machines at home or at work.  Do NOT drink alcohol.    Care of Wrist & Groin Puncture Sites:    For the first 24 hrs - check the puncture site every 1-2 hours while awake.  For 2 days, when you cough, sneeze, laugh or move your bowels,  hold your hand over the puncture site and press firmly on/above the site  Remove the bandaid after 24 hours. If there is minor oozing, apply another bandaid and remove it after 12 hours.  It is normal to have a small bruise or pea size lump at the site.  You may shower tomorrow. Do NOT take a bath, or use a hot tub or pool for at least 3 days. Do NOT scrub the site. Do not use lotion or powder near the puncture site.    Activity - For 2 days:    Wrist site:    do not use your hand or arm to support your weight (such as rising from a chair)   do not bend your wrist (such as lifting a garage door).  do not lift more than 5 pounds or exercise your arm (such as tennis, golf or bowling).  Do NOT do any heavy activity such as exercise, lifting, or straining.     Groin site:      No stooping or squatting  Do NOT do any heavy activity such as exercise, lifting, or straining.   No housework, yard work or any activity that make you sweat  Do NOT lift more than 10 pounds    Bleeding:    If you start bleeding from the site in your wrist:    Sit down and press firmly on/above the site with your fingers for 10 minutes.   Once bleeding stops, keep your arm still for 2 hours.   Call [facility] as soon as you can.    If you start bleeding from the site in your groin:    Lie down flat and press firmly on/above the site for 10 minutes.  Once bleeding  stops, lay flat for 2 hours.   Call [facility] as soon as you can.    Call 911 right away if you have heavy bleeding or bleeding that does not stop.      Medicines:    If you are taking an antiplatelet medication such as Plavix, Brilinta or Effient, do not stop taking it until you talk to your cardiologist.      If you are on Metformin (Glucophage), do not restart it until you have blood tests (within 2 to 3 days after discharge).  After you have your blood drawn, you may restart the Metformin.   Take your medications, including blood thinners, unless your provider tells you not to.    If you take Coumadin (Warfarin), have your INR checked by your provider in  3-5 days. Call your clinic to schedule this.  If you have stopped any medicines, check with your provider about when to restart them.    Follow Up Appointments:    Follow up with [facility] Nurse Practitioner at [facility] of patient preference in 7-10 days.    Call the clinic if:    You have increased pain or a large or growing hard lump around the site.  The site is red, swollen, hot or tender.  Blood or fluid is draining from the site.  You have chills or a fever greater than 101 F (38 C).  Your arm or leg feels numb, cool or changes color.  You have hives, a rash or unusual itching.  New pain in the back or belly that you cannot control with Tylenol.  Any questions or concerns.        [facility] [place]:    [phone] [facility] (7 days a week)

## 2022-07-27 NOTE — PRE-PROCEDURE
GENERAL PRE-PROCEDURE:   Procedure:  Coronary angiogram with potential primary coronary intervention   Date/Time:  7/27/2022 8:07 AM    Verbal consent obtained?: Yes    Written consent obtained?: Yes    Risks and benefits: Risks, benefits and alternatives were discussed    Consent given by:  Patient  Patient states understanding of procedure being performed: Yes    Patient's understanding of procedure matches consent: Yes    Procedure consent matches procedure scheduled: Yes    Expected level of sedation:  Moderate  Appropriately NPO:  Yes  ASA Class:  3  Mallampati  :  Grade 3- soft palate visible, posterior pharyngeal wall not visible  Lungs:  Lungs clear with good breath sounds bilaterally  Heart:  Normal heart sounds and rate  History & Physical reviewed:  History and physical reviewed and no updates needed  Statement of review:  I have reviewed the lab findings, diagnostic data, medications, and the plan for sedation

## 2022-07-27 NOTE — PROGRESS NOTES
1100 report to Norma WOODSON pt awaiting CV consult for surgery,  RN told pt that she would have some tests done prior to discharge in preparation for surgery  1300 CV PA here told by there would be no work up prior to discharge since they are unable to see a surgeon today., but that cardiology would see pt before discharge to to discuss medications.  1315 Pt getting anxious wants to be discharged Call placed to cardiology clinic re medications message sent to Dr Sanchez.  1330 Walked back to cath lab to talk with Dr Hannah he understood that pt was being admitted after CV PA talked with her.  I explained I knew nothing of this asked if he would come talked with pt.  Dr Hannah here to talk with pt, was reported that pt had frequent episodes of CP, pt denies wants to go home.  Dr Hannah OK with pt being discharged and CV surgery setting up surgery.  Pt and  agreeable to plan.  Courtesy parking given to pts .  Pt and family left satisfied and understands plan.

## 2022-07-27 NOTE — PROGRESS NOTES
"Page to Karma FOSTER: \"Are you available to place pre-surgical orders? CXR, CT, US, UA, etc? Pt is waiting to go home, has been given informational folder but told will not meet with surgeon today. Just need orders pls. Thanks!\"  "

## 2022-07-27 NOTE — PROGRESS NOTES
0640 Pt arrived for scheduled heart cath today, pt c/o CP with arm pain 8/10.  Pt states it is the pain she has been having.  Pt connected to monitor SR no ectopy, other VSS.  Pt states pain is improving, says if she was home she would take a ntg and rest.  0700 Pt states arm pain is gone and chest pain is almost gone 1-2/10.  0730 Fellow here to consent for procedure, informed of CP episode no further orders at this time    [facility] Admission Nursing Note    Patient Information  Name: Hanane Sorto  Age: 76 year old  Reason for admission: left heart cath  [facility] arrival time: 0635    Visitor Information  Name: Lee  Informed of visitor restrictions: Yes  1 visitor allowed per patient   Visitor must screen negative for COVID symptoms   Visitor must wear a mask  Waiting rooms closed to visitors    Patient Admission/Assessment   Pre-procedure assessment complete: Yes  If abnormal assessment/labs, provider notified: Yes  NPO: Yes  Medications held per instructions/orders: Yes  Consent: obtained  If applicable, pregnancy test status: deferred  Patient oriented to room: Yes  Education/questions answered: Yes  Plan/other: proceed as planned discharge to home    Discharge Planning  Discharge name/phone number: Lee [phone]  Overnight post sedation caregiver: Lee  Discharge location: home    Hayder Ramirez RN

## 2022-07-28 ENCOUNTER — OFFICE VISIT (OUTPATIENT)
Dept: CARDIOLOGY | Facility: CLINIC | Age: 77
End: 2022-07-28
Attending: INTERNAL MEDICINE
Payer: MEDICARE

## 2022-07-28 VITALS
HEIGHT: 58 IN | BODY MASS INDEX: 22.04 KG/M2 | WEIGHT: 105 LBS | HEART RATE: 80 BPM | OXYGEN SATURATION: 98 % | SYSTOLIC BLOOD PRESSURE: 135 MMHG | DIASTOLIC BLOOD PRESSURE: 85 MMHG

## 2022-07-28 DIAGNOSIS — I25.118 CORONARY ARTERY DISEASE OF NATIVE HEART WITH STABLE ANGINA PECTORIS, UNSPECIFIED VESSEL OR LESION TYPE (H): Primary | ICD-10-CM

## 2022-07-28 DIAGNOSIS — I75.89 ATHEROEMBOLISM OF OTHER SITE (H): ICD-10-CM

## 2022-07-28 DIAGNOSIS — I25.10 CORONARY ARTERY DISEASE INVOLVING NATIVE CORONARY ARTERY OF NATIVE HEART WITHOUT ANGINA PECTORIS: ICD-10-CM

## 2022-07-28 PROCEDURE — 99204 OFFICE O/P NEW MOD 45 MIN: CPT | Performed by: SURGERY

## 2022-07-28 NOTE — PROGRESS NOTES
Cardiothoracic Surgery Consult    Date of Service: 7/28/2022    REFERRING CARDIOLOGIST: Dr. Hannah    REASON FOR CONSULTATION: severe coronary artery disease     HISTORY OF PRESENT ILLNESS: Ms. Sorto is a 76 year old with coronary artery disease risk factors including hyperlipidemia, hypertension, osteoporosis presented with chest pain radiating to the left arm. Her angiogram showed left main equivalent disease. She was stable upon discharge and presented to clinic for evaluation and to discuss surgical options      IMPRESSION AND PLAN: Ms. Sorto is a 76 year old with severe symptomatic multivessel coronary artery disease.     I reviewed the angiogram with patient in detail and outlined my plan. I recommend coronary artery bypass grafting. I discussed the technical details of the procedure with the patient, as well as the expected postoperative course and recovery. The risks include but are not limited to bleeding, infection, stroke, heart or graft failure, dysrhythmia, respiratory failure, kidney or liver injury, bowel or limb ischemia, and death.     I had a lengthy discussion regarding CABG and also PCI options. Understandably, she and her  are worried about post op rehab. They are both very active in their correction and play tennis often. They would like to think about options over the weekend and we will reach out to them.    Will hold off on preop imaging until decision is made    Thank you very much for this referral.     Jose Herrera MD   Cardiothoracic Surgery  P: [phone]  C: [phone]      PAST MEDICAL HISTORY:   Past Medical History:   Diagnosis Date     Anxiety     psych yearly, Dr Shea     Family hx of colon cancer     sister     Floaters     Karma leverentz     Fracture of wrist, left, with routine healing, subsequent encounter 08/28/2017     GERD (gastroesophageal reflux disease)      Gross hematuria 09/12/2011     Hyperlipidemia LDL goal < 130      Hyperlipidemia LDL  goal <130 2017     Hypertension May 3,2022     Malnutrition (H) 2017     Osteoarthritis 2012     Osteoporosis     Dexa     PONV (postoperative nausea and vomiting)      Restless legs      Skin cancer        PAST SURGICAL HISTORY:   Past Surgical History:   Procedure Laterality Date     ARTHROPLASTY KNEE Left 2017    Procedure: ARTHROPLASTY KNEE;  LEFT TOTAL KNEE ARTHROPLASTY;  Surgeon: Lee Ayala MD;  Location:  OR     BIOPSY   approx.    vaginal     BREAST SURGERY  1972    Implants     BUNIONECTOMY      both      SECTION       COLONOSCOPY  8-10     CV CORONARY ANGIOGRAM N/A 2022    Procedure: Coronary Angiogram;  Surgeon: Lea Sanchez MD;  Location:  [facility]     EYE SURGERY  2014    cataract surgery in both eyes     LIPOSUCTION (LOCATION)       RECONSTRUCT BREAST, IMPLANT PROSTHESIS, COMBINED         ALLERGIES:   Allergies   Allergen Reactions     Crestor [Rosuvastatin]      Ezetimibe Other (See Comments)     Zetia = leg cramps      Hmg-Coa-R Inhibitors Other (See Comments)     Leg cramping     Miralax [Polyethylene Glycol] Other (See Comments)     Back pain     Pravastatin Other (See Comments)     Leg cramps         CURRENT MEDICATIONS:  Current Outpatient Medications   Medication     aspirin (ASA) 81 MG EC tablet     Calcium-Phosphorus-Vitamin D (CALCIUM GUMMIES PO)     cholecalciferol (VITAMIN D3) 25 mcg (1000 units) capsule     clonazePAM (KLONOPIN) 0.5 MG ODT     isosorbide mononitrate (IMDUR) 60 MG 24 hr tablet     nitroGLYcerin (NITROSTAT) 0.4 MG sublingual tablet     omeprazole (PRILOSEC) 20 MG DR capsule     pramipexole (MIRAPEX) 0.25 MG tablet     No current facility-administered medications for this visit.         FAMILY HISTORY:   Family History   Problem Relation Age of Onset     Lipids Mother      Alzheimer Disease Mother      Depression Mother      Osteoporosis Mother         RA     SPENCER Father       Coronary Artery Disease Father      Hypertension Father      Diabetes Father      Breast Cancer Sister      Leukemia Sister      Hyperlipidemia Sister      Colon Cancer Sister              Cerebrovascular Disease Brother      The family history was reviewed and is not pertinent to the patient's disease/illness      SOCIAL HISTORY:   Social History     Socioeconomic History     Marital status:      Spouse name: Not on file     Number of children: Not on file     Years of education: Not on file     Highest education level: Not on file   Occupational History     Not on file   Tobacco Use     Smoking status: Former Smoker     Packs/day: 0.50     Years: 15.00     Pack years: 7.50     Types: Cigarettes     Start date: 1960     Quit date: 1975     Years since quittin.6     Smokeless tobacco: Never Used     Tobacco comment: one pack/ three months   Substance and Sexual Activity     Alcohol use: Yes     Comment: one glass of wine a week     Drug use: No     Sexual activity: Not Currently     Partners: Male     Birth control/protection: Post-menopausal   Other Topics Concern      Service Not Asked     Blood Transfusions Not Asked     Caffeine Concern Not Asked     Occupational Exposure Not Asked     Hobby Hazards Not Asked     Sleep Concern Not Asked     Stress Concern Not Asked     Weight Concern Not Asked     Special Diet Not Asked     Back Care Not Asked     Exercise Yes     Bike Helmet Not Asked     Seat Belt Not Asked     Self-Exams Not Asked     Parent/sibling w/ CABG, MI or angioplasty before 65F 55M? No   Social History Narrative     Not on file     Social Determinants of Health     Financial Resource Strain: Not on file   Food Insecurity: Not on file   Transportation Needs: Not on file   Physical Activity: Not on file   Stress: Not on file   Social Connections: Not on file   Intimate Partner Violence: Not on file   Housing Stability: Not on file         REVIEW OF SYSTEMS:  Review of  "Systems - Negative except chest pain  A complete 10 point review of systems was obtained and is negative other than the above stated complaints    PHYSICAL EXAMINATION:   Vitals: /85   Pulse 80   Ht 1.473 m (4' 10\")   Wt 47.6 kg (105 lb)   SpO2 98%   BMI 21.95 kg/m    GENERAL:  Well developed and well nourished   HEENT: conjunctiva anicteric and sclera clear   NECK: no JVD  RESPIRATIONS: breathing comfortably, no audible wheezing  ABDOMEN: soft, non distended  NEUROLOGIC: intact and symmetric with no focal deficits.   PSYCHIATRIC: alert and oriented, pleasant     LABORATORY STUDIES:   Lab Results   Component Value Date    WBC 5.9 07/27/2022    HGB 12.9 07/27/2022    HCT 40.3 07/27/2022    MCV 93 07/27/2022     07/27/2022      Lab Results   Component Value Date    BUN 26 07/27/2022     07/27/2022    CO2 23 07/27/2022       CARDIAC CATHETERIZATION: This study was personally reviewed by me.    TRANSTHORACIC ECHOCARDIOGRAM: pending    "

## 2022-07-29 ENCOUNTER — MYC MEDICAL ADVICE (OUTPATIENT)
Dept: FAMILY MEDICINE | Facility: CLINIC | Age: 77
End: 2022-07-29

## 2022-07-29 DIAGNOSIS — I25.110 CORONARY ARTERY DISEASE INVOLVING NATIVE CORONARY ARTERY WITH UNSTABLE ANGINA PECTORIS, UNSPECIFIED WHETHER NATIVE OR TRANSPLANTED HEART (H): Primary | ICD-10-CM

## 2022-08-01 DIAGNOSIS — I25.84 CORONARY ATHEROSCLEROSIS DUE TO CALCIFIED CORONARY LESION (CODE): ICD-10-CM

## 2022-08-01 DIAGNOSIS — I21.A9 OTHER MYOCARDIAL INFARCTION TYPE (H): ICD-10-CM

## 2022-08-01 DIAGNOSIS — R07.9 CHEST PAIN, UNSPECIFIED TYPE: ICD-10-CM

## 2022-08-01 DIAGNOSIS — Z98.890 STATUS POST CORONARY ANGIOGRAM: Primary | ICD-10-CM

## 2022-08-03 LAB
ATRIAL RATE - MUSE: 67 BPM
DIASTOLIC BLOOD PRESSURE - MUSE: NORMAL MMHG
INTERPRETATION ECG - MUSE: NORMAL
P AXIS - MUSE: 61 DEGREES
PR INTERVAL - MUSE: 140 MS
QRS DURATION - MUSE: 86 MS
QT - MUSE: 416 MS
QTC - MUSE: 439 MS
R AXIS - MUSE: 70 DEGREES
SYSTOLIC BLOOD PRESSURE - MUSE: NORMAL MMHG
T AXIS - MUSE: 60 DEGREES
VENTRICULAR RATE- MUSE: 67 BPM

## 2022-08-05 ENCOUNTER — OFFICE VISIT (OUTPATIENT)
Dept: CARDIOLOGY | Facility: CLINIC | Age: 77
End: 2022-08-05
Payer: MEDICARE

## 2022-08-05 ENCOUNTER — HOSPITAL ENCOUNTER (OUTPATIENT)
Dept: CARDIOLOGY | Facility: CLINIC | Age: 77
Discharge: HOME | End: 2022-08-05
Attending: INTERNAL MEDICINE | Admitting: INTERNAL MEDICINE
Payer: MEDICARE

## 2022-08-05 ENCOUNTER — DOCUMENTATION ONLY (OUTPATIENT)
Dept: OTHER | Facility: CLINIC | Age: 77
End: 2022-08-05

## 2022-08-05 VITALS
HEIGHT: 58 IN | WEIGHT: 100 LBS | OXYGEN SATURATION: 98 % | HEART RATE: 82 BPM | SYSTOLIC BLOOD PRESSURE: 124 MMHG | BODY MASS INDEX: 20.99 KG/M2 | DIASTOLIC BLOOD PRESSURE: 84 MMHG

## 2022-08-05 DIAGNOSIS — I25.118 STABLE ANGINA PECTORIS DUE TO ARTERIOSCLEROSIS OF CORONARY ARTERY (H): ICD-10-CM

## 2022-08-05 DIAGNOSIS — E78.2 MIXED HYPERLIPIDEMIA: ICD-10-CM

## 2022-08-05 DIAGNOSIS — Z98.890 STATUS POST CORONARY ANGIOGRAM: Primary | ICD-10-CM

## 2022-08-05 DIAGNOSIS — I25.118 STABLE ANGINA PECTORIS DUE TO ARTERIOSCLEROSIS OF CORONARY ARTERY (H): Primary | ICD-10-CM

## 2022-08-05 LAB — LVEF ECHO: NORMAL

## 2022-08-05 PROCEDURE — 93306 TTE W/DOPPLER COMPLETE: CPT | Mod: 26 | Performed by: INTERNAL MEDICINE

## 2022-08-05 PROCEDURE — 999N000208 ECHOCARDIOGRAM COMPLETE

## 2022-08-05 PROCEDURE — 99214 OFFICE O/P EST MOD 30 MIN: CPT | Mod: 25 | Performed by: INTERNAL MEDICINE

## 2022-08-05 PROCEDURE — 255N000002 HC RX 255 OP 636: Performed by: INTERNAL MEDICINE

## 2022-08-05 RX ORDER — CLOPIDOGREL BISULFATE 75 MG/1
75 TABLET ORAL DAILY
Qty: 90 TABLET | Refills: 3 | Status: SHIPPED | OUTPATIENT
Start: 2022-08-05 | End: 2023-07-21

## 2022-08-05 RX ORDER — METOPROLOL SUCCINATE 25 MG/1
12.5 TABLET, EXTENDED RELEASE ORAL DAILY
Qty: 90 TABLET | Refills: 3 | Status: SHIPPED | OUTPATIENT
Start: 2022-08-05 | End: 2023-08-15

## 2022-08-05 RX ORDER — EVOLOCUMAB 140 MG/ML
140 INJECTION, SOLUTION SUBCUTANEOUS
Qty: 24 ML | Refills: 3 | Status: SHIPPED | OUTPATIENT
Start: 2022-08-05 | End: 2022-09-09 | Stop reason: ALTCHOICE

## 2022-08-05 RX ADMIN — HUMAN ALBUMIN MICROSPHERES AND PERFLUTREN 6 ML: 10; .22 INJECTION, SOLUTION INTRAVENOUS at 11:53

## 2022-08-05 NOTE — PROGRESS NOTES
HPI and Plan:       Today's clinic visit entailed:  30 minutes spent on the date of the encounter doing chart review, history and exam, documentation and further activities per the note  Provider  Link to MDM Help Grid       Orders Placed This Encounter   Procedures     Echocardiogram Complete     Case Request Cath Lab: Coronary Angiogram       Orders Placed This Encounter   Medications     metoprolol succinate ER (TOPROL XL) 25 MG 24 hr tablet     Sig: Take 0.5 tablets (12.5 mg) by mouth daily     Dispense:  90 tablet     Refill:  3     evolocumab (REPATHA SURECLICK) 140 MG/ML prefilled autoinjector     Sig: Inject 1 mL (140 mg) Subcutaneous every 14 days     Dispense:  24 mL     Refill:  3     clopidogrel (PLAVIX) 75 MG tablet     Sig: Take 1 tablet (75 mg) by mouth daily     Dispense:  90 tablet     Refill:  3       There are no discontinued medications.      Encounter Diagnoses   Name Primary?     Mixed hyperlipidemia      Stable angina pectoris due to arteriosclerosis of coronary artery (H) Yes       CURRENT MEDICATIONS:  Current Outpatient Medications   Medication Sig Dispense Refill     aspirin (ASA) 81 MG EC tablet Take 1 tablet (81 mg) by mouth daily       Calcium-Phosphorus-Vitamin D (CALCIUM GUMMIES PO) Take 2 Gum by mouth daily       cholecalciferol (VITAMIN D3) 25 mcg (1000 units) capsule Take 1 capsule by mouth daily       clonazePAM (KLONOPIN) 0.5 MG ODT DISSOLVE 1 TABLET(0.5 MG) ON THE TONGUE EVERY NIGHT AS NEEDED FOR ANXIETY 20 tablet 3     clopidogrel (PLAVIX) 75 MG tablet Take 1 tablet (75 mg) by mouth daily 90 tablet 3     evolocumab (REPATHA SURECLICK) 140 MG/ML prefilled autoinjector Inject 1 mL (140 mg) Subcutaneous every 14 days 24 mL 3     isosorbide mononitrate (IMDUR) 60 MG 24 hr tablet Take 1 tablet (60 mg) by mouth daily (Patient taking differently: Take 30 mg by mouth daily Taking 30 mg daily) 30 tablet 1     metoprolol succinate ER (TOPROL XL) 25 MG 24 hr tablet Take 0.5 tablets (12.5  mg) by mouth daily 90 tablet 3     nitroGLYcerin (NITROSTAT) 0.4 MG sublingual tablet For chest pain place 1 tablet under the tongue every 5 minutes for 3 doses. If symptoms persist 5 minutes after 1st dose call 911. 25 tablet 1     omeprazole (PRILOSEC) 20 MG DR capsule TAKE 1 CAPSULE(20 MG) BY MOUTH TWICE DAILY 180 capsule 3     pramipexole (MIRAPEX) 0.25 MG tablet TAKE 1 TABLET BY MOUTH TWICE DAILY AT 5PM AND AT BEDTIME 180 tablet 3       ALLERGIES     Allergies   Allergen Reactions     Crestor [Rosuvastatin]      Ezetimibe Other (See Comments)     Zetia = leg cramps      Hmg-Coa-R Inhibitors Other (See Comments)     Leg cramping     Miralax [Polyethylene Glycol] Other (See Comments)     Back pain     Pravastatin Other (See Comments)     Leg cramps        PAST MEDICAL HISTORY:  Past Medical History:   Diagnosis Date     Anxiety     psych yearly, Dr Shea     Family hx of colon cancer     sister     Floaters     Karma rocael     Fracture of wrist, left, with routine healing, subsequent encounter 2017     GERD (gastroesophageal reflux disease)      Gross hematuria 2011     Hyperlipidemia LDL goal < 130      Hyperlipidemia LDL goal <130 2017     Hypertension May 3,2022     Malnutrition (H) 2017     Osteoarthritis 2012     Osteoporosis     Dexa     PONV (postoperative nausea and vomiting)      Restless legs      Skin cancer        PAST SURGICAL HISTORY:  Past Surgical History:   Procedure Laterality Date     ARTHROPLASTY KNEE Left 2017    Procedure: ARTHROPLASTY KNEE;  LEFT TOTAL KNEE ARTHROPLASTY;  Surgeon: Lee Ayala MD;  Location:  OR     BIOPSY   approx.    vaginal     BREAST SURGERY  1972    Implants     BUNIONECTOMY      both      SECTION       COLONOSCOPY  8-10     CV CORONARY ANGIOGRAM N/A 2022    Procedure: Coronary Angiogram;  Surgeon: Lea Sanchez MD;  Location:  [facility]     EYE SURGERY  2014     "cataract surgery in both eyes     LIPOSUCTION (LOCATION)       RECONSTRUCT BREAST, IMPLANT PROSTHESIS, COMBINED         FAMILY HISTORY:  Family History   Problem Relation Age of Onset     Lipids Mother      Alzheimer Disease Mother      Depression Mother      Osteoporosis Mother         RA     C.A.MESSI Father      Coronary Artery Disease Father      Hypertension Father      Diabetes Father      Breast Cancer Sister      Leukemia Sister      Hyperlipidemia Sister      Colon Cancer Sister              Cerebrovascular Disease Brother        SOCIAL HISTORY:  Social History     Socioeconomic History     Marital status:      Spouse name: None     Number of children: None     Years of education: None     Highest education level: None   Tobacco Use     Smoking status: Former Smoker     Packs/day: 0.50     Years: 15.00     Pack years: 7.50     Types: Cigarettes     Start date: 1960     Quit date: 1975     Years since quittin.6     Smokeless tobacco: Never Used     Tobacco comment: one pack/ three months   Substance and Sexual Activity     Alcohol use: Yes     Comment: one glass of wine a week     Drug use: No     Sexual activity: Not Currently     Partners: Male     Birth control/protection: Post-menopausal   Other Topics Concern     Exercise Yes     Parent/sibling w/ CABG, MI or angioplasty before 65F 55M? No       Review of Systems:  Skin:  not assessed       Eyes:  not assessed      ENT:  not assessed      Respiratory:  Negative   disturbed sleep due to nose plugging at night.   Cardiovascular:  Negative Positive for;chest pain;lightheadedness occasional chest pain and lightheadedness.  Gastroenterology: not assessed      Genitourinary:  not assessed      Musculoskeletal:  not assessed      Neurologic:  not assessed      Psychiatric:  not assessed      Heme/Lymph/Imm:  not assessed      Endocrine:  not assessed        Physical Exam:  Vitals: /84   Pulse 82   Ht 1.473 m (4' 10\")   Wt 45.4 kg " (100 lb)   SpO2 98%   BMI 20.90 kg/m      Constitutional:  cooperative;in no acute distress        Skin:  warm and dry to the touch          Head:  normocephalic        Eyes:  conjunctivae and lids unremarkable        Lymph:      ENT:  no pallor or cyanosis        Neck:  JVP normal;no carotid bruit        Respiratory:  clear to auscultation         Cardiac: regular rhythm;no murmurs, gallops or rubs detected                pulses full and equal                                        GI:  abdomen soft;non-tender;no bruits        Extremities and Muscular Skeletal:  no edema              Neurological:  no gross motor deficits        Psych:  Alert and Oriented x 3        CC  No referring provider defined for this encounter.

## 2022-08-05 NOTE — LETTER
8/5/2022    Corona Bethea MD  [address]  [place] [address]    RE: Hanane Sorto       Dear Colleague,     I had the pleasure of seeing Hanane Sorto in the [facility].  HPI and Plan:       Today's clinic visit entailed:  30 minutes spent on the date of the encounter doing chart review, history and exam, documentation and further activities per the note  Provider  Link to MDM Help Grid       Orders Placed This Encounter   Procedures     Echocardiogram Complete     Case Request Cath Lab: Coronary Angiogram       Orders Placed This Encounter   Medications     metoprolol succinate ER (TOPROL XL) 25 MG 24 hr tablet     Sig: Take 0.5 tablets (12.5 mg) by mouth daily     Dispense:  90 tablet     Refill:  3     evolocumab (REPATHA SURECLICK) 140 MG/ML prefilled autoinjector     Sig: Inject 1 mL (140 mg) Subcutaneous every 14 days     Dispense:  24 mL     Refill:  3     clopidogrel (PLAVIX) 75 MG tablet     Sig: Take 1 tablet (75 mg) by mouth daily     Dispense:  90 tablet     Refill:  3       There are no discontinued medications.      Encounter Diagnoses   Name Primary?     Mixed hyperlipidemia      Stable angina pectoris due to arteriosclerosis of coronary artery (H) Yes       CURRENT MEDICATIONS:  Current Outpatient Medications   Medication Sig Dispense Refill     aspirin (ASA) 81 MG EC tablet Take 1 tablet (81 mg) by mouth daily       Calcium-Phosphorus-Vitamin D (CALCIUM GUMMIES PO) Take 2 Gum by mouth daily       cholecalciferol (VITAMIN D3) 25 mcg (1000 units) capsule Take 1 capsule by mouth daily       clonazePAM (KLONOPIN) 0.5 MG ODT DISSOLVE 1 TABLET(0.5 MG) ON THE TONGUE EVERY NIGHT AS NEEDED FOR ANXIETY 20 tablet 3     clopidogrel (PLAVIX) 75 MG tablet Take 1 tablet (75 mg) by mouth daily 90 tablet 3     evolocumab (REPATHA SURECLICK) 140 MG/ML prefilled autoinjector Inject 1 mL (140 mg) Subcutaneous every 14 days 24 mL 3     isosorbide mononitrate (IMDUR) 60 MG 24 hr tablet Take  1 tablet (60 mg) by mouth daily (Patient taking differently: Take 30 mg by mouth daily Taking 30 mg daily) 30 tablet 1     metoprolol succinate ER (TOPROL XL) 25 MG 24 hr tablet Take 0.5 tablets (12.5 mg) by mouth daily 90 tablet 3     nitroGLYcerin (NITROSTAT) 0.4 MG sublingual tablet For chest pain place 1 tablet under the tongue every 5 minutes for 3 doses. If symptoms persist 5 minutes after 1st dose call 911. 25 tablet 1     omeprazole (PRILOSEC) 20 MG DR capsule TAKE 1 CAPSULE(20 MG) BY MOUTH TWICE DAILY 180 capsule 3     pramipexole (MIRAPEX) 0.25 MG tablet TAKE 1 TABLET BY MOUTH TWICE DAILY AT 5PM AND AT BEDTIME 180 tablet 3       ALLERGIES     Allergies   Allergen Reactions     Crestor [Rosuvastatin]      Ezetimibe Other (See Comments)     Zetia = leg cramps      Hmg-Coa-R Inhibitors Other (See Comments)     Leg cramping     Miralax [Polyethylene Glycol] Other (See Comments)     Back pain     Pravastatin Other (See Comments)     Leg cramps        PAST MEDICAL HISTORY:  Past Medical History:   Diagnosis Date     Anxiety     psych yearly, Dr Shea     Family hx of colon cancer     sister     Floaters     Karma cote     Fracture of wrist, left, with routine healing, subsequent encounter 2017     GERD (gastroesophageal reflux disease)      Gross hematuria 2011     Hyperlipidemia LDL goal < 130      Hyperlipidemia LDL goal <130 2017     Hypertension May 3,2022     Malnutrition (H) 2017     Osteoarthritis 2012     Osteoporosis     Dexa     PONV (postoperative nausea and vomiting)      Restless legs      Skin cancer        PAST SURGICAL HISTORY:  Past Surgical History:   Procedure Laterality Date     ARTHROPLASTY KNEE Left 2017    Procedure: ARTHROPLASTY KNEE;  LEFT TOTAL KNEE ARTHROPLASTY;  Surgeon: Lee Ayala MD;  Location: [facility]     BIOPSY   approx.    vaginal     BREAST SURGERY      Implants     BUNIONECTOMY      both      SECTION        COLONOSCOPY  8-10     CV CORONARY ANGIOGRAM N/A 2022    Procedure: Coronary Angiogram;  Surgeon: Lea Sanchez MD;  Location:  [facility]     EYE SURGERY  2014    cataract surgery in both eyes     LIPOSUCTION (LOCATION)       RECONSTRUCT BREAST, IMPLANT PROSTHESIS, COMBINED         FAMILY HISTORY:  Family History   Problem Relation Age of Onset     Lipids Mother      Alzheimer Disease Mother      Depression Mother      Osteoporosis Mother         RA     C.A.D. Father      Coronary Artery Disease Father      Hypertension Father      Diabetes Father      Breast Cancer Sister      Leukemia Sister      Hyperlipidemia Sister      Colon Cancer Sister              Cerebrovascular Disease Brother        SOCIAL HISTORY:  Social History     Socioeconomic History     Marital status:      Spouse name: None     Number of children: None     Years of education: None     Highest education level: None   Tobacco Use     Smoking status: Former Smoker     Packs/day: 0.50     Years: 15.00     Pack years: 7.50     Types: Cigarettes     Start date: 1960     Quit date: 1975     Years since quittin.6     Smokeless tobacco: Never Used     Tobacco comment: one pack/ three months   Substance and Sexual Activity     Alcohol use: Yes     Comment: one glass of wine a week     Drug use: No     Sexual activity: Not Currently     Partners: Male     Birth control/protection: Post-menopausal   Other Topics Concern     Exercise Yes     Parent/sibling w/ CABG, MI or angioplasty before 65F 55M? No       Review of Systems:  Skin:  not assessed       Eyes:  not assessed      ENT:  not assessed      Respiratory:  Negative   disturbed sleep due to nose plugging at night.   Cardiovascular:  Negative Positive for;chest pain;lightheadedness occasional chest pain and lightheadedness.  Gastroenterology: not assessed      Genitourinary:  not assessed      Musculoskeletal:  not assessed      Neurologic:  " not assessed      Psychiatric:  not assessed      Heme/Lymph/Imm:  not assessed      Endocrine:  not assessed        Physical Exam:  Vitals: /84   Pulse 82   Ht 1.473 m (4' 10\")   Wt 45.4 kg (100 lb)   SpO2 98%   BMI 20.90 kg/m      Constitutional:  cooperative;in no acute distress        Skin:  warm and dry to the touch          Head:  normocephalic        Eyes:  conjunctivae and lids unremarkable        Lymph:      ENT:  no pallor or cyanosis        Neck:  JVP normal;no carotid bruit        Respiratory:  clear to auscultation         Cardiac: regular rhythm;no murmurs, gallops or rubs detected                pulses full and equal                                        GI:  abdomen soft;non-tender;no bruits        Extremities and Muscular Skeletal:  no edema              Neurological:  no gross motor deficits        Psych:  Alert and Oriented x 3        CC  No referring provider defined for this encounter.    Thank you for allowing me to participate in the care of your patient.      Sincerely,     Luis Moffett MD, MD     [facility]  "

## 2022-08-05 NOTE — PROGRESS NOTES
Service Date: 08/05/2022    REASON FOR CONSULTATION:  Coronary artery disease and possible revascularization.    REFERRING PHYSICIAN:  Corona Bethea MD    HISTORY OF PRESENT ILLNESS:  I had the pleasure of seeing Hanane Sorto at the [facility] in [place] this morning.  She is a very pleasant 76-year-old female with history of coronary artery disease, hypertension, hyperlipidemia and osteoarthritis, who recently underwent coronary angiogram, which revealed severe coronary artery disease.    She follows in the clinic with my partner, Dr. Frias.  She was diagnosed with coronary angiogram several years ago with a CT scan.  She did well from a cardiac point of view until approximately a month ago.  She noted exertional chest symptoms, which were difficult to describe initially, but with mainly tightness as well as arm pain.  The symptoms initially came on with vigorous activities such as playing tennis, but ultimately were triggered by minimal activity such as walking.  She mentioned the symptoms to Sandy Masterson, who saw the patient early 07/2022.      She was then sent to the Cath Lab directly for coronary angiography.  The angiogram was performed on 07/27.  I personally reviewed the coronary angiography.  The left main appears to be free of significant disease.  There is disease in the proximal LAD, which appears to be in the 70% range.  There is also 60%-70% disease in the mid LAD.  There is severe (90%-95%) lesion in the proximal circumflex, which is most likely the culprit for the patient's clinical presentation.  The proximal and mid RCA has moderate disease.      The patient was subsequently referred to surgery for possible bypass.  She was seen by Dr. Little, who felt that she would be a reasonable surgical candidate.  However, the patient has been concerned about rehab, which is understandable.  She subsequently had a second opinion at [facility] virtually and was asked to follow up with Cardiology  there.  She is now here for a second opinion.    She was initiated on Imdur after a recent angiogram.  Fortunately, she has not had any recurrent symptoms since starting the Imdur.  She is also on aspirin.  She is not on statin therapy and has been intolerant to statins in the past.  Her most recent LDL was 188.  She was previously advised to start a PCSK9 inhibitor, but she declined.    ASSESSMENT AND PLAN:  A very pleasant 76-year-old female with severe 2-vessel coronary artery disease and angina.  Her symptoms have resolved with Imdur, although she has not been as active as she was before.  I reviewed the angiogram with her and her  in detail.  The left main was not well visualized, but appears to be free of significant disease.  The proximal LAD lesion could be significant, but certainly not as tight as the circumflex lesion.  I suspect her symptoms are secondary to the severe circumflex disease.    She is not diabetic.  We do not have any recent echocardiogram with regards to her LV performance.  I told the patient since she does not have diabetes and I assume her LV function is preserved, then a PCI is a reasonable option.  The patient is not interested in surgery at this point and is hoping to pursue a PCI.    Since she is stable at this point, I would recommend getting an echocardiogram first to reassess her LV function.  I will also add a beta blocker to her regimen to give her additional antianginal relief.  Additionally, since she is interested in PCI, we will initiate Plavix.    Finally, I told the patient that she is doing everything else right, but we need to treat her cholesterol.  Otherwise, she will continue to be a high risk for future cardiovascular events.  She is not interested in starting a PCSK9 inhibitor.  We will initiate Repatha as soon as possible.    I am not in the cath lab in the next 2 weeks, but it is my hope that we can get this procedure is scheduled for the end of the month.   In the interim, if she develops recurrent angina, she will let me know and we can certainly expedite the procedure.  She will avoid any vigorous activity in the meantime.    It was a pleasure seeing Ms. Victoria in the clinic this morning.  I appreciate the opportunity to participate in her care.    Luis Moffett MD        D: 2022   T: 2022   MT: YANICK    Name:     GILDA VICTORIA  MRN:      [id number]        Account:      [id number]   :      1945           Service Date: 2022       Document: [id number]

## 2022-08-07 ENCOUNTER — MYC MEDICAL ADVICE (OUTPATIENT)
Dept: CARDIOLOGY | Facility: CLINIC | Age: 77
End: 2022-08-07

## 2022-08-08 ENCOUNTER — MYC MEDICAL ADVICE (OUTPATIENT)
Dept: CARDIOLOGY | Facility: CLINIC | Age: 77
End: 2022-08-08

## 2022-08-09 ENCOUNTER — MYC MEDICAL ADVICE (OUTPATIENT)
Dept: CARDIOLOGY | Facility: CLINIC | Age: 77
End: 2022-08-09

## 2022-08-09 ENCOUNTER — TELEPHONE (OUTPATIENT)
Dept: CARDIOLOGY | Facility: CLINIC | Age: 77
End: 2022-08-09

## 2022-08-09 DIAGNOSIS — E78.2 MIXED HYPERLIPIDEMIA: Primary | ICD-10-CM

## 2022-08-09 NOTE — TELEPHONE ENCOUNTER
Patient would like to start PCSK9, insurance does not cover Repatha.  Provider to advise on if ok to start Praluent and please clarify dosing.  Mckenna Rand RN on 8/9/2022 at 3:21 PM

## 2022-08-10 ENCOUNTER — MYC MEDICAL ADVICE (OUTPATIENT)
Dept: CARDIOLOGY | Facility: CLINIC | Age: 77
End: 2022-08-10

## 2022-08-10 RX ORDER — ALIROCUMAB 75 MG/ML
75 INJECTION, SOLUTION SUBCUTANEOUS
Qty: 6 ML | Refills: 3 | Status: SHIPPED | OUTPATIENT
Start: 2022-08-10 | End: 2022-11-11 | Stop reason: ALTCHOICE

## 2022-08-10 NOTE — TELEPHONE ENCOUNTER
Luis Moffett MD Lidke, Jen M RN  Caller: Unspecified (Yesterday,  3:19 PM)  Yes to Praluent. Thanks     PA process started for Praluent.  Mckenna Rand, KANDICE on 8/10/2022 at 11:59 AM

## 2022-08-15 ENCOUNTER — TELEPHONE (OUTPATIENT)
Dept: CARDIOLOGY | Facility: CLINIC | Age: 77
End: 2022-08-15

## 2022-08-15 NOTE — TELEPHONE ENCOUNTER
Prior Authorization Approval    Authorization Effective Date: 7/15/2022  Authorization Expiration Date: 8/15/2023  Medication: Praluent 75mg/ml soaj - APPROVED  Approved Dose/Quantity: 2ML PER 28 DAYS  Insurance Company: [employer] Part D - Phone [phone] Fax [phone]  Which Pharmacy is filling the prescription (Not needed for infusion/clinic administered): [facility] - [place], [place] - [facility] [address]  Pharmacy Notified: Yes  Patient Notified: no - sent to Liaisons for co-pay assistqance first

## 2022-08-15 NOTE — TELEPHONE ENCOUNTER
PA Initiation    Medication: Praluent 75mg/ml soaj - INITIATED  Insurance Company: [employer] Part D - Phone [phone] Fax [phone]  Pharmacy Filling the Rx: [facility] - [place], [place] - [facility] [address]  Filling Pharmacy Phone: [phone]    Start Date: 8/15/2022

## 2022-08-15 NOTE — TELEPHONE ENCOUNTER
[facility]  Fax: [phone]  Spec: [phone]  MO: [phone]

## 2022-08-16 ENCOUNTER — MYC MEDICAL ADVICE (OUTPATIENT)
Dept: CARDIOLOGY | Facility: CLINIC | Age: 77
End: 2022-08-16

## 2022-08-17 NOTE — TELEPHONE ENCOUNTER
Free Drug Application Initiated  Medication: Praluent  Sponsor: MyPraluent  Phone #: [phone]  Fax #: [phone]  Additional Information: Sent patient Snapstreamt message with free drug (PAP) information to enroll.    Thank you,    Genie Mckenna [facility]  Specialty Pharmacy Clinic Liaison - [facility] [address] [address]  Ph: [phone] Fax: [phone]  [email or web address]

## 2022-08-19 ENCOUNTER — TELEPHONE (OUTPATIENT)
Dept: CARDIOLOGY | Facility: CLINIC | Age: 77
End: 2022-08-19

## 2022-08-29 ENCOUNTER — MYC MEDICAL ADVICE (OUTPATIENT)
Dept: FAMILY MEDICINE | Facility: CLINIC | Age: 77
End: 2022-08-29

## 2022-08-29 ENCOUNTER — MYC MEDICAL ADVICE (OUTPATIENT)
Dept: CARDIOLOGY | Facility: CLINIC | Age: 77
End: 2022-08-29

## 2022-08-29 ENCOUNTER — TELEPHONE (OUTPATIENT)
Dept: CARDIOLOGY | Facility: CLINIC | Age: 77
End: 2022-08-29

## 2022-08-29 DIAGNOSIS — F41.9 ANXIETY: ICD-10-CM

## 2022-08-29 NOTE — TELEPHONE ENCOUNTER
Patient states she has been noticing worsening shortness of breath since starting Metoprolol and Plavix on 8/5/22.  Patient has staged PCI on 9/12/22 with Dr. Moffett due to coronary angiogram findings:       The left main appears to be free of significant disease.  There is disease in the proximal LAD, which appears to be in the 70% range.  There is also 60%-70% disease in the mid LAD.  There is severe (90%-95%) lesion in the proximal circumflex, which is most likely the culprit for the patient's clinical presentation.  The proximal and mid RCA has moderate disease.      Patient denies chest pain and states overall she has been feeling ok.  Provider to advise.  Mckenna Rand RN on 8/29/2022 at 11:57 AM

## 2022-08-30 RX ORDER — CLONAZEPAM 0.5 MG/1
TABLET, ORALLY DISINTEGRATING ORAL
Qty: 20 TABLET | Refills: 3 | Status: SHIPPED | OUTPATIENT
Start: 2022-08-30 | End: 2023-10-16

## 2022-09-06 ENCOUNTER — TRANSCRIBE ORDERS (OUTPATIENT)
Dept: OTHER | Age: 77
End: 2022-09-06

## 2022-09-06 DIAGNOSIS — Z95.5 STATUS POST CORONARY ARTERY STENT PLACEMENT: Primary | ICD-10-CM

## 2022-09-09 ENCOUNTER — TELEPHONE (OUTPATIENT)
Dept: CARDIOLOGY | Facility: CLINIC | Age: 77
End: 2022-09-09

## 2022-09-09 NOTE — TELEPHONE ENCOUNTER
[facility] Call Center    Phone Message    May a detailed message be left on voicemail: yes     Reason for Call: Medication Question or concern regarding medication   Prescription Clarification  Name of Medication: evolocumab (REPATHA SURECLICK) 140 MG/ML prefilled autoinjector  Prescribing Provider: Dr. Moffett      What on the order needs clarification? Len from [employer] asking if Dr. Moffett has received the Appeal for the Prior Authorization for this medication and if he is going to pursue it for the pt.  The pt's insurance company has denied coverage for the med and [employer] is working to get it covered for her.  Please london Len at [phone] and ref key [id number]      Action Taken: Message routed to:  [facility] ([facility]): kathie    Travel Screening: Not Applicable

## 2022-09-09 NOTE — TELEPHONE ENCOUNTER
Called back to [employer] and advised Dr. Moffett will not be appealing Repatha denial, pt has been prescribed Pralulent as this med is preferred by her insurance. Repatha removed from medication list.

## 2022-09-13 ENCOUNTER — HOSPITAL ENCOUNTER (OUTPATIENT)
Dept: CARDIAC REHAB | Facility: CLINIC | Age: 77
Discharge: HOME | End: 2022-09-13
Attending: INTERNAL MEDICINE
Payer: MEDICARE

## 2022-09-13 DIAGNOSIS — Z95.5 STATUS POST CORONARY ARTERY STENT PLACEMENT: ICD-10-CM

## 2022-09-13 PROCEDURE — 93798 PHYS/QHP OP CAR RHAB W/ECG: CPT

## 2022-09-13 PROCEDURE — 93797 PHYS/QHP OP CAR RHAB WO ECG: CPT | Mod: XU

## 2022-09-14 ENCOUNTER — OFFICE VISIT (OUTPATIENT)
Dept: FAMILY MEDICINE | Facility: CLINIC | Age: 77
End: 2022-09-14
Payer: MEDICARE

## 2022-09-14 ENCOUNTER — TRANSFERRED RECORDS (OUTPATIENT)
Dept: HEALTH INFORMATION MANAGEMENT | Facility: CLINIC | Age: 77
End: 2022-09-14

## 2022-09-14 VITALS
SYSTOLIC BLOOD PRESSURE: 116 MMHG | BODY MASS INDEX: 21.31 KG/M2 | HEART RATE: 77 BPM | OXYGEN SATURATION: 98 % | WEIGHT: 101.5 LBS | HEIGHT: 58 IN | DIASTOLIC BLOOD PRESSURE: 67 MMHG | TEMPERATURE: 97.5 F

## 2022-09-14 DIAGNOSIS — I25.110 CORONARY ARTERY DISEASE INVOLVING NATIVE CORONARY ARTERY WITH UNSTABLE ANGINA PECTORIS, UNSPECIFIED WHETHER NATIVE OR TRANSPLANTED HEART (H): ICD-10-CM

## 2022-09-14 DIAGNOSIS — I25.118 STABLE ANGINA PECTORIS DUE TO ARTERIOSCLEROSIS OF CORONARY ARTERY (H): ICD-10-CM

## 2022-09-14 DIAGNOSIS — Z23 NEED FOR PROPHYLACTIC VACCINATION AND INOCULATION AGAINST INFLUENZA: ICD-10-CM

## 2022-09-14 DIAGNOSIS — Z09 HOSPITAL DISCHARGE FOLLOW-UP: Primary | ICD-10-CM

## 2022-09-14 LAB
ERYTHROCYTE [DISTWIDTH] IN BLOOD BY AUTOMATED COUNT: 13.7 % (ref 10–15)
HCT VFR BLD AUTO: 36.7 % (ref 35–47)
HGB BLD-MCNC: 12 G/DL (ref 11.7–15.7)
MCH RBC QN AUTO: 29.9 PG (ref 26.5–33)
MCHC RBC AUTO-ENTMCNC: 32.7 G/DL (ref 31.5–36.5)
MCV RBC AUTO: 92 FL (ref 78–100)
PLATELET # BLD AUTO: 358 10E3/UL (ref 150–450)
RBC # BLD AUTO: 4.01 10E6/UL (ref 3.8–5.2)
WBC # BLD AUTO: 5.7 10E3/UL (ref 4–11)

## 2022-09-14 PROCEDURE — 99213 OFFICE O/P EST LOW 20 MIN: CPT | Mod: 25 | Performed by: NURSE PRACTITIONER

## 2022-09-14 PROCEDURE — 90662 IIV NO PRSV INCREASED AG IM: CPT | Performed by: NURSE PRACTITIONER

## 2022-09-14 PROCEDURE — 80048 BASIC METABOLIC PNL TOTAL CA: CPT | Performed by: NURSE PRACTITIONER

## 2022-09-14 PROCEDURE — 36415 COLL VENOUS BLD VENIPUNCTURE: CPT | Performed by: NURSE PRACTITIONER

## 2022-09-14 PROCEDURE — G0008 ADMIN INFLUENZA VIRUS VAC: HCPCS | Performed by: NURSE PRACTITIONER

## 2022-09-14 PROCEDURE — 85027 COMPLETE CBC AUTOMATED: CPT | Performed by: NURSE PRACTITIONER

## 2022-09-14 ASSESSMENT — PAIN SCALES - GENERAL: PAINLEVEL: NO PAIN (0)

## 2022-09-14 NOTE — PROGRESS NOTES
Assessment & Plan   Problem List Items Addressed This Visit     Stable angina pectoris due to arteriosclerosis of coronary artery (H)      Other Visit Diagnoses     Hospital discharge follow-up    -  Primary    Coronary artery disease involving native coronary artery with unstable angina pectoris, unspecified whether native or transplanted heart (H)        Relevant Orders    Basic metabolic panel  (Ca, Cl, CO2, Creat, Gluc, K, Na, BUN) (Completed)    CBC with platelets (Completed)    Need for prophylactic vaccination and inoculation against influenza        Relevant Orders    INFLUENZA, QUAD, HIGH DOSE, PF, 65YR + (FLUZONE HD) (Completed)    ADMIN INFLUENZA (For MEDICARE Patients ONLY) [] (Completed)           Doing great since stents. No concerns today. Is working with cardiology. Follow-up SHADE Morales CNP  [facility] KARSON Koo is a 76 year old, presenting for the following health issues:  Hospital F/U and Imm/Inj (Flu Shot)      [facility]       Hospital Follow-up Visit:    Hospital/Nursing Home/IP Rehab Facility:[facility] in [place], [place]  Date of Admission: 9/2/2022  Date of Discharge: 9/2/2022  Reason(s) for Admission: Coronary Angiography, Stent Placement      Was your hospitalization related to COVID-19? No   Problems taking medications regularly:  None  Medication changes since discharge: None  Problems adhering to non-medication therapy:  None    Summary of hospitalization:  [facility] discharge summary reviewed  Diagnostic Tests/Treatments reviewed.  Follow up needed: none  Other Healthcare Providers Involved in Patient s Care:         Specialist appointment - cardiology  Update since discharge: improved.   Post Medication Reconciliation Status: Discharge medications reconciled, continue medications without change      Plan of care communicated with patient     Recent stenting at [facility]  Is feeling great  "  Finally is exercising and back to tennis and is feeling well   No CP, SOB   Is part of a genomics study and ASA was removed from her regimen based on that study     Had swelling of left knee and went to [facility] today. They thought it was just a flare and it will pass. Symptoms have improved since onset        Review of Systems   Detailed as above         Objective    /67 (BP Location: Right arm, Patient Position: Sitting, Cuff Size: Adult Regular)   Pulse 77   Temp 97.5  F (36.4  C) (Oral)   Ht 1.473 m (4' 10\")   Wt 46 kg (101 lb 8 oz)   SpO2 98%   BMI 21.21 kg/m    Body mass index is 21.21 kg/m .  Physical Exam  Constitutional:       Appearance: Normal appearance.   Pulmonary:      Effort: Pulmonary effort is normal.   Neurological:      Mental Status: She is alert.   Psychiatric:         Mood and Affect: Mood normal.                      "

## 2022-09-14 NOTE — PROGRESS NOTES
"[facility]    I had the pleasure of seeing Hanane when she came for follow up of CAD.  This 76 year old sees Dr. Frias for her history of:    1.  CAD - now s/p PCI x 1 to pCx and PCI x 3 with overlapping stents to pLAD 9/2/2022 @ [facility]  2.  Dyslipidemia - intolerant to many statins and antilipid therapy. Praluent initiated  3.  Palpitations      I met Hanane 7/2022 at which time she sought evaluation for episodes of palpitations, noting that her heart would \"speed up.\"  When we talked, she also noticed increasing LUCIO and a \"funny sensation\" in the chest while playing tennis.  Due to her exertional discomfort that seem to be coming on more quickly with exertion, I recommended coronary angiography after discussion with Dr. Frias.    Therefore, on 7/27 she underwent coronary angiography via the RRA revealing multivessel disease, including o/pLAD 70%, pCx 95%, mCx 70% and p/mRCA 30% for which CABG vs multivessel stent implantation.  Isosorbide was started.    She met with Dr. Herrera 7/28 for consultation of CABG and then Dr. Moffett 8/5.  Echocardiogram was repeated, with consideration of PCI.  Plavix was initiated and Repatha recommended as she had previously been intolerant to statins.    She subsequently met with cardiologist at and [facility] and ultimately underwent coronary intervention at [facility] 9/2/2022.  She had a pCx stent implantation (Synergy 3.0x16mm CHAUNCEY) followed by LAD intervention with overlapping 2.5 x 30 mm (Resolute Yaw), 2.5 x 8 mm (Resolute [place]), and 2.25 x 16 mm (Synergy CHAUNCEY) via RFA.  She was recommended for DAPT x at least 6 months, and enrolled in the CHAMP trial with genotyping to determine if antiplatelet recommendation may change.  Therefore, discharge medications include clopidogrel 75 mg daily, metoprolol XL 12.5 mg daily, omeprazole 20 mg daily, isosorbide 60 mg daily, ASA 81 mg daily.    It appears on 9/6, genotyping came back indicating she responded " "appropriately to clopidogrel and ASA was stopped.    It appears she contacted her MDs at [facility] 9/7 with c/o R unilateral knee swelling, concerned it could have something to do with the stent implantation.  It was not felt to be related.  She then reached out to us 9/11 with complaints of continued R knee swelling.  Given concern for possible hemarthrosis, recommended to follow-up with [facility]    Interval History:  Overall, Hanane is really doing well.  In retrospect, she thinks she had been \"slowing down\" for at least a few months prior to her visit with me where she described the sensations in her chest that prompted angiogram.    She's back to playing tennis, and is walking at least 2-3 miles daily.  She also participates in cardiac rehab and is excited that she is signed up for a few classes through this.  With this, denies any problems with chest pain, pressure, tightness.  No \"funny sensation.\"  She has not had to take any sl NTG.    No right groin site discomfort post angiogram.  No fever/chills.  No bleeding issues on the Plavix, and is off ASA as part of the CHAMP Study.  Denies dizziness, lightheadedness.  No concerns today.    VITALS:  Vitals: /73   Pulse 97   Ht 1.473 m (4' 10\")   Wt 45.4 kg (100 lb)   SpO2 99%   BMI 20.90 kg/m      Diagnostic Testing:  Angiogram 7/27/2022 o/pLAD 70%, pCx 95%, mCx 70% and p/mRCA 30%.  Echocardiogram 10/2018-LVEF 50-55%. Grade I DD. Nl RV. No sig valve abnls. Nl aorta.  Stress echocardiogram testing 10/2018- [facility]  CT Calcium Score 11/2016 CAC 39 (LM 0, LAD 11, Cx 18, RCA 0), placing her in 53rd percentile compared to age/gender matched controls. Non-cardiac portion RLL 3 mm granuloma  Component      Latest Ref Rng & Units 2/5/2018 5/28/2019 11/2/2021 8/31/22    Cholesterol      <200 mg/dL 245 (H) 263 (H) 270 (H)                      230   Triglycerides      <150 mg/dL 204 (H) 251 (H) 190 (H)                      163    HDL Cholesterol      >=50 mg/dL 42 (L) " "45 (L) 44 (L)                          40   LDL Cholesterol Calculated      <=100 mg/dL 162 (H) 168 (H) 188 (H)                      160   Non HDL Cholesterol      <130 mg/dL 203 (H) 218 (H) 226 (H)                      190         Plan:  1. She preferred to continue follow-up for her CAD with Dr. Moffett, whom she met to discuss multivessel stent implantation.   2. Fasting lipids (as now on Praleunt) ~3 months  3. Echo prior to upcoming appt - she thinks one has been scheduled through [facility] and will call us with date    Assessment/Plan:    1. CAD    As above, described a \"funny sensation\" in the chest which was exertional.  Due to high concern for ischemia, underwent angiogram 7/27 revealing pCx and pLAD disease for which bypass vs multivessel stenting was considered.    Ultimately underwent angiogram and intervention at [facility] 9/2/2022. S/p pCx stent implantation (Synergy 3.0x16mm CHAUNCEY) followed by overlapping 2.5 x 30 mm (Resolute Hir[place]), 2.5 x 8 mm (Resolute Yaw), and 2.25 x 16 mm (Synergy CHAUNCEY) to LAD via RFA.     Remains on Plavix 75 (no ASA as part of CHAMP study), low dose BB and Praluent as intolerant to statins    PLAN:    Continue Plavix for at least 6 months per [facility] discharge note    No ASA as part of [facility] study    Continue BB and lipid therapy    See Dr. Moffett 12/2022 with updated lipids    She believes that an echocardiogram is scheduled through [facility] in the coming months.  She will contact me when she finds out when this will be and if it is not scheduled, we will plan to get a repeat echo before she sees Dr. Moffett 12/2022    Continue cardiac rehab    2. Dyslipidemia    As above, lipids looked terrible.  Unfortunately, had been unable to tolerate statins    Recently started Praluent and tolerating without issues    PLAN:    Repeat lipids in 3 m prior to Dr. Moffett appt      Sandy Masterson PA-C, MSPAS      Orders Placed This Encounter   Procedures     Lipid Profile     ALT     Follow-Up with Cardiology     No orders " of the defined types were placed in this encounter.    There are no discontinued medications.      Encounter Diagnoses   Name Primary?     Coronary atherosclerosis due to calcified coronary lesion (CODE)  Yes     Hyperlipidemia LDL goal <70        CURRENT MEDICATIONS:  Current Outpatient Medications   Medication Sig Dispense Refill     alirocumab (PRALUENT) 75 MG/ML injectable pen Inject 1 mL (75 mg) Subcutaneous every 14 days 6 mL 3     Calcium-Phosphorus-Vitamin D (CALCIUM GUMMIES PO) Take 2 Gum by mouth daily       cholecalciferol (VITAMIN D3) 25 mcg (1000 units) capsule Take 1 capsule by mouth daily       clonazePAM (KLONOPIN) 0.5 MG ODT DISSOLVE 1 TABLET(0.5 MG) ON THE TONGUE EVERY NIGHT AS NEEDED FOR ANXIETY 20 tablet 3     clopidogrel (PLAVIX) 75 MG tablet Take 1 tablet (75 mg) by mouth daily 90 tablet 3     metoprolol succinate ER (TOPROL XL) 25 MG 24 hr tablet Take 0.5 tablets (12.5 mg) by mouth daily 90 tablet 3     nitroGLYcerin (NITROSTAT) 0.4 MG sublingual tablet For chest pain place 1 tablet under the tongue every 5 minutes for 3 doses. If symptoms persist 5 minutes after 1st dose call 911. 25 tablet 1     omeprazole (PRILOSEC) 20 MG DR capsule TAKE 1 CAPSULE(20 MG) BY MOUTH TWICE DAILY 180 capsule 3     pramipexole (MIRAPEX) 0.25 MG tablet TAKE 1 TABLET BY MOUTH TWICE DAILY AT 5PM AND AT BEDTIME 180 tablet 3       ALLERGIES     Allergies   Allergen Reactions     Crestor [Rosuvastatin]      Ezetimibe Other (See Comments)     Zetia = leg cramps      Hmg-Coa-R Inhibitors Other (See Comments)     Leg cramping     Miralax [Polyethylene Glycol] Other (See Comments)     Back pain     Pravastatin Other (See Comments)     Leg cramps          Review of Systems:  Skin:  Negative for bruising   Eyes:  Negative    ENT:  not assessed    Respiratory:  Negative for shortness of breath;dyspnea on exertion;cough  Cardiovascular:  Negative for;palpitations;chest pain;edema;exercise  "intolerance;fatigue;lightheadedness;dizziness    Gastroenterology: Negative for melena;hematochezia  Genitourinary:  Negative    Musculoskeletal:  Positive for arthritis;joint pain  Neurologic:  Negative    Psychiatric:  Negative    Heme/Lymph/Imm:  Negative    Endocrine:  Negative      Physical Exam:  Vitals: /73   Pulse 97   Ht 1.473 m (4' 10\")   Wt 45.4 kg (100 lb)   SpO2 99%   BMI 20.90 kg/m      Constitutional:  cooperative;in no acute distress        Skin:  warm and dry to the touch        Head:  normocephalic        Eyes:  conjunctivae and lids unremarkable;sclera white;pupils equal and round        ENT:  not assessed this visit        Neck:  JVP normal;no carotid bruit        Chest:  clear to auscultation        Cardiac: regular rhythm;no murmurs, gallops or rubs detected                  Abdomen:  not assessed this visit        Vascular: pulses full and equal                                      Extremities and Back:  no edema        Neurological:  no gross motor deficits            PAST MEDICAL HISTORY:  Past Medical History:   Diagnosis Date     Anxiety     psych yearly, Dr Shea     Family hx of colon cancer     sister     Floaters     Karma leverentz     Fracture of wrist, left, with routine healing, subsequent encounter 08/28/2017     GERD (gastroesophageal reflux disease)      Gross hematuria 09/12/2011     Hyperlipidemia LDL goal < 130      Hyperlipidemia LDL goal <130 08/28/2017     Hypertension May 3,2022     Malnutrition (H) 08/28/2017     Osteoarthritis 12/19/2012     Osteoporosis     Dexa     PONV (postoperative nausea and vomiting)      Restless legs      Skin cancer      Stable angina pectoris due to arteriosclerosis of coronary artery (H) 9/14/2022       PAST SURGICAL HISTORY:  Past Surgical History:   Procedure Laterality Date     ARTHROPLASTY KNEE Left 4/24/2017    Procedure: ARTHROPLASTY KNEE;  LEFT TOTAL KNEE ARTHROPLASTY;  Surgeon: Lee Ayala MD;  [facility]: [facility]" OR     BIOPSY   approx.    vaginal     BREAST SURGERY  1972    Implants     BUNIONECTOMY      both      SECTION       COLONOSCOPY  8-10     CV CORONARY ANGIOGRAM N/A 2022    Procedure: Coronary Angiogram;  Surgeon: Lea Sanchez MD;  Location:  HEART CARDIAC CATH LAB     EYE SURGERY  2014    cataract surgery in both eyes     LIPOSUCTION (LOCATION)       RECONSTRUCT BREAST, IMPLANT PROSTHESIS, COMBINED         FAMILY HISTORY:  Family History   Problem Relation Age of Onset     Lipids Mother      Alzheimer Disease Mother      Depression Mother      Osteoporosis Mother         RA     C.AALFREDO Father      Coronary Artery Disease Father      Hypertension Father      Diabetes Father      Breast Cancer Sister      Leukemia Sister      Hyperlipidemia Sister      Colon Cancer Sister              Cerebrovascular Disease Brother        SOCIAL HISTORY:  Social History     Socioeconomic History     Marital status:      Spouse name: None     Number of children: None     Years of education: None     Highest education level: None   Tobacco Use     Smoking status: Former Smoker     Packs/day: 0.50     Years: 15.00     Pack years: 7.50     Types: Cigarettes     Start date: 1960     Quit date: 1975     Years since quittin.7     Smokeless tobacco: Never Used     Tobacco comment: one pack/ three months   Substance and Sexual Activity     Alcohol use: Yes     Comment: one glass of wine a week     Drug use: No     Sexual activity: Not Currently     Partners: Male     Birth control/protection: Post-menopausal   Other Topics Concern     Exercise Yes     Parent/sibling w/ CABG, MI or angioplasty before 65F 55M? No

## 2022-09-15 ENCOUNTER — OFFICE VISIT (OUTPATIENT)
Dept: CARDIOLOGY | Facility: CLINIC | Age: 77
End: 2022-09-15

## 2022-09-15 ENCOUNTER — MYC MEDICAL ADVICE (OUTPATIENT)
Dept: CARDIOLOGY | Facility: CLINIC | Age: 77
End: 2022-09-15

## 2022-09-15 VITALS
OXYGEN SATURATION: 99 % | SYSTOLIC BLOOD PRESSURE: 109 MMHG | BODY MASS INDEX: 20.99 KG/M2 | HEIGHT: 58 IN | WEIGHT: 100 LBS | HEART RATE: 97 BPM | DIASTOLIC BLOOD PRESSURE: 73 MMHG

## 2022-09-15 DIAGNOSIS — E78.5 HYPERLIPIDEMIA LDL GOAL <70: ICD-10-CM

## 2022-09-15 DIAGNOSIS — Z98.890 STATUS POST CORONARY ANGIOGRAM: Primary | ICD-10-CM

## 2022-09-15 DIAGNOSIS — I25.84 CORONARY ATHEROSCLEROSIS DUE TO CALCIFIED CORONARY LESION (CODE): Primary | ICD-10-CM

## 2022-09-15 LAB
ANION GAP SERPL CALCULATED.3IONS-SCNC: 3 MMOL/L (ref 3–14)
BUN SERPL-MCNC: 20 MG/DL (ref 7–30)
CALCIUM SERPL-MCNC: 9.8 MG/DL (ref 8.5–10.1)
CHLORIDE BLD-SCNC: 112 MMOL/L (ref 94–109)
CO2 SERPL-SCNC: 26 MMOL/L (ref 20–32)
CREAT SERPL-MCNC: 0.92 MG/DL (ref 0.52–1.04)
GFR SERPL CREATININE-BSD FRML MDRD: 64 ML/MIN/1.73M2
GLUCOSE BLD-MCNC: 93 MG/DL (ref 70–99)
POTASSIUM BLD-SCNC: 4.5 MMOL/L (ref 3.4–5.3)
SODIUM SERPL-SCNC: 141 MMOL/L (ref 133–144)

## 2022-09-15 PROCEDURE — 99214 OFFICE O/P EST MOD 30 MIN: CPT | Performed by: PHYSICIAN ASSISTANT

## 2022-09-15 NOTE — LETTER
"9/15/2022    Corona Bethea MD  [address]  [place] [address]    RE: Hanane Sorto       Dear Colleague,     I had the pleasure of seeing Hanane Sorto in the [facility].  [facility]    I had the pleasure of seeing Hanane when she came for follow up of CAD.  This 76 year old sees Dr. Frias for her history of:    1.  CAD - now s/p PCI x 1 to pCx and PCI x 3 with overlapping stents to pLAD 9/2/2022 @ [facility]  2.  Dyslipidemia - intolerant to many statins and antilipid therapy. Praluent initiated  3.  Palpitations      I met Hanane 7/2022 at which time she sought evaluation for episodes of palpitations, noting that her heart would \"speed up.\"  When we talked, she also noticed increasing LUCIO and a \"funny sensation\" in the chest while playing tennis.  Due to her exertional discomfort that seem to be coming on more quickly with exertion, I recommended coronary angiography after discussion with Dr. Frias.    Therefore, on 7/27 she underwent coronary angiography via the RRA revealing multivessel disease, including o/pLAD 70%, pCx 95%, mCx 70% and p/mRCA 30% for which CABG vs multivessel stent implantation.  Isosorbide was started.    She met with Dr. Herrera 7/28 for consultation of CABG and then Dr. Moffett 8/5.  Echocardiogram was repeated, with consideration of PCI.  Plavix was initiated and Repatha recommended as she had previously been intolerant to statins.    She subsequently met with cardiologist at and [facility] and ultimately underwent coronary intervention at [facility] 9/2/2022.  She had a pCx stent implantation (Synergy 3.0x16mm CHAUNCEY) followed by LAD intervention with overlapping 2.5 x 30 mm (Resolute Yaw), 2.5 x 8 mm (Resolute Yaw), and 2.25 x 16 mm (Synergy CHAUNCEY) via RFA.  She was recommended for DAPT x at least 6 months, and enrolled in the CHAMP trial with genotyping to determine if antiplatelet recommendation may change.  Therefore, discharge medications " "include clopidogrel 75 mg daily, metoprolol XL 12.5 mg daily, omeprazole 20 mg daily, isosorbide 60 mg daily, ASA 81 mg daily.    It appears on 9/6, genotyping came back indicating she responded appropriately to clopidogrel and ASA was stopped.    It appears she contacted her MDs at [place] 9/7 with c/o R unilateral knee swelling, concerned it could have something to do with the stent implantation.  It was not felt to be related.  She then reached out to us 9/11 with complaints of continued R knee swelling.  Given concern for possible hemarthrosis, recommended to follow-up with [facility]    Interval History:  Overall, Hanane is really doing well.  In retrospect, she thinks she had been \"slowing down\" for at least a few months prior to her visit with me where she described the sensations in her chest that prompted angiogram.    She's back to playing tennis, and is walking at least 2-3 miles daily.  She also participates in cardiac rehab and is excited that she is signed up for a few classes through this.  With this, denies any problems with chest pain, pressure, tightness.  No \"funny sensation.\"  She has not had to take any sl NTG.    No right groin site discomfort post angiogram.  No fever/chills.  No bleeding issues on the Plavix, and is off ASA as part of the CHAMP Study.  Denies dizziness, lightheadedness.  No concerns today.    VITALS:  Vitals: /73   Pulse 97   Ht 1.473 m (4' 10\")   Wt 45.4 kg (100 lb)   SpO2 99%   BMI 20.90 kg/m      Diagnostic Testing:  Angiogram 7/27/2022 o/pLAD 70%, pCx 95%, mCx 70% and p/mRCA 30%.  Echocardiogram 10/2018-LVEF 50-55%. Grade I DD. Nl RV. No sig valve abnls. Nl aorta.  Stress echocardiogram testing 10/2018- wnl  CT Calcium Score 11/2016 CAC 39 (LM 0, LAD 11, Cx 18, RCA 0), placing her in 53rd percentile compared to age/gender matched controls. Non-cardiac portion RLL 3 mm granuloma  Component      Latest Ref Rng & Units 2/5/2018 5/28/2019 11/2/2021 8/31/22  " "  Cholesterol      <200 mg/dL 245 (H) 263 (H) 270 (H)                      230   Triglycerides      <150 mg/dL 204 (H) 251 (H) 190 (H)                      163    HDL Cholesterol      >=50 mg/dL 42 (L) 45 (L) 44 (L)                          40   LDL Cholesterol Calculated      <=100 mg/dL 162 (H) 168 (H) 188 (H)                      160   Non HDL Cholesterol      <130 mg/dL 203 (H) 218 (H) 226 (H)                      190         Plan:  1. She preferred to continue follow-up for her CAD with Dr. Moffett, whom she met to discuss multivessel stent implantation.   2. Fasting lipids (as now on Praleunt) ~3 months  3. Echo prior to upcoming appt - she thinks one has been scheduled through [place] and will call us with date    Assessment/Plan:    1. CAD    As above, described a \"funny sensation\" in the chest which was exertional.  Due to high concern for ischemia, underwent angiogram 7/27 revealing pCx and pLAD disease for which bypass vs multivessel stenting was considered.    Ultimately underwent angiogram and intervention at [place] 9/2/2022. S/p pCx stent implantation (Synergy 3.0x16mm CHAUNCEY) followed by overlapping 2.5 x 30 mm (Resolute [place]), 2.5 x 8 mm (Resolute [place]), and 2.25 x 16 mm (Synergy CHAUNCEY) to LAD via RFA.     Remains on Plavix 75 (no ASA as part of CHAMP study), low dose BB and Praluent as intolerant to statins    PLAN:    Continue Plavix for at least 6 months per [place] discharge note    No ASA as part of CHAMP study    Continue BB and lipid therapy    See Dr. Moffett 12/2022 with updated lipids    She believes that an echocardiogram is scheduled through [place] in the coming months.  She will contact me when she finds out when this will be and if it is not scheduled, we will plan to get a repeat echo before she sees Dr. Moffett 12/2022    Continue cardiac rehab    2. Dyslipidemia    As above, lipids looked terrible.  Unfortunately, had been unable to tolerate statins    Recently started Praluent and tolerating without " issues    PLAN:    Repeat lipids in 3 m prior to Dr. Zuhair Masterson PA-C, MSPAS      Orders Placed This Encounter   Procedures     Lipid Profile     ALT     Follow-Up with Cardiology     No orders of the defined types were placed in this encounter.    There are no discontinued medications.      Encounter Diagnoses   Name Primary?     Coronary atherosclerosis due to calcified coronary lesion (CODE)  Yes     Hyperlipidemia LDL goal <70        CURRENT MEDICATIONS:  Current Outpatient Medications   Medication Sig Dispense Refill     alirocumab (PRALUENT) 75 MG/ML injectable pen Inject 1 mL (75 mg) Subcutaneous every 14 days 6 mL 3     Calcium-Phosphorus-Vitamin D (CALCIUM GUMMIES PO) Take 2 Gum by mouth daily       cholecalciferol (VITAMIN D3) 25 mcg (1000 units) capsule Take 1 capsule by mouth daily       clonazePAM (KLONOPIN) 0.5 MG ODT DISSOLVE 1 TABLET(0.5 MG) ON THE TONGUE EVERY NIGHT AS NEEDED FOR ANXIETY 20 tablet 3     clopidogrel (PLAVIX) 75 MG tablet Take 1 tablet (75 mg) by mouth daily 90 tablet 3     metoprolol succinate ER (TOPROL XL) 25 MG 24 hr tablet Take 0.5 tablets (12.5 mg) by mouth daily 90 tablet 3     nitroGLYcerin (NITROSTAT) 0.4 MG sublingual tablet For chest pain place 1 tablet under the tongue every 5 minutes for 3 doses. If symptoms persist 5 minutes after 1st dose call 911. 25 tablet 1     omeprazole (PRILOSEC) 20 MG DR capsule TAKE 1 CAPSULE(20 MG) BY MOUTH TWICE DAILY 180 capsule 3     pramipexole (MIRAPEX) 0.25 MG tablet TAKE 1 TABLET BY MOUTH TWICE DAILY AT 5PM AND AT BEDTIME 180 tablet 3       ALLERGIES     Allergies   Allergen Reactions     Crestor [Rosuvastatin]      Ezetimibe Other (See Comments)     Zetia = leg cramps      Hmg-Coa-R Inhibitors Other (See Comments)     Leg cramping     Miralax [Polyethylene Glycol] Other (See Comments)     Back pain     Pravastatin Other (See Comments)     Leg cramps          Review of Systems:  Skin:  Negative for bruising   Eyes:   "Negative    ENT:  not assessed    Respiratory:  Negative for shortness of breath;dyspnea on exertion;cough  Cardiovascular:  Negative for;palpitations;chest pain;edema;exercise intolerance;fatigue;lightheadedness;dizziness    Gastroenterology: Negative for melena;hematochezia  Genitourinary:  Negative    Musculoskeletal:  Positive for arthritis;joint pain  Neurologic:  Negative    Psychiatric:  Negative    Heme/Lymph/Imm:  Negative    Endocrine:  Negative      Physical Exam:  Vitals: /73   Pulse 97   Ht 1.473 m (4' 10\")   Wt 45.4 kg (100 lb)   SpO2 99%   BMI 20.90 kg/m      Constitutional:  cooperative;in no acute distress        Skin:  warm and dry to the touch        Head:  normocephalic        Eyes:  conjunctivae and lids unremarkable;sclera white;pupils equal and round        ENT:  not assessed this visit        Neck:  JVP normal;no carotid bruit        Chest:  clear to auscultation        Cardiac: regular rhythm;no murmurs, gallops or rubs detected                  Abdomen:  not assessed this visit        Vascular: pulses full and equal                                      Extremities and Back:  no edema        Neurological:  no gross motor deficits            PAST MEDICAL HISTORY:  Past Medical History:   Diagnosis Date     Anxiety     psych yearly, Dr Shea     Family hx of colon cancer     sister     Floaters     Karma leverentz     Fracture of wrist, left, with routine healing, subsequent encounter 08/28/2017     GERD (gastroesophageal reflux disease)      Gross hematuria 09/12/2011     Hyperlipidemia LDL goal < 130      Hyperlipidemia LDL goal <130 08/28/2017     Hypertension May 3,2022     Malnutrition (H) 08/28/2017     Osteoarthritis 12/19/2012     Osteoporosis     Dexa     PONV (postoperative nausea and vomiting)      Restless legs      Skin cancer      Stable angina pectoris due to arteriosclerosis of coronary artery (H) 9/14/2022       PAST SURGICAL HISTORY:  Past Surgical History: "   Procedure Laterality Date     ARTHROPLASTY KNEE Left 2017    Procedure: ARTHROPLASTY KNEE;  LEFT TOTAL KNEE ARTHROPLASTY;  Surgeon: Lee Ayala MD;  Location:  OR     BIOPSY   approx.    vaginal     BREAST SURGERY      Implants     BUNIONECTOMY      both      SECTION       COLONOSCOPY  8-10     CV CORONARY ANGIOGRAM N/A 2022    Procedure: Coronary Angiogram;  Surgeon: Lea Sanchez MD;  Location:  HEART CARDIAC CATH LAB     EYE SURGERY  2014    cataract surgery in both eyes     LIPOSUCTION (LOCATION)       RECONSTRUCT BREAST, IMPLANT PROSTHESIS, COMBINED         FAMILY HISTORY:  Family History   Problem Relation Age of Onset     Lipids Mother      Alzheimer Disease Mother      Depression Mother      Osteoporosis Mother         RA     C.A.D. Father      Coronary Artery Disease Father      Hypertension Father      Diabetes Father      Breast Cancer Sister      Leukemia Sister      Hyperlipidemia Sister      Colon Cancer Sister              Cerebrovascular Disease Brother        SOCIAL HISTORY:  Social History     Socioeconomic History     Marital status:      Spouse name: None     Number of children: None     Years of education: None     Highest education level: None   Tobacco Use     Smoking status: Former Smoker     Packs/day: 0.50     Years: 15.00     Pack years: 7.50     Types: Cigarettes     Start date: 1960     Quit date: 1975     Years since quittin.7     Smokeless tobacco: Never Used     Tobacco comment: one pack/ three months   Substance and Sexual Activity     Alcohol use: Yes     Comment: one glass of wine a week     Drug use: No     Sexual activity: Not Currently     Partners: Male     Birth control/protection: Post-menopausal   Other Topics Concern     Exercise Yes     Parent/sibling w/ CABG, MI or angioplasty before 65F 55M? No            Thank you for allowing me to participate in the care of your  patient.      Sincerely,     JIMMY Samson [facility]  cc:   No referring provider defined for this encounter.

## 2022-09-15 NOTE — PATIENT INSTRUCTIONS
Hanane - it was good to speak with you today!    Reviewed the 4 stents place @ [place]   I'm glad you're doing so well!    PLAN:  Call us with update on when echo is (ultrasound of heart)  - [phone]  Continue Cardiac Rehab  Continue clopidogrel x at least 6 months if not 1 year  Get fasting labs and see Dr. Moffett ~12/2022

## 2022-09-16 ENCOUNTER — HOSPITAL ENCOUNTER (OUTPATIENT)
Dept: CARDIAC REHAB | Facility: CLINIC | Age: 77
Discharge: HOME | End: 2022-09-16
Attending: INTERNAL MEDICINE
Payer: MEDICARE

## 2022-09-16 PROCEDURE — 93798 PHYS/QHP OP CAR RHAB W/ECG: CPT

## 2022-09-21 ENCOUNTER — HOSPITAL ENCOUNTER (OUTPATIENT)
Dept: CARDIAC REHAB | Facility: CLINIC | Age: 77
Discharge: HOME | End: 2022-09-21
Attending: INTERNAL MEDICINE
Payer: MEDICARE

## 2022-09-21 PROCEDURE — 93798 PHYS/QHP OP CAR RHAB W/ECG: CPT | Performed by: REHABILITATION PRACTITIONER

## 2022-09-28 ENCOUNTER — HOSPITAL ENCOUNTER (OUTPATIENT)
Dept: CARDIAC REHAB | Facility: CLINIC | Age: 77
Discharge: HOME | End: 2022-09-28
Attending: INTERNAL MEDICINE
Payer: MEDICARE

## 2022-09-28 PROCEDURE — 93798 PHYS/QHP OP CAR RHAB W/ECG: CPT | Performed by: REHABILITATION PRACTITIONER

## 2022-09-30 ENCOUNTER — HOSPITAL ENCOUNTER (OUTPATIENT)
Dept: CARDIAC REHAB | Facility: CLINIC | Age: 77
Discharge: HOME | End: 2022-09-30
Attending: INTERNAL MEDICINE
Payer: MEDICARE

## 2022-09-30 PROCEDURE — 93798 PHYS/QHP OP CAR RHAB W/ECG: CPT | Performed by: CLINICAL EXERCISE PHYSIOLOGIST

## 2022-10-05 ENCOUNTER — HOSPITAL ENCOUNTER (OUTPATIENT)
Dept: CARDIOLOGY | Facility: CLINIC | Age: 77
Discharge: HOME | End: 2022-10-05
Attending: PHYSICIAN ASSISTANT | Admitting: PHYSICIAN ASSISTANT
Payer: MEDICARE

## 2022-10-05 DIAGNOSIS — Z98.890 STATUS POST CORONARY ANGIOGRAM: ICD-10-CM

## 2022-10-05 LAB — LVEF ECHO: NORMAL

## 2022-10-05 PROCEDURE — 93321 DOPPLER ECHO F-UP/LMTD STD: CPT

## 2022-10-05 PROCEDURE — 93308 TTE F-UP OR LMTD: CPT | Mod: 26 | Performed by: INTERNAL MEDICINE

## 2022-10-05 PROCEDURE — 93325 DOPPLER ECHO COLOR FLOW MAPG: CPT

## 2022-10-05 PROCEDURE — 93321 DOPPLER ECHO F-UP/LMTD STD: CPT | Mod: 26 | Performed by: INTERNAL MEDICINE

## 2022-10-05 PROCEDURE — 93325 DOPPLER ECHO COLOR FLOW MAPG: CPT | Mod: 26 | Performed by: INTERNAL MEDICINE

## 2022-10-07 ENCOUNTER — HOSPITAL ENCOUNTER (OUTPATIENT)
Dept: CARDIAC REHAB | Facility: CLINIC | Age: 77
Discharge: HOME | End: 2022-10-07
Attending: INTERNAL MEDICINE
Payer: MEDICARE

## 2022-10-07 PROCEDURE — 93798 PHYS/QHP OP CAR RHAB W/ECG: CPT | Performed by: REHABILITATION PRACTITIONER

## 2022-10-24 ENCOUNTER — OFFICE VISIT (OUTPATIENT)
Dept: FAMILY MEDICINE | Facility: CLINIC | Age: 77
End: 2022-10-24
Payer: MEDICARE

## 2022-10-24 VITALS
RESPIRATION RATE: 16 BRPM | SYSTOLIC BLOOD PRESSURE: 121 MMHG | DIASTOLIC BLOOD PRESSURE: 75 MMHG | OXYGEN SATURATION: 95 % | WEIGHT: 104 LBS | HEART RATE: 106 BPM | BODY MASS INDEX: 21.83 KG/M2 | TEMPERATURE: 97.9 F | HEIGHT: 58 IN

## 2022-10-24 DIAGNOSIS — N64.4 NIPPLE TENDERNESS: ICD-10-CM

## 2022-10-24 DIAGNOSIS — N64.4 BREAST TENDERNESS IN FEMALE: Primary | ICD-10-CM

## 2022-10-24 PROCEDURE — 99213 OFFICE O/P EST LOW 20 MIN: CPT | Performed by: INTERNAL MEDICINE

## 2022-10-24 ASSESSMENT — PAIN SCALES - GENERAL: PAINLEVEL: NO PAIN (0)

## 2022-10-24 NOTE — PROGRESS NOTES
"  Assessment & Plan     Breast tenderness in female  - US Breast Left Limited 1-3 Quadrants; Future  - MA Diagnostic Digital Left; Future    Nipple tenderness  - US Breast Left Limited 1-3 Quadrants; Future  - MA Diagnostic Digital Left; Future     See Patient Instructions    Return if symptoms worsen or fail to improve.    MANNY GALVEZ MD  [facility] KARSON Koo is a 77 year old, presenting for the following health issues:    Breast Problem (Left breast concerns)    OCTAVIO Koo is a very pleasant 77 year old lady who presented to the clinic for left breast symptoms.  She started having discomfort in left breast 5 days ago associated with left nipple tenderness.  She has breast implants  No personal hx of breast cancer  She has family hx of breast cancer in her sister at age 60.  No signs of infection or lump noted on exam. No fever or chills.     Review of Systems       Objective    /75 (BP Location: Left arm, Cuff Size: Adult Large)   Pulse 106   Temp 97.9  F (36.6  C) (Oral)   Resp 16   Ht 1.473 m (4' 10\")   Wt 47.2 kg (104 lb)   SpO2 95%   BMI 21.74 kg/m    Body mass index is 21.74 kg/m .  Physical Exam           "

## 2022-11-02 ENCOUNTER — HOSPITAL ENCOUNTER (OUTPATIENT)
Dept: MAMMOGRAPHY | Facility: CLINIC | Age: 77
Discharge: HOME | End: 2022-11-02
Attending: INTERNAL MEDICINE
Payer: MEDICARE

## 2022-11-02 DIAGNOSIS — N64.4 NIPPLE TENDERNESS: ICD-10-CM

## 2022-11-02 DIAGNOSIS — N64.4 BREAST TENDERNESS IN FEMALE: ICD-10-CM

## 2022-11-02 PROCEDURE — 76642 ULTRASOUND BREAST LIMITED: CPT | Mod: LT

## 2022-11-02 PROCEDURE — 77061 BREAST TOMOSYNTHESIS UNI: CPT | Mod: LT

## 2022-11-03 ENCOUNTER — MYC MEDICAL ADVICE (OUTPATIENT)
Dept: CARDIOLOGY | Facility: CLINIC | Age: 77
End: 2022-11-03

## 2022-11-03 NOTE — TELEPHONE ENCOUNTER
Received [employer] message from pt:     I can't sleep, I can't breath and I ache all over.  Do I need to take the metoprolol and I don't know if I can continue the praluent if these are the side effects caused by the praluent.  I have been trying to continue with the injections until my appointment in December but I really don't know if I can.  Any suggestions.  Thank you.    Called and spoke with pt. Pt stated her most recent Praluent injection was on 10/29/22. Pt noted that she has had aching pains in arms, back and legs following Praluent injections that typically lasts about a week. Pt stated she also has noted a runny/stuffy nose and congestion that makes it difficult to sleep. Pt also reported she becomes short of breath more easily on exertion such as when playing tennis or climbing stairs. Pt stated it feels hard to catch her breath at times. Pt denied chest pain and noted she has not taken any nitroglycerin since her stent placement. Pt recently had a limited echo done on 10/5/22 that showed EF 55%. Pt wonders if she should continue on Praluent or if there is an alternative medication. Pt has not tried Repatha yet due to her insurance preferred Praluent. Pt is intolerant of statins. Advised will review with Dr. Moffett and call back with any recommendations. Pt will be due for her next injection on 11/12/22. Pt has lipids scheduled on 12/14/22 and follow up with Dr. Moffett on 12/15/22.

## 2022-11-07 DIAGNOSIS — G25.81 RESTLESS LEG SYNDROME: ICD-10-CM

## 2022-11-09 RX ORDER — PRAMIPEXOLE DIHYDROCHLORIDE 0.25 MG/1
TABLET ORAL
Qty: 180 TABLET | Refills: 0 | Status: SHIPPED | OUTPATIENT
Start: 2022-11-09 | End: 2022-11-09

## 2022-11-09 NOTE — TELEPHONE ENCOUNTER
Prescription approved per [facility] Refill Protocol.  Marleni CARBAJAL, Triage RN  [facility]

## 2022-11-10 ENCOUNTER — MYC MEDICAL ADVICE (OUTPATIENT)
Dept: CARDIOLOGY | Facility: CLINIC | Age: 77
End: 2022-11-10

## 2022-11-11 ENCOUNTER — TELEPHONE (OUTPATIENT)
Dept: CARDIOLOGY | Facility: CLINIC | Age: 77
End: 2022-11-11

## 2022-11-11 DIAGNOSIS — E78.2 MIXED HYPERLIPIDEMIA: Primary | ICD-10-CM

## 2022-11-11 DIAGNOSIS — E78.5 HYPERLIPIDEMIA LDL GOAL <70: ICD-10-CM

## 2022-11-11 NOTE — TELEPHONE ENCOUNTER
Luis Moffett MD Hair, Ashley W, RN  Phone Number: [phone]     Yes we can switch her to Repatha. Thanks

## 2022-11-11 NOTE — TELEPHONE ENCOUNTER
[facility]    Fax:[phone]    Spec: [phone]    MO: [phone]

## 2022-11-11 NOTE — TELEPHONE ENCOUNTER
Pt has not been able to tolerate Praluent and has been having side effects.   Pt reports SOB and aches and pains in arms, back, and legs.     Dr. Moffett reviewed and recommends pt try Repatha.   Will order Repatha and send to PA team to start authorization process.

## 2022-11-11 NOTE — TELEPHONE ENCOUNTER
Central Prior Authorization Team   Phone: [phone]    PA Initiation    Medication: Repatha SureClick 140MG/ML auto-injectors    Insurance Company: [employer] - Phone [phone] Fax [phone]  Pharmacy Filling the Rx: [facility] - [place], [place] - [address]  Filling Pharmacy Phone: [phone]  Filling Pharmacy Fax:    Start Date: 11/11/2022

## 2022-11-14 NOTE — TELEPHONE ENCOUNTER
Prior Authorization Approval    Authorization Effective Date: 8/16/2022  Authorization Expiration Date: 11/14/2023  Medication: Repatha SureClick 140MG/ML auto-injectors    Approved Dose/Quantity:   Reference #:     Insurance Company: [email or web address] - Phone [phone] Fax [phone]  Expected CoPay:       CoPay Card Available:      Foundation Assistance Needed:    Which Pharmacy is filling the prescription (Not needed for infusion/clinic administered): [facility] - [place], [place] - [facility] [address]  Pharmacy Notified: Yes  Patient Notified: Yes

## 2022-11-16 DIAGNOSIS — G47.30 SLEEP APNEA: Primary | ICD-10-CM

## 2022-12-04 ENCOUNTER — HEALTH MAINTENANCE LETTER (OUTPATIENT)
Age: 77
End: 2022-12-04

## 2022-12-11 ENCOUNTER — MYC MEDICAL ADVICE (OUTPATIENT)
Dept: CARDIOLOGY | Facility: CLINIC | Age: 77
End: 2022-12-11

## 2022-12-15 ASSESSMENT — ENCOUNTER SYMPTOMS
ABDOMINAL PAIN: 1
FREQUENCY: 1
BREAST MASS: 0
HEADACHES: 1
CONSTIPATION: 1
HEARTBURN: 1

## 2022-12-15 ASSESSMENT — ACTIVITIES OF DAILY LIVING (ADL): CURRENT_FUNCTION: NO ASSISTANCE NEEDED

## 2022-12-22 ENCOUNTER — OFFICE VISIT (OUTPATIENT)
Dept: FAMILY MEDICINE | Facility: CLINIC | Age: 77
End: 2022-12-22
Payer: MEDICARE

## 2022-12-22 VITALS
TEMPERATURE: 96.9 F | OXYGEN SATURATION: 99 % | BODY MASS INDEX: 22.72 KG/M2 | HEIGHT: 56 IN | SYSTOLIC BLOOD PRESSURE: 125 MMHG | RESPIRATION RATE: 16 BRPM | DIASTOLIC BLOOD PRESSURE: 62 MMHG | HEART RATE: 73 BPM | WEIGHT: 101 LBS

## 2022-12-22 DIAGNOSIS — E78.5 HYPERLIPIDEMIA LDL GOAL <70: ICD-10-CM

## 2022-12-22 DIAGNOSIS — F41.9 ANXIETY: ICD-10-CM

## 2022-12-22 DIAGNOSIS — G25.81 RESTLESS LEG SYNDROME: ICD-10-CM

## 2022-12-22 DIAGNOSIS — R73.03 PREDIABETES: ICD-10-CM

## 2022-12-22 DIAGNOSIS — Z96.652 STATUS POST TOTAL LEFT KNEE REPLACEMENT: ICD-10-CM

## 2022-12-22 DIAGNOSIS — N64.4 NIPPLE TENDERNESS: ICD-10-CM

## 2022-12-22 DIAGNOSIS — I25.84 CORONARY ATHEROSCLEROSIS DUE TO CALCIFIED CORONARY LESION (CODE): ICD-10-CM

## 2022-12-22 DIAGNOSIS — I25.10 CORONARY ARTERY DISEASE INVOLVING NATIVE CORONARY ARTERY OF NATIVE HEART WITHOUT ANGINA PECTORIS: ICD-10-CM

## 2022-12-22 DIAGNOSIS — D12.6 ADENOMATOUS POLYP OF COLON, UNSPECIFIED PART OF COLON: ICD-10-CM

## 2022-12-22 DIAGNOSIS — M81.0 SENILE OSTEOPOROSIS: ICD-10-CM

## 2022-12-22 DIAGNOSIS — K21.9 GASTROESOPHAGEAL REFLUX DISEASE WITHOUT ESOPHAGITIS: ICD-10-CM

## 2022-12-22 DIAGNOSIS — Z00.00 ENCOUNTER FOR ANNUAL WELLNESS VISIT (AWV) IN MEDICARE PATIENT: Primary | ICD-10-CM

## 2022-12-22 LAB
ALT SERPL W P-5'-P-CCNC: 14 U/L (ref 10–35)
ANION GAP SERPL CALCULATED.3IONS-SCNC: 12 MMOL/L (ref 7–15)
BUN SERPL-MCNC: 18.8 MG/DL (ref 8–23)
CALCIUM SERPL-MCNC: 10.3 MG/DL (ref 8.8–10.2)
CHLORIDE SERPL-SCNC: 106 MMOL/L (ref 98–107)
CHOLEST SERPL-MCNC: 138 MG/DL
CREAT SERPL-MCNC: 0.87 MG/DL (ref 0.51–0.95)
DEPRECATED CALCIDIOL+CALCIFEROL SERPL-MC: 61 UG/L (ref 20–75)
DEPRECATED HCO3 PLAS-SCNC: 23 MMOL/L (ref 22–29)
GFR SERPL CREATININE-BSD FRML MDRD: 68 ML/MIN/1.73M2
GLUCOSE SERPL-MCNC: 104 MG/DL (ref 70–99)
HBA1C MFR BLD: 5.5 % (ref 0–5.6)
HDLC SERPL-MCNC: 50 MG/DL
LDLC SERPL CALC-MCNC: 55 MG/DL
NONHDLC SERPL-MCNC: 88 MG/DL
POTASSIUM SERPL-SCNC: 4.7 MMOL/L (ref 3.4–5.3)
SODIUM SERPL-SCNC: 141 MMOL/L (ref 136–145)
TRIGL SERPL-MCNC: 165 MG/DL
VIT B12 SERPL-MCNC: 658 PG/ML (ref 232–1245)

## 2022-12-22 PROCEDURE — 80061 LIPID PANEL: CPT | Performed by: INTERNAL MEDICINE

## 2022-12-22 PROCEDURE — 83036 HEMOGLOBIN GLYCOSYLATED A1C: CPT | Performed by: INTERNAL MEDICINE

## 2022-12-22 PROCEDURE — 36415 COLL VENOUS BLD VENIPUNCTURE: CPT | Performed by: INTERNAL MEDICINE

## 2022-12-22 PROCEDURE — 80048 BASIC METABOLIC PNL TOTAL CA: CPT | Performed by: INTERNAL MEDICINE

## 2022-12-22 PROCEDURE — 82306 VITAMIN D 25 HYDROXY: CPT | Performed by: INTERNAL MEDICINE

## 2022-12-22 PROCEDURE — 82607 VITAMIN B-12: CPT | Performed by: INTERNAL MEDICINE

## 2022-12-22 PROCEDURE — G0439 PPPS, SUBSEQ VISIT: HCPCS | Performed by: INTERNAL MEDICINE

## 2022-12-22 PROCEDURE — 99215 OFFICE O/P EST HI 40 MIN: CPT | Mod: 25 | Performed by: INTERNAL MEDICINE

## 2022-12-22 PROCEDURE — 84460 ALANINE AMINO (ALT) (SGPT): CPT | Performed by: INTERNAL MEDICINE

## 2022-12-22 RX ORDER — FAMOTIDINE 40 MG/1
40 TABLET, FILM COATED ORAL DAILY
Qty: 90 TABLET | Refills: 3 | Status: SHIPPED | OUTPATIENT
Start: 2022-12-22 | End: 2023-08-15

## 2022-12-22 RX ORDER — PANTOPRAZOLE SODIUM 40 MG/1
40 TABLET, DELAYED RELEASE ORAL DAILY
Qty: 31 TABLET | Refills: 3 | Status: SHIPPED | OUTPATIENT
Start: 2022-12-22 | End: 2023-01-03

## 2022-12-22 RX ORDER — ALIROCUMAB 75 MG/ML
75 INJECTION, SOLUTION SUBCUTANEOUS
COMMUNITY
Start: 2022-12-22 | End: 2023-02-16

## 2022-12-22 ASSESSMENT — ACTIVITIES OF DAILY LIVING (ADL): CURRENT_FUNCTION: NO ASSISTANCE NEEDED

## 2022-12-22 ASSESSMENT — PAIN SCALES - GENERAL: PAINLEVEL: NO PAIN (0)

## 2022-12-22 NOTE — PATIENT INSTRUCTIONS
Add Pepcid 40 mg in AM and continue Prilosec 20 mg evening meal for 6 weeks  Please inform if symptoms persist

## 2022-12-22 NOTE — PROGRESS NOTES
"SUBJECTIVE:   Hanane is a 77 year old who presents for Preventive Visit.    This very pleasant lady has coronary artery disease and she has complex disease and is now taking cholesterol medication  She notices heartburn more at night despite taking Prilosec  Also her joints hurt   on 7/27 she underwent coronary angiography via the [facility] revealing multivessel disease, including o/pLAD 70%, pCx 95%, mCx 70% and p/mRCA 30% for which CABG vs multivessel stent implantation.  Isosorbide was started.     She met with Dr. Herrera 7/28 for consultation of CABG and then Dr. Moffett 8/5.  Echocardiogram was repeated, with consideration of PCI.  Plavix was initiated   now s/p PCI x 1 to pCx and PCI x 3 with overlapping stents to pLAD 9/2/2022 @ [facility]  Patient has been advised of split billing requirements and indicates understanding: Yes  Are you in the first 12 months of your Medicare coverage?  No    Healthy Habits:     In general, how would you rate your overall health?  Good    Do you usually eat at least 4 servings of fruit and vegetables a day, include whole grains    & fiber and avoid regularly eating high fat or \"junk\" foods?  No    Taking medications regularly:  Yes    Medication side effects:  None    Ability to successfully perform activities of daily living:  No assistance needed    Home Safety:  No safety concerns identified    Hearing Impairment:  No hearing concerns    In the past 6 months, have you been bothered by leaking of urine? Yes    In general, how would you rate your overall mental or emotional health?  Good      PHQ-2 Total Score: 0    Additional concerns today:  Yes      Have you ever done Advance Care Planning? (For example, a Health Directive, POLST, or a discussion with a medical provider or your loved ones about your wishes): Yes, advance care planning is on file.       Fall risk  Fallen 2 or more times in the past year?: No  Any fall with injury in the past year?: No    Cognitive Screening    1) " Repeat 3 items (Leader, Season, Table)    2) Clock draw: NORMAL  3) 3 item recall: Recalls 3 objects  Results: 3 items recalled: COGNITIVE IMPAIRMENT LESS LIKELY    Mini-CogTM Copyright S Kyle. Licensed by the author for use in [facility]; reprinted with permission ([email or web address]@[email or web address]). All rights reserved.      Do you have sleep apnea, excessive snoring or daytime drowsiness?: yes    Reviewed and updated as needed this visit by clinical staff   Tobacco  Allergies  Meds  Problems  Med Hx   Fam Hx          Reviewed and updated as needed this visit by Provider     Meds  Problems  Med Hx   Fam Hx         Social History     Tobacco Use     Smoking status: Former     Packs/day: 0.50     Years: 15.00     Pack years: 7.50     Types: Cigarettes     Start date: 1960     Quit date: 1975     Years since quittin.0     Smokeless tobacco: Never     Tobacco comments:     one pack/ three months   Substance Use Topics     Alcohol use: Yes     Comment: one glass of wine a week     If you drink alcohol do you typically have >3 drinks per day or >7 drinks per week? No    No flowsheet data found.            Current providers sharing in care for this patient include:   Patient Care Team:  Corona Bethea MD as PCP - General (Internal Medicine)  Corona Bethea MD as Assigned PCP  Cb Dobbs DO as Assigned Musculoskeletal Provider  Lee Narvaez MD as MD (Neurology)  Speaker, VIVIANA Fink as Cardiac Rehabilitation Therapist  Kristina Masterson PA-C as Assigned Heart and Vascular Provider    The following health maintenance items are reviewed in Epic and correct as of today:  Health Maintenance   Topic Date Due     COVID-19 Vaccine (5 - Booster for Pfizer series) 2022     ANNUAL REVIEW OF HM ORDERS  10/24/2023     DEXA  2023     MEDICARE ANNUAL WELLNESS VISIT  2023     FALL RISK ASSESSMENT  2023     COLORECTAL CANCER SCREENING  2026     LIPID   2027     ADVANCE CARE PLANNING  2027     DTAP/TDAP/TD IMMUNIZATION (3 - Td or Tdap) 2032     HEPATITIS C SCREENING  Completed     PHQ-2 (once per calendar year)  Completed     INFLUENZA VACCINE  Completed     Pneumococcal Vaccine: 65+ Years  Completed     ZOSTER IMMUNIZATION  Completed     IPV IMMUNIZATION  Aged Out     MENINGITIS IMMUNIZATION  Aged Out     MAMMO SCREENING  Discontinued     BP Readings from Last 3 Encounters:   22 125/62   10/24/22 121/75   09/15/22 109/73    Wt Readings from Last 3 Encounters:   22 45.8 kg (101 lb)   10/24/22 47.2 kg (104 lb)   09/15/22 45.4 kg (100 lb)                  Patient Active Problem List   Diagnosis     Hyperlipidemia with target LDL less than 130     Anxiety     Osteoporosis     Gross hematuria     Advanced directives, counseling/discussion     Osteoarthritis     Bursitis of hip     Constipation     Gastroesophageal reflux disease without esophagitis     S/P total knee arthroplasty     Fracture of wrist, left, with routine healing, subsequent encounter     Restless leg syndrome     Arthritis of neck     Dizziness     Status post coronary angiogram     Stable angina pectoris due to arteriosclerosis of coronary artery (H)     Breast tenderness in female     Nipple tenderness     Past Surgical History:   Procedure Laterality Date     ARTHROPLASTY KNEE Left 2017    Procedure: ARTHROPLASTY KNEE;  LEFT TOTAL KNEE ARTHROPLASTY;  Surgeon: Lee Ayala MD;  Location:  OR     BIOPSY   approx.    vaginal     BREAST SURGERY      Implants     BUNIONECTOMY      both      SECTION       COLONOSCOPY  8-10     CV CORONARY ANGIOGRAM N/A 2022    Procedure: Coronary Angiogram;  Surgeon: Lea Sanchez MD;  Location:  HEART CARDIAC CATH LAB     EYE SURGERY  2014    cataract surgery in both eyes     LIPOSUCTION (LOCATION)       RECONSTRUCT BREAST, IMPLANT PROSTHESIS, COMBINED         Social History      Tobacco Use     Smoking status: Former     Packs/day: 0.50     Years: 15.00     Pack years: 7.50     Types: Cigarettes     Start date: 1960     Quit date: 1975     Years since quittin.0     Smokeless tobacco: Never     Tobacco comments:     one pack/ three months   Substance Use Topics     Alcohol use: Yes     Comment: one glass of wine a week     Family History   Problem Relation Age of Onset     Lipids Mother      Alzheimer Disease Mother      Depression Mother      Osteoporosis Mother         RA     CRusselAALFREDO Father      Coronary Artery Disease Father      Hypertension Father      Diabetes Father      Breast Cancer Sister      Leukemia Sister      Hyperlipidemia Sister      Colon Cancer Sister              Cerebrovascular Disease Brother          Current Outpatient Medications   Medication Sig Dispense Refill     alirocumab (PRALUENT) 75 MG/ML injectable pen Inject 1 mL (75 mg) Subcutaneous every 14 days       Calcium-Phosphorus-Vitamin D (CALCIUM GUMMIES PO) Take 2 Gum by mouth daily       cholecalciferol (VITAMIN D3) 25 mcg (1000 units) capsule Take 1 capsule by mouth daily       clonazePAM (KLONOPIN) 0.5 MG ODT DISSOLVE 1 TABLET(0.5 MG) ON THE TONGUE EVERY NIGHT AS NEEDED FOR ANXIETY 20 tablet 3     clopidogrel (PLAVIX) 75 MG tablet Take 1 tablet (75 mg) by mouth daily 90 tablet 3     famotidine (PEPCID) 40 MG tablet Take 1 tablet (40 mg) by mouth daily 90 tablet 3     metoprolol succinate ER (TOPROL XL) 25 MG 24 hr tablet Take 0.5 tablets (12.5 mg) by mouth daily 90 tablet 3     nitroGLYcerin (NITROSTAT) 0.4 MG sublingual tablet For chest pain place 1 tablet under the tongue every 5 minutes for 3 doses. If symptoms persist 5 minutes after 1st dose call 911. 25 tablet 1     pantoprazole (PROTONIX) 40 MG EC tablet Take 1 tablet (40 mg) by mouth daily 31 tablet 3     pramipexole (MIRAPEX) 0.25 MG tablet TAKE 1 TABLET BY MOUTH TWICE DAILY AT 5PM AND AT BEDTIME 180 tablet 3     STATIN NOT  "PRESCRIBED (INTENTIONAL) Please choose reason not prescribed from choices below.       Allergies   Allergen Reactions     Crestor [Rosuvastatin]      Ezetimibe Other (See Comments)     Zetia = leg cramps      Hmg-Coa-R Inhibitors Other (See Comments)     Leg cramping     Miralax [Polyethylene Glycol] Other (See Comments)     Back pain     Pravastatin Other (See Comments)     Leg cramps          Mammogram Screening - Patient over age 75, has elected to continue with screening.  Pertinent mammograms are reviewed under the imaging tab.    Review of Systems  10 point ROS of systems including Constitutional, Eyes, Respiratory, Cardiovascular, Gastroenterology, Genitourinary, Integumentary, Muscularskeletal, Psychiatric were all negative except for pertinent positives noted in my HPI.      OBJECTIVE:   /62 (BP Location: Right arm, Patient Position: Sitting, Cuff Size: Adult Regular)   Pulse 73   Temp 96.9  F (36.1  C)   Resp 16   Ht 1.422 m (4' 8\")   Wt 45.8 kg (101 lb)   SpO2 99%   BMI 22.64 kg/m   Estimated body mass index is 22.64 kg/m  as calculated from the following:    Height as of this encounter: 1.422 m (4' 8\").    Weight as of this encounter: 45.8 kg (101 lb).  Physical Exam  GENERAL APPEARANCE: healthy, alert and no distress  EYES: Eyes grossly normal to inspection, PERRL and conjunctivae and sclerae normal  HENT: ear canals and TM's normal, nose and mouth without ulcers or lesions, oropharynx clear and oral mucous membranes moist  NECK: no adenopathy, no asymmetry, masses, or scars and thyroid normal to palpation  RESP: lungs clear to auscultation - no rales, rhonchi or wheezes  BREAST: Implants otherwise normal without masses, tenderness or nipple discharge and no palpable axillary masses or adenopathy  CV: regular rate and rhythm, normal S1 S2, no S3 or S4, no murmur, click or rub, no peripheral edema and peripheral pulses strong  ABDOMEN: soft, nontender, no hepatosplenomegaly, no masses and " bowel sounds normal  MS: no musculoskeletal defects are noted and gait is age appropriate without ataxia  SKIN: no suspicious lesions or rashes  NEURO: Normal strength and tone, sensory exam grossly normal, mentation intact and speech normal  PSYCH: mentation appears normal and affect normal/bright        ASSESSMENT / PLAN:   Hanane was seen today for physical.    Diagnoses and all orders for this visit:    Encounter for annual wellness visit (AWV) in Medicare patient  Very pleasant 77 years old who is up-to-date on mammogram but needs colonoscopy and COVID booster  Coronary artery disease involving native coronary artery of native heart without angina pectoris  now s/p PCI x 1 to pCx and PCI x 3 with overlapping stents to pLAD 9/2/2022 @ [facility] complex lesions  Gastroesophageal reflux disease without esophagitis  I will ask her to take omeprazole at night and famotidine in the morning and I have written to cardiologist because this might interact with her Plavix  -     omeprazole (PRILOSEC) 20 MG DR capsule; Take 1 capsule (20 mg) by mouth 2 times daily  -     famotidine (PEPCID) 40 MG tablet; Take 1 tablet (40 mg) by mouth daily  -     Vitamin B12; Future  -     Vitamin B12    Restless leg syndrome  On Mirapex without side effects  Senile osteoporosis  She has lost height and also has previous osteopenia  -     DX Hip/Pelvis/Spine; Future  -     Vitamin D Deficiency; Future  -     Vitamin D Deficiency    Prediabetes  We will check blood sugar and A1c  -     BASIC METABOLIC PANEL; Future  -     Hemoglobin A1c; Future  -     BASIC METABOLIC PANEL  -     Hemoglobin A1c    Hyperlipidemia LDL goal <70  On Praluent  -     ALT; Future  -     BASIC METABOLIC PANEL; Future  -     Lipid panel reflex to direct LDL Fasting; Future  -     Cancel: Lipid Profile  -     ALT  -     Cancel: ALT  -     BASIC METABOLIC PANEL  -     Lipid panel reflex to direct LDL Fasting    Nipple tenderness  Mammogram was normal and she has  implants  Status post total left knee replacement  All other joints hurt when she takes Tylenol  Anxiety  Occasional use of clonazepam  Adenomatous polyp of colon, unspecified part of colon  -     Colonoscopy Screening  Referral; Future    Also has family history of coronary artery disease        I spoke to cardiology  Advised to change to Protonix from Prilosec  Informed and sent Prescription     COUNSELING:  She declined COVID booster today        She reports that she quit smoking about 48 years ago. Her smoking use included cigarettes. She started smoking about 63 years ago. She has a 7.50 pack-year smoking history. She has never used smokeless tobacco.      Appropriate preventive services were discussed with this patient, including applicable screening as appropriate for cardiovascular disease, diabetes, osteopenia/osteoporosis, and glaucoma.  As appropriate for age/gender, discussed screening for colorectal cancer, breast cancer, and cervical cancer. Checklist reviewing preventive services available has been given to the patient.    Reviewed patients plan of care and provided an AVS. The Complex Care Plan (for patients with higher acuity and needing more deliberate coordination of services) for Hanane meets the Care Plan requirement. This Care Plan has been established and reviewed with the Patient.      Corona Bethea MD  [facility]    Identified Health Risks:

## 2023-01-02 ENCOUNTER — MYC MEDICAL ADVICE (OUTPATIENT)
Dept: FAMILY MEDICINE | Facility: CLINIC | Age: 78
End: 2023-01-02

## 2023-01-03 DIAGNOSIS — K21.9 GASTROESOPHAGEAL REFLUX DISEASE WITHOUT ESOPHAGITIS: Primary | ICD-10-CM

## 2023-01-03 RX ORDER — RABEPRAZOLE SODIUM 20 MG/1
20 TABLET, DELAYED RELEASE ORAL DAILY
Qty: 31 TABLET | Refills: 3 | Status: SHIPPED | OUTPATIENT
Start: 2023-01-03 | End: 2023-08-15

## 2023-01-03 NOTE — TELEPHONE ENCOUNTER
Please see mychart from patient.  Please reply to patient if appropriate, or route back to Triage with follow up needed.  Zack Goldberg RN

## 2023-01-05 ENCOUNTER — HOSPITAL ENCOUNTER (OUTPATIENT)
Dept: BONE DENSITY | Facility: CLINIC | Age: 78
Discharge: HOME | End: 2023-01-05
Attending: INTERNAL MEDICINE | Admitting: INTERNAL MEDICINE
Payer: COMMERCIAL

## 2023-01-05 DIAGNOSIS — M81.0 SENILE OSTEOPOROSIS: ICD-10-CM

## 2023-01-05 PROCEDURE — 77080 DXA BONE DENSITY AXIAL: CPT

## 2023-02-02 ENCOUNTER — MYC MEDICAL ADVICE (OUTPATIENT)
Dept: FAMILY MEDICINE | Facility: CLINIC | Age: 78
End: 2023-02-02
Payer: COMMERCIAL

## 2023-02-02 DIAGNOSIS — Z96.653 STATUS POST TOTAL BILATERAL KNEE REPLACEMENT: ICD-10-CM

## 2023-02-02 RX ORDER — AMOXICILLIN 500 MG/1
2000 CAPSULE ORAL ONCE
Qty: 4 CAPSULE | Refills: 0 | Status: SHIPPED | OUTPATIENT
Start: 2023-02-02 | End: 2023-02-02

## 2023-02-02 NOTE — TELEPHONE ENCOUNTER
To PCP    Patient with coronary angiogram and stable angina pectoris wanting rx for amoxicillin pre dental appointment.    RX and pharmacy pended for your Review.    Deya Bang RN on 2/2/2023 at 4:23 PM

## 2023-02-04 DIAGNOSIS — G25.81 RESTLESS LEG SYNDROME: ICD-10-CM

## 2023-02-06 ENCOUNTER — MYC MEDICAL ADVICE (OUTPATIENT)
Dept: FAMILY MEDICINE | Facility: CLINIC | Age: 78
End: 2023-02-06
Payer: COMMERCIAL

## 2023-02-06 RX ORDER — PRAMIPEXOLE DIHYDROCHLORIDE 0.25 MG/1
TABLET ORAL
Qty: 180 TABLET | Refills: 0 | Status: SHIPPED | OUTPATIENT
Start: 2023-02-06 | End: 2023-04-15

## 2023-02-07 NOTE — TELEPHONE ENCOUNTER
Per chart review, patient was able to  prescription:        No further action needed, signing encounter.    Vera Camara RN  [facility]

## 2023-02-08 DIAGNOSIS — E78.2 MIXED HYPERLIPIDEMIA: Primary | ICD-10-CM

## 2023-02-08 DIAGNOSIS — I25.84 CORONARY ATHEROSCLEROSIS DUE TO CALCIFIED CORONARY LESION (CODE): ICD-10-CM

## 2023-02-09 ENCOUNTER — NURSE TRIAGE (OUTPATIENT)
Dept: FAMILY MEDICINE | Facility: CLINIC | Age: 78
End: 2023-02-09
Payer: COMMERCIAL

## 2023-02-09 NOTE — TELEPHONE ENCOUNTER
dry cough  (Newest Message First)  Hanane BINGHAM  Triage Im (supporting Corona Bethea MD) 30 minutes ago (10:31 AM)     I have what is going around which involves a constant dry cough.  I tried looking this up but couldn't find anything.  Will this coughing have any effect on my stents?  Thank you    ONSET: had sore throat for a while, yesterday felt good in afternoon. Last night cough started     Tested negative for COVID      No sputum      had cough first     Stents: placed Sept 2nd of last year   Coughing heavily, dry deep cough   Breathing concerns? Denies SOB     Fever? Did not check. Chilled? A little bit     Has nasal congestion, no wheezing     Offered appointment/home care measures. Pt wants to try home care for now, cough is not severe     Triaged per Epic Triage Protocol, gave care advice which patient plans to follow.  See Care advice tab for more information.  Patent to call back if further questions or concerns.      Reason for Disposition    Cough with no complications    Additional Information    Negative: Bluish (or gray) lips or face    Negative: SEVERE difficulty breathing (e.g., struggling for each breath, speaks in single words)    Negative: Rapid onset of cough and has hives    Negative: Coughing started suddenly after medicine, an allergic food or bee sting    Negative: Difficulty breathing after exposure to flames, smoke, or fumes    Negative: Sounds like a life-threatening emergency to the triager    Negative: Previous asthma attacks and this feels like asthma attack    Negative: Dry cough (non-productive; no sputum or minimal clear sputum) and within 14 days of COVID-19 Exposure    Negative: MODERATE difficulty breathing (e.g., speaks in phrases, SOB even at rest, pulse 100-120) and still present when not coughing    Negative: Chest pain present when not coughing    Negative: Passed out (i.e., fainted, collapsed and was not responding)    Negative: Patient sounds very sick or  weak to the triager    Negative: MILD difficulty breathing (e.g., minimal/no SOB at rest, SOB with walking, pulse <100) and still present when not coughing    Negative: Coughed up > 1 tablespoon (15 ml) blood (Exception: Blood-tinged sputum.)    Negative: Fever > 103 F (39.4 C)    Negative: Fever > 101 F (38.3 C) and over 60 years of age    Negative: Fever > 100.0 F (37.8 C) and has diabetes mellitus or a weak immune system (e.g., HIV positive, cancer chemotherapy, organ transplant, splenectomy, chronic steroids)    Negative: Fever > 100.0 F (37.8 C) and bedridden (e.g., nursing home patient, stroke, chronic illness, recovering from surgery)    Negative: Increasing ankle swelling    Negative: Wheezing is present    Negative: SEVERE coughing spells (e.g., whooping sound after coughing, vomiting after coughing)    Negative: Coughing up paty-colored (reddish-brown) or blood-tinged sputum    Negative: Fever present > 3 days (72 hours)    Negative: Fever returns after gone for over 24 hours and symptoms worse or not improved    Negative: Using nasal washes and pain medicine > 24 hours and sinus pain persists    Negative: Known COPD or other severe lung disease (i.e., bronchiectasis, cystic fibrosis, lung surgery) and worsening symptoms (i.e., increased sputum purulence or amount, increased breathing difficulty)    Negative: Continuous (nonstop) coughing interferes with work or school and no improvement using cough treatment per Care Advice    Negative: Patient wants to be seen    Negative: Cough has been present for > 3 weeks    Negative: Allergy symptoms are also present (e.g., itchy eyes, clear nasal discharge, postnasal drip)    Negative: Nasal discharge present > 10 days    Negative: Exposure to TB (Tuberculosis)    Negative: Taking an ACE Inhibitor medication (e.g., benazepril/LOTENSIN, captopril/CAPOTEN, enalapril/VASOTEC, lisinopril/ZESTRIL)    Protocols used: COUGH-A-OH

## 2023-02-13 ENCOUNTER — LAB (OUTPATIENT)
Dept: LAB | Facility: CLINIC | Age: 78
End: 2023-02-13
Payer: COMMERCIAL

## 2023-02-13 DIAGNOSIS — I25.84 CORONARY ATHEROSCLEROSIS DUE TO CALCIFIED CORONARY LESION (CODE): ICD-10-CM

## 2023-02-13 DIAGNOSIS — E78.2 MIXED HYPERLIPIDEMIA: ICD-10-CM

## 2023-02-13 LAB
ALT SERPL W P-5'-P-CCNC: 20 U/L (ref 10–35)
CHOLEST SERPL-MCNC: 133 MG/DL
HDLC SERPL-MCNC: 44 MG/DL
LDLC SERPL CALC-MCNC: 67 MG/DL
NONHDLC SERPL-MCNC: 89 MG/DL
TRIGL SERPL-MCNC: 109 MG/DL

## 2023-02-13 PROCEDURE — 80061 LIPID PANEL: CPT | Performed by: INTERNAL MEDICINE

## 2023-02-13 PROCEDURE — 84460 ALANINE AMINO (ALT) (SGPT): CPT | Performed by: INTERNAL MEDICINE

## 2023-02-13 PROCEDURE — 36415 COLL VENOUS BLD VENIPUNCTURE: CPT | Performed by: INTERNAL MEDICINE

## 2023-02-14 ENCOUNTER — E-VISIT (OUTPATIENT)
Dept: URGENT CARE | Facility: CLINIC | Age: 78
End: 2023-02-14
Payer: COMMERCIAL

## 2023-02-14 DIAGNOSIS — J01.90 ACUTE BACTERIAL SINUSITIS: Primary | ICD-10-CM

## 2023-02-14 DIAGNOSIS — B96.89 ACUTE BACTERIAL SINUSITIS: Primary | ICD-10-CM

## 2023-02-14 PROCEDURE — 99421 OL DIG E/M SVC 5-10 MIN: CPT | Performed by: EMERGENCY MEDICINE

## 2023-02-14 NOTE — PATIENT INSTRUCTIONS
Sinusitis (Antibiotic Treatment)    The sinuses are air-filled spaces within the bones of the face. They connect to the inside of the nose. Sinusitis is an inflammation of the tissue that lines the sinuses. Sinusitis can occur during a cold. It can also happen due to allergies to pollens and other particles in the air. Sinusitis can cause symptoms of sinus congestion and a feeling of fullness. A sinus infection causes fever, headache, and facial pain. There is often green or yellow fluid draining from the nose or into the back of the throat (post-nasal drip). You have been given antibiotics to treat this condition.   Home care    Take the full course of antibiotics as instructed. Don't stop taking them, even when you feel better.    Drink plenty of water, hot tea, and other liquids as directed by the healthcare provider. This may help thin nasal mucus. It also may help your sinuses drain fluids.    Heat may help soothe painful areas of your face. Use a towel soaked in hot water. Or,  the shower and direct the warm spray onto your face. Using a vaporizer along with a menthol rub at night may also help soothe symptoms.     An expectorant with guaifenesin may help thin nasal mucus and help your sinuses drain fluids. Talk with your provider or pharmacists before taking an over-the-counter (OTC) medicine if you have any questions about it or its side effects..    You can use an OTC decongestant, unless a similar medicine was prescribed to you. Nasal sprays work the fastest. Use one that contains phenylephrine or oxymetazoline. First blow your nose gently. Then use the spray. Don't use these medicines more often than directed on the label. If you do, your symptoms may get worse. You may also take pills that contain pseudoephedrine. Don t use products that combine multiple medicines. This is because side effects may be increased. Read labels. You can also ask the pharmacist for help. (People with high blood  pressure should not use decongestants. They can raise blood pressure.) Talk with your provider or pharmacist if you have any questions about the medicine..    OTC antihistamines may help if allergies contributed to your sinusitis. Talk with your provider or pharmacist if you have any questions about the medicine..    Don't use nasal rinses or irrigation during an acute sinus infection, unless your healthcare provider tells you to. Rinsing may spread the infection to other areas in your sinuses.    Use acetaminophen or ibuprofen to control pain, unless another pain medicine was prescribed to you. If you have chronic liver or kidney disease or ever had a stomach ulcer, talk with your healthcare provider before using these medicines. Never give aspirin to anyone under age 18 who is ill with a fever. It may cause severe liver damage.    Don't smoke. This can make symptoms worse.    Follow-up care  Follow up with your healthcare provider, or as advised.   When to seek medical advice  Call your healthcare provider if any of these occur:     Facial pain or headache that gets worse    Stiff neck    Unusual drowsiness or confusion    Swelling of your forehead or eyelids    Symptoms don't go away in 10 days    Vision problems, such as blurred or double vision    Fever of 100.4 F (38 C) or higher, or as directed by your healthcare provider  Call 911  Call 911 if any of these occur:     Seizure    Trouble breathing    Feeling dizzy or faint    Fingernails, skin or lips look blue, purple , or gray  Prevention  Here are steps you can take to help prevent an infection:     Keep good hand washing habits.    Don t have close contact with people who have sore throats, colds, or other upper respiratory infections.    Don t smoke, and stay away from secondhand smoke.    Stay up to date with of your vaccines.  [employer] last reviewed this educational content on 12/1/2019 2000-2021 [employer]. All rights reserved. This  information is not intended as a substitute for professional medical care. Always follow your healthcare professional's instructions.        Dear Hanane Sorto        Based on your responses and diagnosis, I have prescribed Augmentin to treat your symptoms. I have sent this to your pharmacy.?     It is also important to stay well hydrated, get lots of rest and take over-the-counter decongestants,?tylenol?or ibuprofen if you?are able to?take those medications per your primary care provider to help relieve discomfort.?     It is important that you take?all of?your prescribed medication even if your symptoms are improving after a few doses.? Taking?all of?your medicine helps prevent the symptoms from returning.?     If your symptoms worsen, you develop severe headache, vomiting, high fever (>102), or are not improving in 7 days, please contact your primary care provider for an appointment or visit any of our convenient Walk-in Care or Urgent Care Centers to be seen which can be found on our website?here.?     Thanks again for choosing?us?as your health care partner,?   ?  Jaren Sharma MD?

## 2023-02-15 ENCOUNTER — OFFICE VISIT (OUTPATIENT)
Dept: CARDIOLOGY | Facility: CLINIC | Age: 78
End: 2023-02-15
Attending: PHYSICIAN ASSISTANT
Payer: COMMERCIAL

## 2023-02-15 VITALS
DIASTOLIC BLOOD PRESSURE: 74 MMHG | HEIGHT: 58 IN | OXYGEN SATURATION: 98 % | HEART RATE: 61 BPM | SYSTOLIC BLOOD PRESSURE: 119 MMHG | WEIGHT: 106 LBS | BODY MASS INDEX: 22.25 KG/M2

## 2023-02-15 DIAGNOSIS — I25.10 CORONARY ARTERY DISEASE INVOLVING NATIVE CORONARY ARTERY OF NATIVE HEART WITHOUT ANGINA PECTORIS: Primary | ICD-10-CM

## 2023-02-15 DIAGNOSIS — E78.5 HYPERLIPIDEMIA LDL GOAL <70: ICD-10-CM

## 2023-02-15 DIAGNOSIS — I25.84 CORONARY ATHEROSCLEROSIS DUE TO CALCIFIED CORONARY LESION (CODE): ICD-10-CM

## 2023-02-15 DIAGNOSIS — Z98.61 S/P PTCA (PERCUTANEOUS TRANSLUMINAL CORONARY ANGIOPLASTY): ICD-10-CM

## 2023-02-15 PROCEDURE — 99215 OFFICE O/P EST HI 40 MIN: CPT | Performed by: INTERNAL MEDICINE

## 2023-02-15 RX ORDER — BISACODYL 5 MG/1
5 TABLET, DELAYED RELEASE ORAL DAILY PRN
COMMUNITY

## 2023-02-15 NOTE — LETTER
2/15/2023    Corona Bethea MD  [address]  [facility] [address]    RE: Hanane Sorto       Dear Colleague,     I had the pleasure of seeing Hanane Sorto in the [facility].  REASON FOR VISIT:  Follow up for Coronary artery disease     PRIMARY PHYSICIAN:  Corona Bethea MD    HISTORY OF PRESENT ILLNESS:  I had the pleasure of seeing Hanane Sorto at the [facility] in [place] this morning.  She is a very pleasant 77-year-old female with history of coronary artery disease, hypertension, hyperlipidemia and osteoarthritis who is here for follow-up.    She was evaluated for exertional chest tightness last summer.  She underwent coronary angiography which revealed multivessel coronary disease.  She subsequently came to see me to discuss possible percutaneous revascularization versus surgery as she was not keen on pursuing surgery.  We reviewed her coronary angiography and felt that percutaneous revascularization was a good option.  She later went to [facility] for further opinion and ultimately had PCI of the circumflex as well as the LAD.  Her symptoms resolved after her revascularization.    She is very active and plays tennis 2-3 times per week.  She does not endorse any cardiopulmonary symptoms at this point.  Her risk factors including her hyperlipidemia are well controlled.  She was previously intolerant of statins but has been tolerating PCSK9 inhibitor over the past several months.  I reviewed her most recent lipid profile, basic metabolic panel and liver function test.  Her LDL is 67 mg/dL.    I also reviewed her most recent echocardiogram which showed preserved LV function with an EF of 55%.    ASSESSMENT AND PLAN:  A very pleasant 77-year-old female with coronary disease and prior PCI of the LAD and circumflex.  She remains very stable from cardiac point of view and does not endorse any cardiopulmonary symptoms.  She is currently part of a study under the [facility] looking  at safety and efficacy of rapid de-escalation of dual antiplatelet therapy.  Her aspirin was discontinued as part of the study and she is currently on monotherapy with Plavix.  She can continue Plavix lifelong or substitute for low-dose aspirin after completing the 12-month duration.  We will see in approximately 6 months for follow-up but sooner if she develops symptoms.    It was a pleasure seeing Ms. Sorto in the clinic this morning.  I appreciate the opportunity to participate in her care.    Luis Moffett MD        Today's clinic visit entailed:  40 minutes spent on the date of the encounter doing chart review, history and exam, documentation and further activities per the note  Provider  Link to MDM Help Grid       Orders Placed This Encounter   Procedures     Follow-Up with Cardiology JEANA       Orders Placed This Encounter   Medications     omeprazole (PRILOSEC) 20 MG DR capsule     Mpjrnrsfo-Oxnoxszct-TX-APAP (MUCINEX SINUS-MAX DAY/NIGHT PO)     bisacodyl (DULCOLAX) 5 MG EC tablet     Sig: Take 5 mg by mouth daily as needed for constipation     UNABLE TO FIND     Sig: MEDICATION NAME: phillps laxative       There are no discontinued medications.      Encounter Diagnoses   Name Primary?     Coronary atherosclerosis due to calcified coronary lesion (CODE)       Hyperlipidemia LDL goal <70      S/P PTCA (percutaneous transluminal coronary angioplasty)      Coronary artery disease involving native coronary artery of native heart without angina pectoris Yes       CURRENT MEDICATIONS:  Current Outpatient Medications   Medication Sig Dispense Refill     alirocumab (PRALUENT) 75 MG/ML injectable pen Inject 1 mL (75 mg) Subcutaneous every 14 days       bisacodyl (DULCOLAX) 5 MG EC tablet Take 5 mg by mouth daily as needed for constipation       Calcium-Phosphorus-Vitamin D (CALCIUM GUMMIES PO) Take 2 Gum by mouth daily       cholecalciferol (VITAMIN D3) 25 mcg (1000 units) capsule Take 1 capsule by mouth daily        clonazePAM (KLONOPIN) 0.5 MG ODT DISSOLVE 1 TABLET(0.5 MG) ON THE TONGUE EVERY NIGHT AS NEEDED FOR ANXIETY 20 tablet 3     clopidogrel (PLAVIX) 75 MG tablet Take 1 tablet (75 mg) by mouth daily 90 tablet 3     famotidine (PEPCID) 40 MG tablet Take 1 tablet (40 mg) by mouth daily 90 tablet 3     metoprolol succinate ER (TOPROL XL) 25 MG 24 hr tablet Take 0.5 tablets (12.5 mg) by mouth daily 90 tablet 3     nitroGLYcerin (NITROSTAT) 0.4 MG sublingual tablet For chest pain place 1 tablet under the tongue every 5 minutes for 3 doses. If symptoms persist 5 minutes after 1st dose call 911. 25 tablet 1     omeprazole (PRILOSEC) 20 MG DR capsule        Vidqjcchv-Chhriymbv-TY-APAP (MUCINEX SINUS-MAX DAY/NIGHT PO)        pramipexole (MIRAPEX) 0.25 MG tablet TAKE 1 TABLET BY MOUTH TWICE DAILY AT 5 PM AND AT BEDTIME 180 tablet 0     STATIN NOT PRESCRIBED (INTENTIONAL) Please choose reason not prescribed from choices below.       UNABLE TO FIND MEDICATION NAME: cade laxative       amoxicillin-clavulanate (AUGMENTIN) 875-125 MG tablet Take 1 tablet by mouth 2 times daily for 7 days (Patient not taking: Reported on 2/15/2023) 14 tablet 0     RABEprazole (ACIPHEX) 20 MG EC tablet Take 1 tablet (20 mg) by mouth daily (Patient not taking: Reported on 2/15/2023) 31 tablet 3     sucralfate (CARAFATE) 1 GM tablet Take 1 tablet (1 g) by mouth 2 times daily (Patient not taking: Reported on 2/15/2023) 60 tablet 3       ALLERGIES     Allergies   Allergen Reactions     Crestor [Rosuvastatin]      Ezetimibe Other (See Comments)     Zetia = leg cramps      Hmg-Coa-R Inhibitors Other (See Comments)     Leg cramping     Miralax [Polyethylene Glycol] Other (See Comments)     Back pain     Pravastatin Other (See Comments)     Leg cramps        PAST MEDICAL HISTORY:  Past Medical History:   Diagnosis Date     Anxiety     psych yearly, Dr Shea     Family hx of colon cancer     sister     Floaters     Karma rocael     Fracture of wrist,  left, with routine healing, subsequent encounter 2017     GERD (gastroesophageal reflux disease)      Gross hematuria 2011     Hyperlipidemia LDL goal < 130      Hyperlipidemia LDL goal <130 2017     Hypertension May 3,2022     Malnutrition (H) 2017     Osteoarthritis 2012     Osteoporosis     Dexa     PONV (postoperative nausea and vomiting)      Restless legs      Skin cancer      Stable angina pectoris due to arteriosclerosis of coronary artery (H) 2022       PAST SURGICAL HISTORY:  Past Surgical History:   Procedure Laterality Date     ARTHROPLASTY KNEE Left 2017    Procedure: ARTHROPLASTY KNEE;  LEFT TOTAL KNEE ARTHROPLASTY;  Surgeon: Lee Ayala MD;  Location:  OR     BIOPSY   approx.    vaginal     BREAST SURGERY      Implants     BUNIONECTOMY      both      SECTION       COLONOSCOPY  8-10     CV CORONARY ANGIOGRAM N/A 2022    Procedure: Coronary Angiogram;  Surgeon: Lea Sanchez MD;  Location:  HEART CARDIAC CATH LAB     EYE SURGERY  2014    cataract surgery in both eyes     LIPOSUCTION (LOCATION)       RECONSTRUCT BREAST, IMPLANT PROSTHESIS, COMBINED         FAMILY HISTORY:  Family History   Problem Relation Age of Onset     Lipids Mother      Alzheimer Disease Mother      Depression Mother      Osteoporosis Mother         RA     C.A.DRussel Father      Coronary Artery Disease Father      Hypertension Father      Diabetes Father      Breast Cancer Sister      Leukemia Sister      Hyperlipidemia Sister      Colon Cancer Sister              Cerebrovascular Disease Brother        SOCIAL HISTORY:  Social History     Socioeconomic History     Marital status:      Spouse name: None     Number of children: None     Years of education: None     Highest education level: None   Tobacco Use     Smoking status: Former     Packs/day: 0.50     Years: 15.00     Pack years: 7.50     Types: Cigarettes     Start date:  "1960     Quit date: 1975     Years since quittin.1     Smokeless tobacco: Never     Tobacco comments:     one pack/ three months   Substance and Sexual Activity     Alcohol use: Yes     Comment: 1-2 glasses of wine on weekends     Drug use: No     Sexual activity: Not Currently     Partners: Male     Birth control/protection: Post-menopausal   Other Topics Concern     Exercise Yes     Parent/sibling w/ CABG, MI or angioplasty before 65F 55M? No       Review of Systems:  Skin:  not assessed       Eyes:  not assessed glasses    ENT:  not assessed nasal congestion    Respiratory:  Negative shortness of breath;dyspnea on exertion     Cardiovascular:  Negative for;palpitations;chest pain;edema;fatigue;lightheadedness;dizziness Positive for;dizziness    Gastroenterology: not assessed      Genitourinary:  Negative      Musculoskeletal:  Positive for arthritis;joint pain    Neurologic:  Negative headaches    Psychiatric:  Positive for sleep disturbances    Heme/Lymph/Imm:  Negative allergies    Endocrine:  Negative thyroid disorder;diabetes      Physical Exam:  Vitals: /74   Pulse 61   Ht 1.473 m (4' 10\")   Wt 48.1 kg (106 lb)   SpO2 98%   BMI 22.15 kg/m      Constitutional:  cooperative;in no acute distress        Skin:  warm and dry to the touch          Head:  normocephalic        Eyes:  conjunctivae and lids unremarkable        Lymph:      ENT:  no pallor or cyanosis        Neck:  JVP normal;no carotid bruit        Respiratory:  clear to auscultation         Cardiac: regular rhythm;no murmurs, gallops or rubs detected                pulses full and equal                                        GI:  abdomen soft;non-tender;no bruits        Extremities and Muscular Skeletal:  no edema              Neurological:  no gross motor deficits        Psych:  Alert and Oriented x 3        CC  Kristina Masterson PA-C  [address]  [place] [address]    Thank you for allowing me to participate " in the care of your patient.      Sincerely,     Luis Moffett MD, MD     [facility]

## 2023-02-15 NOTE — PROGRESS NOTES
REASON FOR VISIT:  Follow up for Coronary artery disease     PRIMARY PHYSICIAN:  Corona Bethea MD    HISTORY OF PRESENT ILLNESS:  I had the pleasure of seeing Hanane Sorto at the [facility] in [place] this morning.  She is a very pleasant 77-year-old female with history of coronary artery disease, hypertension, hyperlipidemia and osteoarthritis who is here for follow-up.    She was evaluated for exertional chest tightness last summer.  She underwent coronary angiography which revealed multivessel coronary disease.  She subsequently came to see me to discuss possible percutaneous revascularization versus surgery as she was not keen on pursuing surgery.  We reviewed her coronary angiography and felt that percutaneous revascularization was a good option.  She later went to [facility] for further opinion and ultimately had PCI of the circumflex as well as the LAD.  Her symptoms resolved after her revascularization.    She is very active and plays tennis 2-3 times per week.  She does not endorse any cardiopulmonary symptoms at this point.  Her risk factors including her hyperlipidemia are well controlled.  She was previously intolerant of statins but has been tolerating PCSK9 inhibitor over the past several months.  I reviewed her most recent lipid profile, basic metabolic panel and liver function test.  Her LDL is 67 mg/dL.    I also reviewed her most recent echocardiogram which showed preserved LV function with an EF of 55%.    ASSESSMENT AND PLAN:  A very pleasant 77-year-old female with coronary disease and prior PCI of the LAD and circumflex.  She remains very stable from cardiac point of view and does not endorse any cardiopulmonary symptoms.  She is currently part of a study under the [facility] looking at safety and efficacy of rapid de-escalation of dual antiplatelet therapy.  Her aspirin was discontinued as part of the study and she is currently on monotherapy with Plavix.  She can continue Plavix  lifelong or substitute for low-dose aspirin after completing the 12-month duration.  We will see in approximately 6 months for follow-up but sooner if she develops symptoms.    It was a pleasure seeing Ms. Sorto in the clinic this morning.  I appreciate the opportunity to participate in her care.    Luis Moffett MD        Today's clinic visit entailed:  40 minutes spent on the date of the encounter doing chart review, history and exam, documentation and further activities per the note  Provider  Link to [facility] Help Grid       Orders Placed This Encounter   Procedures     Follow-Up with Cardiology JEANA       Orders Placed This Encounter   Medications     omeprazole (PRILOSEC) 20 MG DR capsule     Glsolmoex-Jfcqmzvzp-AC-APAP (MUCINEX SINUS-MAX DAY/NIGHT PO)     bisacodyl (DULCOLAX) 5 MG EC tablet     Sig: Take 5 mg by mouth daily as needed for constipation     UNABLE TO FIND     Sig: MEDICATION NAME: phillps laxative       There are no discontinued medications.      Encounter Diagnoses   Name Primary?     Coronary atherosclerosis due to calcified coronary lesion (CODE)       Hyperlipidemia LDL goal <70      S/P PTCA (percutaneous transluminal coronary angioplasty)      Coronary artery disease involving native coronary artery of native heart without angina pectoris Yes       CURRENT MEDICATIONS:  Current Outpatient Medications   Medication Sig Dispense Refill     alirocumab (PRALUENT) 75 MG/ML injectable pen Inject 1 mL (75 mg) Subcutaneous every 14 days       bisacodyl (DULCOLAX) 5 MG EC tablet Take 5 mg by mouth daily as needed for constipation       Calcium-Phosphorus-Vitamin D (CALCIUM GUMMIES PO) Take 2 Gum by mouth daily       cholecalciferol (VITAMIN D3) 25 mcg (1000 units) capsule Take 1 capsule by mouth daily       clonazePAM (KLONOPIN) 0.5 MG ODT DISSOLVE 1 TABLET(0.5 MG) ON THE TONGUE EVERY NIGHT AS NEEDED FOR ANXIETY 20 tablet 3     clopidogrel (PLAVIX) 75 MG tablet Take 1 tablet (75 mg) by mouth daily 90  tablet 3     famotidine (PEPCID) 40 MG tablet Take 1 tablet (40 mg) by mouth daily 90 tablet 3     metoprolol succinate ER (TOPROL XL) 25 MG 24 hr tablet Take 0.5 tablets (12.5 mg) by mouth daily 90 tablet 3     nitroGLYcerin (NITROSTAT) 0.4 MG sublingual tablet For chest pain place 1 tablet under the tongue every 5 minutes for 3 doses. If symptoms persist 5 minutes after 1st dose call 911. 25 tablet 1     omeprazole (PRILOSEC) 20 MG DR capsule        Lqpdcpkas-Finvzowrx-GC-APAP (MUCINEX SINUS-MAX DAY/NIGHT PO)        pramipexole (MIRAPEX) 0.25 MG tablet TAKE 1 TABLET BY MOUTH TWICE DAILY AT 5 PM AND AT BEDTIME 180 tablet 0     STATIN NOT PRESCRIBED (INTENTIONAL) Please choose reason not prescribed from choices below.       UNABLE TO FIND MEDICATION NAME: phillbibi laxative       amoxicillin-clavulanate (AUGMENTIN) 875-125 MG tablet Take 1 tablet by mouth 2 times daily for 7 days (Patient not taking: Reported on 2/15/2023) 14 tablet 0     RABEprazole (ACIPHEX) 20 MG EC tablet Take 1 tablet (20 mg) by mouth daily (Patient not taking: Reported on 2/15/2023) 31 tablet 3     sucralfate (CARAFATE) 1 GM tablet Take 1 tablet (1 g) by mouth 2 times daily (Patient not taking: Reported on 2/15/2023) 60 tablet 3       ALLERGIES     Allergies   Allergen Reactions     Crestor [Rosuvastatin]      Ezetimibe Other (See Comments)     Zetia = leg cramps      Hmg-Coa-R Inhibitors Other (See Comments)     Leg cramping     Miralax [Polyethylene Glycol] Other (See Comments)     Back pain     Pravastatin Other (See Comments)     Leg cramps        PAST MEDICAL HISTORY:  Past Medical History:   Diagnosis Date     Anxiety     psych yearly, Dr Shea     Family hx of colon cancer     sister     Floaters     Karma rocael     Fracture of wrist, left, with routine healing, subsequent encounter 08/28/2017     GERD (gastroesophageal reflux disease)      Gross hematuria 09/12/2011     Hyperlipidemia LDL goal < 130      Hyperlipidemia LDL goal  <130 2017     Hypertension May 3,2022     Malnutrition (H) 2017     Osteoarthritis 2012     Osteoporosis     Dexa     PONV (postoperative nausea and vomiting)      Restless legs      Skin cancer      Stable angina pectoris due to arteriosclerosis of coronary artery (H) 2022       PAST SURGICAL HISTORY:  Past Surgical History:   Procedure Laterality Date     ARTHROPLASTY KNEE Left 2017    Procedure: ARTHROPLASTY KNEE;  LEFT TOTAL KNEE ARTHROPLASTY;  Surgeon: Lee Ayala MD;  Location:  OR     BIOPSY   approx.    vaginal     BREAST SURGERY      Implants     BUNIONECTOMY      both      SECTION       COLONOSCOPY  8-10     CV CORONARY ANGIOGRAM N/A 2022    Procedure: Coronary Angiogram;  Surgeon: Lea Sanchez MD;  Location:  HEART CARDIAC CATH LAB     EYE SURGERY  2014    cataract surgery in both eyes     LIPOSUCTION (LOCATION)       RECONSTRUCT BREAST, IMPLANT PROSTHESIS, COMBINED         FAMILY HISTORY:  Family History   Problem Relation Age of Onset     Lipids Mother      Alzheimer Disease Mother      Depression Mother      Osteoporosis Mother         RA     C.A.D. Father      Coronary Artery Disease Father      Hypertension Father      Diabetes Father      Breast Cancer Sister      Leukemia Sister      Hyperlipidemia Sister      Colon Cancer Sister              Cerebrovascular Disease Brother        SOCIAL HISTORY:  Social History     Socioeconomic History     Marital status:      Spouse name: None     Number of children: None     Years of education: None     Highest education level: None   Tobacco Use     Smoking status: Former     Packs/day: 0.50     Years: 15.00     Pack years: 7.50     Types: Cigarettes     Start date: 1960     Quit date: 1975     Years since quittin.1     Smokeless tobacco: Never     Tobacco comments:     one pack/ three months   Substance and Sexual Activity     Alcohol use: Yes      "Comment: 1-2 glasses of wine on weekends     Drug use: No     Sexual activity: Not Currently     Partners: Male     Birth control/protection: Post-menopausal   Other Topics Concern     Exercise Yes     Parent/sibling w/ CABG, MI or angioplasty before 65F 55M? No       Review of Systems:  Skin:  not assessed       Eyes:  not assessed glasses    ENT:  not assessed nasal congestion    Respiratory:  Negative shortness of breath;dyspnea on exertion     Cardiovascular:  Negative for;palpitations;chest pain;edema;fatigue;lightheadedness;dizziness Positive for;dizziness    Gastroenterology: not assessed      Genitourinary:  Negative      Musculoskeletal:  Positive for arthritis;joint pain    Neurologic:  Negative headaches    Psychiatric:  Positive for sleep disturbances    Heme/Lymph/Imm:  Negative allergies    Endocrine:  Negative thyroid disorder;diabetes      Physical Exam:  Vitals: /74   Pulse 61   Ht 1.473 m (4' 10\")   Wt 48.1 kg (106 lb)   SpO2 98%   BMI 22.15 kg/m      Constitutional:  cooperative;in no acute distress        Skin:  warm and dry to the touch          Head:  normocephalic        Eyes:  conjunctivae and lids unremarkable        Lymph:      ENT:  no pallor or cyanosis        Neck:  JVP normal;no carotid bruit        Respiratory:  clear to auscultation         Cardiac: regular rhythm;no murmurs, gallops or rubs detected                pulses full and equal                                        GI:  abdomen soft;non-tender;no bruits        Extremities and Muscular Skeletal:  no edema              Neurological:  no gross motor deficits        Psych:  Alert and Oriented x 3        CC  Kristina Masterson PA-C  [address]  [place] [address]              "

## 2023-03-05 ENCOUNTER — MYC MEDICAL ADVICE (OUTPATIENT)
Dept: CARDIOLOGY | Facility: CLINIC | Age: 78
End: 2023-03-05
Payer: COMMERCIAL

## 2023-03-05 DIAGNOSIS — I25.84 CORONARY ATHEROSCLEROSIS DUE TO CALCIFIED CORONARY LESION (CODE): Primary | ICD-10-CM

## 2023-03-05 DIAGNOSIS — E78.5 HYPERLIPIDEMIA LDL GOAL <70: ICD-10-CM

## 2023-03-05 DIAGNOSIS — I25.10 CORONARY ARTERY DISEASE INVOLVING NATIVE CORONARY ARTERY OF NATIVE HEART WITHOUT ANGINA PECTORIS: ICD-10-CM

## 2023-03-06 NOTE — TELEPHONE ENCOUNTER
Received Yilliot message from pt:     Hanane Sorto  [facility] 1  Phone Number: [phone]     I read an article about a new pill called nexletol for people who have issues with the regular statins.  Is this something I might be able to take in place of the injections?     Last visit with Dr. Moffett on 2/15/23. Pt is currently on Praluent. Will review with Dr. Moffett.     Addendum 3/6/23 - Received additional information from pt:     New Statin  Received: Today  Hanane Sorto  [facility] Team 1  Phone Number: [phone]     We are having issues with insurance.  In order to stay on praluent, I need to change to [facility].  The praluent on the insurance I have now is $700 for three months. The [employer] I have now will cover the repatha.  So I would need to change prescriptions.  I have had an easier time injecting with the praluent and I need to make a decision by the end of March.  I need an injection this Saturday so I am going to do the repatha (I have two months left over from last prescription).  If all is well, I will stay on repatha and will eventually need another prescription.  I will let you know.  If the pills worked as well, I just thought it would be easier to use.  I will wait to hear from you and I will let you know where I am at also next week.  Thank you.

## 2023-03-20 NOTE — TELEPHONE ENCOUNTER
Received response from Dr. Moffett:     Luis Moffett MD Hair, Ashley W, RN  Phone Number: [phone]     Nexletol is recommended for patients who have uncontrolled cholesterol despite taking statins or PCSK9 inhibitors. Since we are able to control her lipids with PCSK9 inhibitor, adding Nexletol is not necessary. She should take Repatha or Praluent whichever insurance covers.  Thanks      [employer] message sent to pt.

## 2023-04-05 ENCOUNTER — HOSPITAL ENCOUNTER (OUTPATIENT)
Dept: MAMMOGRAPHY | Facility: CLINIC | Age: 78
Discharge: HOME | End: 2023-04-05
Attending: INTERNAL MEDICINE | Admitting: INTERNAL MEDICINE
Payer: COMMERCIAL

## 2023-04-05 DIAGNOSIS — Z12.31 VISIT FOR SCREENING MAMMOGRAM: ICD-10-CM

## 2023-04-05 PROCEDURE — 77063 BREAST TOMOSYNTHESIS BI: CPT

## 2023-04-10 ENCOUNTER — TELEPHONE (OUTPATIENT)
Dept: CARDIOLOGY | Facility: CLINIC | Age: 78
End: 2023-04-10
Payer: COMMERCIAL

## 2023-04-10 NOTE — TELEPHONE ENCOUNTER
Central Prior Authorization Team   Phone: [phone]    PA Initiation    Medication: Prior Authorization request  Insurance Company: [employer] - Phone [phone] Fax [phone]  Pharmacy Filling the Rx: [facility] - [place], [place] - [address]  Filling Pharmacy Phone: [phone]  Filling Pharmacy Fax:    Start Date: 4/10/2023

## 2023-04-10 NOTE — TELEPHONE ENCOUNTER
[facility] Call Center    Phone Message    May a detailed message be left on voicemail: yes     Reason for Call: Other: Bette from [employer] called to obtain prior authorization for Repatha medication along with patients diagnosis, and other cliical information. Please call Bette back at [phone] option 3, reference number is [id number]     Action Taken: Other: cardiology    Travel Screening: Not Applicable     Thank you!  [facility]

## 2023-04-11 NOTE — TELEPHONE ENCOUNTER
Prior Authorization Approval    Authorization Effective Date: 1/10/2023  Authorization Expiration Date: 4/10/2024  Medication: Prior Authorization request  Approved Dose/Quantity:   Reference #:     Insurance Company: [employer] - Phone [phone] Fax [phone]  Expected CoPay:       CoPay Card Available:      Foundation Assistance Needed:    Which Pharmacy is filling the prescription (Not needed for infusion/clinic administered): [facility] - [place], [place]  [facility] [address]  Pharmacy Notified: Yes  Patient Notified: Yes

## 2023-04-15 ENCOUNTER — MYC REFILL (OUTPATIENT)
Dept: FAMILY MEDICINE | Facility: CLINIC | Age: 78
End: 2023-04-15
Payer: COMMERCIAL

## 2023-04-15 DIAGNOSIS — G25.81 RESTLESS LEG SYNDROME: ICD-10-CM

## 2023-04-17 RX ORDER — PRAMIPEXOLE DIHYDROCHLORIDE 0.25 MG/1
TABLET ORAL
Qty: 180 TABLET | Refills: 0 | Status: SHIPPED | OUTPATIENT
Start: 2023-04-17 | End: 2023-07-27

## 2023-05-15 ENCOUNTER — TRANSFERRED RECORDS (OUTPATIENT)
Dept: HEALTH INFORMATION MANAGEMENT | Facility: CLINIC | Age: 78
End: 2023-05-15
Payer: COMMERCIAL

## 2023-05-17 ENCOUNTER — TRANSFERRED RECORDS (OUTPATIENT)
Dept: HEALTH INFORMATION MANAGEMENT | Facility: CLINIC | Age: 78
End: 2023-05-17
Payer: COMMERCIAL

## 2023-07-21 ENCOUNTER — MYC MEDICAL ADVICE (OUTPATIENT)
Dept: CARDIOLOGY | Facility: CLINIC | Age: 78
End: 2023-07-21
Payer: COMMERCIAL

## 2023-07-21 DIAGNOSIS — I25.118 STABLE ANGINA PECTORIS DUE TO ARTERIOSCLEROSIS OF CORONARY ARTERY (H): ICD-10-CM

## 2023-07-21 RX ORDER — CLOPIDOGREL BISULFATE 75 MG/1
75 TABLET ORAL DAILY
Qty: 30 TABLET | Refills: 0 | Status: SHIPPED | OUTPATIENT
Start: 2023-07-21 | End: 2023-07-24

## 2023-07-21 NOTE — TELEPHONE ENCOUNTER
Pt sent [employer] message.  She is near the end of her annual plavix prescription.  Ordered 30-day supply + 0 refills, sent to her [facility].  Pt has 6-month follow-up visit with Sandy Masterson on 8/15/23.    [facility] Cardiology Refill Guideline reviewed.  Medication meets criteria for refill.

## 2023-07-24 DIAGNOSIS — I25.118 STABLE ANGINA PECTORIS DUE TO ARTERIOSCLEROSIS OF CORONARY ARTERY (H): ICD-10-CM

## 2023-07-24 RX ORDER — CLOPIDOGREL BISULFATE 75 MG/1
75 TABLET ORAL DAILY
Qty: 90 TABLET | Refills: 0 | Status: SHIPPED | OUTPATIENT
Start: 2023-07-24 | End: 2023-08-15

## 2023-07-26 ENCOUNTER — MYC MEDICAL ADVICE (OUTPATIENT)
Dept: FAMILY MEDICINE | Facility: CLINIC | Age: 78
End: 2023-07-26
Payer: COMMERCIAL

## 2023-07-26 DIAGNOSIS — G25.81 RESTLESS LEG SYNDROME: ICD-10-CM

## 2023-07-27 RX ORDER — PRAMIPEXOLE DIHYDROCHLORIDE 0.25 MG/1
TABLET ORAL
Qty: 180 TABLET | Refills: 0 | Status: SHIPPED | OUTPATIENT
Start: 2023-07-27 | End: 2023-10-16

## 2023-08-13 NOTE — PROGRESS NOTES
"[facility] HEART [facility]    I had the pleasure of seeing Hanane when she came for follow up of CAD.  This 77 year old sees Dr. Frias and has seen Dr. Moffett for her history of:    1.  CAD - now s/p PCI x 1 to pCx and PCI x 3 with overlapping stents to pLAD 9/2/2022 @ [facility].  Enrolled in CHAMP Trial and on monotherapy with clopidogrel  2.  Dyslipidemia - intolerant to many statins and antilipid therapy. Praluent initiated  3.  Palpitations        Last Visit & Interval History:  I saw Hanane 9/2022 at which time we reviewed increasing chest discomfort/SOB.  Angiogram showed significant circumflex and LAD disease.  She was considered for CABG, but ultimately underwent Cx and LAD intervention @ [place].  When I saw her, she was back to playing tennis and walking at least 2-3 miles daily.  She was having no recurrent chest discomfort or \"funny sensation\" in the chest.  No nitroglycerin use, and she was really doing well.  3-month follow-up with , with fasting lipids and echo recommended.    She saw Dr. Moffett 2/2023, who noted significant improvement in LDL on PCSK9 inhibitor.   Echo looked good, with LVEF preserved 55%.  Dr. Moffett recommended lifelong monotherapy vs low-dose ASA after completing the 12 months duration of the CHAMP study.  6-month follow-up recommended.    Today's Visit:  Hanane feels like things are going well.  Denies any problems with chest pain, pressure, tightness.  Remains active, walking and playing tennis routinely.  With this, no reduction in exercise tolerance.  No use of sublingual nitroglycerin.  She is eager to get off clopidogrel therapy (1 year anniversary from stent 9/2/2023) and we discussed starting ASA.    She has not been taking metoprolol.  Despite this, no elevation in blood pressure or palpitations.    No problems with fluid overload.  Denies edema, orthopnea, PND.  Notes some mild \"unsteadiness/imbalance\" but does not limit her activities.  No syncope, palpitations or " "presyncope.      VITALS:  Vitals: /80   Pulse 81   Ht 1.448 m (4' 9\")   Wt 47.2 kg (104 lb)   BMI 22.51 kg/m      Diagnostic Testing:  Angiogram 7/27/2022 o/pLAD 70%, pCx 95%, mCx 70% and p/mRCA 30%.  Echocardiogram 10/2018-LVEF 50-55%. Grade I DD. Nl RV. No sig valve abnls. Nl aorta.  Stress echocardiogram testing 10/2018- wnl  CT Calcium Score 11/2016 CAC 39 (LM 0, LAD 11, Cx 18, RCA 0), placing her in 53rd percentile compared to age/gender matched controls. Non-cardiac portion RLL 3 mm granuloma  Component      Latest Ref Rng 11/2/2021  8:37 AM 12/22/2022  10:07 AM 2/13/2023  10:22 AM   Cholesterol      <200 mg/dL 270 (H)  138  133    Triglycerides      <150 mg/dL 190 (H)  165 (H)  109    HDL Cholesterol      >=50 mg/dL 44 (L)  50  44 (L)    LDL Cholesterol Calculated      <=100 mg/dL 188 (H)  55  67    Non HDL Cholesterol      <130 mg/dL 226 (H)  88  89    Patient Fasting? Yes      ALT      10 - 35 U/L  14  20           Plan:  See Dr. Moffett 2/2024 with fasting labs and echo  Stop clopidogrel and start ASA 81 mg daily after 9/2/2023  Metoprolol removed from medication list    Assessment/Plan:    CAD  As above, underwent angiogram and intervention at [place] 9/2/2022. S/p pCx stent implantation (Synergy 3.0x16mm CHAUNCEY) followed by overlapping 2.5 x 30 mm (Resolute Yaw), 2.5 x 8 mm (Resolute Yaw), and 2.25 x 16 mm (Synergy CHAUNCEY) to LAD via RFA.   Remains on clopidogrel (monotherapy), but eager to get off of this  Echo 10/2022 with normal LVEF    PLAN:  Discontinue clopidogrel after 9/2/2023.  We will start ASA 81 mg daily for antiplatelet therapy  Continue risk factor modification  Metoprolol XL 12.5 mg removed from medication list  No EP follow-up required.  Can continue follow-up with Dr. Moffett and his team    Dyslipidemia  Unable to tolerate statins, and started on PCSK9 inhibitor  Lipids 2/2023 looked great  She is tolerating this without issues    PLAN:  Continue PCSK9 inhibitor  Lipids 2/2024 at time of " Dr. Howard my visit      Sandy Masterson PA-C, MSPAS      Orders Placed This Encounter   Procedures    Lipid Profile    ALT    Follow-Up with Cardiology    Echocardiogram Complete     Orders Placed This Encounter   Medications    aspirin 81 MG EC tablet     Sig: Take 1 tablet (81 mg) by mouth daily     Medications Discontinued During This Encounter   Medication Reason    famotidine (PEPCID) 40 MG tablet Stopped by Patient (No AVS)    RABEprazole (ACIPHEX) 20 MG EC tablet Stopped by Patient (No AVS)    sucralfate (CARAFATE) 1 GM tablet Stopped by Patient (No AVS)    Jwwmzcjjl-Dmdqctppt-IG-APAP (MUCINEX SINUS-MAX DAY/NIGHT PO) Therapy completed (No AVS)    metoprolol succinate ER (TOPROL XL) 25 MG 24 hr tablet Stopped by Patient (No AVS)    clopidogrel (PLAVIX) 75 MG tablet Therapy completed (No AVS)         Encounter Diagnoses   Name Primary?    Coronary artery disease involving native coronary artery of native heart without angina pectoris     Hyperlipidemia LDL goal <70 Yes       CURRENT MEDICATIONS:  Current Outpatient Medications   Medication Sig Dispense Refill    aspirin 81 MG EC tablet Take 1 tablet (81 mg) by mouth daily      bisacodyl (DULCOLAX) 5 MG EC tablet Take 5 mg by mouth daily as needed for constipation      Calcium-Phosphorus-Vitamin D (CALCIUM GUMMIES PO) Take 2 Gum by mouth daily      cholecalciferol (VITAMIN D3) 25 mcg (1000 units) capsule Take 1 capsule by mouth daily      clonazePAM (KLONOPIN) 0.5 MG ODT DISSOLVE 1 TABLET(0.5 MG) ON THE TONGUE EVERY NIGHT AS NEEDED FOR ANXIETY 20 tablet 3    evolocumab (REPATHA) 140 MG/ML prefilled autoinjector Inject 1 mL (140 mg) Subcutaneous every 14 days 6 mL 3    nitroGLYcerin (NITROSTAT) 0.4 MG sublingual tablet For chest pain place 1 tablet under the tongue every 5 minutes for 3 doses. If symptoms persist 5 minutes after 1st dose call 911. 25 tablet 1    omeprazole (PRILOSEC) 20 MG DR capsule Take 1 capsule (20 mg) by mouth 2 times daily 180 capsule 3     "pramipexole (MIRAPEX) 0.25 MG tablet TAKE 1 TABLET BY MOUTH TWICE DAILY AT 5 PM AND AT BEDTIME 180 tablet 0    UNABLE TO FIND MEDICATION NAME: phillbibi laxative      STATIN NOT PRESCRIBED (INTENTIONAL) Please choose reason not prescribed from choices below. (Patient not taking: Reported on 8/15/2023)         ALLERGIES     Allergies   Allergen Reactions    Crestor [Rosuvastatin]     Ezetimibe Other (See Comments)     Zetia = leg cramps     Miralax [Polyethylene Glycol] Other (See Comments)     Back pain    Pravastatin Other (See Comments)     Leg cramps     Statins Other (See Comments)     Leg cramping         Review of Systems:  Skin:  not assessed bruising   Eyes:  not assessed glasses  ENT:  not assessed nasal congestion  Respiratory:  Negative for shortness of breath;dyspnea on exertion  Cardiovascular:  Negative for;palpitations;chest pain;edema;fatigue;lightheadedness;dizziness    Gastroenterology: Negative for melena;hematochezia  Genitourinary:  Negative    Musculoskeletal:  Positive for arthritis;joint pain  Neurologic:  Negative headaches  Psychiatric:  Positive for sleep disturbances  Heme/Lymph/Imm:  Negative allergies  Endocrine:  Negative thyroid disorder;diabetes    Physical Exam:  Vitals: /80   Pulse 81   Ht 1.448 m (4' 9\")   Wt 47.2 kg (104 lb)   BMI 22.51 kg/m      Constitutional:  cooperative;in no acute distress        Skin:  warm and dry to the touch        Head:  normocephalic        Eyes:  conjunctivae and lids unremarkable        ENT:  no pallor or cyanosis        Neck:  JVP normal;no carotid bruit        Chest:  clear to auscultation        Cardiac: regular rhythm;no murmurs, gallops or rubs detected                  Abdomen:  abdomen soft;non-tender;no bruits        Vascular: pulses full and equal                                      Extremities and Back:  no edema        Neurological:  no gross motor deficits            PAST MEDICAL HISTORY:  Past Medical History:   Diagnosis Date "    Anxiety     psych yearly, Dr Shea    Family hx of colon cancer     sister    Floaters     Karma cote    Fracture of wrist, left, with routine healing, subsequent encounter 2017    GERD (gastroesophageal reflux disease)     Gross hematuria 2011    Hyperlipidemia LDL goal < 130     Hyperlipidemia LDL goal <130 2017    Hypertension May 3,2022    Malnutrition (H) 2017    Osteoarthritis 2012    Osteoporosis     Dexa    PONV (postoperative nausea and vomiting)     Restless legs     Skin cancer     Stable angina pectoris due to arteriosclerosis of coronary artery (H) 2022       PAST SURGICAL HISTORY:  Past Surgical History:   Procedure Laterality Date    ARTHROPLASTY KNEE Left 2017    Procedure: ARTHROPLASTY KNEE;  LEFT TOTAL KNEE ARTHROPLASTY;  Surgeon: Lee Ayala MD;  Location:  OR    BIOPSY   approx.    vaginal    BREAST SURGERY      Implants    BUNIONECTOMY      both     SECTION      COLONOSCOPY  8-10    CV CORONARY ANGIOGRAM N/A 2022    Procedure: Coronary Angiogram;  Surgeon: Lea Sanchez MD;  Location:  HEART CARDIAC CATH LAB    EYE SURGERY  2014    cataract surgery in both eyes    LIPOSUCTION (LOCATION)      RECONSTRUCT BREAST, IMPLANT PROSTHESIS, COMBINED         FAMILY HISTORY:  Family History   Problem Relation Age of Onset    Lipids Mother     Alzheimer Disease Mother     Depression Mother     Osteoporosis Mother         RA    KTAALFREDO Father     Coronary Artery Disease Father     Hypertension Father     Diabetes Father     Breast Cancer Sister     Leukemia Sister     Hyperlipidemia Sister     Colon Cancer Sister             Cerebrovascular Disease Brother        SOCIAL HISTORY:  Social History     Socioeconomic History    Marital status:      Spouse name: None    Number of children: None    Years of education: None    Highest education level: None   Tobacco Use    Smoking status: Former      Packs/day: 0.50     Years: 15.00     Pack years: 7.50     Types: Cigarettes     Start date: 1960     Quit date: 1975     Years since quittin.6    Smokeless tobacco: Never    Tobacco comments:     one pack/ three months   Substance and Sexual Activity    Alcohol use: Yes     Comment: 1-2 glasses of wine on weekends    Drug use: No    Sexual activity: Not Currently     Partners: Male     Birth control/protection: Post-menopausal   Other Topics Concern    Exercise Yes    Parent/sibling w/ CABG, MI or angioplasty before 65F 55M? No

## 2023-08-14 DIAGNOSIS — K21.00 GASTROESOPHAGEAL REFLUX DISEASE WITH ESOPHAGITIS WITHOUT HEMORRHAGE: Primary | ICD-10-CM

## 2023-08-14 NOTE — TELEPHONE ENCOUNTER
Routing refill request to provider for review/approval because:  Medication is reported/historical      RT Chris (R)

## 2023-08-15 ENCOUNTER — OFFICE VISIT (OUTPATIENT)
Dept: CARDIOLOGY | Facility: CLINIC | Age: 78
End: 2023-08-15
Attending: INTERNAL MEDICINE
Payer: COMMERCIAL

## 2023-08-15 VITALS
WEIGHT: 104 LBS | SYSTOLIC BLOOD PRESSURE: 122 MMHG | BODY MASS INDEX: 22.44 KG/M2 | HEIGHT: 57 IN | HEART RATE: 81 BPM | DIASTOLIC BLOOD PRESSURE: 80 MMHG

## 2023-08-15 DIAGNOSIS — I25.10 CORONARY ARTERY DISEASE INVOLVING NATIVE CORONARY ARTERY OF NATIVE HEART WITHOUT ANGINA PECTORIS: ICD-10-CM

## 2023-08-15 DIAGNOSIS — E78.5 HYPERLIPIDEMIA LDL GOAL <70: Primary | ICD-10-CM

## 2023-08-15 PROCEDURE — 99214 OFFICE O/P EST MOD 30 MIN: CPT | Performed by: PHYSICIAN ASSISTANT

## 2023-08-15 RX ORDER — ASPIRIN 81 MG/1
81 TABLET ORAL DAILY
COMMUNITY
Start: 2023-08-15

## 2023-08-15 NOTE — LETTER
"8/15/2023    Corona Bethea MD  [address]  [place] [address]    RE: Hanane Sorto       Dear Colleague,     I had the pleasure of seeing Hanane Sorto in the [facility].  [facility] HEART [facility]    I had the pleasure of seeing Hanane when she came for follow up of CAD.  This 77 year old sees Dr. Frias and has seen Dr. Moffett for her history of:    1.  CAD - now s/p PCI x 1 to pCx and PCI x 3 with overlapping stents to pLAD 9/2/2022 @ [facility].  Enrolled in CHAMP Trial and on monotherapy with clopidogrel  2.  Dyslipidemia - intolerant to many statins and antilipid therapy. Praluent initiated  3.  Palpitations        Last Visit & Interval History:  I saw Hanane 9/2022 at which time we reviewed increasing chest discomfort/SOB.  Angiogram showed significant circumflex and LAD disease.  She was considered for CABG, but ultimately underwent Cx and LAD intervention @ [place].  When I saw her, she was back to playing tennis and walking at least 2-3 miles daily.  She was having no recurrent chest discomfort or \"funny sensation\" in the chest.  No nitroglycerin use, and she was really doing well.  3-month follow-up with , with fasting lipids and echo recommended.    She saw Dr. Moffett 2/2023, who noted significant improvement in LDL on PCSK9 inhibitor.   Echo looked good, with LVEF preserved 55%.  Dr. Moffett recommended lifelong monotherapy vs low-dose ASA after completing the 12 months duration of the CHAMP study.  6-month follow-up recommended.    Today's Visit:  Hanane feels like things are going well.  Denies any problems with chest pain, pressure, tightness.  Remains active, walking and playing tennis routinely.  With this, no reduction in exercise tolerance.  No use of sublingual nitroglycerin.  She is eager to get off clopidogrel therapy (1 year anniversary from stent 9/2/2023) and we discussed starting ASA.    She has not been taking metoprolol.  Despite this, no elevation in blood " "pressure or palpitations.    No problems with fluid overload.  Denies edema, orthopnea, PND.  Notes some mild \"unsteadiness/imbalance\" but does not limit her activities.  No syncope, palpitations or presyncope.      VITALS:  Vitals: /80   Pulse 81   Ht 1.448 m (4' 9\")   Wt 47.2 kg (104 lb)   BMI 22.51 kg/m      Diagnostic Testing:  Angiogram 7/27/2022 o/pLAD 70%, pCx 95%, mCx 70% and p/mRCA 30%.  Echocardiogram 10/2018-LVEF 50-55%. Grade I DD. Nl RV. No sig valve abnls. Nl aorta.  Stress echocardiogram testing 10/2018- wnl  CT Calcium Score 11/2016 CAC 39 (LM 0, LAD 11, Cx 18, RCA 0), placing her in 53rd percentile compared to age/gender matched controls. Non-cardiac portion RLL 3 mm granuloma  Component      Latest Ref Rng 11/2/2021  8:37 AM 12/22/2022  10:07 AM 2/13/2023  10:22 AM   Cholesterol      <200 mg/dL 270 (H)  138  133    Triglycerides      <150 mg/dL 190 (H)  165 (H)  109    HDL Cholesterol      >=50 mg/dL 44 (L)  50  44 (L)    LDL Cholesterol Calculated      <=100 mg/dL 188 (H)  55  67    Non HDL Cholesterol      <130 mg/dL 226 (H)  88  89    Patient Fasting? Yes      ALT      10 - 35 U/L  14  20           Plan:  See Dr. Moffett 2/2024 with fasting labs and echo  Stop clopidogrel and start ASA 81 mg daily after 9/2/2023  Metoprolol removed from medication list    Assessment/Plan:    CAD  As above, underwent angiogram and intervention at [place] 9/2/2022. S/p pCx stent implantation (Synergy 3.0x16mm CHAUNCEY) followed by overlapping 2.5 x 30 mm (Resolute [place]), 2.5 x 8 mm (Resolute Yaw), and 2.25 x 16 mm (Synergy CHAUNCEY) to LAD via RFA.   Remains on clopidogrel (monotherapy), but eager to get off of this  Echo 10/2022 with normal LVEF    PLAN:  Discontinue clopidogrel after 9/2/2023.  We will start ASA 81 mg daily for antiplatelet therapy  Continue risk factor modification  Metoprolol XL 12.5 mg removed from medication list  No EP follow-up required.  Can continue follow-up with Dr. Moffett and his " team    Dyslipidemia  Unable to tolerate statins, and started on PCSK9 inhibitor  Lipids 2/2023 looked great  She is tolerating this without issues    PLAN:  Continue PCSK9 inhibitor  Lipids 2/2024 at time of Dr. Howard my visit      Sandy Masterson PA-C, MSPAS      Orders Placed This Encounter   Procedures    Lipid Profile    ALT    Follow-Up with Cardiology    Echocardiogram Complete     Orders Placed This Encounter   Medications    aspirin 81 MG EC tablet     Sig: Take 1 tablet (81 mg) by mouth daily     Medications Discontinued During This Encounter   Medication Reason    famotidine (PEPCID) 40 MG tablet Stopped by Patient (No AVS)    RABEprazole (ACIPHEX) 20 MG EC tablet Stopped by Patient (No AVS)    sucralfate (CARAFATE) 1 GM tablet Stopped by Patient (No AVS)    Nvgqxgyib-Uexuxbxbt-ET-APAP (MUCINEX SINUS-MAX DAY/NIGHT PO) Therapy completed (No AVS)    metoprolol succinate ER (TOPROL XL) 25 MG 24 hr tablet Stopped by Patient (No AVS)    clopidogrel (PLAVIX) 75 MG tablet Therapy completed (No AVS)         Encounter Diagnoses   Name Primary?    Coronary artery disease involving native coronary artery of native heart without angina pectoris     Hyperlipidemia LDL goal <70 Yes       CURRENT MEDICATIONS:  Current Outpatient Medications   Medication Sig Dispense Refill    aspirin 81 MG EC tablet Take 1 tablet (81 mg) by mouth daily      bisacodyl (DULCOLAX) 5 MG EC tablet Take 5 mg by mouth daily as needed for constipation      Calcium-Phosphorus-Vitamin D (CALCIUM GUMMIES PO) Take 2 Gum by mouth daily      cholecalciferol (VITAMIN D3) 25 mcg (1000 units) capsule Take 1 capsule by mouth daily      clonazePAM (KLONOPIN) 0.5 MG ODT DISSOLVE 1 TABLET(0.5 MG) ON THE TONGUE EVERY NIGHT AS NEEDED FOR ANXIETY 20 tablet 3    evolocumab (REPATHA) 140 MG/ML prefilled autoinjector Inject 1 mL (140 mg) Subcutaneous every 14 days 6 mL 3    nitroGLYcerin (NITROSTAT) 0.4 MG sublingual tablet For chest pain place 1 tablet under the  "tongue every 5 minutes for 3 doses. If symptoms persist 5 minutes after 1st dose call 911. 25 tablet 1    omeprazole (PRILOSEC) 20 MG DR capsule Take 1 capsule (20 mg) by mouth 2 times daily 180 capsule 3    pramipexole (MIRAPEX) 0.25 MG tablet TAKE 1 TABLET BY MOUTH TWICE DAILY AT 5 PM AND AT BEDTIME 180 tablet 0    UNABLE TO FIND MEDICATION NAME: phillps laxative      STATIN NOT PRESCRIBED (INTENTIONAL) Please choose reason not prescribed from choices below. (Patient not taking: Reported on 8/15/2023)         ALLERGIES     Allergies   Allergen Reactions    Crestor [Rosuvastatin]     Ezetimibe Other (See Comments)     Zetia = leg cramps     Miralax [Polyethylene Glycol] Other (See Comments)     Back pain    Pravastatin Other (See Comments)     Leg cramps     Statins Other (See Comments)     Leg cramping         Review of Systems:  Skin:  not assessed bruising   Eyes:  not assessed glasses  ENT:  not assessed nasal congestion  Respiratory:  Negative for shortness of breath;dyspnea on exertion  Cardiovascular:  Negative for;palpitations;chest pain;edema;fatigue;lightheadedness;dizziness    Gastroenterology: Negative for melena;hematochezia  Genitourinary:  Negative    Musculoskeletal:  Positive for arthritis;joint pain  Neurologic:  Negative headaches  Psychiatric:  Positive for sleep disturbances  Heme/Lymph/Imm:  Negative allergies  Endocrine:  Negative thyroid disorder;diabetes    Physical Exam:  Vitals: /80   Pulse 81   Ht 1.448 m (4' 9\")   Wt 47.2 kg (104 lb)   BMI 22.51 kg/m      Constitutional:  cooperative;in no acute distress        Skin:  warm and dry to the touch        Head:  normocephalic        Eyes:  conjunctivae and lids unremarkable        ENT:  no pallor or cyanosis        Neck:  JVP normal;no carotid bruit        Chest:  clear to auscultation        Cardiac: regular rhythm;no murmurs, gallops or rubs detected                  Abdomen:  abdomen soft;non-tender;no bruits        Vascular: " pulses full and equal                                      Extremities and Back:  no edema        Neurological:  no gross motor deficits            PAST MEDICAL HISTORY:  Past Medical History:   Diagnosis Date    Anxiety     psych yearly, Dr Shea    Family hx of colon cancer     sister    Floaters     Karma tingentz    Fracture of wrist, left, with routine healing, subsequent encounter 2017    GERD (gastroesophageal reflux disease)     Gross hematuria 2011    Hyperlipidemia LDL goal < 130     Hyperlipidemia LDL goal <130 2017    Hypertension May 3,2022    Malnutrition (H) 2017    Osteoarthritis 2012    Osteoporosis     Dexa    PONV (postoperative nausea and vomiting)     Restless legs     Skin cancer     Stable angina pectoris due to arteriosclerosis of coronary artery (H) 2022       PAST SURGICAL HISTORY:  Past Surgical History:   Procedure Laterality Date    ARTHROPLASTY KNEE Left 2017    Procedure: ARTHROPLASTY KNEE;  LEFT TOTAL KNEE ARTHROPLASTY;  Surgeon: Lee Ayala MD;  Location:  OR    BIOPSY   approx.    vaginal    BREAST SURGERY      Implants    BUNIONECTOMY      both     SECTION      COLONOSCOPY  8-10    CV CORONARY ANGIOGRAM N/A 2022    Procedure: Coronary Angiogram;  Surgeon: Lea Sanchez MD;  Location:  HEART CARDIAC CATH LAB    EYE SURGERY  2014    cataract surgery in both eyes    LIPOSUCTION (LOCATION)      RECONSTRUCT BREAST, IMPLANT PROSTHESIS, COMBINED         FAMILY HISTORY:  Family History   Problem Relation Age of Onset    Lipids Mother     Alzheimer Disease Mother     Depression Mother     Osteoporosis Mother         TOM PALMER Father     Coronary Artery Disease Father     Hypertension Father     Diabetes Father     Breast Cancer Sister     Leukemia Sister     Hyperlipidemia Sister     Colon Cancer Sister             Cerebrovascular Disease Brother        SOCIAL HISTORY:  Social History      Socioeconomic History    Marital status:      Spouse name: None    Number of children: None    Years of education: None    Highest education level: None   Tobacco Use    Smoking status: Former     Packs/day: 0.50     Years: 15.00     Pack years: 7.50     Types: Cigarettes     Start date: 1960     Quit date: 1975     Years since quittin.6    Smokeless tobacco: Never    Tobacco comments:     one pack/ three months   Substance and Sexual Activity    Alcohol use: Yes     Comment: 1-2 glasses of wine on weekends    Drug use: No    Sexual activity: Not Currently     Partners: Male     Birth control/protection: Post-menopausal   Other Topics Concern    Exercise Yes    Parent/sibling w/ CABG, MI or angioplasty before 65F 55M? No         Thank you for allowing me to participate in the care of your patient.      Sincerely,     Kristina Masterson PA-C     [facility]  cc:   Luis Moffett MD  [address]  [place] [address]

## 2023-08-15 NOTE — PATIENT INSTRUCTIONS
Hanane - it was good to see you today!    Reviewed that the stent will be 1-year old on 9/2/2023!  Will see Dr. Moffett 2/2023 with echo and fasting cholesterol      PLAN:  OK to stop Plavix on 9/2/2023  Start baby aspirin 81 mg daily when you stop the Plavix  Reviewed not remembering to take the Metoprolol - OK to stop 1 year post stent as must data indicates that is when it's most helpful  Watch for palpitations or high BP and CALL if that occurs!  [phone]

## 2023-09-29 ASSESSMENT — ANXIETY QUESTIONNAIRES
3. WORRYING TOO MUCH ABOUT DIFFERENT THINGS: NEARLY EVERY DAY
7. FEELING AFRAID AS IF SOMETHING AWFUL MIGHT HAPPEN: SEVERAL DAYS
GAD7 TOTAL SCORE: 18
4. TROUBLE RELAXING: NEARLY EVERY DAY
2. NOT BEING ABLE TO STOP OR CONTROL WORRYING: MORE THAN HALF THE DAYS
6. BECOMING EASILY ANNOYED OR IRRITABLE: NEARLY EVERY DAY
5. BEING SO RESTLESS THAT IT IS HARD TO SIT STILL: NEARLY EVERY DAY
IF YOU CHECKED OFF ANY PROBLEMS ON THIS QUESTIONNAIRE, HOW DIFFICULT HAVE THESE PROBLEMS MADE IT FOR YOU TO DO YOUR WORK, TAKE CARE OF THINGS AT HOME, OR GET ALONG WITH OTHER PEOPLE: VERY DIFFICULT
GAD7 TOTAL SCORE: 18
1. FEELING NERVOUS, ANXIOUS, OR ON EDGE: NEARLY EVERY DAY

## 2023-10-02 ENCOUNTER — VIRTUAL VISIT (OUTPATIENT)
Dept: FAMILY MEDICINE | Facility: CLINIC | Age: 78
End: 2023-10-02
Payer: COMMERCIAL

## 2023-10-02 DIAGNOSIS — F41.9 ANXIETY: Primary | ICD-10-CM

## 2023-10-02 PROCEDURE — 99213 OFFICE O/P EST LOW 20 MIN: CPT | Mod: 95 | Performed by: NURSE PRACTITIONER

## 2023-10-02 RX ORDER — ESCITALOPRAM OXALATE 5 MG/1
5 TABLET ORAL DAILY
Qty: 30 TABLET | Refills: 3 | Status: SHIPPED | OUTPATIENT
Start: 2023-10-02 | End: 2023-12-21

## 2023-10-02 ASSESSMENT — ENCOUNTER SYMPTOMS: NERVOUS/ANXIOUS: 1

## 2023-10-02 NOTE — PROGRESS NOTES
Hanane is a 78 year old who is being evaluated via a billable telephone visit.      What phone number would you like to be contacted at? [phone]   How would you like to obtain your AVS? Idania    Distant Location (provider location):  On-site    Assessment & Plan     (F41.9) Anxiety  (primary encounter diagnosis)  Comment: long history of on and off anxiety that had been well-controlled until just recently. She gets SOB with her anxiety and this feels the same. She stated lexapro worked well for her in the past and she is interested in this again. We discussed that she needs to be seen right away if symptoms change considering her cardiac history. She has sleep med follow-up on Friday which could be helpful. Also instructed to add protein/fat snacks as she drinks sugary drinks without eating much so could be having some hypoglycemia episodes causing more anxiety.    Plan: escitalopram (LEXAPRO) 5 MG tablet              [facility]    Kalyn Koo is a 78 year old, presenting for the following health issues:  Mental Health Problem      Anxiety    History of Present Illness       Mental Health Follow-up:  Patient presents to follow-up on Anxiety.    Patient's anxiety since last visit has been:  Worse  The patient is not having other symptoms associated with anxiety.  Any significant life events: relationship concerns, health concerns and other  Patient is not feeling anxious or having panic attacks.  Patient has no concerns about alcohol or drug use.    She eats 0-1 servings of fruits and vegetables daily.She consumes 2 sweetened beverage(s) daily.She exercises with enough effort to increase her heart rate 30 to 60 minutes per day.  She exercises with enough effort to increase her heart rate 3 or less days per week.   She is taking medications regularly.     Has a lot going on. Has been thru similar in the past since she was a teenager   Having trouble  catching her breath that she relates to anxiety. This sensation is constant all day and all night   These symptoms have been lasting quite a bit longer than before   This is worse when she hasn't slept well for a few night   No CP   Plays tennis twice a week that she is still able to do  Has clonazepam that she takes sparingly. Makes her really groggy in the morning. Last took it 10 days ago.     Only sleeps a few hours a night  Just had a sleep study and getting results on Friday   Gets stuffy at night so is sleeping in a recliner. Has seen ENT.   For sleeping has tried everything: nasal sprays, antihistamines     Did therapy for years   Is going thru different situations currently   Has been on SSRIs in the past and thinks this could be helpful for her     Doesn't drink water. Will drink Iced tea, lemonade, soda.   Doesn't eat breakfast. Eats at 1pm. Then will have about half a sandwich.         Review of Systems   Psychiatric/Behavioral:  The patient is nervous/anxious.     Detailed as above         Objective           Vitals:  No vitals were obtained today due to virtual visit.    Physical Exam     PSYCH: Alert and oriented times 3; coherent speech, normal   rate and volume, able to articulate logical thoughts, able   to abstract reason, no tangential thoughts, no hallucinations   or delusions  Her affect is normal  RESP: No cough, no audible wheezing, able to talk in full sentences  Remainder of exam unable to be completed due to telephone visits          Phone call duration: 22 minutes

## 2023-10-16 DIAGNOSIS — G25.81 RESTLESS LEG SYNDROME: ICD-10-CM

## 2023-10-16 DIAGNOSIS — F41.9 ANXIETY: ICD-10-CM

## 2023-10-16 RX ORDER — CLONAZEPAM 0.5 MG/1
TABLET, ORALLY DISINTEGRATING ORAL
Qty: 20 TABLET | Refills: 1 | Status: SHIPPED | OUTPATIENT
Start: 2023-10-16 | End: 2024-06-07

## 2023-10-16 RX ORDER — PRAMIPEXOLE DIHYDROCHLORIDE 0.25 MG/1
TABLET ORAL
Qty: 180 TABLET | Refills: 3 | Status: SHIPPED | OUTPATIENT
Start: 2023-10-16 | End: 2023-12-22

## 2023-11-14 ENCOUNTER — TELEPHONE (OUTPATIENT)
Dept: CARDIOLOGY | Facility: CLINIC | Age: 78
End: 2023-11-14
Payer: COMMERCIAL

## 2023-11-14 NOTE — TELEPHONE ENCOUNTER
Prior Authorization Not Needed per Insurance    Medication: evolocumab (REPATHA) 140 MG/ML prefilled autoinjector - PA RENEWAL  Insurance Company: [employer] - Phone [phone] Fax [phone]  Expected CoPay:      Pharmacy Filling the Rx: [facility] - [place], [place] - [address]  Pharmacy Notified:  yes  Patient Notified:  yes    Previously approved, paid claim at the pharmacy for 3 months supply on 9/26

## 2023-12-15 ASSESSMENT — ENCOUNTER SYMPTOMS
ABDOMINAL PAIN: 0
NERVOUS/ANXIOUS: 1
JOINT SWELLING: 0
FREQUENCY: 1
COUGH: 1
ARTHRALGIAS: 1
SHORTNESS OF BREATH: 1
WEAKNESS: 0
CONSTIPATION: 1
DIARRHEA: 0
PALPITATIONS: 0
FEVER: 0
EYE PAIN: 0
HEMATURIA: 0
DYSURIA: 0
DIZZINESS: 1
PARESTHESIAS: 0
BREAST MASS: 0
NAUSEA: 0
HEADACHES: 1
CHILLS: 1
MYALGIAS: 1
SORE THROAT: 0
HEARTBURN: 1

## 2023-12-15 ASSESSMENT — ACTIVITIES OF DAILY LIVING (ADL): CURRENT_FUNCTION: NO ASSISTANCE NEEDED

## 2023-12-20 ENCOUNTER — TRANSFERRED RECORDS (OUTPATIENT)
Dept: HEALTH INFORMATION MANAGEMENT | Facility: CLINIC | Age: 78
End: 2023-12-20
Payer: COMMERCIAL

## 2023-12-21 ASSESSMENT — ENCOUNTER SYMPTOMS
MYALGIAS: 1
WEAKNESS: 0
EYE PAIN: 0
JOINT SWELLING: 0
DYSURIA: 0
ARTHRALGIAS: 1
CONSTIPATION: 1
SHORTNESS OF BREATH: 1
HEMATURIA: 0
HEARTBURN: 1
HEADACHES: 1
COUGH: 1
BREAST MASS: 0
PALPITATIONS: 0
CHILLS: 1
ABDOMINAL PAIN: 0
FREQUENCY: 1
DIARRHEA: 0
PARESTHESIAS: 0
SORE THROAT: 0
DIZZINESS: 1
FEVER: 0
NAUSEA: 0
NERVOUS/ANXIOUS: 1

## 2023-12-21 ASSESSMENT — ACTIVITIES OF DAILY LIVING (ADL): CURRENT_FUNCTION: NO ASSISTANCE NEEDED

## 2023-12-21 NOTE — PROGRESS NOTES
"SUBJECTIVE:   Hanane is a 78 year old, presenting for the following:  Physical    78 years old known CAD  now s/p PCI x 1 to pCx and PCI x 3 with overlapping stents to pLAD 9/2/2022 @ [facility].   No symptoms  Restless legs are worse  Lot of symptoms as below  In ROS  Are you in the first 12 months of your Medicare coverage?  No    Healthy Habits:     In general, how would you rate your overall health?  Good    Frequency of exercise:  2-3 days/week    Duration of exercise:  N/A    Do you usually eat at least 4 servings of fruit and vegetables a day, include whole grains    & fiber and avoid regularly eating high fat or \"junk\" foods?  No    Taking medications regularly:  Yes    Barriers to taking medications:  None    Medication side effects:  Muscle aches and Lightheadedness    Ability to successfully perform activities of daily living:  No assistance needed    Home Safety:  No safety concerns identified    Hearing Impairment:  No hearing concerns    In the past 6 months, have you been bothered by leaking of urine?  No    In general, how would you rate your overall mental or emotional health?  Fair    Additional concerns today:  No          Have you ever done Advance Care Planning? (For example, a Health Directive, POLST, or a discussion with a medical provider or your loved ones about your wishes): Yes, advance care planning is on file.       Fall risk  Fallen 2 or more times in the past year?: No  Any fall with injury in the past year?: No    Cognitive Screening   1) Repeat 3 items (Leader, Season, Table)    2) Clock draw: NORMAL  3) 3 item recall: Recalls 3 objects  Results: 3 items recalled: COGNITIVE IMPAIRMENT LESS LIKELY    Mini-CogTM Copyright CARLIE Wright. Licensed by the author for use in [facility]; reprinted with permission ([email or web address]). All rights reserved.      Do you have sleep apnea, excessive snoring or daytime drowsiness? : yes    Reviewed and updated as needed this visit by clinical " staff   Tobacco  Allergies  Meds  Problems  Med Hx    Soc Hx        Reviewed and updated as needed this visit by Provider    Allergies  Meds  Problems  Med Hx           Social History     Tobacco Use    Smoking status: Former     Packs/day: 0.50     Years: 15.00     Additional pack years: 0.00     Total pack years: 7.50     Types: Cigarettes     Start date: 1960     Quit date: 1975     Years since quittin.0    Smokeless tobacco: Never    Tobacco comments:     one pack/ three months   Substance Use Topics    Alcohol use: Yes     Comment: one glass of wine a week             12/15/2023    10:34 AM   Alcohol Use   Prescreen: >3 drinks/day or >7 drinks/week? No          No data to display              Do you have a current opioid prescription? No  Do you use any other controlled substances or medications that are not prescribed by a provider? None              Current providers sharing in care for this patient include:   Patient Care Team:  Corona Bethea MD as PCP - General (Internal Medicine)  Corona Bethea MD as Assigned PCP  Lee Narvaez MD as MD (Neurology)  Kristina Masterson PA-C as Assigned Heart and Vascular Provider    The following health maintenance items are reviewed in Epic and correct as of today:  Health Maintenance   Topic Date Due    RSV VACCINE (Pregnancy & 60+) (1 - 1-dose 60+ series) Never done    COVID-19 Vaccine ( - - season) 2023    ANNUAL REVIEW OF HM ORDERS  10/24/2023    MEDICARE ANNUAL WELLNESS VISIT  2024    FALL RISK ASSESSMENT  2024    DEXA  2026    COLORECTAL CANCER SCREENING  2026    LIPID  2028    ADVANCE CARE PLANNING  2028    DTAP/TDAP/TD IMMUNIZATION (3 - Td or Tdap) 2032    HEPATITIS C SCREENING  Completed    PHQ-2 (once per calendar year)  Completed    INFLUENZA VACCINE  Completed    Pneumococcal Vaccine: 65+ Years  Completed    ZOSTER IMMUNIZATION  Completed    IPV  IMMUNIZATION  Aged Out    HPV IMMUNIZATION  Aged Out    MENINGITIS IMMUNIZATION  Aged Out    RSV MONOCLONAL ANTIBODY  Aged Out    MAMMO SCREENING  Discontinued     BP Readings from Last 3 Encounters:   23 112/75   08/15/23 122/80   02/15/23 119/74    Wt Readings from Last 3 Encounters:   23 49.4 kg (109 lb)   08/15/23 47.2 kg (104 lb)   02/15/23 48.1 kg (106 lb)                  Patient Active Problem List   Diagnosis    Hyperlipidemia with target LDL less than 130    Anxiety    Osteoporosis    Gross hematuria    Advanced directives, counseling/discussion    Osteoarthritis    Bursitis of hip    Constipation    Gastroesophageal reflux disease without esophagitis    S/P total knee arthroplasty    Fracture of wrist, left, with routine healing, subsequent encounter    Restless leg syndrome    Arthritis of neck    Dizziness    Status post coronary angiogram    Breast tenderness in female    Nipple tenderness     Past Surgical History:   Procedure Laterality Date    ARTHROPLASTY KNEE Left 2017    Procedure: ARTHROPLASTY KNEE;  LEFT TOTAL KNEE ARTHROPLASTY;  Surgeon: Lee Ayala MD;  Location:  OR    BIOPSY   approx.    vaginal    BREAST SURGERY      Implants    BUNIONECTOMY      both    CARDIAC SURGERY      4 stents     SECTION      COLONOSCOPY  2010    CV CORONARY ANGIOGRAM N/A 2022    Procedure: Coronary Angiogram;  Surgeon: Lea Sanchez MD;  Location:  HEART CARDIAC CATH LAB    EYE SURGERY  2014    cataract surgery in both eyes    LIPOSUCTION (LOCATION)      RECONSTRUCT BREAST, IMPLANT PROSTHESIS, COMBINED         Social History     Tobacco Use    Smoking status: Former     Packs/day: 0.50     Years: 15.00     Additional pack years: 0.00     Total pack years: 7.50     Types: Cigarettes     Start date: 1960     Quit date: 1975     Years since quittin.0    Smokeless tobacco: Never    Tobacco comments:     one pack/ three  months   Substance Use Topics    Alcohol use: Yes     Comment: one glass of wine a week     Family History   Problem Relation Age of Onset    Lipids Mother     Alzheimer Disease Mother     Depression Mother     Osteoporosis Mother         RA    [email or web address] Father     Coronary Artery Disease Father     Hypertension Father     Diabetes Father     Breast Cancer Sister     Leukemia Sister     Hyperlipidemia Sister     Colon Cancer Sister             Cerebrovascular Disease Brother          Current Outpatient Medications   Medication Sig Dispense Refill    aspirin 81 MG EC tablet Take 1 tablet (81 mg) by mouth daily      bisacodyl (DULCOLAX) 5 MG EC tablet Take 5 mg by mouth daily as needed for constipation      Calcium-Phosphorus-Vitamin D (CALCIUM GUMMIES PO) Take 2 Gum by mouth daily      cholecalciferol (VITAMIN D3) 25 mcg (1000 units) capsule Take 1 capsule by mouth daily      clonazePAM (KLONOPIN) 0.5 MG ODT DISSOLVE 1 TABLET(0.5 MG) ON THE TONGUE EVERY NIGHT AS NEEDED FOR ANXIETY 20 tablet 1    evolocumab (REPATHA) 140 MG/ML prefilled autoinjector Inject 1 mL (140 mg) Subcutaneous every 14 days 6 mL 3    nitroGLYcerin (NITROSTAT) 0.4 MG sublingual tablet For chest pain place 1 tablet under the tongue every 5 minutes for 3 doses. If symptoms persist 5 minutes after 1st dose call 911. 25 tablet 1    omeprazole (PRILOSEC) 20 MG DR capsule Take 1 capsule (20 mg) by mouth 2 times daily 180 capsule 3    rOPINIRole (REQUIP) 0.5 MG tablet Take 1-2 tablets (0.5-1 mg) by mouth 3 times daily 90 tablet 3    STATIN NOT PRESCRIBED (INTENTIONAL) Please choose reason not prescribed from choices below.      UNABLE TO FIND MEDICATION NAME: phillps laxative       Allergies   Allergen Reactions    Crestor [Rosuvastatin]     Ezetimibe Other (See Comments)     Zetia = leg cramps     Miralax [Polyethylene Glycol] Other (See Comments)     Back pain    Pravastatin Other (See Comments)     Leg cramps     Statins Other (See Comments)     Leg  "cramping           Pertinent mammograms are reviewed under the imaging tab.    Review of Systems   Constitutional:  Positive for chills. Negative for fever.   HENT:  Positive for congestion. Negative for ear pain, hearing loss and sore throat.    Eyes:  Positive for visual disturbance. Negative for pain.   Respiratory:  Positive for cough and shortness of breath.    Cardiovascular:  Negative for chest pain, palpitations and peripheral edema.   Gastrointestinal:  Positive for constipation and heartburn. Negative for abdominal pain, diarrhea and nausea.   Breasts:  Negative for tenderness, breast mass and discharge.   Genitourinary:  Positive for frequency and urgency. Negative for dysuria, genital sores, hematuria, pelvic pain, vaginal bleeding and vaginal discharge.   Musculoskeletal:  Positive for arthralgias and myalgias. Negative for joint swelling.   Skin:  Negative for rash.   Neurological:  Positive for dizziness and headaches. Negative for weakness and paresthesias.   Psychiatric/Behavioral:  Negative for mood changes. The patient is nervous/anxious.    Always cold  Restless legs   Sinus drainage for years  Has seen ENT  Did sleep study   No GERD on medications   Takes Dulcolax for constipation   Urinary frequency sine menopause  Is thirsty a lot  Dizziness off and on  Blood pressure at home is good  Anxiety is pretty good mostly   Has a situation with son at home        OBJECTIVE:   /75   Pulse 73   Temp 97.3  F (36.3  C) (Temporal)   Resp 16   Ht 1.448 m (4' 9\")   Wt 49.4 kg (109 lb)   SpO2 97%   Breastfeeding No   BMI 23.59 kg/m   Estimated body mass index is 23.59 kg/m  as calculated from the following:    Height as of this encounter: 1.448 m (4' 9\").    Weight as of this encounter: 49.4 kg (109 lb).  Physical Exam  GENERAL APPEARANCE: healthy, alert and no distress  EYES: Eyes grossly normal to inspection, PERRL and conjunctivae and sclerae normal  HENT: ear canals and TM's normal, nose and " mouth without ulcers or lesions, oropharynx clear and oral mucous membranes moist  NECK: no adenopathy, no asymmetry, masses, or scars and thyroid normal to palpation  RESP: lungs clear to auscultation - no rales, rhonchi or wheezes  BREAST: Implants noticed normal without masses, tenderness or nipple discharge and no palpable axillary masses or adenopathy  CV: regular rate and rhythm, normal S1 S2, no S3 or S4, no murmur, click or rub, no peripheral edema and peripheral pulses strong  ABDOMEN: soft, nontender, no hepatosplenomegaly, no masses and bowel sounds normal  MS: no musculoskeletal defects are noted and gait is age appropriate without ataxia  SKIN: no suspicious lesions or rashes  NEURO: Normal strength and tone, sensory exam grossly normal, mentation intact and speech normal  PSYCH: mentation appears normal and affect normal/bright        ASSESSMENT / PLAN:   Hanane was seen today for physical.    Diagnoses and all orders for this visit:    Encounter for annual wellness visit (AWV) in Medicare patient  This very pleasant 78 years old woman we will do annual mammogram on her implants and she declines COVID booster and RSV at this point  We talked about diet and exercise  Coronary artery disease involving native coronary artery of native heart without angina pectoris  Comments:  now s/p PCI x 1 to pCx and PCI x 3 with overlapping stents to pLAD 9/2/2022 @ [facility].  Orders:  -     HEMOGLOBIN A1C  Patient will continue Repatha and baby aspirin  Prediabetes  -     HEMOGLOBIN A1C  As above  Hyperlipidemia LDL goal <70  -     Comprehensive metabolic panel  -     Lipid panel reflex to direct LDL Fasting  As above could not tolerate statins  Disorder of bone  -     Vitamin D Deficiency  Does have osteopenia and also takes 4 daily servings  Gastroesophageal reflux disease with esophagitis without hemorrhage  -     CBC with Platelets    -     Vitamin D Deficiency  -     Vitamin B12  On omeprazole  Restless leg  syndrome  -     rOPINIRole (REQUIP) 0.5 MG tablet; Take 1-2 tablets (0.5-1 mg) by mouth 3 times daily  We will change to ropinirole and she will add magnesium at night  Other orders  -     PRIMARY CARE FOLLOW-UP SCHEDULING; Future              COUNSELING:  RSV and COVID booster          She reports that she quit smoking about 49 years ago. Her smoking use included cigarettes. She started smoking about 64 years ago. She has a 7.5 pack-year smoking history. She has never used smokeless tobacco.      Appropriate preventive services were discussed with this patient, including applicable screening as appropriate for fall prevention, nutrition, physical activity, Tobacco-use cessation, weight loss and cognition.  Checklist reviewing preventive services available has been given to the patient.    Reviewed patients plan of care and provided an AVS. The Complex Care Plan (for patients with higher acuity and needing more deliberate coordination of services) for Hanane meets the Care Plan requirement. This Care Plan has been established and reviewed with the Patient.    Corona Bethea MD  [facility]    Identified Health Risks:

## 2023-12-22 ENCOUNTER — OFFICE VISIT (OUTPATIENT)
Dept: FAMILY MEDICINE | Facility: CLINIC | Age: 78
End: 2023-12-22
Payer: COMMERCIAL

## 2023-12-22 VITALS
BODY MASS INDEX: 23.51 KG/M2 | TEMPERATURE: 97.3 F | HEART RATE: 73 BPM | WEIGHT: 109 LBS | DIASTOLIC BLOOD PRESSURE: 75 MMHG | OXYGEN SATURATION: 97 % | RESPIRATION RATE: 16 BRPM | SYSTOLIC BLOOD PRESSURE: 112 MMHG | HEIGHT: 57 IN

## 2023-12-22 DIAGNOSIS — R73.03 PREDIABETES: ICD-10-CM

## 2023-12-22 DIAGNOSIS — E78.5 HYPERLIPIDEMIA LDL GOAL <70: ICD-10-CM

## 2023-12-22 DIAGNOSIS — M89.9 DISORDER OF BONE: ICD-10-CM

## 2023-12-22 DIAGNOSIS — I25.10 CORONARY ARTERY DISEASE INVOLVING NATIVE CORONARY ARTERY OF NATIVE HEART WITHOUT ANGINA PECTORIS: ICD-10-CM

## 2023-12-22 DIAGNOSIS — G25.81 RESTLESS LEG SYNDROME: ICD-10-CM

## 2023-12-22 DIAGNOSIS — Z00.00 ENCOUNTER FOR ANNUAL WELLNESS VISIT (AWV) IN MEDICARE PATIENT: Primary | ICD-10-CM

## 2023-12-22 DIAGNOSIS — K21.00 GASTROESOPHAGEAL REFLUX DISEASE WITH ESOPHAGITIS WITHOUT HEMORRHAGE: ICD-10-CM

## 2023-12-22 PROBLEM — I25.118: Status: RESOLVED | Noted: 2022-09-14 | Resolved: 2023-12-22

## 2023-12-22 LAB
ALBUMIN SERPL BCG-MCNC: 4.6 G/DL (ref 3.5–5.2)
ALP SERPL-CCNC: 95 U/L (ref 40–150)
ALT SERPL W P-5'-P-CCNC: 24 U/L (ref 0–50)
ANION GAP SERPL CALCULATED.3IONS-SCNC: 12 MMOL/L (ref 7–15)
AST SERPL W P-5'-P-CCNC: 21 U/L (ref 0–45)
BILIRUB SERPL-MCNC: 0.2 MG/DL
BUN SERPL-MCNC: 22.8 MG/DL (ref 8–23)
CALCIUM SERPL-MCNC: 10.2 MG/DL (ref 8.8–10.2)
CHLORIDE SERPL-SCNC: 108 MMOL/L (ref 98–107)
CHOLEST SERPL-MCNC: 147 MG/DL
CREAT SERPL-MCNC: 0.79 MG/DL (ref 0.51–0.95)
DEPRECATED HCO3 PLAS-SCNC: 22 MMOL/L (ref 22–29)
EGFRCR SERPLBLD CKD-EPI 2021: 76 ML/MIN/1.73M2
ERYTHROCYTE [DISTWIDTH] IN BLOOD BY AUTOMATED COUNT: 13.4 % (ref 10–15)
FASTING STATUS PATIENT QL REPORTED: YES
GLUCOSE SERPL-MCNC: 124 MG/DL (ref 70–99)
HBA1C MFR BLD: 5.5 % (ref 0–5.6)
HCT VFR BLD AUTO: 40.5 % (ref 35–47)
HDLC SERPL-MCNC: 67 MG/DL
HGB BLD-MCNC: 13 G/DL (ref 11.7–15.7)
LDLC SERPL CALC-MCNC: 60 MG/DL
MCH RBC QN AUTO: 29.7 PG (ref 26.5–33)
MCHC RBC AUTO-ENTMCNC: 32.1 G/DL (ref 31.5–36.5)
MCV RBC AUTO: 93 FL (ref 78–100)
NONHDLC SERPL-MCNC: 80 MG/DL
PLATELET # BLD AUTO: 361 10E3/UL (ref 150–450)
POTASSIUM SERPL-SCNC: 4.7 MMOL/L (ref 3.4–5.3)
PROT SERPL-MCNC: 6.8 G/DL (ref 6.4–8.3)
RBC # BLD AUTO: 4.37 10E6/UL (ref 3.8–5.2)
SODIUM SERPL-SCNC: 142 MMOL/L (ref 135–145)
TRIGL SERPL-MCNC: 102 MG/DL
VIT B12 SERPL-MCNC: 517 PG/ML (ref 232–1245)
VIT D+METAB SERPL-MCNC: 59 NG/ML (ref 20–50)
WBC # BLD AUTO: 17.5 10E3/UL (ref 4–11)

## 2023-12-22 PROCEDURE — G0439 PPPS, SUBSEQ VISIT: HCPCS | Performed by: INTERNAL MEDICINE

## 2023-12-22 PROCEDURE — 82306 VITAMIN D 25 HYDROXY: CPT | Performed by: INTERNAL MEDICINE

## 2023-12-22 PROCEDURE — 99214 OFFICE O/P EST MOD 30 MIN: CPT | Mod: 25 | Performed by: INTERNAL MEDICINE

## 2023-12-22 PROCEDURE — 83036 HEMOGLOBIN GLYCOSYLATED A1C: CPT | Performed by: INTERNAL MEDICINE

## 2023-12-22 PROCEDURE — 80061 LIPID PANEL: CPT | Performed by: INTERNAL MEDICINE

## 2023-12-22 PROCEDURE — 36415 COLL VENOUS BLD VENIPUNCTURE: CPT | Performed by: INTERNAL MEDICINE

## 2023-12-22 PROCEDURE — 85027 COMPLETE CBC AUTOMATED: CPT | Performed by: INTERNAL MEDICINE

## 2023-12-22 PROCEDURE — 82607 VITAMIN B-12: CPT | Performed by: INTERNAL MEDICINE

## 2023-12-22 PROCEDURE — 80053 COMPREHEN METABOLIC PANEL: CPT | Performed by: INTERNAL MEDICINE

## 2023-12-22 RX ORDER — ROPINIROLE 0.5 MG/1
.5-1 TABLET, FILM COATED ORAL 3 TIMES DAILY
Qty: 90 TABLET | Refills: 3 | Status: SHIPPED | OUTPATIENT
Start: 2023-12-22 | End: 2024-01-04

## 2023-12-22 ASSESSMENT — PAIN SCALES - GENERAL: PAINLEVEL: NO PAIN (0)

## 2023-12-22 NOTE — PATIENT INSTRUCTIONS
Patient Education   Personalized Prevention Plan  You are due for the preventive services outlined below.  Your care team is available to assist you in scheduling these services.  If you have already completed any of these items, please share that information with your care team to update in your medical record.  Health Maintenance Due   Topic Date Due     RSV VACCINE (Pregnancy & 60+) (1 - 1-dose 60+ series) Never done     COVID-19 Vaccine (5 - 2023-24 season) 09/01/2023     ANNUAL REVIEW OF HM ORDERS  10/24/2023     Annual Wellness Visit  12/22/2023     Learning About Dietary Guidelines  What are the Dietary Guidelines for Americans?     Dietary Guidelines for Americans provide tips for eating well and staying healthy. This helps reduce the risk for long-term (chronic) diseases.  These guidelines recommend that you:  Eat and drink the right amount for you. The U.[place] government's food guide is called MyPlate. It can help you make your own well-balanced eating plan.  Try to balance your eating with your activity. This helps you stay at a healthy weight.  Drink alcohol in moderation, if at all.  Limit foods high in salt, saturated fat, trans fat, and added sugar.  These guidelines are from the U.S. Department of Agriculture and the U.S. Department of Health and Human Services. They are updated every 5 years.  What is MyPlate?  MyPlate is the U.[place]. government's food guide. It can help you make your own well-balanced eating plan. A balanced eating plan means that you eat enough, but not too much, and that your food gives you the nutrients you need to stay healthy.  MyPlate focuses on eating plenty of whole grains, fruits, and vegetables, and on limiting fat and sugar. It is available online at www.ChooseMyPlate.gov.  How can you get started?  If you're trying to eat healthier, you can slowly change your eating habits over time. You don't have to make big changes all at once. Start by adding one or two healthy foods to  "your meals each day.  Grains  Choose whole-grain breads and cereals and whole-wheat pasta and whole-grain crackers.  Vegetables  Eat a variety of vegetables every day. They have lots of nutrients and are part of a heart-healthy diet.  Fruits  Eat a variety of fruits every day. Fruits contain lots of nutrients. Choose fresh fruit instead of fruit juice.  Protein foods  Choose fish and lean poultry more often. Eat red meat and fried meats less often. Dried beans, tofu, and nuts are also good sources of protein.  Dairy  Choose low-fat or fat-free products from this food group. If you have problems digesting milk, try eating cheese or yogurt instead.  Fats and oils  Limit fats and oils if you're trying to cut calories. Choose healthy fats when you cook. These include canola oil and olive oil.  Where can you learn more?  Go to https://www.Knok.1CloudStar/patiented  Enter D676 in the search box to learn more about \"Learning About Dietary Guidelines.\"  Current as of: February 28, 2023               Content Version: 13.8    3927-6473 [employer].   Care instructions adapted under license by your healthcare professional. If you have questions about a medical condition or this instruction, always ask your healthcare professional. [employer] disclaims any warranty or liability for your use of this information.      Your Health Risk Assessment indicates you feel you are not in good emotional health.    Recreation   Recreation is not limited to sports and team events. It includes any activity that provides relaxation, interest, enjoyment, and exercise. Recreation provides an outlet for physical, mental, and social energy. It can give a sense of worth and achievement. It can help you stay healthy.    Mental Exercise and Social Involvement  Mental and emotional health is as important as physical health. Keep in touch with friends and family. Stay as active as possible. Continue to learn and challenge " yourself.   Things you can do to stay mentally active are:  Learn something new, like a foreign language or musical instrument.   Play SCRABBLE or do crossword puzzles. If you cannot find people to play these games with you at home, you can play them with others on your computer through the Internet.   Join a games club--anything from card games to chess or checkers or lawn bowling.   Start a new hobby.   Go back to school.   Volunteer.   Read.   Keep up with world events.

## 2023-12-22 NOTE — PROGRESS NOTES
The patient was counseled and encouraged to consider modifying their diet and eating habits. She was provided with information on recommended healthy diet options.  The patient was provided with suggestions to help her develop a healthy emotional lifestyle.

## 2024-02-05 ENCOUNTER — MYC MEDICAL ADVICE (OUTPATIENT)
Dept: FAMILY MEDICINE | Facility: CLINIC | Age: 79
End: 2024-02-05
Payer: COMMERCIAL

## 2024-02-05 DIAGNOSIS — G25.81 RESTLESS LEG SYNDROME: Primary | ICD-10-CM

## 2024-02-07 RX ORDER — GABAPENTIN 100 MG/1
100 CAPSULE ORAL AT BEDTIME
Qty: 90 CAPSULE | Refills: 0 | Status: SHIPPED | OUTPATIENT
Start: 2024-02-07 | End: 2024-02-21

## 2024-02-07 NOTE — TELEPHONE ENCOUNTER
"Spoke with patient to gather more information. Patient stated she tried taking both Ropinirole and Pramipexole, both medications were not effective in helping with restless leg symptoms. Patient stated she is taking current dose of Pramipexole and it is also not effective. Patient is requesting provider recommendations and would be interested in trying Gabapentin.     Patient also stated she took Gabepantin \"a very long time ago\" but could not recall how effective it was.     Routing to covering provider for review.   "

## 2024-02-19 ENCOUNTER — HOSPITAL ENCOUNTER (OUTPATIENT)
Dept: CARDIOLOGY | Facility: CLINIC | Age: 79
Discharge: HOME | End: 2024-02-19
Attending: PHYSICIAN ASSISTANT | Admitting: PHYSICIAN ASSISTANT
Payer: COMMERCIAL

## 2024-02-19 ENCOUNTER — LAB (OUTPATIENT)
Dept: LAB | Facility: CLINIC | Age: 79
End: 2024-02-19
Payer: COMMERCIAL

## 2024-02-19 DIAGNOSIS — I25.10 CORONARY ARTERY DISEASE INVOLVING NATIVE CORONARY ARTERY OF NATIVE HEART WITHOUT ANGINA PECTORIS: ICD-10-CM

## 2024-02-19 DIAGNOSIS — E78.5 HYPERLIPIDEMIA LDL GOAL <70: ICD-10-CM

## 2024-02-19 LAB
ALT SERPL W P-5'-P-CCNC: 13 U/L (ref 0–50)
CHOLEST SERPL-MCNC: 136 MG/DL
FASTING STATUS PATIENT QL REPORTED: YES
HDLC SERPL-MCNC: 52 MG/DL
LDLC SERPL CALC-MCNC: 61 MG/DL
LVEF ECHO: NORMAL
NONHDLC SERPL-MCNC: 84 MG/DL
TRIGL SERPL-MCNC: 113 MG/DL

## 2024-02-19 PROCEDURE — 93306 TTE W/DOPPLER COMPLETE: CPT

## 2024-02-19 PROCEDURE — 93306 TTE W/DOPPLER COMPLETE: CPT | Mod: 26 | Performed by: INTERNAL MEDICINE

## 2024-02-19 PROCEDURE — 84460 ALANINE AMINO (ALT) (SGPT): CPT | Performed by: PHYSICIAN ASSISTANT

## 2024-02-19 PROCEDURE — 80061 LIPID PANEL: CPT | Performed by: PHYSICIAN ASSISTANT

## 2024-02-19 PROCEDURE — 36415 COLL VENOUS BLD VENIPUNCTURE: CPT | Performed by: PHYSICIAN ASSISTANT

## 2024-02-21 ENCOUNTER — OFFICE VISIT (OUTPATIENT)
Dept: CARDIOLOGY | Facility: CLINIC | Age: 79
End: 2024-02-21
Attending: PHYSICIAN ASSISTANT
Payer: COMMERCIAL

## 2024-02-21 VITALS
DIASTOLIC BLOOD PRESSURE: 71 MMHG | HEIGHT: 57 IN | SYSTOLIC BLOOD PRESSURE: 103 MMHG | OXYGEN SATURATION: 97 % | HEART RATE: 69 BPM | BODY MASS INDEX: 23.3 KG/M2 | WEIGHT: 108 LBS

## 2024-02-21 DIAGNOSIS — I25.10 CORONARY ARTERY DISEASE INVOLVING NATIVE CORONARY ARTERY OF NATIVE HEART WITHOUT ANGINA PECTORIS: ICD-10-CM

## 2024-02-21 DIAGNOSIS — E78.5 HYPERLIPIDEMIA LDL GOAL <70: ICD-10-CM

## 2024-02-21 PROCEDURE — 99214 OFFICE O/P EST MOD 30 MIN: CPT | Performed by: INTERNAL MEDICINE

## 2024-02-21 NOTE — LETTER
2/21/2024    Corona Bethea MD  [address] [facility] [address]  [facility] [address]    RE: Hanane Sorto       Dear Colleague,     I had the pleasure of seeing Hanane Sorto in the [facility].  CARDIOLOGY CLINIC CONSULTATION    PRIMARY CARE PHYSICIAN:  Corona Bethea    HISTORY OF PRESENT ILLNESS:  I had the pleasure of seeing Hanane Sorto at the [facility] in [place] this morning.  She is a very pleasant 78-year-old female with history of coronary artery disease, hypertension, hyperlipidemia and osteoarthritis who is here for follow-up.     She she reported exertional chest discomfort in the summer 2022.  She underwent coronary angiography which revealed multivessel coronary disease.  She subsequently came to see me to discuss possible percutaneous revascularization versus surgery as she was not keen on pursuing surgery.  We reviewed her coronary angiography and felt that percutaneous revascularization was a good option.  She later went to [facility] for further opinion and ultimately had PCI of the circumflex as well as the LAD.  Her symptoms resolved after her revascularization.     She is very active and plays tennis 2-3 times per week.  She does not endorse any cardiopulmonary symptoms at this point.  Her risk factors including her hyperlipidemia are well controlled.  She was previously intolerant of statins but has been tolerating PCSK9 inhibitor over the past several months.    PAST MEDICAL HISTORY:  Past Medical History:   Diagnosis Date    Anxiety     psych yearly, Dr Shea    Family hx of colon cancer     sister    Floaters     Karma leverentz    Fracture of wrist, left, with routine healing, subsequent encounter 08/28/2017    GERD (gastroesophageal reflux disease)     Gross hematuria 09/12/2011    Hypertension May 3,2022    Osteoarthritis 12/19/2012    Osteoporosis     Dexa    Restless legs     Skin cancer     Stable angina pectoris due to arteriosclerosis of coronary artery (H24)  09/14/2022    now s/p PCI x 1 to pCx and PCI x 3 with overlapping stents to pLAD 9/2/2022 @ [facility].       MEDICATIONS:  Current Outpatient Medications   Medication    aspirin 81 MG EC tablet    bisacodyl (DULCOLAX) 5 MG EC tablet    Calcium-Phosphorus-Vitamin D (CALCIUM GUMMIES PO)    clonazePAM (KLONOPIN) 0.5 MG ODT    evolocumab (REPATHA) 140 MG/ML prefilled autoinjector    omeprazole (PRILOSEC) 20 MG DR capsule    pramipexole (MIRAPEX) 0.125 MG tablet    UNABLE TO FIND    nitroGLYcerin (NITROSTAT) 0.4 MG sublingual tablet    STATIN NOT PRESCRIBED (INTENTIONAL)     No current facility-administered medications for this visit.       SOCIAL HISTORY:  I have reviewed this patient's social history and updated it with pertinent information if needed. Hanane Sorto  reports that she quit smoking about 49 years ago. Her smoking use included cigarettes. She started smoking about 64 years ago. She has a 7.5 pack-year smoking history. She has never used smokeless tobacco. She reports current alcohol use. She reports that she does not use drugs.    PHYSICAL EXAM:  Pulse:  [69] 69  BP: (103)/(71) 103/71  SpO2:  [97 %] 97 %  108 lbs 0 oz    Constitutional: alert, no distress  Respiratory: Good bilateral air entry  Cardiovascular: Regular rate and rhythm, no murmur  GI: nondistended  Neuropsychiatric: appropriate affact    ASSESSMENT: Pertinent issues addressed/ reviewed during this cardiology visit  Coronary disease status post prior circumflex and LAD stenting, asymptomatic  Hyperlipidemia, treated  Hypertension, well-controlled    RECOMMENDATIONS:  She remains very stable from cardiac point of view.  She is active and does not endorse any cardiopulmonary symptoms.  Risk factors are well-controlled.  Recent transthoracic echocardiogram was reviewed.  LV systolic function remains preserved.  I encouraged her to remain active.  Follow-up in 1 year    It was a pleasure seeing this patient in clinic today. Please do not  hesitate to contact me with any future questions.     Luis Moffett MD, [facility]  Cardiology - [facility]  February 21, 2024    Review of the result(s) of each unique test - Last lipid profile, BMP, echocardiogram     The level of medical decision making during this visit was of moderate complexity.    This note was completed in part using dictation via the Dragon voice recognition software. Some word and grammatical errors may occur and must be interpreted in the appropriate clinical context.  If there are any questions pertaining to this issue, please contact me for further clarification.      Thank you for allowing me to participate in the care of your patient.      Sincerely,     Luis Moffett MD, MD     [facility]  cc:   Kristina Masterson, PA-C  [address]  [place]  [place] [address]

## 2024-02-21 NOTE — PROGRESS NOTES
CARDIOLOGY CLINIC CONSULTATION    PRIMARY CARE PHYSICIAN:  Corona Bethea    HISTORY OF PRESENT ILLNESS:  I had the pleasure of seeing Hanane Sorto at the [facility] in [place] this morning.  She is a very pleasant 78-year-old female with history of coronary artery disease, hypertension, hyperlipidemia and osteoarthritis who is here for follow-up.     She she reported exertional chest discomfort in the summer 2022.  She underwent coronary angiography which revealed multivessel coronary disease.  She subsequently came to see me to discuss possible percutaneous revascularization versus surgery as she was not keen on pursuing surgery.  We reviewed her coronary angiography and felt that percutaneous revascularization was a good option.  She later went to [facility] for further opinion and ultimately had PCI of the circumflex as well as the LAD.  Her symptoms resolved after her revascularization.     She is very active and plays tennis 2-3 times per week.  She does not endorse any cardiopulmonary symptoms at this point.  Her risk factors including her hyperlipidemia are well controlled.  She was previously intolerant of statins but has been tolerating PCSK9 inhibitor over the past several months.    PAST MEDICAL HISTORY:  Past Medical History:   Diagnosis Date    Anxiety     psych yearly, Dr Shea    Family hx of colon cancer     sister    Andria cote    Fracture of wrist, left, with routine healing, subsequent encounter 08/28/2017    GERD (gastroesophageal reflux disease)     Gross hematuria 09/12/2011    Hypertension May 3,2022    Osteoarthritis 12/19/2012    Osteoporosis     Dexa    Restless legs     Skin cancer     Stable angina pectoris due to arteriosclerosis of coronary artery (H24) 09/14/2022    now s/p PCI x 1 to pCx and PCI x 3 with overlapping stents to pLAD 9/2/2022 @ [facility].       MEDICATIONS:  Current Outpatient Medications   Medication    aspirin 81 MG EC tablet     bisacodyl (DULCOLAX) 5 MG EC tablet    Calcium-Phosphorus-Vitamin D (CALCIUM GUMMIES PO)    clonazePAM (KLONOPIN) 0.5 MG ODT    evolocumab (REPATHA) 140 MG/ML prefilled autoinjector    omeprazole (PRILOSEC) 20 MG DR capsule    pramipexole (MIRAPEX) 0.125 MG tablet    UNABLE TO FIND    nitroGLYcerin (NITROSTAT) 0.4 MG sublingual tablet    STATIN NOT PRESCRIBED (INTENTIONAL)     No current facility-administered medications for this visit.       SOCIAL HISTORY:  I have reviewed this patient's social history and updated it with pertinent information if needed. Hanane Sorto  reports that she quit smoking about 49 years ago. Her smoking use included cigarettes. She started smoking about 64 years ago. She has a 7.5 pack-year smoking history. She has never used smokeless tobacco. She reports current alcohol use. She reports that she does not use drugs.    PHYSICAL EXAM:  Pulse:  [69] 69  BP: (103)/(71) 103/71  SpO2:  [97 %] 97 %  108 lbs 0 oz    Constitutional: alert, no distress  Respiratory: Good bilateral air entry  Cardiovascular: Regular rate and rhythm, no murmur  GI: nondistended  Neuropsychiatric: appropriate affact    ASSESSMENT: Pertinent issues addressed/ reviewed during this cardiology visit  Coronary disease status post prior circumflex and LAD stenting, asymptomatic  Hyperlipidemia, treated  Hypertension, well-controlled    RECOMMENDATIONS:  She remains very stable from cardiac point of view.  She is active and does not endorse any cardiopulmonary symptoms.  Risk factors are well-controlled.  Recent transthoracic echocardiogram was reviewed.  LV systolic function remains preserved.  I encouraged her to remain active.  Follow-up in 1 year    It was a pleasure seeing this patient in clinic today. Please do not hesitate to contact me with any future questions.     Luis Moffett MD, FAC  Cardiology - [facility]  February 21, 2024    Review of the result(s) of each unique test - Last lipid profile, BMP,  echocardiogram     The level of medical decision making during this visit was of moderate complexity.    This note was completed in part using dictation via the Dragon voice recognition software. Some word and grammatical errors may occur and must be interpreted in the appropriate clinical context.  If there are any questions pertaining to this issue, please contact me for further clarification.

## 2024-03-08 ENCOUNTER — TELEPHONE (OUTPATIENT)
Dept: CARDIOLOGY | Facility: CLINIC | Age: 79
End: 2024-03-08
Payer: COMMERCIAL

## 2024-03-08 NOTE — TELEPHONE ENCOUNTER
Central Prior Authorization Team   Phone: [phone]    PA Initiation    Medication: Repatha 140mg/ml  Insurance Company: [employer] - Phone [phone] Fax [phone]  Pharmacy Filling the Rx: [facility] - [place], [place] - [address]  Filling Pharmacy Phone: [phone]  Filling Pharmacy Fax:    Start Date: 3/8/2024

## 2024-03-12 NOTE — TELEPHONE ENCOUNTER
Test claim 28 day supply     $47 copay          Prior Authorization Approval    Authorization Effective Date: 1/1/2024  Authorization Expiration Date: 3/8/2025  Medication: Repatha 140mg/ml  Approved Dose/Quantity:   Reference #:     Insurance Company: [employer] - Phone [phone] Fax [phone]  Expected CoPay:       CoPay Card Available:      Foundation Assistance Needed:    Which Pharmacy is filling the prescription (Not needed for infusion/clinic administered): [facility] - [place] [address]   Pharmacy Notified:  yes  Patient Notified:  yes- Pharmacy will contact patient when ready to /ship    Per call to [employer] renewal has been approved.

## 2024-03-25 DIAGNOSIS — I25.10 CORONARY ARTERY DISEASE INVOLVING NATIVE CORONARY ARTERY OF NATIVE HEART WITHOUT ANGINA PECTORIS: ICD-10-CM

## 2024-03-25 DIAGNOSIS — E78.5 HYPERLIPIDEMIA LDL GOAL <70: ICD-10-CM

## 2024-03-28 ENCOUNTER — VIRTUAL VISIT (OUTPATIENT)
Dept: FAMILY MEDICINE | Facility: CLINIC | Age: 79
End: 2024-03-28
Payer: COMMERCIAL

## 2024-03-28 DIAGNOSIS — R35.0 URINARY FREQUENCY: Primary | ICD-10-CM

## 2024-03-28 PROCEDURE — 99213 OFFICE O/P EST LOW 20 MIN: CPT | Mod: 95 | Performed by: PHYSICIAN ASSISTANT

## 2024-03-28 RX ORDER — RESPIRATORY SYNCYTIAL VIRUS VACCINE 120MCG/0.5
0.5 KIT INTRAMUSCULAR ONCE
Qty: 1 EACH | Refills: 0 | Status: CANCELLED | OUTPATIENT
Start: 2024-03-28 | End: 2024-03-28

## 2024-03-28 NOTE — PROGRESS NOTES
Hanane is a 78 year old who is being evaluated via a billable video visit.    How would you like to obtain your AVS? MyChart  If the video visit is dropped, the invitation should be resent by: Text to cell phone: [phone]  Will anyone else be joining your video visit? No      Assessment & Plan     Urinary frequency  Discussed with patient that I suspect a UTI causing her symptoms. However, an urinalysis should be done to confirm. Future orders placed. Advised to come into clinic tomorrow to leave a sample. Based on these results will determine treatment plan. Patient agree's with this plan and has no further questions  - UA Macroscopic with reflex to Microscopic and Culture; Future          Subjective   Hanane is a 78 year old, presenting for the following health issues:  UTI    Video Start Time: 5:13 PM    History of Present Illness       Reason for visit:  Night sweats, UTI  Symptom onset:  More than a month  Symptoms include:  Wake up at night wet.  Frequent urination (3 t0 4 times a night)  Always feel like I have to go  Symptom intensity:  Severe  Symptom progression:  Worsening  Had these symptoms before:  No  What makes it worse:  Not that I can think of  What makes it better:  No    She eats 0-1 servings of fruits and vegetables daily.She consumes 0 sweetened beverage(s) daily.She exercises with enough effort to increase her heart rate 20 to 29 minutes per day.  She exercises with enough effort to increase her heart rate 3 or less days per week.   She is taking medications regularly.     Additional provider notes :   For the last month she has been having more urination at night. Feels like her bladder if full all the time. Feels like she can't empty her bladder. She has been going to the bathroom 3-4 x a night. Some discomfort in the vaginal area. She is having night sweats for the last few weeks. No fevers. No body aches. She thinks the Kenalog shot she got from ENT caused the night sweats because they  started shortly after that. She denies any pain with urinary. No blood in urine. No back pain, abdominal pain or flank pain. No hx of UTI's or kidney stones. Has not tried anything at home for symptoms           Review of Systems  Constitutional, HEENT, cardiovascular, pulmonary, gi and gu systems are negative, except as otherwise noted.      Objective           Vitals:  No vitals were obtained today due to virtual visit.    Physical Exam   GENERAL: alert and no distress  EYES: Eyes grossly normal to inspection.  No discharge or erythema, or obvious scleral/conjunctival abnormalities.  RESP: No audible wheeze, cough, or visible cyanosis.    SKIN: Visible skin clear. No significant rash, abnormal pigmentation or lesions.  NEURO: Cranial nerves grossly intact.  Mentation and speech appropriate for age.  PSYCH: Appropriate affect, tone, and pace of words    No results found for any visits on 03/28/24.      Video-Visit Details    Type of service:  Video Visit   Video End Time:5:23 PM  Originating Location (pt. Location): Home    Distant Location (provider location):  On-site  Platform used for Video Visit: Dana  Signed Electronically by: KRISTIN Hunt

## 2024-04-01 ENCOUNTER — APPOINTMENT (OUTPATIENT)
Dept: LAB | Facility: CLINIC | Age: 79
End: 2024-04-01
Payer: COMMERCIAL

## 2024-04-02 ENCOUNTER — MYC MEDICAL ADVICE (OUTPATIENT)
Dept: FAMILY MEDICINE | Facility: CLINIC | Age: 79
End: 2024-04-02
Payer: COMMERCIAL

## 2024-04-02 NOTE — TELEPHONE ENCOUNTER
RN replied to patient via [employer]. See message for details.     Ovidio Beltran RN, BSN, PHN  [facility]: [place]

## 2024-04-03 ENCOUNTER — TELEPHONE (OUTPATIENT)
Dept: FAMILY MEDICINE | Facility: CLINIC | Age: 79
End: 2024-04-03
Payer: COMMERCIAL

## 2024-04-03 DIAGNOSIS — N39.0 ACUTE UTI (URINARY TRACT INFECTION): Primary | ICD-10-CM

## 2024-04-03 RX ORDER — SULFAMETHOXAZOLE/TRIMETHOPRIM 800-160 MG
1 TABLET ORAL 2 TIMES DAILY
Qty: 6 TABLET | Refills: 0 | Status: SHIPPED | OUTPATIENT
Start: 2024-04-03 | End: 2024-04-06

## 2024-04-03 NOTE — TELEPHONE ENCOUNTER
RN called and spoke with patient.    RN reviewed providers message.    Patient verbalized understanding.    Ovidio Beltran RN, BSN, PHN  [facility]

## 2024-04-03 NOTE — TELEPHONE ENCOUNTER
----- Message from KRISTIN Hunt sent at 4/3/2024  9:17 AM CDT -----  Please let Hanane know that her urine came back positive for UTI. I sent in medication to treat to her pharmacy. Please follow up in clinic if symptom do not improve after completion of medication.     Thank you,  Ana Laura Carrera PA-C

## 2024-04-03 NOTE — PATIENT INSTRUCTIONS
Urinary Tract Infection (UTI) in Women: Care Instructions  Overview     A urinary tract infection (UTI) is an infection caused by bacteria. It can happen anywhere in the urinary tract. A UTI can happen in the:  Kidneys.  Ureters, the tubes that connect the kidneys to the bladder.  Bladder.  Urethra, where the urine comes out.  Most UTIs are bladder infections. They often cause pain or burning when you urinate.  Most UTIs can be cured with antibiotics. If you are prescribed antibiotics, be sure to complete your treatment so that the infection does not get worse.  Follow-up care is a key part of your treatment and safety. Be sure to make and go to all appointments, and call your doctor if you are having problems. It's also a good idea to know your test results and keep a list of the medicines you take.  How can you care for yourself at home?  Take your antibiotics as directed. Do not stop taking them just because you feel better. You need to take the full course of antibiotics.  Drink extra water and other fluids for the next day or two. This will help make the urine less concentrated and help wash out the bacteria that are causing the infection. (If you have kidney, heart, or liver disease and have to limit fluids, talk with your doctor before you increase the amount of fluids you drink.)  Avoid drinks that are carbonated or have caffeine. They can irritate the bladder.  Urinate often. Try to empty your bladder each time.  To relieve pain, take a hot bath or lay a heating pad set on low over your lower belly or genital area. Never go to sleep with a heating pad in place.  To prevent UTIs  Drink plenty of water each day. This helps you urinate often, which clears bacteria from your system. (If you have kidney, heart, or liver disease and have to limit fluids, talk with your doctor before you increase the amount of fluids you drink.)  Urinate when you need to.  If you are sexually active, urinate right after you have  "sex.  Change sanitary pads often.  Avoid douches, bubble baths, feminine hygiene sprays, and other feminine hygiene products that have deodorants.  After going to the bathroom, wipe from front to back.  When should you call for help?   Call your doctor now or seek immediate medical care if:    You have new or worse fever, chills, nausea, or vomiting.     You have new pain in your back just below your rib cage. This is called flank pain.     There is new blood or pus in your urine.     You have any problems with your antibiotic medicine.   Watch closely for changes in your health, and be sure to contact your doctor if:    You are not getting better after taking an antibiotic for 2 days.     Your symptoms go away but then come back.   Where can you learn more?  Go to https://www.sportif225.net/patiented  Enter K848 in the search box to learn more about \"Urinary Tract Infection (UTI) in Women: Care Instructions.\"  Current as of: November 15, 2023               Content Version: 14.0    5792-7074 [employer].   Care instructions adapted under license by your healthcare professional. If you have questions about a medical condition or this instruction, always ask your healthcare professional. [employer] disclaims any warranty or liability for your use of this information.      "

## 2024-04-08 ENCOUNTER — MYC MEDICAL ADVICE (OUTPATIENT)
Dept: FAMILY MEDICINE | Facility: CLINIC | Age: 79
End: 2024-04-08
Payer: COMMERCIAL

## 2024-04-08 NOTE — TELEPHONE ENCOUNTER
Called and spoke with patient .      Her PCP is in [place] and she would prefer to go there.    She will call them see if she can get an appointment.    Christine M Klisch, RN

## 2024-04-08 NOTE — TELEPHONE ENCOUNTER
I would recommend that she come in for a in person appointment. With the night sweats I would maybe do more labs and ensure her vitals are stable. She could go to URGENT CARE as well if she wants otherwise hopefully there is an opening in the next day for her to get in.     Thank you,  Ana Laura Carrera PA-C]

## 2024-04-08 NOTE — TELEPHONE ENCOUNTER
Routing [employer] message to Provider      Pt has a virtual visit on 3/28, urine cultures were completed on 4/1- pt was on abx bactrim 4/3-4/6.    She continues to have frequency- can we order another ua?      Alethea, RN    Triage Nurse  [facility]

## 2024-04-10 ENCOUNTER — HOSPITAL ENCOUNTER (OUTPATIENT)
Dept: MAMMOGRAPHY | Facility: CLINIC | Age: 79
Discharge: HOME | End: 2024-04-10
Attending: INTERNAL MEDICINE | Admitting: INTERNAL MEDICINE
Payer: COMMERCIAL

## 2024-04-10 DIAGNOSIS — Z12.31 VISIT FOR SCREENING MAMMOGRAM: ICD-10-CM

## 2024-04-10 PROCEDURE — 77067 SCR MAMMO BI INCL CAD: CPT

## 2024-04-20 ENCOUNTER — MYC MEDICAL ADVICE (OUTPATIENT)
Dept: FAMILY MEDICINE | Facility: CLINIC | Age: 79
End: 2024-04-20
Payer: COMMERCIAL

## 2024-04-22 ENCOUNTER — VIRTUAL VISIT (OUTPATIENT)
Dept: FAMILY MEDICINE | Facility: CLINIC | Age: 79
End: 2024-04-22
Payer: COMMERCIAL

## 2024-04-22 DIAGNOSIS — J32.9 SINUSITIS, UNSPECIFIED CHRONICITY, UNSPECIFIED LOCATION: Primary | ICD-10-CM

## 2024-04-22 PROCEDURE — 99441 PR PHYSICIAN TELEPHONE EVALUATION 5-10 MIN: CPT | Mod: 93 | Performed by: PHYSICIAN ASSISTANT

## 2024-04-22 RX ORDER — DOXYCYCLINE 100 MG/1
100 CAPSULE ORAL 2 TIMES DAILY
Qty: 14 CAPSULE | Refills: 0 | Status: SHIPPED | OUTPATIENT
Start: 2024-04-22 | End: 2024-04-29

## 2024-04-22 ASSESSMENT — ENCOUNTER SYMPTOMS
SINUS PRESSURE: 1
TROUBLE SWALLOWING: 0
NAUSEA: 0
SORE THROAT: 1
HEADACHES: 1
SHORTNESS OF BREATH: 0
FEVER: 0
COUGH: 1
SINUS PAIN: 1
VOMITING: 0
CHILLS: 0

## 2024-04-22 NOTE — PROGRESS NOTES
Hanane is a 78 year old who is being evaluated via a billable telephone visit.    What phone number would you like to be contacted at? [phone]  How would you like to obtain your AVS? [employer]  Originating Location (pt. Location): Home    Distant Location (provider location):  On-site    Assessment & Plan     Sinusitis, unspecified chronicity, unspecified location  Patient is a 78-year-old female who presents to clinic due to 2 weeks of sinus pressure/pain, dental pressure, ear pressure, cough, congestion.  History of sinusitis approximately once per year.  No signs of respiratory distress-patient able to speak in full sentences.  Symptoms consistent with bacterial sinusitis.  Antibiotic options discussed.  Will proceed with doxycycline.  Recommended Tylenol/ibuprofen for discomfort.  Discussed expected course of recovery.  Return precautions provided.  - doxycycline hyclate (VIBRAMYCIN) 100 MG capsule; Take 1 capsule (100 mg) by mouth 2 times daily for 7 days      See Patient Instructions    Subjective   Hanane is a 78 year old, presenting for the following health issues:  Sinus Problem      4/22/2024     4:01 PM   Additional Questions   Roomed by Completed by patient via [employer].     History of Present Illness       Reason for visit:  Sinus infection  Symptom onset:  1-2 weeks ago  Symptoms include:  Headache, ears plugging, teeth bothering me and pressure in my nose and sore throat  Symptom intensity:  Moderate  Symptom progression:  Worsening  Had these symptoms before:  Yes  Has tried/received treatment for these symptoms:  Yes  Previous treatment was successful:  Yes  Prior treatment description:  Prednisone and antibiotics  What makes it worse:  No  What makes it better:  No    She eats 2-3 servings of fruits and vegetables daily.She consumes 0 sweetened beverage(s) daily.She exercises with enough effort to increase her heart rate 20 to 29 minutes per day.  She exercises with enough effort to increase her  heart rate 3 or less days per week. She is missing 1 dose(s) of medications per week.  She is not taking prescribed medications regularly due to remembering to take.     Patient notes sinus infection ongoing for the last few weeks. Symptoms: facial pain, dental pressure, sore throat, nasal congestion. Patient had history of sinus infections since childhood-at least one per year.     Review of Systems   Constitutional:  Negative for chills and fever.   HENT:  Positive for congestion, ear pain, sinus pressure, sinus pain and sore throat. Negative for trouble swallowing.    Respiratory:  Positive for cough. Negative for shortness of breath.    Gastrointestinal:  Negative for nausea and vomiting.   Neurological:  Positive for headaches.   All other systems reviewed and are negative.          Objective           Vitals:  No vitals were obtained today due to virtual visit.    Physical Exam   General: Alert and no distress //Respiratory: No audible wheeze, cough, or shortness of breath // Psychiatric:  Appropriate affect, tone, and pace of words            Phone call duration: 6 minutes  Signed Electronically by: Karma Hill PA-C

## 2024-04-22 NOTE — PATIENT INSTRUCTIONS
Your symptoms are concerning for a bacterial sinus infection.  For treatment you have been prescribed Doxycycline, an antibiotic.  With antibiotic treatment, sinus infections typically improve/resolve over approximately 10 days.  For additional relief you can use Tylenol/ibuprofen.  Make sure to get plenty of rest and stay hydrated.  Reach out with questions or concerns and follow-up in clinic for new or worsening symptoms.

## 2024-05-20 DIAGNOSIS — I25.9 CHEST PAIN DUE TO MYOCARDIAL ISCHEMIA, UNSPECIFIED ISCHEMIC CHEST PAIN TYPE: ICD-10-CM

## 2024-05-20 RX ORDER — NITROGLYCERIN 0.4 MG/1
TABLET SUBLINGUAL
Qty: 25 TABLET | Refills: 3 | Status: SHIPPED | OUTPATIENT
Start: 2024-05-20

## 2024-06-07 ENCOUNTER — MYC REFILL (OUTPATIENT)
Dept: FAMILY MEDICINE | Facility: CLINIC | Age: 79
End: 2024-06-07
Payer: COMMERCIAL

## 2024-06-07 DIAGNOSIS — F41.9 ANXIETY: ICD-10-CM

## 2024-06-07 RX ORDER — CLONAZEPAM 0.5 MG/1
TABLET, ORALLY DISINTEGRATING ORAL
Qty: 20 TABLET | Refills: 1 | Status: SHIPPED | OUTPATIENT
Start: 2024-06-07

## 2024-06-11 NOTE — PROGRESS NOTES
Facial Plastic and Reconstructive Surgery Consultation    HPI:   I had the pleasure of seeing Hanane Sorto today in clinic for consultation for vision issues. Hanane Sorto is a 78 year old female.     Hanane Sorto describes upper lids interfering with superior visual field and interfering with activities of daily living including reading, driving and watching television. This is mainly on the left.           Review Of Systems  ROS: 10 point ROS neg other than the symptoms noted above in the HPI.    Patient Active Problem List   Diagnosis    Hyperlipidemia with target LDL less than 130    Anxiety    Osteoporosis    Gross hematuria    Advanced directives, counseling/discussion    Osteoarthritis    Bursitis of hip    Constipation    Gastroesophageal reflux disease without esophagitis    S/P total knee arthroplasty    Fracture of wrist, left, with routine healing, subsequent encounter    Restless leg syndrome    Arthritis of neck    Dizziness    Status post coronary angiogram    Breast tenderness in female    Nipple tenderness     Past Surgical History:   Procedure Laterality Date    ARTHROPLASTY KNEE Left 2017    Procedure: ARTHROPLASTY KNEE;  LEFT TOTAL KNEE ARTHROPLASTY;  Surgeon: Lee Ayala MD;  Location:  OR    BIOPSY   approx.    vaginal    BREAST SURGERY  1972    Implants    BUNIONECTOMY      both    CARDIAC SURGERY      4 stents     SECTION      COLONOSCOPY  2010    CV CORONARY ANGIOGRAM N/A 2022    Procedure: Coronary Angiogram;  Surgeon: Lea Sanchez MD;  Location:  HEART CARDIAC CATH LAB    EYE SURGERY  2014    cataract surgery in both eyes    LIPOSUCTION (LOCATION)      RECONSTRUCT BREAST, IMPLANT PROSTHESIS, COMBINED       Current Outpatient Medications   Medication Sig Dispense Refill    aspirin 81 MG EC tablet Take 1 tablet (81 mg) by mouth daily      bisacodyl (DULCOLAX) 5 MG EC tablet Take 5 mg by mouth daily as needed for  constipation      Calcium-Phosphorus-Vitamin D (CALCIUM GUMMIES PO) Take 2 Gum by mouth daily      clonazePAM (KLONOPIN) 0.5 MG ODT DISSOLVE 1 TABLET(0.5 MG) ON THE TONGUE EVERY NIGHT AS NEEDED FOR ANXIETY 20 tablet 1    evolocumab (REPATHA) 140 MG/ML prefilled autoinjector Inject 1 mL (140 mg) Subcutaneous every 14 days 6 mL 4    nitroGLYcerin (NITROSTAT) 0.4 MG sublingual tablet For chest pain place 1 tablet under the tongue every 5 minutes for 3 doses. If symptoms persist 5 minutes after 1st dose call 911. 25 tablet 3    omeprazole (PRILOSEC) 20 MG DR capsule Take 1 capsule (20 mg) by mouth 2 times daily 180 capsule 3    pramipexole (MIRAPEX) 0.125 MG tablet Take 1-2 tablets (0.125-0.25 mg) by mouth 3 times daily 180 tablet 3    STATIN NOT PRESCRIBED (INTENTIONAL) Please choose reason not prescribed from choices below. (Patient not taking: Reported on 2024)      UNABLE TO FIND MEDICATION NAME: phillps laxative       Crestor [rosuvastatin], Ezetimibe, Miralax [polyethylene glycol], Pravastatin, and Statins  Social History     Socioeconomic History    Marital status:      Spouse name: Not on file    Number of children: Not on file    Years of education: Not on file    Highest education level: Not on file   Occupational History    Not on file   Tobacco Use    Smoking status: Former     Current packs/day: 0.00     Average packs/day: 0.5 packs/day for 15.0 years (7.5 ttl pk-yrs)     Types: Cigarettes     Start date: 1960     Quit date: 1975     Years since quittin.4    Smokeless tobacco: Never    Tobacco comments:     one pack/ three months   Vaping Use    Vaping status: Never Used   Substance and Sexual Activity    Alcohol use: Yes     Comment: one glass of wine a week    Drug use: No    Sexual activity: Not Currently     Partners: Male     Birth control/protection: Post-menopausal   Other Topics Concern     Service Not Asked    Blood Transfusions Not Asked    Caffeine Concern Not  Asked    Occupational Exposure Not Asked    Hobby Hazards Not Asked    Sleep Concern Not Asked    Stress Concern Not Asked    Weight Concern Not Asked    Special Diet Not Asked    Back Care Not Asked    Exercise Yes    Bike Helmet Not Asked    Seat Belt Not Asked    Self-Exams Not Asked    Parent/sibling w/ CABG, MI or angioplasty before 65F 55M? No   Social History Narrative    Not on file     Social Determinants of Health     Financial Resource Strain: Low Risk  (12/15/2023)    Financial Resource Strain     Within the past 12 months, have you or your family members you live with been unable to get utilities (heat, electricity) when it was really needed?: No   Food Insecurity: Low Risk  (12/15/2023)    Food Insecurity     Within the past 12 months, did you worry that your food would run out before you got money to buy more?: No     Within the past 12 months, did the food you bought just not last and you didn t have money to get more?: No   Transportation Needs: Low Risk  (12/15/2023)    Transportation Needs     Within the past 12 months, has lack of transportation kept you from medical appointments, getting your medicines, non-medical meetings or appointments, work, or from getting things that you need?: No   Physical Activity: Sufficiently Active (3/13/2022)    Received from [facility]    Exercise Vital Sign     Days of Exercise per Week: 2 days     Minutes of Exercise per Session: 90 min   Stress: No Stress Concern Present (3/13/2022)    Received from [facility]    [employer] - Occupational Stress Questionnaire     Feeling of Stress : Not at all   Social Connections: Moderately Integrated (3/13/2022)    Received from [facility]    Social Connection and Isolation Panel [NHANES]     Frequency of Communication with Friends and Family: More than three times a week     Frequency of Social Gatherings with Friends and Family: More than three times a week     Attends Restoration Services:  Never     Active Member of Clubs or Organizations: Yes     Attends Club or Organization Meetings: More than 4 times per year     Marital Status:    Interpersonal Safety: Low Risk  (2023)    Interpersonal Safety     Do you feel physically and emotionally safe where you currently live?: Yes     Within the past 12 months, have you been hit, slapped, kicked or otherwise physically hurt by someone?: No     Within the past 12 months, have you been humiliated or emotionally abused in other ways by your partner or ex-partner?: No   Housing Stability: Low Risk  (12/15/2023)    Housing Stability     Do you have housing? : Yes     Are you worried about losing your housing?: No     Family History   Problem Relation Age of Onset    Lipids Mother     Alzheimer Disease Mother     Depression Mother     Osteoporosis Mother         RA    C.A.DRussel Father     Coronary Artery Disease Father     Hypertension Father     Diabetes Father     Breast Cancer Sister     Leukemia Sister     Hyperlipidemia Sister     Colon Cancer Sister             Cerebrovascular Disease Brother        PE:  Alert and Oriented, Answering Questions Appropriately  Atraumatic, Normocephalic, Face Symmetric  Skin: Sampson 2  Facial Nerve Intact and facial movement symmetric  EOMI    Dermatochalasis is minimal. She has some hollowing and volume loss of her upper lids.  Brow ptosis bilaterally that is minimal  She has a left upper lid ptosis     Nasal Exam: No external Deformity, no mucopurulence or polyps, no masses  Chin: Normal   Lips/Teeth/Toungue/Gums: Lips intact  Neck: Trachea midline  Chest: No wheezing, cyanosis, or stridor  Card: not diaphoretic  Neuro/Psych: CN's 2-12 intact, Moves all extremities, ambulation in intact, positive affect, no notable muscle weakness       IMPRESSION/PLAN: Hanane Sorto has noted visual field constriction from left-sided upper lid ptosis. I am going to refer her to Dr. Lord for evaluation.      Photodocumentation was obtained.

## 2024-06-12 ENCOUNTER — OFFICE VISIT (OUTPATIENT)
Dept: PLASTIC SURGERY | Facility: CLINIC | Age: 79
End: 2024-06-12
Payer: COMMERCIAL

## 2024-06-12 DIAGNOSIS — H02.402 PTOSIS OF LEFT EYELID: Primary | ICD-10-CM

## 2024-07-29 ENCOUNTER — OFFICE VISIT (OUTPATIENT)
Dept: FAMILY MEDICINE | Facility: CLINIC | Age: 79
End: 2024-07-29
Payer: COMMERCIAL

## 2024-07-29 VITALS
DIASTOLIC BLOOD PRESSURE: 79 MMHG | OXYGEN SATURATION: 98 % | BODY MASS INDEX: 23.21 KG/M2 | WEIGHT: 103.2 LBS | SYSTOLIC BLOOD PRESSURE: 118 MMHG | HEIGHT: 56 IN | HEART RATE: 96 BPM | RESPIRATION RATE: 16 BRPM | TEMPERATURE: 97.8 F

## 2024-07-29 DIAGNOSIS — F43.21 GRIEF REACTION: Primary | ICD-10-CM

## 2024-07-29 PROCEDURE — G2211 COMPLEX E/M VISIT ADD ON: HCPCS | Performed by: FAMILY MEDICINE

## 2024-07-29 PROCEDURE — 99213 OFFICE O/P EST LOW 20 MIN: CPT | Performed by: FAMILY MEDICINE

## 2024-07-29 RX ORDER — PRAMIPEXOLE DIHYDROCHLORIDE 0.12 MG/1
.125-.25 TABLET ORAL 3 TIMES DAILY
COMMUNITY
Start: 2024-07-29 | End: 2024-08-19

## 2024-07-29 RX ORDER — ESCITALOPRAM OXALATE 5 MG/1
TABLET ORAL
Qty: 60 TABLET | Refills: 2 | Status: SHIPPED | OUTPATIENT
Start: 2024-07-29 | End: 2024-08-20

## 2024-07-29 ASSESSMENT — ANXIETY QUESTIONNAIRES
5. BEING SO RESTLESS THAT IT IS HARD TO SIT STILL: SEVERAL DAYS
6. BECOMING EASILY ANNOYED OR IRRITABLE: NEARLY EVERY DAY
GAD7 TOTAL SCORE: 11
GAD7 TOTAL SCORE: 11
1. FEELING NERVOUS, ANXIOUS, OR ON EDGE: NEARLY EVERY DAY
7. FEELING AFRAID AS IF SOMETHING AWFUL MIGHT HAPPEN: NOT AT ALL
3. WORRYING TOO MUCH ABOUT DIFFERENT THINGS: MORE THAN HALF THE DAYS
IF YOU CHECKED OFF ANY PROBLEMS ON THIS QUESTIONNAIRE, HOW DIFFICULT HAVE THESE PROBLEMS MADE IT FOR YOU TO DO YOUR WORK, TAKE CARE OF THINGS AT HOME, OR GET ALONG WITH OTHER PEOPLE: VERY DIFFICULT
2. NOT BEING ABLE TO STOP OR CONTROL WORRYING: MORE THAN HALF THE DAYS

## 2024-07-29 ASSESSMENT — PAIN SCALES - GENERAL: PAINLEVEL: NO PAIN (0)

## 2024-07-29 ASSESSMENT — PATIENT HEALTH QUESTIONNAIRE - PHQ9
5. POOR APPETITE OR OVEREATING: NOT AT ALL
SUM OF ALL RESPONSES TO PHQ QUESTIONS 1-9: 19

## 2024-07-29 NOTE — PROGRESS NOTES
Assessment & Plan     Grief reaction  Patient lost her son as he committed suicide.  She has been very saddened with.  She has never been on medications in the past.  Recommending to start using escitalopram 5 mg daily and in the next 2 weeks she can increase it to 10 mg daily.  Recommending to start therapy as well.  Follow-up in the next recheck.  Instructed to call us if any concerns arise.  Patient is agreement to the plan    The longitudinal plan of care for the diagnosis(es)/condition(s) as documented were addressed during this visit. Due to the added complexity in care, I will continue to support Hanane in the subsequent management and with ongoing continuity of care.            Subjective   Hanane is a 78 year old, presenting for the following health issues:  MH Follow Up (Depression) and Cough (Recent pneumonia- persistent cough, headaches, and ear-aches )        7/29/2024     3:55 PM   Additional Questions   Roomed by Alfredito ESPINAL     History of Present Illness       Mental Health Follow-up:  Patient presents to follow-up on Depression.Patient's depression since last visit has been:  Worse  The patient is not having other symptoms associated with depression.      Any significant life events: grief or loss  Patient is not feeling anxious or having panic attacks.  Patient has no concerns about alcohol or drug use.    Reason for visit:  Depression and pneumonia  Symptom onset:  More than a month  Symptoms include:  Either crying or strong anger  Symptom intensity:  Severe  Symptom progression:  Staying the same  Had these symptoms before:  No  What makes it worse:  Agitation  What makes it better:  No    She eats 2-3 servings of fruits and vegetables daily.She consumes 1 sweetened beverage(s) daily.She exercises with enough effort to increase her heart rate 20 to 29 minutes per day.  She exercises with enough effort to increase her heart rate 3 or less days per week. She is missing 1 dose(s) of medications per  "week.  She is not taking prescribed medications regularly due to other.         Depression   How are you doing with your depression since your last visit? Patient has history of depression but it has been a while, son recently committed suicide   Are you having other symptoms that might be associated with depression? Yes:  Mood changes and crying episodes, with agitation   Have you had a significant life event?  Grief or Loss   Do you have any concerns with your use of alcohol or other drugs? No    Social History     Tobacco Use    Smoking status: Former     Current packs/day: 0.00     Average packs/day: 0.5 packs/day for 15.0 years (7.5 ttl pk-yrs)     Types: Cigarettes     Start date: 1960     Quit date: 1975     Years since quittin.7     Passive exposure: Never    Smokeless tobacco: Never    Tobacco comments:     one pack/ three months   Vaping Use    Vaping status: Never Used   Substance Use Topics    Alcohol use: Yes     Comment: one glass of wine a week    Drug use: No         2024     4:43 PM   PHQ   PHQ-9 Total Score 19   Q9: Thoughts of better off dead/self-harm past 2 weeks Not at all         2020     1:50 PM 2023     9:46 PM 2024     4:43 PM   GELACIO-7 SCORE   Total Score  18 (severe anxiety)    Total Score 12 18 11         Suicide Assessment Five-step Evaluation and Treatment (SAFE-T)                Objective    /79   Pulse 96   Temp 97.8  F (36.6  C) (Temporal)   Resp 16   Ht 1.422 m (4' 8\")   Wt 46.8 kg (103 lb 3.2 oz)   SpO2 98%   BMI 23.14 kg/m    Body mass index is 23.14 kg/m .  Physical Exam   GENERAL: alert and no distress  RESP: lungs clear to auscultation - no rales, rhonchi or wheezes  CV: regular rate and rhythm, normal S1 S2, no S3 or S4, no murmur, click or rub, no peripheral edema  PSYCH: mentation appears normal, affect normal/bright            Signed Electronically by: Anuj Martinez MD    "

## 2024-08-06 ENCOUNTER — VIRTUAL VISIT (OUTPATIENT)
Dept: FAMILY MEDICINE | Facility: CLINIC | Age: 79
End: 2024-08-06
Payer: COMMERCIAL

## 2024-08-06 DIAGNOSIS — G25.81 RESTLESS LEG SYNDROME: Primary | ICD-10-CM

## 2024-08-06 PROCEDURE — 99441 PR PHYSICIAN TELEPHONE EVALUATION 5-10 MIN: CPT | Mod: 93 | Performed by: PHYSICIAN ASSISTANT

## 2024-08-06 RX ORDER — GABAPENTIN 100 MG/1
100 CAPSULE ORAL AT BEDTIME
Qty: 30 CAPSULE | Refills: 0 | Status: SHIPPED | OUTPATIENT
Start: 2024-08-06 | End: 2024-08-12

## 2024-08-06 NOTE — PROGRESS NOTES
Hanane is a 78 year old who is being evaluated via a billable telephone visit.    What phone number would you like to be contacted at? [phone]   How would you like to obtain your AVS? MyChart  Originating Location (pt. Location): Home    Distant Location (provider location):  Off-site  3:27-3:32  Assessment & Plan     Restless leg syndrome  Has follow up schedule with pcp.  Is open to trying gabapentin.  Encouraged her to follow up with pcp with evisit if she is not finding it affective before her office visit   - gabapentin (NEURONTIN) 100 MG capsule; Take 1 capsule (100 mg) by mouth at bedtime                Subjective   Hanane is a 78 year old, presenting for the following health issues:  Recheck Medication      8/6/2024     3:15 PM   Additional Questions   Roomed by Marlyn   Accompanied by self         8/6/2024     3:15 PM   Patient Reported Additional Medications   Patient reports taking the following new medications Ropinirole 0.5 mg daily-stopped taking today     HPI     Medication Followup of Ropinirole stopped taking today   Taking Medication as prescribed: NO  Side Effects:  eye pressure   Medication Helping Symptoms:  not applicable    Has taken this for years and now doesn't work. .125mg.    Brings on symptoms.    Tried a new med yesterday and it felt it affected her eye.  Afraid to continue to take it.    Has follow up with PCP next month.    Is afraid of not sleeping today if she doesn't have meds since symptoms start about 3pm.                    Objective           Vitals:  No vitals were obtained today due to virtual visit.    Physical Exam   General: Alert and no distress //Respiratory: No audible wheeze, cough, or shortness of breath // Psychiatric:  Appropriate affect, tone, and pace of words            Phone call duration: 5 minutes  Signed Electronically by: Wandy Carrizales PA-C

## 2024-08-12 ENCOUNTER — ANCILLARY PROCEDURE (OUTPATIENT)
Dept: GENERAL RADIOLOGY | Facility: CLINIC | Age: 79
End: 2024-08-12
Attending: NURSE PRACTITIONER
Payer: COMMERCIAL

## 2024-08-12 ENCOUNTER — OFFICE VISIT (OUTPATIENT)
Dept: FAMILY MEDICINE | Facility: CLINIC | Age: 79
End: 2024-08-12
Payer: COMMERCIAL

## 2024-08-12 VITALS
SYSTOLIC BLOOD PRESSURE: 108 MMHG | WEIGHT: 104.4 LBS | DIASTOLIC BLOOD PRESSURE: 69 MMHG | OXYGEN SATURATION: 99 % | HEIGHT: 56 IN | HEART RATE: 86 BPM | BODY MASS INDEX: 23.49 KG/M2 | TEMPERATURE: 98.1 F | RESPIRATION RATE: 13 BRPM

## 2024-08-12 DIAGNOSIS — Z87.01 HISTORY OF BACTERIAL PNEUMONIA: ICD-10-CM

## 2024-08-12 DIAGNOSIS — R09.81 SINUS CONGESTION: Primary | ICD-10-CM

## 2024-08-12 PROCEDURE — 99213 OFFICE O/P EST LOW 20 MIN: CPT | Performed by: NURSE PRACTITIONER

## 2024-08-12 PROCEDURE — 71046 X-RAY EXAM CHEST 2 VIEWS: CPT | Mod: TC | Performed by: RADIOLOGY

## 2024-08-12 ASSESSMENT — PAIN SCALES - GENERAL: PAINLEVEL: NO PAIN (0)

## 2024-08-12 NOTE — PROGRESS NOTES
Assessment & Plan     Sinus congestion  Persistent URI symptoms since being diagnosed with pneumonia about 3 weeks ago.  She has been using a decongestant nasal spray that is likely playing a role in her persistent nasal congestion.  Encouraged her to stop using this and switch to Xlear nasal spray.  This will likely help a lot of her symptoms    History of bacterial pneumonia  With diagnosis of right middle lobe pneumonia 3 weeks ago chest x-ray is repeated today.  I reviewed x-ray and does not reveal any severe findings.  I am unable to compare to previous x-ray as she was in a different system.  Still awaiting radiology read  - XR Chest 2 Views; Future        Subjective   Hanane is a 78 year old, presenting for the following health issues:  Sinus Problem    History of Present Illness       Reason for visit:  Sinus infection  Symptom onset:  1-2 weeks ago  Symptoms include:  Headache, nose, ears  Symptom intensity:  Moderate  Symptom progression:  Worsening  Had these symptoms before:  Yes  Has tried/received treatment for these symptoms:  Yes  Previous treatment was successful:  Yes  Prior treatment description:  Antibiotics  What makes it worse:  No  What makes it better:  No    She eats 0-1 servings of fruits and vegetables daily.She consumes 0 sweetened beverage(s) daily.She exercises with enough effort to increase her heart rate 10 to 19 minutes per day.  She exercises with enough effort to increase her heart rate 3 or less days per week. She is missing 1 dose(s) of medications per week.  She is not taking prescribed medications regularly due to other.     July 21 dx pneumonia   Having symptoms of head hurting, ears bothering her, teeth and nose.   Gets a lot of sinus infections   Feeling terrible the last couple weeks   No recent fevers   Mild cough that is a lot better than what it was   She was treated with the zpak and augmentin. She only took about 3 days of the augmentin   Is taking mucinex nose spray  "(same as afrin)       Review of Systems  Detailed as above         Objective    /69 (BP Location: Left arm, Patient Position: Sitting, Cuff Size: Adult Small)   Pulse 86   Temp 98.1  F (36.7  C) (Tympanic)   Resp 13   Ht 1.422 m (4' 8\")   Wt 47.4 kg (104 lb 6.4 oz)   SpO2 99%   BMI 23.41 kg/m    There is no height or weight on file to calculate BMI.  Physical Exam  Constitutional:       Appearance: Normal appearance.   HENT:      Right Ear: Tympanic membrane, ear canal and external ear normal.      Left Ear: Tympanic membrane, ear canal and external ear normal.      Ears:      Comments: Moderate amount of cerumen left ear     Nose: Nose normal. No congestion.      Comments: Mild irritation of nasal mucosa  Pulmonary:      Effort: Pulmonary effort is normal.   Neurological:      Mental Status: She is alert.   Psychiatric:         Mood and Affect: Mood normal.                  Signed Electronically by: SHADE Dawson CNP    "

## 2024-08-12 NOTE — PATIENT INSTRUCTIONS
Xlear nasal spray. This has grapefruit seed extract that is a natural antimicrobial. This helps fight off any sort of infection in the nose. It also has xylitol that lines the mucus membranes so that viruses and bacteria can stop and take hold.    Used it every few hours initially while you are sick. Then go to twice a day  It can also be used prophylactically. If you are getting sick or around anyone that sick you can use it twice a day.

## 2024-08-19 ENCOUNTER — MYC MEDICAL ADVICE (OUTPATIENT)
Dept: FAMILY MEDICINE | Facility: CLINIC | Age: 79
End: 2024-08-19
Payer: COMMERCIAL

## 2024-08-19 ENCOUNTER — MYC REFILL (OUTPATIENT)
Dept: FAMILY MEDICINE | Facility: CLINIC | Age: 79
End: 2024-08-19
Payer: COMMERCIAL

## 2024-08-19 DIAGNOSIS — F43.21 GRIEF REACTION: ICD-10-CM

## 2024-08-19 DIAGNOSIS — G25.81 RESTLESS LEG SYNDROME: ICD-10-CM

## 2024-08-19 RX ORDER — PRAMIPEXOLE DIHYDROCHLORIDE 0.12 MG/1
.125-.25 TABLET ORAL 3 TIMES DAILY
Qty: 270 TABLET | Refills: 1 | Status: SHIPPED | OUTPATIENT
Start: 2024-08-19

## 2024-08-19 RX ORDER — ESCITALOPRAM OXALATE 5 MG/1
TABLET ORAL
Qty: 60 TABLET | Refills: 2 | OUTPATIENT
Start: 2024-08-19

## 2024-08-20 RX ORDER — ESCITALOPRAM OXALATE 5 MG/1
20 TABLET ORAL DAILY
Qty: 180 TABLET | Refills: 2 | Status: SHIPPED | OUTPATIENT
Start: 2024-08-20 | End: 2024-08-20

## 2024-08-20 RX ORDER — ESCITALOPRAM OXALATE 20 MG/1
20 TABLET ORAL DAILY
Qty: 90 TABLET | Refills: 1 | Status: SHIPPED | OUTPATIENT
Start: 2024-08-20

## 2024-08-20 NOTE — TELEPHONE ENCOUNTER
Please see MC message and advise. Please also see other  message from 8/19/24.       Please change sig if appropriate.

## 2024-08-20 NOTE — TELEPHONE ENCOUNTER
Please review prescription sent for escitalopram 5 mg tablets ordered today. The sig contains conflicting directions. Current prescription pended. Please update and reorder. Alka Rose RN

## 2024-08-21 ENCOUNTER — TELEPHONE (OUTPATIENT)
Dept: FAMILY MEDICINE | Facility: CLINIC | Age: 79
End: 2024-08-21
Payer: COMMERCIAL

## 2024-08-21 ENCOUNTER — MYC MEDICAL ADVICE (OUTPATIENT)
Dept: FAMILY MEDICINE | Facility: CLINIC | Age: 79
End: 2024-08-21
Payer: COMMERCIAL

## 2024-08-21 NOTE — TELEPHONE ENCOUNTER
Patient calling back. The insurance company ([email or web address]) is handling the prescription. Patient aware. They will reach out to Dr. Jun Pineda RN  [facility]

## 2024-08-21 NOTE — TELEPHONE ENCOUNTER
Patient Contact    Attempt # 1    Was call answered?  No.  Left message on voicemail with information to call me back.    Upon callback, please clarify what dosage of Lexapro the pt is taking and the amount of the medication she has on hand.    Christin Tadeo RN

## 2024-08-21 NOTE — TELEPHONE ENCOUNTER
Prior Authorization Retail Medication Request    Medication/Dose: escitalopram (LEXAPRO) 20 MG tablet  Diagnosis and ICD code (if different than what is on RX):    New/renewal/insurance change PA/secondary ins. PA:  Previously Tried and Failed:    Rationale:      Insurance   Primary:   Insurance ID:  [id number]    Secondary (if applicable):  Insurance ID:      Pharmacy Information (if different than what is on RX)  Name:    Phone:    Fax:

## 2024-08-21 NOTE — TELEPHONE ENCOUNTER
PCP, please advise patient's mychart message.       Gorge Valenzuela, CMA on 8/21/2024 at 8:48 AM

## 2024-08-23 NOTE — TELEPHONE ENCOUNTER
Prior Authorization Approval    Authorization Effective Date: 5/28/2024  Authorization Expiration Date: 8/23/2025  Medication: escitalopram (LEXAPRO)20MG-APPROVED  Reference #:     Insurance Company: Other (see comments)Comment:  [employer] [phone]  Which Pharmacy is filling the prescription (Not needed for infusion/clinic administered): [facility] #[id number] - [place], [place] - [address] AT [facility] [address]  Pharmacy Notified: Yes  Patient Notified: Instructed pharmacy to notify patient when script is ready to /ship.

## 2024-08-23 NOTE — TELEPHONE ENCOUNTER
Retail Pharmacy Prior Authorization Team   Phone: [phone]    PA Initiation    Medication: ESCITALOPRAM OXALATE 20 MG PO TABS  Insurance Company: Other (see comments)Comment:  [employer] [phone]  Pharmacy Filling the Rx: [facility] #[id number] - [place], [place] - [address] AT [facility] [place] [address]  Filling Pharmacy Phone: [phone]  Filling Pharmacy Fax:    Start Date: 8/23/2024

## 2024-08-25 ENCOUNTER — TRANSFERRED RECORDS (OUTPATIENT)
Dept: HEALTH INFORMATION MANAGEMENT | Facility: CLINIC | Age: 79
End: 2024-08-25
Payer: COMMERCIAL

## 2024-09-24 ENCOUNTER — NURSE TRIAGE (OUTPATIENT)
Dept: FAMILY MEDICINE | Facility: CLINIC | Age: 79
End: 2024-09-24

## 2024-09-24 ENCOUNTER — OFFICE VISIT (OUTPATIENT)
Dept: FAMILY MEDICINE | Facility: CLINIC | Age: 79
End: 2024-09-24
Payer: COMMERCIAL

## 2024-09-24 VITALS
HEART RATE: 89 BPM | DIASTOLIC BLOOD PRESSURE: 77 MMHG | WEIGHT: 105 LBS | HEIGHT: 56 IN | OXYGEN SATURATION: 93 % | TEMPERATURE: 96.8 F | BODY MASS INDEX: 23.62 KG/M2 | SYSTOLIC BLOOD PRESSURE: 111 MMHG | RESPIRATION RATE: 20 BRPM

## 2024-09-24 DIAGNOSIS — R35.0 URINARY FREQUENCY: Primary | ICD-10-CM

## 2024-09-24 DIAGNOSIS — T14.8XXA BRUISING: ICD-10-CM

## 2024-09-24 LAB
ALBUMIN UR-MCNC: NEGATIVE MG/DL
AMORPH CRY #/AREA URNS HPF: ABNORMAL /HPF
APPEARANCE UR: CLEAR
BACTERIA #/AREA URNS HPF: ABNORMAL /HPF
BILIRUB UR QL STRIP: NEGATIVE
COLOR UR AUTO: YELLOW
ERYTHROCYTE [DISTWIDTH] IN BLOOD BY AUTOMATED COUNT: 14.3 % (ref 10–15)
GLUCOSE UR STRIP-MCNC: NEGATIVE MG/DL
HCT VFR BLD AUTO: 40.3 % (ref 35–47)
HGB BLD-MCNC: 12.8 G/DL (ref 11.7–15.7)
HGB UR QL STRIP: NEGATIVE
INR PPP: 0.9 (ref 0.85–1.15)
KETONES UR STRIP-MCNC: NEGATIVE MG/DL
LEUKOCYTE ESTERASE UR QL STRIP: NEGATIVE
MCH RBC QN AUTO: 30 PG (ref 26.5–33)
MCHC RBC AUTO-ENTMCNC: 31.8 G/DL (ref 31.5–36.5)
MCV RBC AUTO: 95 FL (ref 78–100)
NITRATE UR QL: NEGATIVE
PH UR STRIP: 7 [PH] (ref 5–7)
PLATELET # BLD AUTO: 350 10E3/UL (ref 150–450)
RBC # BLD AUTO: 4.26 10E6/UL (ref 3.8–5.2)
RBC #/AREA URNS AUTO: ABNORMAL /HPF
SP GR UR STRIP: 1.02 (ref 1–1.03)
SQUAMOUS #/AREA URNS AUTO: ABNORMAL /LPF
UROBILINOGEN UR STRIP-ACNC: 0.2 E.U./DL
WBC # BLD AUTO: 7.4 10E3/UL (ref 4–11)
WBC #/AREA URNS AUTO: ABNORMAL /HPF

## 2024-09-24 PROCEDURE — 81001 URINALYSIS AUTO W/SCOPE: CPT | Performed by: PHYSICIAN ASSISTANT

## 2024-09-24 PROCEDURE — 99213 OFFICE O/P EST LOW 20 MIN: CPT | Performed by: PHYSICIAN ASSISTANT

## 2024-09-24 PROCEDURE — 36415 COLL VENOUS BLD VENIPUNCTURE: CPT | Performed by: PHYSICIAN ASSISTANT

## 2024-09-24 PROCEDURE — 85027 COMPLETE CBC AUTOMATED: CPT | Performed by: PHYSICIAN ASSISTANT

## 2024-09-24 PROCEDURE — 85610 PROTHROMBIN TIME: CPT | Performed by: PHYSICIAN ASSISTANT

## 2024-09-24 ASSESSMENT — PAIN SCALES - GENERAL: PAINLEVEL: NO PAIN (0)

## 2024-09-24 NOTE — TELEPHONE ENCOUNTER
"Patient reports waking up with round, purple-red spots on left leg that is progressively extending higher up the leg each morning.     Started 10 days ago  Not raised  Currently has 6 red spots about 1/4\" diameter    Started closer to the ankle and is now USP up the shin.     Patient takes 81 mg aspirin daily.  Denies taking NSAIDs  No fever    Dispo: SEE HCP (OR PCP TRIAGE) WITHIN 4 HOURS  Patient agrees and appt scheduled with available team provider today.     Future Appointments 9/24/2024 - 3/23/2025        Date Visit Type Length Department Provider     9/24/2024  2:00 PM OFFICE VISIT 30 min [facility] Verona Suggs, Mindy Sutherland PA-C    Location Instructions:     [facility] is in [address] of the [facility] at [address] This is just south of [facility] and the "[employer] exit off of [address] Free parking is available; access the lot by turning east from [facility] onto [address]. Through the main entrance, the clinic is directly to the left.              12/26/2024 10:30 AM ANNUAL WELLNESS 30 min [facility] Corona Bethea MD    Location Instructions:     [facility] is in [address] of the [facility] at [address] This is just south of [facility] and the "[employer] exit off of [address]. Free parking is available; access the lot by turning east from "[employer] onto [address]. Through the main entrance, the clinic is directly to the left.                           Reason for Disposition   [1] Purple or blood-colored LOCALIZED rash AND [2] no fever AND [3] sounds well to triager  (Exception: Bruise from injury or friction.)    Additional Information   Negative: [1] Purple or blood-colored WIDESPREAD rash AND [2] fever   Negative: [1] Purple or blood-colored WIDESPREAD rash AND [2] very weak   Negative: Shock suspected " (e.g., cold/pale/clammy skin, too weak to stand, low BP, rapid pulse)   Negative: Difficult to awaken or acting confused (e.g., disoriented, slurred speech)   Negative: Sounds like a life-threatening emergency to the triager   Negative: Headache   Negative: [1] Purple or blood-colored LOCALIZED rash AND [2] fever   Negative: Patient sounds very sick or weak to the triager   Negative: [1] Purple or blood-colored WIDESPREAD rash AND [2] no fever AND [3] sounds well to triager    Protocols used: Rash - Purple Spots or Dots-A-AH

## 2024-09-24 NOTE — PROGRESS NOTES
Assessment and Plan:     (R35.0) Urinary frequency  (primary encounter diagnosis)  Comment: x last few days, no fever/chills, otherwise feels well, has Hx UTI and has had similar sxs with UTI in the past  Plan: UA with Microscopic reflex to Culture - lab         collect, UA Microscopic with Reflex to Culture        Push fluids  Await UA results  Discussed reasons to be seen promptly     (T14.8XXA) Bruising  Comment: on asa, looks like senile purpura on left lower leg, does think she bumped the area a few weeks ago, no bleeding/bruising elsewhere   Plan: CBC with platelets, INR    [facility] Same Day Provider       Subjective   Hanane is a 79 year old, presenting for the following health issues:  Circulatory Problem (Patient is here with complaints Petechiae on the  left leg x 10 days. )        9/24/2024     1:36 PM   Additional Questions   Roomed by Semaj MCKEON MA   Accompanied by Self     History of Present Illness       Reason for visit:  Blood spots going up my leg  Symptom onset:  1-2 weeks ago  Symptoms include:  Spots on leg  Symptom intensity:  Mild  Symptom progression:  Worsening  Had these symptoms before:  No  What makes it worse:  No  What makes it better:  No   She is taking medications regularly.    Hanane is here for skin changes on left left  She first noticed it about 10 days ago--red blotchy areas  She did bump the area recently and noticed a bruise weeks ago  She noticed the other areas 10 days ago, the area is not tender  She is on asa 81mg  She feels well otherwise  She hasn't had any vaginal bleeding, blood in stool or increased bruising       She does think she could have a UTI today  She is having increased frequency x last few days   She denies dysuria  She has had similar sxs with previous UTIs  She denies fever/chills, nausea/vomiting, abdominal pain, new back pain (has chronic unchanged back pain)        Objective    /77 (BP Location: Left arm, Patient Position:  "Sitting, Cuff Size: Adult Regular)   Pulse 89   Temp 96.8  F (36  C) (Temporal)   Resp 20   Ht 1.422 m (4' 8\")   Wt 47.6 kg (105 lb)   SpO2 93%   BMI 23.54 kg/m    Body mass index is 23.54 kg/m .    Physical Exam     GENERAL: healthy, alert and no distress  RESP: lungs clear to auscultation - no rales, no rhonchi, no wheezes  CV: regular rates and rhythm, normal S1 S2, no S3 or S4 and no murmur, no click or rub   MS: extremities- no gross deformities noted, no edema  SKIN: resolving bruise             Signed Electronically by: Mindy Suggs PA-C    "

## 2024-09-24 NOTE — RESULT ENCOUNTER NOTE
Bill Koo,     Here are my comments about the recent results: your urine is negative for signs of infection.  If your symptoms persist, please see Dr. Bethea.  Your blood counts were normal.  I think the spots on your legs are due to aspirin.      Please let us know if you have any questions or concerns.    Regards,  Mindy Suggs PA-C

## 2024-09-25 ENCOUNTER — TRANSFERRED RECORDS (OUTPATIENT)
Dept: HEALTH INFORMATION MANAGEMENT | Facility: CLINIC | Age: 79
End: 2024-09-25
Payer: COMMERCIAL

## 2024-10-02 ENCOUNTER — MYC REFILL (OUTPATIENT)
Dept: FAMILY MEDICINE | Facility: CLINIC | Age: 79
End: 2024-10-02
Payer: COMMERCIAL

## 2024-10-02 DIAGNOSIS — K21.00 GASTROESOPHAGEAL REFLUX DISEASE WITH ESOPHAGITIS WITHOUT HEMORRHAGE: ICD-10-CM

## 2024-12-07 ENCOUNTER — TELEPHONE (OUTPATIENT)
Dept: FAMILY MEDICINE | Facility: CLINIC | Age: 79
End: 2024-12-07
Payer: COMMERCIAL

## 2024-12-07 NOTE — TELEPHONE ENCOUNTER
Reason for Call:  Appointment Request    Patient requesting this type of appt:  Preventive     Requested provider: Corona Bethea    Reason patient unable to be scheduled: Not within requested timeframe    When does patient want to be seen/preferred time:  Pcp is not going to be in during their appt on 12/26/2024 and their next avail appts are not showing up with scheduling team. Please call pt back to get them fit in before 2025.     Comments: Next avail appt is now showing up for pcp, pt would like to be fit in before 2025. Please call them back to get fit in     Could we send this information to you in [place] or would you prefer to receive a phone call?:   Patient would prefer a phone call   Okay to leave a detailed message?: Yes at Cell number on file:    Telephone Information:   Mobile [phone]       Call taken on 12/7/2024 at 12:56 PM by Melvina Huber

## 2024-12-11 ENCOUNTER — TRANSFERRED RECORDS (OUTPATIENT)
Dept: HEALTH INFORMATION MANAGEMENT | Facility: CLINIC | Age: 79
End: 2024-12-11
Payer: COMMERCIAL

## 2024-12-19 SDOH — HEALTH STABILITY: PHYSICAL HEALTH: ON AVERAGE, HOW MANY DAYS PER WEEK DO YOU ENGAGE IN MODERATE TO STRENUOUS EXERCISE (LIKE A BRISK WALK)?: 2 DAYS

## 2024-12-19 SDOH — HEALTH STABILITY: PHYSICAL HEALTH: ON AVERAGE, HOW MANY MINUTES DO YOU ENGAGE IN EXERCISE AT THIS LEVEL?: 90 MIN

## 2024-12-19 ASSESSMENT — SOCIAL DETERMINANTS OF HEALTH (SDOH): HOW OFTEN DO YOU GET TOGETHER WITH FRIENDS OR RELATIVES?: MORE THAN THREE TIMES A WEEK

## 2024-12-24 ENCOUNTER — OFFICE VISIT (OUTPATIENT)
Dept: FAMILY MEDICINE | Facility: CLINIC | Age: 79
End: 2024-12-24
Payer: COMMERCIAL

## 2024-12-24 ENCOUNTER — OFFICE VISIT (OUTPATIENT)
Dept: CARDIOLOGY | Facility: CLINIC | Age: 79
End: 2024-12-24
Payer: COMMERCIAL

## 2024-12-24 VITALS
WEIGHT: 108.1 LBS | DIASTOLIC BLOOD PRESSURE: 80 MMHG | BODY MASS INDEX: 24.32 KG/M2 | OXYGEN SATURATION: 98 % | HEIGHT: 56 IN | SYSTOLIC BLOOD PRESSURE: 139 MMHG | HEART RATE: 74 BPM

## 2024-12-24 VITALS
SYSTOLIC BLOOD PRESSURE: 117 MMHG | WEIGHT: 106 LBS | DIASTOLIC BLOOD PRESSURE: 75 MMHG | HEART RATE: 76 BPM | BODY MASS INDEX: 23.84 KG/M2 | TEMPERATURE: 97.6 F | OXYGEN SATURATION: 98 % | RESPIRATION RATE: 16 BRPM | HEIGHT: 56 IN

## 2024-12-24 DIAGNOSIS — E78.5 HYPERLIPIDEMIA LDL GOAL <70: ICD-10-CM

## 2024-12-24 DIAGNOSIS — I25.119 ANGINA CONCURRENT WITH AND DUE TO ARTERIOSCLEROSIS OF CORONARY ARTERY (H): ICD-10-CM

## 2024-12-24 DIAGNOSIS — I25.9 CHEST PAIN DUE TO MYOCARDIAL ISCHEMIA, UNSPECIFIED ISCHEMIC CHEST PAIN TYPE: ICD-10-CM

## 2024-12-24 DIAGNOSIS — K21.9 GASTROESOPHAGEAL REFLUX DISEASE WITHOUT ESOPHAGITIS: ICD-10-CM

## 2024-12-24 DIAGNOSIS — I25.10 CORONARY ARTERY DISEASE INVOLVING NATIVE CORONARY ARTERY OF NATIVE HEART WITHOUT ANGINA PECTORIS: ICD-10-CM

## 2024-12-24 DIAGNOSIS — E78.5 HYPERLIPIDEMIA WITH TARGET LDL LESS THAN 130: ICD-10-CM

## 2024-12-24 DIAGNOSIS — Z96.653 STATUS POST TOTAL BILATERAL KNEE REPLACEMENT: ICD-10-CM

## 2024-12-24 DIAGNOSIS — G25.81 RESTLESS LEG SYNDROME: ICD-10-CM

## 2024-12-24 DIAGNOSIS — Z12.31 ENCOUNTER FOR SCREENING MAMMOGRAM FOR BREAST CANCER: ICD-10-CM

## 2024-12-24 DIAGNOSIS — F43.21 GRIEF REACTION: ICD-10-CM

## 2024-12-24 DIAGNOSIS — Z00.00 ENCOUNTER FOR ANNUAL WELLNESS VISIT (AWV) IN MEDICARE PATIENT: Primary | ICD-10-CM

## 2024-12-24 LAB
ALBUMIN SERPL BCG-MCNC: 4.3 G/DL (ref 3.5–5.2)
ALP SERPL-CCNC: 96 U/L (ref 40–150)
ALT SERPL W P-5'-P-CCNC: 15 U/L (ref 0–50)
ANION GAP SERPL CALCULATED.3IONS-SCNC: 12 MMOL/L (ref 7–15)
AST SERPL W P-5'-P-CCNC: 22 U/L (ref 0–45)
BILIRUB SERPL-MCNC: 0.4 MG/DL
BUN SERPL-MCNC: 17.5 MG/DL (ref 8–23)
CALCIUM SERPL-MCNC: 10 MG/DL (ref 8.8–10.4)
CHLORIDE SERPL-SCNC: 108 MMOL/L (ref 98–107)
CHOLEST SERPL-MCNC: 143 MG/DL
CREAT SERPL-MCNC: 0.87 MG/DL (ref 0.51–0.95)
EGFRCR SERPLBLD CKD-EPI 2021: 67 ML/MIN/1.73M2
ERYTHROCYTE [DISTWIDTH] IN BLOOD BY AUTOMATED COUNT: 13 % (ref 10–15)
FASTING STATUS PATIENT QL REPORTED: YES
FASTING STATUS PATIENT QL REPORTED: YES
GLUCOSE SERPL-MCNC: 93 MG/DL (ref 70–99)
HCO3 SERPL-SCNC: 21 MMOL/L (ref 22–29)
HCT VFR BLD AUTO: 39.2 % (ref 35–47)
HDLC SERPL-MCNC: 58 MG/DL
HGB BLD-MCNC: 12.5 G/DL (ref 11.7–15.7)
LDLC SERPL CALC-MCNC: 61 MG/DL
MCH RBC QN AUTO: 30.1 PG (ref 26.5–33)
MCHC RBC AUTO-ENTMCNC: 31.9 G/DL (ref 31.5–36.5)
MCV RBC AUTO: 95 FL (ref 78–100)
NONHDLC SERPL-MCNC: 85 MG/DL
PLATELET # BLD AUTO: 332 10E3/UL (ref 150–450)
POTASSIUM SERPL-SCNC: 4.2 MMOL/L (ref 3.4–5.3)
PROT SERPL-MCNC: 6.5 G/DL (ref 6.4–8.3)
RBC # BLD AUTO: 4.15 10E6/UL (ref 3.8–5.2)
SODIUM SERPL-SCNC: 141 MMOL/L (ref 135–145)
TRIGL SERPL-MCNC: 120 MG/DL
VIT B12 SERPL-MCNC: 445 PG/ML (ref 232–1245)
WBC # BLD AUTO: 6.9 10E3/UL (ref 4–11)

## 2024-12-24 PROCEDURE — 80053 COMPREHEN METABOLIC PANEL: CPT | Performed by: INTERNAL MEDICINE

## 2024-12-24 PROCEDURE — 85027 COMPLETE CBC AUTOMATED: CPT | Performed by: INTERNAL MEDICINE

## 2024-12-24 PROCEDURE — G0439 PPPS, SUBSEQ VISIT: HCPCS | Performed by: INTERNAL MEDICINE

## 2024-12-24 PROCEDURE — 80061 LIPID PANEL: CPT | Performed by: INTERNAL MEDICINE

## 2024-12-24 PROCEDURE — 82607 VITAMIN B-12: CPT | Performed by: INTERNAL MEDICINE

## 2024-12-24 PROCEDURE — 36415 COLL VENOUS BLD VENIPUNCTURE: CPT | Performed by: INTERNAL MEDICINE

## 2024-12-24 RX ORDER — NITROGLYCERIN 0.4 MG/1
TABLET SUBLINGUAL
Qty: 25 TABLET | Refills: 3 | Status: SHIPPED | OUTPATIENT
Start: 2024-12-24

## 2024-12-24 RX ORDER — ESCITALOPRAM OXALATE 20 MG/1
20 TABLET ORAL DAILY
Qty: 90 TABLET | Refills: 3 | Status: SHIPPED | OUTPATIENT
Start: 2024-12-24

## 2024-12-24 RX ORDER — FLUTICASONE PROPIONATE 50 MCG
2 SPRAY, SUSPENSION (ML) NASAL DAILY
COMMUNITY

## 2024-12-24 ASSESSMENT — PAIN SCALES - GENERAL: PAINLEVEL_OUTOF10: NO PAIN (0)

## 2024-12-24 NOTE — PROGRESS NOTES
CARDIOLOGY CLINIC CONSULTATION    PRIMARY CARE PHYSICIAN:  Corona Bethea    HISTORY OF PRESENT ILLNESS:  I had the pleasure of seeing Hanane Sorto at the [facility] in [place] this morning.  She is a very pleasant 79-year-old female with history of coronary artery disease, hypertension, hyperlipidemia and osteoarthritis who is here for follow-up.     She she reported exertional chest discomfort in the summer 2022.  She underwent coronary angiography which revealed multivessel coronary disease. She subsequently came to see me to discuss possible percutaneous revascularization versus surgery as she was not keen on pursuing surgery.  We reviewed her coronary angiography and felt that percutaneous revascularization was a good option.  She later went to [facility] for further opinion and ultimately had PCI of the circumflex as well as the LAD.  Her symptoms resolved after her revascularization.     She is very active and plays tennis 2-3 times per week.  She does not endorse any cardiopulmonary symptoms at this point.  Her risk factors including her hyperlipidemia are well controlled.  She was previously intolerant of statins but has been tolerating PCSK9 inhibitor over the past several months. Her mos recent Lipid profile showed an LDL of 61 mg/dl    PAST MEDICAL HISTORY:  Past Medical History:   Diagnosis Date    Anxiety     psych yearly, Dr Shea    CAD (coronary artery disease)     Family hx of colon cancer     sister    Andria cote    Fracture of wrist, left, with routine healing, subsequent encounter 08/28/2017    GERD (gastroesophageal reflux disease)     Gross hematuria 09/12/2011    Hypertension May 3,2022    Osteoarthritis 12/19/2012    Osteoporosis     Dexa    Restless legs     Skin cancer     Stable angina pectoris due to arteriosclerosis of coronary artery (H) 09/14/2022    now s/p PCI x 1 to pCx and PCI x 3 with overlapping stents to pLAD 9/2/2022 @ [facility].        MEDICATIONS:  Current Outpatient Medications   Medication Sig Dispense Refill    aspirin 81 MG EC tablet Take 1 tablet (81 mg) by mouth daily (Patient taking differently: Take 81 mg by mouth daily. otc)      bisacodyl (DULCOLAX) 5 MG EC tablet Take 5 mg by mouth daily as needed for constipation      Calcium-Phosphorus-Vitamin D (CALCIUM GUMMIES PO) Take 2 Gum by mouth daily      clonazePAM (KLONOPIN) 0.5 MG ODT DISSOLVE 1 TABLET(0.5 MG) ON THE TONGUE EVERY NIGHT AS NEEDED FOR ANXIETY 20 tablet 1    escitalopram (LEXAPRO) 20 MG tablet Take 1 tablet (20 mg) by mouth daily Use 1 tab daily for 10 days and then increase it to 2 tabs daily. (Patient taking differently: Take 20 mg by mouth daily.) 90 tablet 1    evolocumab (REPATHA) 140 MG/ML prefilled autoinjector Inject 1 mL (140 mg) subcutaneously every 14 days. 6 mL 4    fluticasone (FLONASE) 50 MCG/ACT nasal spray Spray 2 sprays into both nostrils daily. OTC      nitroGLYcerin (NITROSTAT) 0.4 MG sublingual tablet For chest pain place 1 tablet under the tongue every 5 minutes for 3 doses. If symptoms persist 5 minutes after 1st dose call 911. 25 tablet 3    omeprazole (PRILOSEC) 20 MG DR capsule Take 1 capsule (20 mg) by mouth 2 times daily. 180 capsule 2    pramipexole (MIRAPEX) 0.125 MG tablet Take 1-2 tablets (0.125-0.25 mg) by mouth 3 times daily 270 tablet 1    UNABLE TO FIND daily. MEDICATION NAME: cade laxative       No current facility-administered medications for this visit.       SOCIAL HISTORY:  I have reviewed this patient's social history and updated it with pertinent information if needed. Hanane Sorto  reports that she quit smoking about 50 years ago. Her smoking use included cigarettes. She started smoking about 65 years ago. She has a 7.5 pack-year smoking history. She has never been exposed to tobacco smoke. She has never used smokeless tobacco. She reports current alcohol use. She reports that she does not use drugs.    PHYSICAL  EXAM:  Pulse:  [74] 74  BP: (139)/(80) 139/80  SpO2:  [98 %] 98 %  108 lbs 1.6 oz    Constitutional: alert, no distress  Respiratory: Good bilateral air entry  Cardiovascular: Regular rate and rhythm, no murmur  GI: nondistended  Neuropsychiatric: appropriate affact    ASSESSMENT: Pertinent issues addressed/ reviewed during this cardiology visit  Coronary disease status post prior circumflex and LAD stenting, asymptomatic  Hyperlipidemia, treated  Hypertension, well-controlled    RECOMMENDATIONS:  She remains very stable from cardiac point of view.  She is active and does not endorse any cardiopulmonary symptoms.  Risk factors are well-controlled.  I encouraged her to remain active.  Follow-up in 1 year    It was a pleasure seeing this patient in clinic today. Please do not hesitate to contact me with any future questions.     Luis Moffett MD, [facility]  Cardiology - [facility] [place] [facility]  February 21, 2024    Review of the result(s) of each unique test - Last lipid profile, BMP, echocardiogram     The level of medical decision making during this visit was of moderate complexity.    This note was completed in part using dictation via the Dragon voice recognition software. Some word and grammatical errors may occur and must be interpreted in the appropriate clinical context.  If there are any questions pertaining to this issue, please contact me for further clarification.

## 2024-12-24 NOTE — PROGRESS NOTES
Preventive Care Visit  [facility] KARSON Bethea MD, Internal Medicine  Dec 24, 2024      Assessment & Plan     This is a very nice 79 years old here for annual wellness visit and she will continue annual mammograms because of implants  She will take RSV vaccine and Prevnar will be given today  She will not want COVID-vaccine but has taken flu shot    Angina concurrent with and due to arteriosclerosis of coronary artery (H)  Patient had PCI of LAD as well as circumflex at [facility] in 2022 as below  She has occasional angina and she is on Repatha, baby aspirin  She saw her cardiologist today    Hyperlipidemia with target LDL less than 130  Could not take statins and is on Repatha  - Comprehensive metabolic panel  - Lipid panel reflex to direct LDL Fasting  - Comprehensive metabolic panel  - Lipid panel reflex to direct LDL Fasting    Gastroesophageal reflux disease without esophagitis  On Prilosec long-term  - CBC with Platelets    - Vitamin B12  - CBC with Platelets    - Vitamin B12    Restless leg syndrome  Quite stable at this point on occasional clonazepam    Status post total bilateral knee replacement  Doing well    Grief reaction  It is very sad that she lost her son and we will continue Lexapro for 1 year and reassess  - escitalopram (LEXAPRO) 20 MG tablet  Dispense: 90 tablet; Refill: 3    Encounter for screening mammogram for breast cancer  - MA Screening Bilateral w/ Dameon              Counseling  Appropriate preventive services were addressed with this patient via screening, questionnaire, or discussion as appropriate for fall prevention, nutrition, physical activity,  Checklist reviewing preventive services available has been given to the patient.  Reviewed patient's diet, addressing concerns and/or questions.   She is at risk for lack of exercise and has been provided with information to increase physical activity for the benefit of her well-being.   She is at risk for psychosocial  distress and has been provided with information to reduce risk.   Discussed possible causes of fatigue. The patient was provided with written information regarding signs of hearing loss.           Subjective   Hanane is a 79 year old, presenting for the following: Hanane has known coronary artery disease  She also lost her son this summer and has been on Lexapro  Her GERD is stable  Physical  she reported exertional chest discomfort in the summer 2022.  She underwent coronary angiography which revealed multivessel coronary disease. She subsequently came to see me to discuss possible percutaneous revascularization versus surgery as she was not keen on pursuing surgery.  We reviewed her coronary angiography and felt that percutaneous revascularization was a good option.  She later went to [facility] for further opinion and ultimately had PCI of the circumflex as well as the LAD.  Her symptoms resolved after her revascularization.         12/24/2024    11:18 AM   Additional Questions   Roomed by Sandy           Health Care Directive  Patient has a Health Care Directive on file  Advance care planning document is on file and is current.      12/19/2024   General Health   How would you rate your overall physical health? Good   Feel stress (tense, anxious, or unable to sleep) To some extent   (!) STRESS CONCERN      12/19/2024   Nutrition   Diet: Low salt    Low fat/cholesterol       Multiple values from one day are sorted in reverse-chronological order         12/19/2024   Exercise   Days per week of moderate/strenous exercise 2 days   Average minutes spent exercising at this level 90 min   (!) EXERCISE CONCERN      12/19/2024   Social Factors   Frequency of gathering with friends or relatives More than three times a week   Worry food won't last until get money to buy more No   Food not last or not have enough money for food? No   Do you have housing? (Housing is defined as stable permanent housing and does not include  staying ouside in a car, in a tent, in an abandoned building, in an overnight shelter, or couch-surfing.) Yes   Are you worried about losing your housing? No   Lack of transportation? No   Unable to get utilities (heat,electricity)? No         12/19/2024   Fall Risk   Fallen 2 or more times in the past year? No   Trouble with walking or balance? No          12/19/2024   Activities of Daily Living- Home Safety   Needs help with the following daily activites None of the above   Safety concerns in the home None of the above         12/19/2024   Dental   Dentist two times every year? Yes         12/19/2024   Hearing Screening   Hearing concerns? (!) IT'S HARD TO FOLLOW A CONVERSATION IN A NOISY RESTAURANT OR CROWDED ROOM.    None of the above       Multiple values from one day are sorted in reverse-chronological order         12/19/2024   Driving Risk Screening   Patient/family members have concerns about driving No         12/19/2024   General Alertness/Fatigue Screening   Have you been more tired than usual lately? (!) YES         12/19/2024   Urinary Incontinence Screening   Bothered by leaking urine in past 6 months No         12/19/2024   TB Screening   Were you born outside of the [place]? No         Today's PHQ-2 Score:       12/24/2024    10:56 AM   PHQ-2 ( 1999 [employer])   Q1: Little interest or pleasure in doing things 0   Q2: Feeling down, depressed or hopeless 1   PHQ-2 Score 1    Q1: Little interest or pleasure in doing things Not at all   Q2: Feeling down, depressed or hopeless Several days   PHQ-2 Score 1       Patient-reported           12/19/2024   Substance Use   Alcohol more than 3/day or more than 7/wk No   Do you have a current opioid prescription? No   How severe/bad is pain from 1 to 10? 0/10 (No Pain)   Do you use any other substances recreationally? No     Social History     Tobacco Use    Smoking status: Former     Current packs/day: 0.00     Average packs/day: 0.5 packs/day for 15.0 years (7.5 ttl  pk-yrs)     Types: Cigarettes     Start date: 1960     Quit date: 1975     Years since quittin.0     Passive exposure: Never    Smokeless tobacco: Never    Tobacco comments:     one pack/ three months   Vaping Use    Vaping status: Never Used   Substance Use Topics    Alcohol use: Yes     Comment: one glass of wine a week    Drug use: No           4/10/2024   LAST FHS-7 RESULTS   1st degree relative breast or ovarian cancer Yes   Any relative bilateral breast cancer No   Any male have breast cancer No   Any ONE woman have BOTH breast AND ovarian cancer No   Any woman with breast cancer before 50yrs Yes   2 or more relatives with breast AND/OR ovarian cancer No   2 or more relatives with breast AND/OR bowel cancer Yes            ASCVD Risk   The ASCVD Risk score (Jose Raul ALEMAN, et al., 2019) failed to calculate for the following reasons:    Risk score cannot be calculated because patient has a medical history suggesting prior/existing ASCVD            Reviewed and updated as needed this visit by Provider                    BP Readings from Last 3 Encounters:   24 117/75   24 139/80   24 111/77    Wt Readings from Last 3 Encounters:   24 48.1 kg (106 lb)   24 49 kg (108 lb 1.6 oz)   24 47.6 kg (105 lb)                  Patient Active Problem List   Diagnosis    Hyperlipidemia with target LDL less than 130    Anxiety    Osteoporosis    Gross hematuria    Osteoarthritis    Bursitis of hip    Constipation    Gastroesophageal reflux disease without esophagitis    S/P total knee arthroplasty    Fracture of wrist, left, with routine healing, subsequent encounter    Restless leg syndrome    Arthritis of neck    Dizziness    Status post coronary angiogram    Breast tenderness in female    Nipple tenderness    Angina concurrent with and due to arteriosclerosis of coronary artery (H)     Past Surgical History:   Procedure Laterality Date    ARTHROPLASTY KNEE Left 2017     Procedure: ARTHROPLASTY KNEE;  LEFT TOTAL KNEE ARTHROPLASTY;  Surgeon: Lee Ayala MD;  Location:  OR    BIOPSY   approx.    vaginal    BREAST SURGERY      Implants    BUNIONECTOMY      both    CARDIAC SURGERY      4 stents     SECTION      COLONOSCOPY  2010    CV CORONARY ANGIOGRAM N/A 2022    Procedure: Coronary Angiogram;  Surgeon: Lea Sanchez MD;  Location:  HEART CARDIAC CATH LAB    EYE SURGERY  2014    cataract surgery in both eyes    LIPOSUCTION (LOCATION)      GA REPAIR ROTATOR CUFF,ACUTE      RECONSTRUCT BREAST, IMPLANT PROSTHESIS, COMBINED         Social History     Tobacco Use    Smoking status: Former     Current packs/day: 0.00     Average packs/day: 0.5 packs/day for 15.0 years (7.5 ttl pk-yrs)     Types: Cigarettes     Start date: 1960     Quit date: 1975     Years since quittin.0     Passive exposure: Never    Smokeless tobacco: Never    Tobacco comments:     one pack/ three months   Substance Use Topics    Alcohol use: Yes     Comment: one glass of wine a week     Family History   Problem Relation Age of Onset    Lipids Mother     Alzheimer Disease Mother     Depression Mother     Osteoporosis Mother         RA    C.A.D. Father     Coronary Artery Disease Father     Hypertension Father     Diabetes Father     Breast Cancer Sister     Leukemia Sister     Hyperlipidemia Sister     Colon Cancer Sister             Cerebrovascular Disease Brother          Current Outpatient Medications   Medication Sig Dispense Refill    aspirin 81 MG EC tablet Take 1 tablet (81 mg) by mouth daily      bisacodyl (DULCOLAX) 5 MG EC tablet Take 5 mg by mouth daily as needed for constipation      Calcium-Phosphorus-Vitamin D (CALCIUM GUMMIES PO) Take 2 Gum by mouth daily      clonazePAM (KLONOPIN) 0.5 MG ODT DISSOLVE 1 TABLET(0.5 MG) ON THE TONGUE EVERY NIGHT AS NEEDED FOR ANXIETY 20 tablet 1    escitalopram (LEXAPRO) 20 MG tablet  Take 1 tablet (20 mg) by mouth daily. 90 tablet 3    evolocumab (REPATHA) 140 MG/ML prefilled autoinjector Inject 1 mL (140 mg) subcutaneously every 14 days. 6 mL 4    fluticasone (FLONASE) 50 MCG/ACT nasal spray Spray 2 sprays into both nostrils daily. OTC      nitroGLYcerin (NITROSTAT) 0.4 MG sublingual tablet For chest pain place 1 tablet under the tongue every 5 minutes for 3 doses. If symptoms persist 5 minutes after 1st dose call 911. 25 tablet 3    omeprazole (PRILOSEC) 20 MG DR capsule Take 1 capsule (20 mg) by mouth 2 times daily. 180 capsule 2    pramipexole (MIRAPEX) 0.125 MG tablet Take 1-2 tablets (0.125-0.25 mg) by mouth 3 times daily 270 tablet 1    UNABLE TO FIND daily. MEDICATION NAME: phillps laxative       Allergies   Allergen Reactions    Crestor [Rosuvastatin]     Ezetimibe Other (See Comments)     Zetia = leg cramps     Miralax [Polyethylene Glycol] Other (See Comments)     Back pain    Pravastatin Other (See Comments)     Leg cramps     Statins Other (See Comments)     Leg cramping     Current providers sharing in care for this patient include:  Patient Care Team:  Corona Bethea MD as PCP - General (Internal Medicine)  Corona Bethea MD as Assigned PCP  Lee Narvaez MD as MD (Neurology)  Luis Moffett MD as Assigned Heart and Vascular Provider  Latricia Prieto MD as Assigned Surgical Provider    The following health maintenance items are reviewed in Epic and correct as of today:  Health Maintenance   Topic Date Due    RSV VACCINE (1 - 1-dose 75+ series) Never done    COVID-19 Vaccine (5 - 2024-25 season) 09/01/2024    MEDICARE ANNUAL WELLNESS VISIT  12/22/2024    LIPID  02/19/2025    ANNUAL REVIEW OF HM ORDERS  07/29/2025    FALL RISK ASSESSMENT  12/24/2025    DEXA  01/05/2026    COLORECTAL CANCER SCREENING  03/03/2026    GLUCOSE  12/22/2026    ADVANCE CARE PLANNING  12/22/2028    DTAP/TDAP/TD IMMUNIZATION (3 - Td or Tdap) 05/20/2032    HEPATITIS C SCREENING   "Completed    PHQ-2 (once per calendar year)  Completed    INFLUENZA VACCINE  Completed    Pneumococcal Vaccine: 50+ Years  Completed    ZOSTER IMMUNIZATION  Completed    HPV IMMUNIZATION  Aged Out    MENINGITIS IMMUNIZATION  Aged Out    RSV MONOCLONAL ANTIBODY  Aged Out    MAMMO SCREENING  Discontinued         Review of Systems  Constitutional, HEENT, cardiovascular, pulmonary, GI, , musculoskeletal, neuro, skin, endocrine and psych systems are negative, except as otherwise noted.     Objective    Exam  /75 (BP Location: Right arm, Patient Position: Chair, Cuff Size: Adult Regular)   Pulse 76   Temp 97.6  F (36.4  C) (Temporal)   Resp 16   Ht 1.422 m (4' 8\")   Wt 48.1 kg (106 lb)   SpO2 98%   BMI 23.76 kg/m     Estimated body mass index is 23.76 kg/m  as calculated from the following:    Height as of this encounter: 1.422 m (4' 8\").    Weight as of this encounter: 48.1 kg (106 lb).    Physical Exam  GENERAL: alert and no distress  EYES: Eyes grossly normal to inspection, PERRL and conjunctivae and sclerae normal  HENT: ear canals and TM's normal, nose and mouth without ulcers or lesions  NECK: no adenopathy, no asymmetry, masses, or scars  RESP: lungs clear to auscultation - no rales, rhonchi or wheezes  BREAST: normal without masses, tenderness or nipple discharge, no palpable axillary masses or adenopathy, and implants are noted  CV: regular rate and rhythm, normal S1 S2, no S3 or S4, no murmur, click or rub, no peripheral edema  ABDOMEN: soft, nontender, no hepatosplenomegaly, no masses and bowel sounds normal  MS: no gross musculoskeletal defects noted, no edema  SKIN: no suspicious lesions or rashes  NEURO: Normal strength and tone, mentation intact and speech normal  PSYCH: mentation appears normal, affect normal/bright        12/24/2024   Mini Cog   Clock Draw Score 2 Normal   3 Item Recall 3 objects recalled   Mini Cog Total Score 5              Signed Electronically by: Corona Bethea MD    "

## 2024-12-24 NOTE — LETTER
12/24/2024    Corona Bethea MD  [address] [facility] [address]  [place] [address]    RE: Hanane Sorto       Dear Colleague,     I had the pleasure of seeing Hanane Sorto in the [facility].  CARDIOLOGY CLINIC CONSULTATION    PRIMARY CARE PHYSICIAN:  Corona Bethea    HISTORY OF PRESENT ILLNESS:  I had the pleasure of seeing Hanane Sorto at the [facility] in [place] this morning.  She is a very pleasant 79-year-old female with history of coronary artery disease, hypertension, hyperlipidemia and osteoarthritis who is here for follow-up.     She she reported exertional chest discomfort in the summer 2022.  She underwent coronary angiography which revealed multivessel coronary disease. She subsequently came to see me to discuss possible percutaneous revascularization versus surgery as she was not keen on pursuing surgery.  We reviewed her coronary angiography and felt that percutaneous revascularization was a good option.  She later went to [facility] for further opinion and ultimately had PCI of the circumflex as well as the LAD.  Her symptoms resolved after her revascularization.     She is very active and plays tennis 2-3 times per week.  She does not endorse any cardiopulmonary symptoms at this point.  Her risk factors including her hyperlipidemia are well controlled.  She was previously intolerant of statins but has been tolerating PCSK9 inhibitor over the past several months. Her mos recent Lipid profile showed an LDL of 61 mg/dl    PAST MEDICAL HISTORY:  Past Medical History:   Diagnosis Date     Anxiety     psych yearly, Dr Shea     CAD (coronary artery disease)      Family hx of colon cancer     sister     Floaters     Karma leverentz     Fracture of wrist, left, with routine healing, subsequent encounter 08/28/2017     GERD (gastroesophageal reflux disease)      Gross hematuria 09/12/2011     Hypertension May 3,2022     Osteoarthritis 12/19/2012     Osteoporosis     Dexa     Restless  legs      Skin cancer      Stable angina pectoris due to arteriosclerosis of coronary artery (H) 09/14/2022    now s/p PCI x 1 to pCx and PCI x 3 with overlapping stents to pLAD 9/2/2022 @ [facility].       MEDICATIONS:  Current Outpatient Medications   Medication Sig Dispense Refill     aspirin 81 MG EC tablet Take 1 tablet (81 mg) by mouth daily (Patient taking differently: Take 81 mg by mouth daily. otc)       bisacodyl (DULCOLAX) 5 MG EC tablet Take 5 mg by mouth daily as needed for constipation       Calcium-Phosphorus-Vitamin D (CALCIUM GUMMIES PO) Take 2 Gum by mouth daily       clonazePAM (KLONOPIN) 0.5 MG ODT DISSOLVE 1 TABLET(0.5 MG) ON THE TONGUE EVERY NIGHT AS NEEDED FOR ANXIETY 20 tablet 1     escitalopram (LEXAPRO) 20 MG tablet Take 1 tablet (20 mg) by mouth daily Use 1 tab daily for 10 days and then increase it to 2 tabs daily. (Patient taking differently: Take 20 mg by mouth daily.) 90 tablet 1     evolocumab (REPATHA) 140 MG/ML prefilled autoinjector Inject 1 mL (140 mg) subcutaneously every 14 days. 6 mL 4     fluticasone (FLONASE) 50 MCG/ACT nasal spray Spray 2 sprays into both nostrils daily. OTC       nitroGLYcerin (NITROSTAT) 0.4 MG sublingual tablet For chest pain place 1 tablet under the tongue every 5 minutes for 3 doses. If symptoms persist 5 minutes after 1st dose call 911. 25 tablet 3     omeprazole (PRILOSEC) 20 MG DR capsule Take 1 capsule (20 mg) by mouth 2 times daily. 180 capsule 2     pramipexole (MIRAPEX) 0.125 MG tablet Take 1-2 tablets (0.125-0.25 mg) by mouth 3 times daily 270 tablet 1     UNABLE TO FIND daily. MEDICATION NAME: cade laxative       No current facility-administered medications for this visit.       SOCIAL HISTORY:  I have reviewed this patient's social history and updated it with pertinent information if needed. Hanane Sorto  reports that she quit smoking about 50 years ago. Her smoking use included cigarettes. She started smoking about 65 years ago. She has  a 7.5 pack-year smoking history. She has never been exposed to tobacco smoke. She has never used smokeless tobacco. She reports current alcohol use. She reports that she does not use drugs.    PHYSICAL EXAM:  Pulse:  [74] 74  BP: (139)/(80) 139/80  SpO2:  [98 %] 98 %  108 lbs 1.6 oz    Constitutional: alert, no distress  Respiratory: Good bilateral air entry  Cardiovascular: Regular rate and rhythm, no murmur  GI: nondistended  Neuropsychiatric: appropriate affact    ASSESSMENT: Pertinent issues addressed/ reviewed during this cardiology visit  Coronary disease status post prior circumflex and LAD stenting, asymptomatic  Hyperlipidemia, treated  Hypertension, well-controlled    RECOMMENDATIONS:  She remains very stable from cardiac point of view.  She is active and does not endorse any cardiopulmonary symptoms.  Risk factors are well-controlled.  I encouraged her to remain active.  Follow-up in 1 year    It was a pleasure seeing this patient in clinic today. Please do not hesitate to contact me with any future questions.     Luis Moffett MD, [facility]  Cardiology - [facility]  February 21, 2024    Review of the result(s) of each unique test - Last lipid profile, BMP, echocardiogram     The level of medical decision making during this visit was of moderate complexity.    This note was completed in part using dictation via the Dragon voice recognition software. Some word and grammatical errors may occur and must be interpreted in the appropriate clinical context.  If there are any questions pertaining to this issue, please contact me for further clarification.      Thank you for allowing me to participate in the care of your patient.      Sincerely,     Luis Moffett MD, MD     [facility]  cc:   No referring provider defined for this encounter.

## 2024-12-26 ENCOUNTER — PATIENT OUTREACH (OUTPATIENT)
Dept: CARE COORDINATION | Facility: CLINIC | Age: 79
End: 2024-12-26

## 2025-01-03 ENCOUNTER — MYC REFILL (OUTPATIENT)
Dept: FAMILY MEDICINE | Facility: CLINIC | Age: 80
End: 2025-01-03
Payer: COMMERCIAL

## 2025-01-03 DIAGNOSIS — G25.81 RESTLESS LEG SYNDROME: ICD-10-CM

## 2025-01-06 RX ORDER — PRAMIPEXOLE DIHYDROCHLORIDE 0.12 MG/1
.125-.25 TABLET ORAL 3 TIMES DAILY
Qty: 270 TABLET | Refills: 1 | Status: SHIPPED | OUTPATIENT
Start: 2025-01-06

## 2025-01-13 ENCOUNTER — OFFICE VISIT (OUTPATIENT)
Dept: FAMILY MEDICINE | Facility: CLINIC | Age: 80
End: 2025-01-13
Payer: COMMERCIAL

## 2025-01-13 VITALS
HEIGHT: 57 IN | HEART RATE: 86 BPM | RESPIRATION RATE: 18 BRPM | BODY MASS INDEX: 23.34 KG/M2 | TEMPERATURE: 96.6 F | SYSTOLIC BLOOD PRESSURE: 121 MMHG | WEIGHT: 108.2 LBS | OXYGEN SATURATION: 98 % | DIASTOLIC BLOOD PRESSURE: 73 MMHG

## 2025-01-13 DIAGNOSIS — E78.5 HYPERLIPIDEMIA LDL GOAL <70: ICD-10-CM

## 2025-01-13 DIAGNOSIS — Z96.652 STATUS POST TOTAL LEFT KNEE REPLACEMENT: ICD-10-CM

## 2025-01-13 DIAGNOSIS — I25.10 CORONARY ARTERY DISEASE INVOLVING NATIVE CORONARY ARTERY OF NATIVE HEART WITHOUT ANGINA PECTORIS: ICD-10-CM

## 2025-01-13 DIAGNOSIS — H02.831 DERMATOCHALASIS OF BOTH UPPER EYELIDS: ICD-10-CM

## 2025-01-13 DIAGNOSIS — H02.834 DERMATOCHALASIS OF BOTH UPPER EYELIDS: ICD-10-CM

## 2025-01-13 DIAGNOSIS — K21.9 GASTROESOPHAGEAL REFLUX DISEASE WITHOUT ESOPHAGITIS: ICD-10-CM

## 2025-01-13 DIAGNOSIS — Z01.818 PREOP GENERAL PHYSICAL EXAM: Primary | ICD-10-CM

## 2025-01-13 PROBLEM — N64.4 NIPPLE TENDERNESS: Status: RESOLVED | Noted: 2022-10-24 | Resolved: 2025-01-13

## 2025-01-13 PROCEDURE — G2211 COMPLEX E/M VISIT ADD ON: HCPCS | Performed by: INTERNAL MEDICINE

## 2025-01-13 PROCEDURE — 93000 ELECTROCARDIOGRAM COMPLETE: CPT | Performed by: INTERNAL MEDICINE

## 2025-01-13 PROCEDURE — 99214 OFFICE O/P EST MOD 30 MIN: CPT | Performed by: INTERNAL MEDICINE

## 2025-01-13 RX ORDER — CYCLOSPORINE 0.5 MG/ML
EMULSION OPHTHALMIC
COMMUNITY
Start: 2024-04-18

## 2025-01-13 ASSESSMENT — PAIN SCALES - GENERAL: PAINLEVEL_OUTOF10: NO PAIN (0)

## 2025-01-13 NOTE — PROGRESS NOTES
Preoperative Evaluation  [facility]  [address] [facility], [address]  [facility] [address]  Phone: [phone]  Primary Provider: Corona Bethea MD  Pre-op Performing Provider: Corona Bethea MD  Jan 13, 2025 1/8/2025   Surgical Information   What procedure is being done? Repair of Blepharoptosis bilateral   Facility or Hospital where procedure/surgery will be performed: [facility]   Who is doing the procedure / surgery? Dr. Carlos Lord   Date of surgery / procedure: 02/06/2025   Time of surgery / procedure: 9:30 a.m.?   Where do you plan to recover after surgery? at home with family     Fax number for surgical facility: [phone]    Assessment & Plan     The proposed surgical procedure is considered LOW risk.    Preop general physical exam  79 years old woman who is undergoing dermatochalasia of surgery for both upper eyelids  Approval is given to proceed with surgery  However she cannot stop aspirin for 3 weeks as instructed to her by surgery department because of severe heart disease  - EKG 12-lead complete w/read - Clinics    Dermatochalasis of both upper eyelids  As above  - EKG 12-lead complete w/read - Clinics    Coronary artery disease involving native coronary artery of native heart without angina pectoris  Patient has 3 stents in LAD artery but another stent in circumflex in 2022  She should continue with aspirin until 5 days of surgery and then resume right after surgery    - EKG 12-lead complete w/read - Clinics    Hyperlipidemia with target LDL less than 130  Patient will continue Repatha    Gastroesophageal reflux disease without esophagitis  Continue omeprazole    Status post total left knee replacement  No issues with knee replacement             Risks and Recommendations  The patient has the following additional risks and recommendations for perioperative complications:  Cardiovascular:   - Patient underwent successful coronary  angiogram with PCI to the pCX artery and three stents to the LAD on 9/2/22 with Dr. Taylor and Dr. Dodd         Recommendation  Approval given to proceed with proposed procedure, without further diagnostic evaluation.    Kalyn Koo is a 79 year old, presenting for the following:  Pre-Op Exam        HPI related to upcoming procedure: Patient is undergoing eyelid surgery for both eyelids  She says she has been told to stop aspirin for 3 weeks  Her coronary disease is stable  She has no other health concerns        1/8/2025   Pre-Op Questionnaire   Have you ever had a heart attack or stroke? No   Have you ever had surgery on your heart or blood vessels, such as a stent placement, a coronary artery bypass, or surgery on an artery in your head, neck, heart, or legs? (!) YES    Do you have chest pain with activity? No   Do you have a history of heart failure? No   Do you currently have a cold, bronchitis or symptoms of other infection? No   Do you have a cough, shortness of breath, or wheezing? No   Do you or anyone in your family have previous history of blood clots? No   Do you or does anyone in your family have a serious bleeding problem such as prolonged bleeding following surgeries or cuts? No   Have you ever had problems with anemia or been told to take iron pills? No   Have you had any abnormal blood loss such as black, tarry or bloody stools, or abnormal vaginal bleeding? No   Have you ever had a blood transfusion? No   Are you willing to have a blood transfusion if it is medically needed before, during, or after your surgery? Yes   Have you or any of your relatives ever had problems with anesthesia? No   Do you have sleep apnea, excessive snoring or daytime drowsiness? No   Do you have any artifical heart valves or other implanted medical devices like a pacemaker, defibrillator, or continuous glucose monitor? No   Do you have artificial joints? (!) YES   Are you allergic to latex? No     Health Care  Directive  Patient has a Health Care Directive on file      Preoperative Review of    reviewed - no record of controlled substances prescribed.          Patient Active Problem List    Diagnosis Date Noted    Angina concurrent with and due to arteriosclerosis of coronary artery 12/24/2024     Priority: Medium    Breast tenderness in female 10/24/2022     Priority: Medium    Status post coronary angiogram 07/27/2022     Priority: Medium    Dizziness 04/21/2022     Priority: Medium    Arthritis of neck 10/22/2021     Priority: Medium    Restless leg syndrome 10/21/2021     Priority: Medium    Fracture of wrist, left, with routine healing, subsequent encounter 08/28/2017     Priority: Medium    S/P total knee arthroplasty 04/24/2017     Priority: Medium    Gastroesophageal reflux disease without esophagitis 11/30/2015     Priority: Medium    Bursitis of hip 11/24/2014     Priority: Medium    Constipation 11/24/2014     Priority: Medium    Osteoarthritis 12/19/2012     Priority: Medium    Gross hematuria 09/12/2011     Priority: Medium    Hyperlipidemia with target LDL less than 130      Priority: Medium     Diagnosis updated by automated process. Provider to review and confirm.      Anxiety      Priority: Medium    Osteoporosis      Priority: Medium      Past Medical History:   Diagnosis Date    Anxiety     psych yearly, Dr Shea    CAD (coronary artery disease)     Patient underwent successful coronary angiogram with PCI to the pCX artery and three stents to the LAD on 9/2/22 with Dr. Taylor and Dr. Dodd    Congestive heart failure (H) July 2022    Depressive disorder     Family hx of colon cancer     sister    Flokindra cote    Fracture of wrist, left, with routine healing, subsequent encounter 08/28/2017    GERD (gastroesophageal reflux disease)     Gross hematuria 09/12/2011    Hypertension May 3,2022    Osteoarthritis 12/19/2012    Osteoporosis     Dexa    Restless legs     Skin cancer      Stable angina pectoris due to arteriosclerosis of coronary artery 2022    now s/p PCI x 1 to pCx and PCI x 3 with overlapping stents to pLAD 2022 @ [facility].     Past Surgical History:   Procedure Laterality Date    ARTHROPLASTY KNEE Left 2017    Procedure: ARTHROPLASTY KNEE;  LEFT TOTAL KNEE ARTHROPLASTY;  Surgeon: Lee Ayala MD;  Location:  OR    BIOPSY   approx.    vaginal    BREAST SURGERY  1972    Implants    BUNIONECTOMY      both    CARDIAC SURGERY      4 stents     SECTION      COLONOSCOPY  2010    CV CORONARY ANGIOGRAM N/A 2022    Procedure: Coronary Angiogram;  Surgeon: Lea Sanchez MD;  Location:  HEART CARDIAC CATH LAB    EYE SURGERY  2014    cataract surgery in both eyes    LIPOSUCTION (LOCATION)      TN REPAIR ROTATOR CUFF,ACUTE      RECONSTRUCT BREAST, IMPLANT PROSTHESIS, COMBINED       Current Outpatient Medications   Medication Sig Dispense Refill    aspirin 81 MG EC tablet Take 1 tablet (81 mg) by mouth daily      bisacodyl (DULCOLAX) 5 MG EC tablet Take 5 mg by mouth daily as needed for constipation      Calcium-Phosphorus-Vitamin D (CALCIUM GUMMIES PO) Take 2 Gum by mouth daily      clonazePAM (KLONOPIN) 0.5 MG ODT DISSOLVE 1 TABLET(0.5 MG) ON THE TONGUE EVERY NIGHT AS NEEDED FOR ANXIETY 20 tablet 1    escitalopram (LEXAPRO) 20 MG tablet Take 1 tablet (20 mg) by mouth daily. 90 tablet 3    evolocumab (REPATHA) 140 MG/ML prefilled autoinjector Inject 1 mL (140 mg) subcutaneously every 14 days. 6 mL 4    fluticasone (FLONASE) 50 MCG/ACT nasal spray Spray 2 sprays into both nostrils daily. OTC      nitroGLYcerin (NITROSTAT) 0.4 MG sublingual tablet For chest pain place 1 tablet under the tongue every 5 minutes for 3 doses. If symptoms persist 5 minutes after 1st dose call 911. 25 tablet 3    omeprazole (PRILOSEC) 20 MG DR capsule Take 1 capsule (20 mg) by mouth 2 times daily. 180 capsule 2    Povidone, PF,  "(IVIZIA DRY EYES) 0.5 % SOLN Apply to eye.      pramipexole (MIRAPEX) 0.125 MG tablet Take 1-2 tablets (0.125-0.25 mg) by mouth 3 times daily. 270 tablet 1    RESTASIS 0.05 % ophthalmic emulsion INSTILL 1 DROP IN BOTH EYES TWICE DAILY AS DIRECTED      UNABLE TO FIND daily. MEDICATION NAME: phillbibi laxative         Allergies   Allergen Reactions    Crestor [Rosuvastatin]     Ezetimibe Other (See Comments)     Zetia = leg cramps     Miralax [Polyethylene Glycol] Other (See Comments)     Back pain    Pravastatin Other (See Comments)     Leg cramps     Statins Other (See Comments)     Leg cramping        Social History     Tobacco Use    Smoking status: Former     Current packs/day: 0.00     Average packs/day: 0.5 packs/day for 15.0 years (7.5 ttl pk-yrs)     Types: Cigarettes     Start date: 1960     Quit date: 1975     Years since quittin.0     Passive exposure: Never    Smokeless tobacco: Never    Tobacco comments:     one pack/ three months   Substance Use Topics    Alcohol use: Yes     Comment: one glass of wine a week     Family History   Problem Relation Age of Onset    Lipids Mother     Alzheimer Disease Mother     Depression Mother     Osteoporosis Mother         RA    C.A.D. Father     Coronary Artery Disease Father     Hypertension Father     Diabetes Father     Breast Cancer Sister     Leukemia Sister     Hyperlipidemia Sister     Colon Cancer Sister             Cerebrovascular Disease Brother      History   Drug Use No             Review of Systems  Constitutional, HEENT, cardiovascular, pulmonary, GI, , musculoskeletal, neuro, skin, endocrine and psych systems are negative, except as otherwise noted.    Objective    /73 (BP Location: Left arm, Patient Position: Sitting, Cuff Size: Adult Regular)   Pulse 86   Temp (!) 96.6  F (35.9  C) (Temporal)   Resp 18   Ht 1.448 m (4' 9\")   Wt 49.1 kg (108 lb 3.2 oz)   SpO2 98%   BMI 23.41 kg/m     Estimated body mass index is 23.41 " "kg/m  as calculated from the following:    Height as of this encounter: 1.448 m (4' 9\").    Weight as of this encounter: 49.1 kg (108 lb 3.2 oz).  Physical Exam  GENERAL: alert and no distress  NECK: no adenopathy, no asymmetry, masses, or scars  RESP: lungs clear to auscultation - no rales, rhonchi or wheezes  CV: regular rate and rhythm, normal S1 S2, no S3 or S4, no murmur, click or rub, no peripheral edema  ABDOMEN: soft, nontender, no hepatosplenomegaly, no masses and bowel sounds normal  MS: no gross musculoskeletal defects noted, no edema    Recent Labs   Lab Test 12/24/24  1148 09/24/24  1422   HGB 12.5 12.8    350   INR  --  0.90     --    POTASSIUM 4.2  --    CR 0.87  --         Diagnostics  No labs were ordered during this visit.   EKG: appears normal, NSR, normal axis, normal intervals, no acute ST/T changes c/w ischemia, no LVH by voltage criteria, unchanged from previous tracings    Revised Cardiac Risk Index (RCRI)  The patient has the following serious cardiovascular risks for perioperative complications:   - Coronary Artery Disease (MI, positive stress test, angina, Qs on EKG) = 1 point     RCRI Interpretation: 1 point: Class II (low risk - 0.9% complication rate)     The longitudinal plan of care for the diagnosis(es)/condition(s) as documented were addressed during this visit. Due to the added complexity in care, I will continue to support Hanane in the subsequent management and with ongoing continuity of care.      Signed Electronically by: Corona Bethea MD  A copy of this evaluation report is provided to the requesting physician.         "

## 2025-02-11 ENCOUNTER — NURSE TRIAGE (OUTPATIENT)
Dept: FAMILY MEDICINE | Facility: CLINIC | Age: 80
End: 2025-02-11

## 2025-02-11 ENCOUNTER — VIRTUAL VISIT (OUTPATIENT)
Dept: FAMILY MEDICINE | Facility: CLINIC | Age: 80
End: 2025-02-11
Payer: COMMERCIAL

## 2025-02-11 DIAGNOSIS — U07.1 COVID-19 VIRUS INFECTION: Primary | ICD-10-CM

## 2025-02-11 PROCEDURE — 98005 SYNCH AUDIO-VIDEO EST LOW 20: CPT | Performed by: PHYSICIAN ASSISTANT

## 2025-02-11 NOTE — TELEPHONE ENCOUNTER
"S-(situation): continued covid symptoms, new symptom sinus/nasal congestion 2/9, worsening    B-(background): pt was seen and treated at [facility] 2/6 for COVID positive  Pt reporting paxlovid completed for covid. Symptoms worsening this week; nasal/sinus congestion. Cough continued. Appointment scheduled. Reporting mild intermittent shortness of breath. Hx 4 cardiac stents. Denies chest pain or difficulty breathing. Reviewed OTC medications for cough/cold; pt will discuss with provider during visit.   A-(assessment): MD assess/tx    R-(recommendations): pt to be seen for worsening symptoms.     1. COVID-19 ONSET: \"When did the symptoms of COVID-19 first start?\"      Pt reporting covid symptoms started 2/3.   2. DIAGNOSIS CONFIRMATION: \"How do you know you have had COVID-19?\" (e.g., positive lab test or self- test, diagnosed by doctor or NP/PA, symptoms after exposure)      At lab 2/6.   3. MAIN SYMPTOM:  \"What is your main concern or symptom right now?\" (e.g., breathing difficulty, cough, fatigue. loss of smell)      Pt new symptom: mucus in nose, constant blowing; started Sunday 2/9/25, worsened.   4. MAIN SYMPTOM ONSET: \"When did the  nasal congestion  start?\"      2/9, denies difficulty breathing. Pt able to breath through nose   5. BETTER-SAME-WORSE: \"Are you getting better, staying the same, or getting worse over the last 1 to 2 weeks?\"      Worse   6. BREATHING DIFFICULTY: \"Are you having any trouble breathing?\" If Yes, ask: \"How bad is your breathing?\" (e.g., mild, moderate, severe)       Denies   7. O2 SATURATION MONITOR:  \"Do you use an oxygen saturation monitor (pulse oximeter) at home?\" If Yes, ask \"What is your reading (oxygen level) today?\" \"What is your usual oxygen saturation reading?\" (e.g., 95%)      Denies   8. OTHER SYMPTOMS: \"Do you have any other symptoms?\"  (e.g., cough, fatigue, fever, headache, muscle pain, shortness of breath, weakness)      Left and right ears are painful  9. RECENT " "MEDICAL VISIT: \"Have you been seen by a healthcare provider (doctor, NP, PA) for these persisting COVID-19 symptoms?\" If Yes, ask: \"When were you seen?\" (e.g., date)      Yes, 2[facility]6 [facility]   9. HIGH RISK DISEASE: \"Do you have any chronic medical problems?\" (e.g., asthma, heart or lung disease, weak immune system, obesity, etc.)      Please see med hx   10. VACCINE: \"Have you gotten the COVID-19 vaccine?\" If Yes, ask: \"Which one, how many shots, when did you get it?\"        yes  11. PREGNANCY: \"Is there any chance you are pregnant?\" \"When was your last menstrual period?\"        No     Reason for Disposition   MILD difficulty breathing (e.g., minimal/no SOB at rest, SOB with walking, pulse <100) and new-onset    Additional Information   Negative: SEVERE difficulty breathing (e.g., struggling for each breath, speaks in single words)   Negative: SEVERE weakness (e.g., can't stand or can barely walk) and new-onset or WORSE   Negative: Difficult to awaken or acting confused (e.g., disoriented, slurred speech)   Negative: Bluish (or gray) lips or face now   Negative: Sounds like a life-threatening emergency to the triager   Negative: Typical COVID-19 symptoms and lasting less than 3 weeks   Negative: Chest pain, pressure, or tightness and new-onset or getting worse   Negative: Fever and new-onset or getting worse   Negative: MODERATE difficulty breathing (e.g., speaks in phrases, SOB even at rest, pulse 100-120) and new-onset or WORSE   Negative: MODERATE difficulty breathing and oxygen level (e.g., pulse oximetry) 91 to 94%   Negative: Oxygen level (e.g., pulse oximetry) 90% or lower   Negative: MODERATE difficulty breathing (e.g., speaks in phrases, SOB even at rest, pulse 100-120)   Negative: Drinking very little and dehydration suspected (e.g., no urine > 12 hours, very dry mouth, very lightheaded)   Negative: Patient sounds very sick or weak to the triager    Protocols used: COVID-19 - Persisting " Symptoms Follow-up Call-A-[place]

## 2025-02-11 NOTE — PROGRESS NOTES
Hanane is a 79 year old who is being evaluated via a billable video visit.    How would you like to obtain your AVS? MyChart  If the video visit is dropped, the invitation should be resent by:   Will anyone else be joining your video visit?       Assessment and Plan:     (U07.1) COVID-19 virus infection  (primary encounter diagnosis)  Comment: onset one week ago, has had congestion, cough, facial/ear pressure, wanting antibiotic to treat sinusitis, was seen at  on 2/7 and prescribed paxlovid but didn't take it, CXR negative for pna at that time, denies sob, chest pain, leg swelling   Plan: discussed covid viral and no role for abx in treatment  Continue symptomatic cares, fluids and rest  Discussed reasons to be seen promptly     JIMMY Hines Same Day Provider     Subjective   Hanane is a 79 year old, presenting for the following health issues:  Covid Concern        2/11/2025    11:48 AM   Additional Questions   Roomed by Pristones     Video Start Time: 11:50    History of Present Illness       Reason for visit:  Covid turning into sinus infection  Symptom onset:  3-7 days ago  Symptoms include:  Green mucus when blowing nose, plugged ears  Symptom intensity:  Severe  Symptom progression:  Worsening  Had these symptoms before:  Yes  Has tried/received treatment for these symptoms:  Yes  Previous treatment was successful:  Yes  Prior treatment description:  Antibiotics  What makes it worse:  No  What makes it better:  No She is missing 1 dose(s) of medications per week.  She is not taking prescribed medications regularly due to other.     Hanane is seeing me virtually for covid  Onset was one week ago  She has had congestion, cough,   She was seen at [facility] on 2/7/25, diagnosed with covid  She was prescribed paxlovid which she hasn't been taking due to size of pills   She is having a lot of congestion with thick green mucus, her head and ears feel plugged   She is wanting abx         Objective           Vitals:  No vitals were obtained today due to virtual visit.    Physical Exam   GENERAL: alert and no distress  EYES: Eyes grossly normal to inspection.  No discharge or erythema, or obvious scleral/conjunctival abnormalities.  RESP: No audible wheeze, cough, or visible cyanosis.    SKIN: Visible skin clear. No significant rash, abnormal pigmentation or lesions.  NEURO: Cranial nerves grossly intact.  Mentation and speech appropriate for age.  PSYCH: Appropriate affect, tone, and pace of words        Video-Visit Details    Type of service:  Video Visit   Video End Time:11:56 AM  Originating Location (pt. Location): Home    Distant Location (provider location):  On-site  Platform used for Video Visit: Dana  Signed Electronically by: Mindy Suggs PA-C

## 2025-02-21 DIAGNOSIS — E78.5 HYPERLIPIDEMIA LDL GOAL <70: ICD-10-CM

## 2025-02-21 DIAGNOSIS — I25.10 CORONARY ARTERY DISEASE INVOLVING NATIVE CORONARY ARTERY OF NATIVE HEART WITHOUT ANGINA PECTORIS: ICD-10-CM

## 2025-02-24 ENCOUNTER — OFFICE VISIT (OUTPATIENT)
Dept: FAMILY MEDICINE | Facility: CLINIC | Age: 80
End: 2025-02-24
Payer: COMMERCIAL

## 2025-02-24 VITALS
SYSTOLIC BLOOD PRESSURE: 116 MMHG | OXYGEN SATURATION: 96 % | BODY MASS INDEX: 23.84 KG/M2 | WEIGHT: 106 LBS | RESPIRATION RATE: 17 BRPM | HEIGHT: 56 IN | DIASTOLIC BLOOD PRESSURE: 80 MMHG | TEMPERATURE: 98.2 F | HEART RATE: 104 BPM

## 2025-02-24 DIAGNOSIS — J01.00 SUBACUTE MAXILLARY SINUSITIS: Primary | ICD-10-CM

## 2025-02-24 DIAGNOSIS — R05.2 SUBACUTE COUGH: ICD-10-CM

## 2025-02-24 PROCEDURE — 99213 OFFICE O/P EST LOW 20 MIN: CPT | Performed by: PHYSICIAN ASSISTANT

## 2025-02-24 RX ORDER — DOXYCYCLINE 100 MG/1
100 CAPSULE ORAL 2 TIMES DAILY
Qty: 14 CAPSULE | Refills: 0 | Status: SHIPPED | OUTPATIENT
Start: 2025-02-24 | End: 2025-03-03

## 2025-02-24 RX ORDER — BENZONATATE 100 MG/1
100 CAPSULE ORAL 3 TIMES DAILY PRN
Qty: 30 CAPSULE | Refills: 0 | Status: SHIPPED | OUTPATIENT
Start: 2025-02-24

## 2025-02-24 ASSESSMENT — PAIN SCALES - GENERAL: PAINLEVEL_OUTOF10: NO PAIN (0)

## 2025-02-24 NOTE — PROGRESS NOTES
Assessment & Plan     Subacute maxillary sinusitis  Subacute cough  Failed augmentin. Evidence of sinusitis on exam. Advised giving in 2-3 additional days to see if there is any improvement, if not, start doxycycline 100 mg BID x7 days. Continue flonase at bedtime, add saline in the morning and afternoon. Add humidifier. Warm steamy showers. Lungs clear on exam. Tessalon prescribed to use prn cough.  - doxycycline hyclate (VIBRAMYCIN) 100 MG capsule  Dispense: 14 capsule; Refill: 0  - benzonatate (TESSALON) 100 MG capsule  Dispense: 30 capsule; Refill: 0    Follow up if symptoms worsen or fail to improve    Aniyah Wilson PA-C on 2/24/2025 at 3:05 PM      Kalyn Koo is a 79 year old, presenting for the following health issues:  Covid Concern (Patient dx of covid 2/3/2025- Patient was treated but is not feeling any improvement:  Dry cough, plugged ears, headaches, chills/shivers, and )        2/24/2025     2:54 PM   Additional Questions   Roomed by Alfredito ESPINAL     History of Present Illness       Reason for visit:  Covid turning into sinus infection  Symptom onset:  3-7 days ago  Symptoms include:  Green mucus when blowing nose, plugged ears  Symptom intensity:  Severe  Symptom progression:  Worsening  Had these symptoms before:  Yes  Has tried/received treatment for these symptoms:  Yes  Previous treatment was successful:  Yes  Prior treatment description:  Antibiotics  What makes it worse:  No  What makes it better:  No She is missing 1 dose(s) of medications per week.  She is not taking prescribed medications regularly due to other.     UC 2/7 = COVID+, paxlovid prescribed but didn't take  VV 2/11 = recommended flonase, mucinex, rest   UC 2/16 = treated for sinusitis with augmentin x7 days     Feels no improvement  Cough continues, headache, ears plugged, low energy, face hurts   Cough is non productive   No wheezing or shortness of breath         Objective    /80   Pulse 104   Temp 98.2  F  "(36.8  C) (Tympanic)   Resp 17   Ht 1.428 m (4' 8.22\")   Wt 48.1 kg (106 lb)   SpO2 96%   BMI 23.58 kg/m    Body mass index is 23.58 kg/m .  Physical Exam   GENERAL: alert and no distress  EYES: Eyes grossly normal to inspection, PERRL and conjunctivae and sclerae normal  HENT: normal cephalic/atraumatic, both ears: clear effusion, nasal mucosa edematous , oropharynx clear, oral mucous membranes moist, and sinuses: maxillary tenderness on bilateral  NECK: no adenopathy, no asymmetry, masses, or scars  RESP: lungs clear to auscultation - no rales, rhonchi or wheezes  CV: regular rate and rhythm, normal S1 S2, no S3 or S4, no murmur, click or rub, no peripheral edema  NEURO: Normal strength and tone, mentation intact and speech normal  PSYCH: mentation appears normal, affect normal/bright        Signed Electronically by: Aniyah Wilson PA-C on 2/24/2025 at 3:07 PM    "

## 2025-02-25 NOTE — TELEPHONE ENCOUNTER
evolocumab (REPATHA) 140 MG/ML prefilled autoinjector         The original prescription was reordered on 2/21/2025 by Radha Antonio RN.   Addressed in a different encounter.

## 2025-03-04 ENCOUNTER — MYC REFILL (OUTPATIENT)
Dept: FAMILY MEDICINE | Facility: CLINIC | Age: 80
End: 2025-03-04

## 2025-03-04 ENCOUNTER — OFFICE VISIT (OUTPATIENT)
Dept: FAMILY MEDICINE | Facility: CLINIC | Age: 80
End: 2025-03-04
Payer: COMMERCIAL

## 2025-03-04 VITALS
DIASTOLIC BLOOD PRESSURE: 70 MMHG | BODY MASS INDEX: 23.84 KG/M2 | SYSTOLIC BLOOD PRESSURE: 118 MMHG | HEART RATE: 77 BPM | TEMPERATURE: 97.1 F | RESPIRATION RATE: 18 BRPM | OXYGEN SATURATION: 96 % | HEIGHT: 56 IN | WEIGHT: 106 LBS

## 2025-03-04 DIAGNOSIS — R05.9 COUGH, UNSPECIFIED TYPE: Primary | ICD-10-CM

## 2025-03-04 DIAGNOSIS — G25.81 RESTLESS LEG SYNDROME: ICD-10-CM

## 2025-03-04 DIAGNOSIS — R09.82 POSTNASAL DISCHARGE: ICD-10-CM

## 2025-03-04 PROCEDURE — 3074F SYST BP LT 130 MM HG: CPT | Performed by: FAMILY MEDICINE

## 2025-03-04 PROCEDURE — 99213 OFFICE O/P EST LOW 20 MIN: CPT | Performed by: FAMILY MEDICINE

## 2025-03-04 PROCEDURE — 3078F DIAST BP <80 MM HG: CPT | Performed by: FAMILY MEDICINE

## 2025-03-04 RX ORDER — ALBUTEROL SULFATE 90 UG/1
2 INHALANT RESPIRATORY (INHALATION) EVERY 6 HOURS PRN
Qty: 8 G | Refills: 0 | Status: SHIPPED | OUTPATIENT
Start: 2025-03-04

## 2025-03-04 RX ORDER — PRAMIPEXOLE DIHYDROCHLORIDE 0.12 MG/1
.125-.25 TABLET ORAL 3 TIMES DAILY
Qty: 270 TABLET | Refills: 1 | OUTPATIENT
Start: 2025-03-04

## 2025-03-04 ASSESSMENT — ENCOUNTER SYMPTOMS: COUGH: 1

## 2025-03-04 NOTE — PROGRESS NOTES
Assessment & Plan     1. Cough, unspecified type (Primary)  Patient has been treated with 2 antibiotics, Augmentin and doxycycline.  Doxycycline ended yesterday.  Prior to that she was tested positive for COVID infection but she never was able to take Paxlovid as the pills were huge and she was having a hard time swallowing it.  Cough is lingering on which I think is all related to residual inflammation.  Recommending to use albuterol inhaler to help relieve spasm.  She has Tessalon but she never used it.  Instructed to use that for cough suppression as needed.  She may also use cough drops and honey which is a natural cough suppressant.  Stay well-hydrated  - albuterol (PROAIR HFA/PROVENTIL HFA/VENTOLIN HFA) 108 (90 Base) MCG/ACT inhaler; Inhale 2 puffs into the lungs every 6 hours as needed for shortness of breath, wheezing or cough.  Dispense: 8 g; Refill: 0    2. Postnasal discharge  Stay well-hydrated.  Start using over-the-counter Zyrtec and Flonase.  This should hopefully help lower the inflammation and drainage.      If symptoms are not improving over the next few days, notify us back    Subjective   Hanane is a 79 year old, presenting for the following health issues:  Cough        3/4/2025    12:48 PM   Additional Questions   Roomed by Marleni LYNCH     History of Present Illness       Reason for visit:  Taken 2 rounds of antibiotics no response to cough, headache, ears    She eats 2-3 servings of fruits and vegetables daily.She consumes 1 sweetened beverage(s) daily.She exercises with enough effort to increase her heart rate 10 to 19 minutes per day.  She exercises with enough effort to increase her heart rate 3 or less days per week.   She is taking medications regularly.          Acute Illness  Acute illness concerns: Cough- had covid x 1 month ago  Onset/Duration: x 1 month  Symptoms:  Fever: No  Chills/Sweats: YES  Headache (location?): YES  Sinus Pressure: YES  Conjunctivitis:  No  Ear Pain: YES:  "bilateral  Rhinorrhea: YES  Congestion: YES  Sore Throat: YES  Cough: YES-non-productive  Wheeze: YES  Decreased Appetite: YES  Nausea: No  Vomiting: No  Diarrhea: No  Dysuria/Freq.: YES- intermittently  Dysuria or Hematuria: No  Fatigue/Achiness: YES  Sick/Strep Exposure: No  Therapies tried and outcome: see above has had two rounds of antibiotics          Review of Systems  CONSTITUTIONAL: NEGATIVE for fever, change in weight  CV: NEGATIVE for chest pain, palpitations or peripheral edema      Objective    /70   Pulse 77   Temp 97.1  F (36.2  C)   Resp 18   Ht 1.428 m (4' 8.22\")   Wt 48.1 kg (106 lb)   SpO2 96%   BMI 23.58 kg/m    Body mass index is 23.58 kg/m .  Physical Exam   GENERAL: alert and no distress  HENT: ear canals and TM's normal, nose and mouth without ulcers or lesions. Maxillary sinus tenderness   NECK: no adenopathy, no asymmetry, masses, or scars  RESP: lungs clear to auscultation - no rales, rhonchi or wheezes            Signed Electronically by: Anuj Martinez MD    "

## 2025-03-22 ENCOUNTER — HOSPITAL ENCOUNTER (EMERGENCY)
Facility: CLINIC | Age: 80
Discharge: HOME | End: 2025-03-22
Attending: EMERGENCY MEDICINE | Admitting: EMERGENCY MEDICINE
Payer: COMMERCIAL

## 2025-03-22 VITALS
TEMPERATURE: 97.4 F | BODY MASS INDEX: 23.62 KG/M2 | HEIGHT: 56 IN | OXYGEN SATURATION: 99 % | WEIGHT: 105 LBS | HEART RATE: 92 BPM | RESPIRATION RATE: 18 BRPM | DIASTOLIC BLOOD PRESSURE: 70 MMHG | SYSTOLIC BLOOD PRESSURE: 159 MMHG

## 2025-03-22 DIAGNOSIS — G25.81 RESTLESS LEG SYNDROME: ICD-10-CM

## 2025-03-22 LAB
ALBUMIN SERPL BCG-MCNC: 4.4 G/DL (ref 3.5–5.2)
ALP SERPL-CCNC: 114 U/L (ref 40–150)
ALT SERPL W P-5'-P-CCNC: 15 U/L (ref 0–50)
ANION GAP SERPL CALCULATED.3IONS-SCNC: 11 MMOL/L (ref 7–15)
AST SERPL W P-5'-P-CCNC: 22 U/L (ref 0–45)
BASOPHILS # BLD AUTO: 0.1 10E3/UL (ref 0–0.2)
BASOPHILS NFR BLD AUTO: 1 %
BILIRUB SERPL-MCNC: 0.3 MG/DL
BUN SERPL-MCNC: 18.2 MG/DL (ref 8–23)
CALCIUM SERPL-MCNC: 10.2 MG/DL (ref 8.8–10.4)
CHLORIDE SERPL-SCNC: 106 MMOL/L (ref 98–107)
CREAT SERPL-MCNC: 0.85 MG/DL (ref 0.51–0.95)
EGFRCR SERPLBLD CKD-EPI 2021: 69 ML/MIN/1.73M2
EOSINOPHIL # BLD AUTO: 0.3 10E3/UL (ref 0–0.7)
EOSINOPHIL NFR BLD AUTO: 6 %
ERYTHROCYTE [DISTWIDTH] IN BLOOD BY AUTOMATED COUNT: 13.7 % (ref 10–15)
GLUCOSE SERPL-MCNC: 97 MG/DL (ref 70–99)
HCO3 SERPL-SCNC: 23 MMOL/L (ref 22–29)
HCT VFR BLD AUTO: 37.7 % (ref 35–47)
HGB BLD-MCNC: 12.5 G/DL (ref 11.7–15.7)
IMM GRANULOCYTES # BLD: 0 10E3/UL
IMM GRANULOCYTES NFR BLD: 0 %
LYMPHOCYTES # BLD AUTO: 1.5 10E3/UL (ref 0.8–5.3)
LYMPHOCYTES NFR BLD AUTO: 25 %
MAGNESIUM SERPL-MCNC: 2.3 MG/DL (ref 1.7–2.3)
MCH RBC QN AUTO: 30 PG (ref 26.5–33)
MCHC RBC AUTO-ENTMCNC: 33.2 G/DL (ref 31.5–36.5)
MCV RBC AUTO: 90 FL (ref 78–100)
MONOCYTES # BLD AUTO: 0.6 10E3/UL (ref 0–1.3)
MONOCYTES NFR BLD AUTO: 10 %
NEUTROPHILS # BLD AUTO: 3.6 10E3/UL (ref 1.6–8.3)
NEUTROPHILS NFR BLD AUTO: 59 %
NRBC # BLD AUTO: 0 10E3/UL
NRBC BLD AUTO-RTO: 0 /100
PHOSPHATE SERPL-MCNC: 3.7 MG/DL (ref 2.5–4.5)
PLATELET # BLD AUTO: 343 10E3/UL (ref 150–450)
POTASSIUM SERPL-SCNC: 4.2 MMOL/L (ref 3.4–5.3)
PROT SERPL-MCNC: 6.8 G/DL (ref 6.4–8.3)
RBC # BLD AUTO: 4.17 10E6/UL (ref 3.8–5.2)
SODIUM SERPL-SCNC: 140 MMOL/L (ref 135–145)
WBC # BLD AUTO: 6.1 10E3/UL (ref 4–11)

## 2025-03-22 PROCEDURE — 96360 HYDRATION IV INFUSION INIT: CPT

## 2025-03-22 PROCEDURE — 83735 ASSAY OF MAGNESIUM: CPT | Performed by: EMERGENCY MEDICINE

## 2025-03-22 PROCEDURE — 84100 ASSAY OF PHOSPHORUS: CPT | Performed by: EMERGENCY MEDICINE

## 2025-03-22 PROCEDURE — 99283 EMERGENCY DEPT VISIT LOW MDM: CPT | Mod: 25

## 2025-03-22 PROCEDURE — 85025 COMPLETE CBC W/AUTO DIFF WBC: CPT | Performed by: EMERGENCY MEDICINE

## 2025-03-22 PROCEDURE — 258N000003 HC RX IP 258 OP 636: Performed by: EMERGENCY MEDICINE

## 2025-03-22 PROCEDURE — 96361 HYDRATE IV INFUSION ADD-ON: CPT

## 2025-03-22 PROCEDURE — 80053 COMPREHEN METABOLIC PANEL: CPT | Performed by: EMERGENCY MEDICINE

## 2025-03-22 PROCEDURE — 36415 COLL VENOUS BLD VENIPUNCTURE: CPT | Performed by: EMERGENCY MEDICINE

## 2025-03-22 RX ADMIN — SODIUM CHLORIDE 1000 ML: 0.9 INJECTION, SOLUTION INTRAVENOUS at 13:45

## 2025-03-22 ASSESSMENT — ACTIVITIES OF DAILY LIVING (ADL)
ADLS_ACUITY_SCORE: 48

## 2025-03-22 NOTE — ED PROVIDER NOTES
"  Emergency Department Note      History of Present Illness     Chief Complaint   Leg Pain      HPI   Hanane Sorto is a 79 year old female with a history of restless leg syndrome, hypertension, anxiety and osteoarthritis who presents for evaluation of bilateral lower extremity pain and sleep troubles. She states she has a history of restless leg syndrome and insomnia and has been on Pramipexole for that since past 12 years. She states it feels like her medications are not working anymore. She reports barely 3 hours of sleep at night and waking up feeling fatigued. She denies any new onset cough, headache, chest pain. She has been hydrating well. She reports allergies to statins.     Independent Historian   None    Review of External Notes   None    Past Medical History     Medical History and Problem List   Anxiety  Coronary artery disease  Congestive heart failure  Depressive disorder  Gastroesophageal reflux disease  Hypertension  Osteoarthritis  Osteoporosis  Restless legs  Skin cancer  Stable angina     Medications   Albuterol inhaler   Aspirin 81 mg   Nitroglycerine   Omeprazole   Pramipexole     Surgical History   Arthroplasty knee   Biopsy vaginal   Breast surgery   Bunionectomy   Cardiac surgery   Colonoscopy   Coronary angiogram   Eye surgery   Liposuction   Breast reconstruction surgery   Physical Exam     Patient Vitals for the past 24 hrs:   BP Temp Temp src Pulse Resp SpO2 Height Weight   03/22/25 1237 (!) 159/70 -- -- -- -- -- -- --   03/22/25 1236 -- 97.4  F (36.3  C) Temporal 92 18 99 % 1.422 m (4' 8\") 47.6 kg (105 lb)     Physical Exam  Constitutional: Vital signs reviewed as above  General: Alert  HEENT: Moist mucous membranes  Eyes: Conjunctiva normal.   Neck: Normal range of motion  Cardiovascular: Regular rate, Regular rhythm and normal heart sounds.  No MRG  Pulmonary/Chest: Effort normal and breath sounds normal. No respiratory distress. Patient has no wheezes. Patient has no rales. "   Abdominal: Soft. Positive bowel sounds. No MRG.  Musculoskeletal/Extremities: Full ROM. No bilateral calf tenderness.   Endo: No pitting edema  Neurological: Alert, no focal deficits.  Skin: Skin is warm and dry.   Psychiatric: Pleasant   Diagnostics     Lab Results   Labs Ordered and Resulted from Time of ED Arrival to Time of ED Departure   COMPREHENSIVE METABOLIC PANEL - Normal       Result Value    Sodium 140      Potassium 4.2      Carbon Dioxide (CO2) 23      Anion Gap 11      Urea Nitrogen 18.2      Creatinine 0.85      GFR Estimate 69      Calcium 10.2      Chloride 106      Glucose 97      Alkaline Phosphatase 114      AST 22      ALT 15      Protein Total 6.8      Albumin 4.4      Bilirubin Total 0.3     MAGNESIUM - Normal    Magnesium 2.3     PHOSPHORUS - Normal    Phosphorus 3.7     CBC WITH PLATELETS AND DIFFERENTIAL    WBC Count 6.1      RBC Count 4.17      Hemoglobin 12.5      Hematocrit 37.7      MCV 90      MCH 30.0      MCHC 33.2      RDW 13.7      Platelet Count 343      % Neutrophils 59      % Lymphocytes 25      % Monocytes 10      % Eosinophils 6      % Basophils 1      % Immature Granulocytes 0      NRBCs per 100 WBC 0      Absolute Neutrophils 3.6      Absolute Lymphocytes 1.5      Absolute Monocytes 0.6      Absolute Eosinophils 0.3      Absolute Basophils 0.1      Absolute Immature Granulocytes 0.0      Absolute NRBCs 0.0         Imaging   No orders to display     Independent Interpretation   None    ED Course      Medications Administered   Medications   sodium chloride 0.9% BOLUS 1,000 mL (0 mLs Intravenous Stopped 3/22/25 1521)       Procedures   Procedures     Discussion of Management   None    ED Course   ED Course as of 03/22/25 1803   Sat Mar 22, 2025   1332 I obtained the history and examined the patient as above.        Additional Documentation  None    Medical Decision Making / Diagnosis     CMS Diagnoses: None    MIPS       None    [facility]   Hanane Sorto is a 79 year old female  who presents with concerns over increasing restless leg syndrome.  She is on Mirapex and taking upwards of 2 tablets at a time.  She says the last few nights been terrible she is unable to sleep.  Her labs here including CBC, comp metabolic panel, magnesium and Phos are all within normal limits.  She did receive a liter of fluids.  I discussed upping her Mirapex.  She understands that she can take up to a milligram at a time with a max of 3 mg in a day.  She has plenty of it at home.  I suggested taking 3 or 4 tablets for the first dose and then titrating the next couple doses as needed.  She will follow-up with her primary care doctor next week as well.    Disposition   The patient was discharged.     Diagnosis     ICD-10-CM    1. Restless leg syndrome  G25.81            Discharge Medications   Discharge Medication List as of 3/22/2025  3:23 PM            Scribe Disclosure:  Ivanna KWAN, am serving as a scribe at 1:34 PM on 3/22/2025 to document services personally performed by Albert Burr MD based on my observations and the provider's statements to me.        Albert Burr MD  03/22/25 0130

## 2025-03-22 NOTE — ED TRIAGE NOTES
"Pt c/o of bilateral lower leg pain due to her \"restless leg syndrome\". Pt states she is not getting proper sleep        "

## 2025-03-31 NOTE — TELEPHONE ENCOUNTER
Negative Pressure Wound Therapy    NAME:  Lex Naidu  YOB: 1945  MEDICAL RECORD NUMBER:  [id number]  DATE:  3/31/2025    Applied Negative Pressure to Right foot  wound(s)/ulcer(s).  [x] Applied skin barrier prep to brain-wound.   [x] Cut strips of plastic drape to picture frame wound so that brain-wound is     covered with the drape.   [x] If bridging dressing to less prominent site, cover any intact skin that will come in contact with the Negative Pressure Therapy sponge, gauze or channel drain with plastic drape. The sponge should never touch intact skin.   [x] Cut sponge, gauze or channel drain to size which will fit into the wound/ulcer bed without being forced.   [x] Be sure the sponge is large enough to hold the entire round plastic flange which is attached to the tubing. Never allow flange to be larger than the sponge or it will produce suction damaging intact skin.  Total number of individual pieces of foam used within the wound bed: 2    [x] If bridging the dressing away from the primary site, be sure the bridge leads to a piece of sponge large enough to hold the entire flange without allowing any of the flange to overlap onto intact skin.   [x] Covered sponge, gauze or channel drain with plastic drape.   [x] Cut a hole in this plastic drape directly over the sponge the same size as the plastic drain tubing.   [x] Removed plastic liner from flange and apply it directly over the hole you cut.   [x] Removed the plastic cover from the flange.   [x] Attached the tubing to the wound/ulcer Negative Pressure Therapy and turn it on to be sure a vacuum is created and that there are no leaks.   [x] If air leaks occur, use plastic drape to patch them.   [x] Secured Negative Pressure Therapy dressing with ace wrap loosely if located on an extremity. Maintain tubing outside of ace wrap. Tubing must not exert pressure on intact skin.    Applied per  Guidelines      Electronically signed by  Reason for Call:  Medication or medication refill:    Do you use a [facility]?  Name of the pharmacy and phone number for the current request:    [id number] - [place], [place] - [address] AT [facility] [address] [facility]      Name of the medication requested:   pramipexole (MIRAPEX) 0.25 MG tablet 180 tablet 3 12/4/2017  No   Sig - Route: Take 1 tablet (0.25 mg) by mouth 2 times daily At 1700 and HS - Oral   Class: E-Prescribe   Order: [id number]       Can we leave a detailed message on this number? YES    Phone number patient can be reached at: Cell number on file:    Telephone Information:   Mobile [phone]       Best Time: Any     Call taken on 10/29/2018 at 10:54 AM by Sade Cruz

## 2025-04-14 ENCOUNTER — OFFICE VISIT (OUTPATIENT)
Dept: FAMILY MEDICINE | Facility: CLINIC | Age: 80
End: 2025-04-14
Payer: COMMERCIAL

## 2025-04-14 VITALS
OXYGEN SATURATION: 97 % | SYSTOLIC BLOOD PRESSURE: 117 MMHG | RESPIRATION RATE: 18 BRPM | WEIGHT: 103.3 LBS | TEMPERATURE: 96.4 F | BODY MASS INDEX: 23.24 KG/M2 | HEART RATE: 104 BPM | DIASTOLIC BLOOD PRESSURE: 69 MMHG | HEIGHT: 56 IN

## 2025-04-14 DIAGNOSIS — M17.0 PRIMARY OSTEOARTHRITIS OF BOTH KNEES: ICD-10-CM

## 2025-04-14 DIAGNOSIS — K21.9 GASTROESOPHAGEAL REFLUX DISEASE WITHOUT ESOPHAGITIS: ICD-10-CM

## 2025-04-14 DIAGNOSIS — I25.119 ANGINA CONCURRENT WITH AND DUE TO ARTERIOSCLEROSIS OF CORONARY ARTERY: ICD-10-CM

## 2025-04-14 DIAGNOSIS — H02.834 DERMATOCHALASIS OF BOTH UPPER EYELIDS: ICD-10-CM

## 2025-04-14 DIAGNOSIS — H02.831 DERMATOCHALASIS OF BOTH UPPER EYELIDS: ICD-10-CM

## 2025-04-14 DIAGNOSIS — Z01.818 PREOP GENERAL PHYSICAL EXAM: Primary | ICD-10-CM

## 2025-04-14 DIAGNOSIS — M47.812 ARTHRITIS OF NECK: ICD-10-CM

## 2025-04-14 DIAGNOSIS — F41.9 ANXIETY: ICD-10-CM

## 2025-04-14 PROBLEM — S62.102D: Status: RESOLVED | Noted: 2017-08-28 | Resolved: 2025-04-14

## 2025-04-14 PROBLEM — Z98.890 STATUS POST CORONARY ANGIOGRAM: Status: RESOLVED | Noted: 2022-07-27 | Resolved: 2025-04-14

## 2025-04-14 PROCEDURE — 99214 OFFICE O/P EST MOD 30 MIN: CPT | Performed by: INTERNAL MEDICINE

## 2025-04-14 PROCEDURE — 1126F AMNT PAIN NOTED NONE PRSNT: CPT | Performed by: INTERNAL MEDICINE

## 2025-04-14 PROCEDURE — 3078F DIAST BP <80 MM HG: CPT | Performed by: INTERNAL MEDICINE

## 2025-04-14 PROCEDURE — 93000 ELECTROCARDIOGRAM COMPLETE: CPT | Performed by: INTERNAL MEDICINE

## 2025-04-14 PROCEDURE — 3074F SYST BP LT 130 MM HG: CPT | Performed by: INTERNAL MEDICINE

## 2025-04-14 PROCEDURE — G2211 COMPLEX E/M VISIT ADD ON: HCPCS | Performed by: INTERNAL MEDICINE

## 2025-04-14 ASSESSMENT — PAIN SCALES - GENERAL: PAINLEVEL_OUTOF10: NO PAIN (0)

## 2025-04-14 NOTE — PROGRESS NOTES
Preoperative Evaluation  [facility]  [address] [facility], [address]  [facility] [address]  Phone: [phone]  Primary Provider: Corona Bethea MD  Pre-op Performing Provider: Corona Bethea MD  Apr 14, 2025 4/9/2025   Surgical Information   What procedure is being done? repair of blepharoptosis   Facility or Hospital where procedure/surgery will be performed: [facility]   Who is doing the procedure / surgery? Dr. Lord   Date of surgery / procedure: 05/08/2025   Time of surgery / procedure: To be determined   Where do you plan to recover after surgery? at home with family     Fax number for surgical facility: [phone]    Assessment & Plan     The proposed surgical procedure is considered LOW risk.    Preop general physical exam  Patient has dermatochalasia surgery coming up and is a good surgical candidate  However she cannot stop baby aspirin because she has 4 cardiac stents and had very difficult anatomy  She can hold her vitamins 7 days before surgery  - EKG 12-lead complete w/read - Clinics    Dermatochalasis of both upper eyelids  As above- EKG 12-lead complete w/read - Clinics    Angina concurrent with and due to arteriosclerosis of coronary artery  now s/p PCI x 1 to pCx and PCI x 3 with overlapping stents to pLAD 9/2/2022 @ [facility]  EKG by my review today is normal sinus rhythm without any changes  - EKG 12-lead complete w/read - Clinics    Anxiety  Patient is doing well off Lexapro    Gastroesophageal reflux disease without esophagitis  She is on chronic omeprazole    Arthritis of neck  Patient has arthritis of the neck and knee and needs right knee replacement    Primary osteoarthritis of both knees    Patient is on Repatha for hyperlipidemia                 Recommendation  Approval given to proceed with proposed procedure, without further diagnostic evaluation.    Kalyn Koo is a very nice 79 year old, presenting for the following:CAD - now s/p  PCI x 1 to pCx and PCI x 3 with overlapping stents to pLAD 9/2/2022 @ [facility].     She now has eyelid surgery coming up  She actually played tennis today and is very active despite her arthritis      4/9/2025   Pre-Op Questionnaire   Have you ever had a heart attack or stroke? No   Have you ever had surgery on your heart or blood vessels, such as a stent placement, a coronary artery bypass, or surgery on an artery in your head, neck, heart, or legs? (!) YES    Do you have chest pain with activity? No   Do you have a history of heart failure? No   Do you currently have a cold, bronchitis or symptoms of other infection? No   Do you have a cough, shortness of breath, or wheezing? No   Do you or anyone in your family have previous history of blood clots? No   Do you or does anyone in your family have a serious bleeding problem such as prolonged bleeding following surgeries or cuts? No   Have you ever had problems with anemia or been told to take iron pills? No   Have you had any abnormal blood loss such as black, tarry or bloody stools, or abnormal vaginal bleeding? No   Have you ever had a blood transfusion? No   Are you willing to have a blood transfusion if it is medically needed before, during, or after your surgery? Yes   Have you or any of your relatives ever had problems with anesthesia? No   Do you have sleep apnea, excessive snoring or daytime drowsiness? No   Do you have any artifical heart valves or other implanted medical devices like a pacemaker, defibrillator, or continuous glucose monitor? No   Do you have artificial joints? (!) YES   Are you allergic to latex? No     Health Care Directive  Patient has a Health Care Directive on file      Preoperative Review of             Patient Active Problem List    Diagnosis Date Noted    Angina concurrent with and due to arteriosclerosis of coronary artery 12/24/2024     Priority: Medium    Breast tenderness in female 10/24/2022     Priority: Medium    Status  post coronary angiogram 07/27/2022     Priority: Medium    Dizziness 04/21/2022     Priority: Medium    Arthritis of neck 10/22/2021     Priority: Medium    Restless leg syndrome 10/21/2021     Priority: Medium    Fracture of wrist, left, with routine healing, subsequent encounter 08/28/2017     Priority: Medium    S/P total knee arthroplasty 04/24/2017     Priority: Medium    Gastroesophageal reflux disease without esophagitis 11/30/2015     Priority: Medium    Bursitis of hip 11/24/2014     Priority: Medium    Constipation 11/24/2014     Priority: Medium    Osteoarthritis 12/19/2012     Priority: Medium    Gross hematuria 09/12/2011     Priority: Medium    Hyperlipidemia with target LDL less than 130      Priority: Medium     Diagnosis updated by automated process. Provider to review and confirm.      Anxiety      Priority: Medium    Osteoporosis      Priority: Medium      Past Medical History:   Diagnosis Date    Anxiety     psych yearly, Dr Shea    CAD (coronary artery disease)     Patient underwent successful coronary angiogram with PCI to the pCX artery and three stents to the LAD on 9/2/22 with Dr. Taylor and Dr. Dodd    Congestive heart failure (H) July 2022    Depressive disorder     Family hx of colon cancer     sister    Andria cote    Fracture of wrist, left, with routine healing, subsequent encounter 08/28/2017    GERD (gastroesophageal reflux disease)     Gross hematuria 09/12/2011    Hypertension May 3,2022    Osteoarthritis 12/19/2012    Osteoporosis     Dexa    Restless legs     Skin cancer     Stable angina pectoris due to arteriosclerosis of coronary artery 09/14/2022    now s/p PCI x 1 to pCx and PCI x 3 with overlapping stents to pLAD 9/2/2022 @ [facility].     Past Surgical History:   Procedure Laterality Date    ARTHROPLASTY KNEE Left 04/24/2017    Procedure: ARTHROPLASTY KNEE;  LEFT TOTAL KNEE ARTHROPLASTY;  Surgeon: Lee Ayala MD;  Location:  OR     BIOPSY  1968 approx.    vaginal    BREAST SURGERY  1972    Implants    BUNIONECTOMY      both    CARDIAC SURGERY      4 stents     SECTION      COLONOSCOPY  2010    CV CORONARY ANGIOGRAM N/A 2022    Procedure: Coronary Angiogram;  Surgeon: Lea Sanchez MD;  Location:  HEART CARDIAC CATH LAB    EYE SURGERY  2014    cataract surgery in both eyes    LIPOSUCTION (LOCATION)      OR REPAIR ROTATOR CUFF,ACUTE  2020    RECONSTRUCT BREAST, IMPLANT PROSTHESIS, COMBINED       Current Outpatient Medications   Medication Sig Dispense Refill    aspirin 81 MG EC tablet Take 1 tablet (81 mg) by mouth daily      bisacodyl (DULCOLAX) 5 MG EC tablet Take 5 mg by mouth daily as needed for constipation      Calcium-Phosphorus-Vitamin D (CALCIUM GUMMIES PO) Take 2 Gum by mouth daily      evolocumab (REPATHA) 140 MG/ML prefilled autoinjector Inject 1 mL (140 mg) subcutaneously every 14 days. 6 mL 3    fluticasone (FLONASE) 50 MCG/ACT nasal spray Spray 2 sprays into both nostrils daily. OTC      nitroGLYcerin (NITROSTAT) 0.4 MG sublingual tablet For chest pain place 1 tablet under the tongue every 5 minutes for 3 doses. If symptoms persist 5 minutes after 1st dose call 911. 25 tablet 3    omeprazole (PRILOSEC) 20 MG DR capsule Take 1 capsule (20 mg) by mouth 2 times daily. 180 capsule 2    Povidone, PF, (IVIZIA DRY EYES) 0.5 % SOLN Apply to eye.      pramipexole (MIRAPEX) 0.125 MG tablet Take 1-2 tablets (0.125-0.25 mg) by mouth 3 times daily. 270 tablet 1    RESTASIS 0.05 % ophthalmic emulsion INSTILL 1 DROP IN BOTH EYES TWICE DAILY AS DIRECTED      UNABLE TO FIND daily. MEDICATION NAME: phillps laxative      albuterol (PROAIR HFA/PROVENTIL HFA/VENTOLIN HFA) 108 (90 Base) MCG/ACT inhaler Inhale 2 puffs into the lungs every 6 hours as needed for shortness of breath, wheezing or cough. 8 g 0       Allergies   Allergen Reactions    Crestor [Rosuvastatin]     Ezetimibe Other (See Comments)     Zetia  "= leg cramps     Miralax [Polyethylene Glycol] Other (See Comments)     Back pain    Pravastatin Other (See Comments)     Leg cramps     Statins Other (See Comments)     Leg cramping        Social History     Tobacco Use    Smoking status: Former     Current packs/day: 0.00     Average packs/day: 0.5 packs/day for 15.0 years (7.5 ttl pk-yrs)     Types: Cigarettes     Start date: 1960     Quit date: 1975     Years since quittin.3     Passive exposure: Never    Smokeless tobacco: Never    Tobacco comments:     one pack/ three months   Substance Use Topics    Alcohol use: Yes     Comment: one glass of wine a week     Family History   Problem Relation Age of Onset    Lipids Mother     Alzheimer Disease Mother     Depression Mother     Osteoporosis Mother         RA    C.A.DRussel Father     Coronary Artery Disease Father     Hypertension Father     Diabetes Father     Breast Cancer Sister     Leukemia Sister     Hyperlipidemia Sister     Colon Cancer Sister             Cerebrovascular Disease Brother      History   Drug Use No             Review of Systems  Constitutional, HEENT, cardiovascular, pulmonary, GI, , musculoskeletal, neuro, skin, endocrine and psych systems are negative, except as otherwise noted.    Objective    /69 (BP Location: Left arm, Patient Position: Sitting, Cuff Size: Adult Regular)   Pulse 104   Temp (!) 96.4  F (35.8  C) (Temporal)   Resp 18   Ht 1.422 m (4' 8\")   Wt 46.9 kg (103 lb 4.8 oz)   SpO2 97%   BMI 23.16 kg/m     Estimated body mass index is 23.16 kg/m  as calculated from the following:    Height as of this encounter: 1.422 m (4' 8\").    Weight as of this encounter: 46.9 kg (103 lb 4.8 oz).  Physical Exam  GENERAL: alert and no distress  NECK: no adenopathy, no asymmetry, masses, or scars  RESP: lungs clear to auscultation - no rales, rhonchi or wheezes  CV: regular rate and rhythm, normal S1 S2, no S3 or S4, no murmur, click or rub, no peripheral " edema  ABDOMEN: soft, nontender, no hepatosplenomegaly, no masses and bowel sounds normal  MS: no gross musculoskeletal defects noted, no edema    Recent Labs   Lab Test 03/22/25  1344 12/24/24  1148 09/24/24  1422   HGB 12.5 12.5 12.8    332 350   INR  --   --  0.90    141  --    POTASSIUM 4.2 4.2  --    CR 0.85 0.87  --         Diagnostics  No labs were ordered during this visit.   EKG: appears normal, NSR, normal axis, normal intervals, no acute ST/T changes c/w ischemia, no LVH by voltage criteria, unchanged from previous tracings    Revised Cardiac Risk Index (RCRI)  The patient has the following serious cardiovascular risks for perioperative complications:   - Coronary Artery Disease (MI, positive stress test, angina, Qs on EKG) = 1 point     RCRI Interpretation: 1 point: Class II (low risk - 0.9% complication rate)     The longitudinal plan of care for the diagnosis(es)/condition(s) as documented were addressed during this visit. Due to the added complexity in care, I will continue to support Hanane in the subsequent management and with ongoing continuity of care.      Signed Electronically by: Corona Bethea MD  A copy of this evaluation report is provided to the requesting physician.

## 2025-04-22 ENCOUNTER — ANCILLARY PROCEDURE (OUTPATIENT)
Dept: MAMMOGRAPHY | Facility: CLINIC | Age: 80
End: 2025-04-22
Attending: INTERNAL MEDICINE
Payer: COMMERCIAL

## 2025-04-22 DIAGNOSIS — Z12.31 ENCOUNTER FOR SCREENING MAMMOGRAM FOR BREAST CANCER: ICD-10-CM

## 2025-04-22 PROCEDURE — 77063 BREAST TOMOSYNTHESIS BI: CPT | Mod: TC | Performed by: RADIOLOGY

## 2025-04-22 PROCEDURE — 77067 SCR MAMMO BI INCL CAD: CPT | Mod: TC | Performed by: RADIOLOGY

## 2025-05-06 ENCOUNTER — MYC REFILL (OUTPATIENT)
Dept: FAMILY MEDICINE | Facility: CLINIC | Age: 80
End: 2025-05-06
Payer: COMMERCIAL

## 2025-05-06 DIAGNOSIS — G25.81 RESTLESS LEG SYNDROME: ICD-10-CM

## 2025-05-06 RX ORDER — PRAMIPEXOLE DIHYDROCHLORIDE 0.12 MG/1
.125-.25 TABLET ORAL 3 TIMES DAILY
Qty: 270 TABLET | Refills: 1 | Status: SHIPPED | OUTPATIENT
Start: 2025-05-06

## 2025-05-08 ENCOUNTER — HOSPITAL ENCOUNTER (OUTPATIENT)
Facility: CLINIC | Age: 80
Discharge: HOME | End: 2025-05-08
Attending: OPHTHALMOLOGY | Admitting: OPHTHALMOLOGY
Payer: COMMERCIAL

## 2025-05-08 ENCOUNTER — ANESTHESIA EVENT (OUTPATIENT)
Dept: SURGERY | Facility: CLINIC | Age: 80
End: 2025-05-08
Payer: COMMERCIAL

## 2025-05-08 ENCOUNTER — ANESTHESIA (OUTPATIENT)
Dept: SURGERY | Facility: CLINIC | Age: 80
End: 2025-05-08
Payer: COMMERCIAL

## 2025-05-08 VITALS
TEMPERATURE: 97.9 F | SYSTOLIC BLOOD PRESSURE: 134 MMHG | BODY MASS INDEX: 23.04 KG/M2 | WEIGHT: 102.4 LBS | OXYGEN SATURATION: 97 % | DIASTOLIC BLOOD PRESSURE: 59 MMHG | HEART RATE: 68 BPM | RESPIRATION RATE: 16 BRPM | HEIGHT: 56 IN

## 2025-05-08 DIAGNOSIS — H02.403 ACQUIRED INVOLUTIONAL PTOSIS OF BOTH EYELIDS: Primary | ICD-10-CM

## 2025-05-08 PROCEDURE — 250N000009 HC RX 250

## 2025-05-08 PROCEDURE — 370N000017 HC ANESTHESIA TECHNICAL FEE, PER MIN: Performed by: OPHTHALMOLOGY

## 2025-05-08 PROCEDURE — 250N000009 HC RX 250: Performed by: OPHTHALMOLOGY

## 2025-05-08 PROCEDURE — 258N000003 HC RX IP 258 OP 636

## 2025-05-08 PROCEDURE — 250N000011 HC RX IP 250 OP 636

## 2025-05-08 PROCEDURE — 999N000141 HC STATISTIC PRE-PROCEDURE NURSING ASSESSMENT: Performed by: OPHTHALMOLOGY

## 2025-05-08 PROCEDURE — 272N000001 HC OR GENERAL SUPPLY STERILE: Performed by: OPHTHALMOLOGY

## 2025-05-08 PROCEDURE — 710N000009 HC RECOVERY PHASE 1, LEVEL 1, PER MIN: Performed by: OPHTHALMOLOGY

## 2025-05-08 PROCEDURE — 360N000076 HC SURGERY LEVEL 3, PER MIN: Performed by: OPHTHALMOLOGY

## 2025-05-08 PROCEDURE — 710N000012 HC RECOVERY PHASE 2, PER MINUTE: Performed by: OPHTHALMOLOGY

## 2025-05-08 PROCEDURE — 250N000011 HC RX IP 250 OP 636: Performed by: OPHTHALMOLOGY

## 2025-05-08 RX ORDER — HYDROMORPHONE HCL IN WATER/PF 6 MG/30 ML
0.2 PATIENT CONTROLLED ANALGESIA SYRINGE INTRAVENOUS EVERY 5 MIN PRN
Status: DISCONTINUED | OUTPATIENT
Start: 2025-05-08 | End: 2025-05-08 | Stop reason: HOSPADM

## 2025-05-08 RX ORDER — ERYTHROMYCIN 5 MG/G
0.5 OINTMENT OPHTHALMIC AT BEDTIME
Qty: 3.5 G | Refills: 0 | Status: SHIPPED | OUTPATIENT
Start: 2025-05-08

## 2025-05-08 RX ORDER — PROPOFOL 10 MG/ML
INJECTION, EMULSION INTRAVENOUS PRN
Status: DISCONTINUED | OUTPATIENT
Start: 2025-05-08 | End: 2025-05-08

## 2025-05-08 RX ORDER — NALOXONE HYDROCHLORIDE 0.4 MG/ML
0.1 INJECTION, SOLUTION INTRAMUSCULAR; INTRAVENOUS; SUBCUTANEOUS
Status: DISCONTINUED | OUTPATIENT
Start: 2025-05-08 | End: 2025-05-08 | Stop reason: HOSPADM

## 2025-05-08 RX ORDER — NEOMYCIN SULFATE, POLYMYXIN B SULFATE AND DEXAMETHASONE 3.5; 10000; 1 MG/ML; [USP'U]/ML; MG/ML
1-2 SUSPENSION/ DROPS OPHTHALMIC EVERY 4 HOURS
Qty: 5 ML | Refills: 0 | Status: SHIPPED | OUTPATIENT
Start: 2025-05-08

## 2025-05-08 RX ORDER — LIDOCAINE HYDROCHLORIDE 20 MG/ML
INJECTION, SOLUTION INFILTRATION; PERINEURAL PRN
Status: DISCONTINUED | OUTPATIENT
Start: 2025-05-08 | End: 2025-05-08

## 2025-05-08 RX ORDER — CEFAZOLIN SODIUM/WATER 2 G/20 ML
SYRINGE (ML) INTRAVENOUS PRN
Status: DISCONTINUED | OUTPATIENT
Start: 2025-05-08 | End: 2025-05-08

## 2025-05-08 RX ORDER — FENTANYL CITRATE 0.05 MG/ML
50 INJECTION, SOLUTION INTRAMUSCULAR; INTRAVENOUS EVERY 5 MIN PRN
Status: DISCONTINUED | OUTPATIENT
Start: 2025-05-08 | End: 2025-05-08 | Stop reason: HOSPADM

## 2025-05-08 RX ORDER — TETRACAINE HYDROCHLORIDE 5 MG/ML
SOLUTION OPHTHALMIC PRN
Status: DISCONTINUED | OUTPATIENT
Start: 2025-05-08 | End: 2025-05-08 | Stop reason: HOSPADM

## 2025-05-08 RX ORDER — HYDROMORPHONE HCL IN WATER/PF 6 MG/30 ML
0.4 PATIENT CONTROLLED ANALGESIA SYRINGE INTRAVENOUS EVERY 5 MIN PRN
Status: DISCONTINUED | OUTPATIENT
Start: 2025-05-08 | End: 2025-05-08 | Stop reason: HOSPADM

## 2025-05-08 RX ORDER — ONDANSETRON 4 MG/1
4 TABLET, ORALLY DISINTEGRATING ORAL EVERY 30 MIN PRN
Status: DISCONTINUED | OUTPATIENT
Start: 2025-05-08 | End: 2025-05-08 | Stop reason: HOSPADM

## 2025-05-08 RX ORDER — ONDANSETRON 2 MG/ML
INJECTION INTRAMUSCULAR; INTRAVENOUS PRN
Status: DISCONTINUED | OUTPATIENT
Start: 2025-05-08 | End: 2025-05-08

## 2025-05-08 RX ORDER — PROPOFOL 10 MG/ML
INJECTION, EMULSION INTRAVENOUS CONTINUOUS PRN
Status: DISCONTINUED | OUTPATIENT
Start: 2025-05-08 | End: 2025-05-08

## 2025-05-08 RX ORDER — ERYTHROMYCIN 5 MG/G
OINTMENT OPHTHALMIC PRN
Status: DISCONTINUED | OUTPATIENT
Start: 2025-05-08 | End: 2025-05-08 | Stop reason: HOSPADM

## 2025-05-08 RX ORDER — SODIUM CHLORIDE, SODIUM LACTATE, POTASSIUM CHLORIDE, CALCIUM CHLORIDE 600; 310; 30; 20 MG/100ML; MG/100ML; MG/100ML; MG/100ML
INJECTION, SOLUTION INTRAVENOUS CONTINUOUS PRN
Status: DISCONTINUED | OUTPATIENT
Start: 2025-05-08 | End: 2025-05-08

## 2025-05-08 RX ORDER — FENTANYL CITRATE 0.05 MG/ML
25 INJECTION, SOLUTION INTRAMUSCULAR; INTRAVENOUS EVERY 5 MIN PRN
Status: DISCONTINUED | OUTPATIENT
Start: 2025-05-08 | End: 2025-05-08 | Stop reason: HOSPADM

## 2025-05-08 RX ORDER — SODIUM CHLORIDE, SODIUM LACTATE, POTASSIUM CHLORIDE, CALCIUM CHLORIDE 600; 310; 30; 20 MG/100ML; MG/100ML; MG/100ML; MG/100ML
INJECTION, SOLUTION INTRAVENOUS CONTINUOUS
Status: DISCONTINUED | OUTPATIENT
Start: 2025-05-08 | End: 2025-05-08 | Stop reason: HOSPADM

## 2025-05-08 RX ORDER — DEXAMETHASONE SODIUM PHOSPHATE 4 MG/ML
4 INJECTION, SOLUTION INTRA-ARTICULAR; INTRALESIONAL; INTRAMUSCULAR; INTRAVENOUS; SOFT TISSUE
Status: DISCONTINUED | OUTPATIENT
Start: 2025-05-08 | End: 2025-05-08 | Stop reason: HOSPADM

## 2025-05-08 RX ORDER — ONDANSETRON 2 MG/ML
4 INJECTION INTRAMUSCULAR; INTRAVENOUS EVERY 30 MIN PRN
Status: DISCONTINUED | OUTPATIENT
Start: 2025-05-08 | End: 2025-05-08 | Stop reason: HOSPADM

## 2025-05-08 RX ORDER — TETRACAINE HYDROCHLORIDE 5 MG/ML
SOLUTION OPHTHALMIC
Status: DISCONTINUED
Start: 2025-05-08 | End: 2025-05-08 | Stop reason: HOSPADM

## 2025-05-08 RX ORDER — ERYTHROMYCIN 5 MG/G
OINTMENT OPHTHALMIC
Status: DISCONTINUED
Start: 2025-05-08 | End: 2025-05-08 | Stop reason: HOSPADM

## 2025-05-08 RX ADMIN — PROPOFOL 40 MG: 10 INJECTION, EMULSION INTRAVENOUS at 07:34

## 2025-05-08 RX ADMIN — SODIUM CHLORIDE, POTASSIUM CHLORIDE, SODIUM LACTATE AND CALCIUM CHLORIDE: 600; 310; 30; 20 INJECTION, SOLUTION INTRAVENOUS at 07:30

## 2025-05-08 RX ADMIN — LIDOCAINE HYDROCHLORIDE 20 MG: 20 INJECTION, SOLUTION INFILTRATION; PERINEURAL at 07:34

## 2025-05-08 RX ADMIN — PROPOFOL 100 MCG/KG/MIN: 10 INJECTION, EMULSION INTRAVENOUS at 07:34

## 2025-05-08 RX ADMIN — ONDANSETRON 4 MG: 2 INJECTION INTRAMUSCULAR; INTRAVENOUS at 07:38

## 2025-05-08 RX ADMIN — Medication 2 G: at 07:24

## 2025-05-08 ASSESSMENT — ACTIVITIES OF DAILY LIVING (ADL)
ADLS_ACUITY_SCORE: 48

## 2025-05-08 ASSESSMENT — ENCOUNTER SYMPTOMS: SEIZURES: 0

## 2025-05-08 ASSESSMENT — LIFESTYLE VARIABLES: TOBACCO_USE: 0

## 2025-05-08 NOTE — PROGRESS NOTES
Meets criteria for discharge.  Discharge instructions reviewed with pt and pt's designated responsible party.  Pt label on prescription bag from pharmacy matched to pt's wristband. Pharmacy bag opened with 2 prescriptions inside. Medications were reviewed to match pt wristband while pt and significant other agreed with identification. Prescriptions placed back in pharmacy bag resealed with tape and handed to  per pt request.

## 2025-05-08 NOTE — DISCHARGE INSTRUCTIONS
Post-operative Instructions  Ophthalmic Plastic and Reconstructive Surgery  Carlos Lord M.D.      All instructions apply to the operated eye(s) or eyelid(s).    Wound care and personal care  ? If a patch or bandage has been placed, please leave this in place until seen by your physician. Tape on the lower lids can get wet and it is safe to shower with the tape on the lower eyelids. The tape will be removed at one week. If it falls off, do not replace.   ? Apply ice compresses for 20 minutes every hour while awake for 2 days, then  switch to warm water compresses 4 times a day until seen by your physician.For warm packs you can place a cup of dry uncooked rice in a clean cotton sock. Then place sock in microwave 30 seconds to one minute. Next place the warm sock into a plastic bag and wrap the bag with clean warm wet washcloth and place over operated eye.    ? You may shower or wash your hair the day after surgery. Do not bathe or go  swimming for 1 week to prevent contamination of your wounds.  ? Do not apply make-up to the eyes or eyelids for 2 weeks after surgery.  ? Expect some swelling, bruising, black eye (even into the lower eyelids and cheeks). Also expect serum caking, crusting and discharge from the eye and/or incisions. A small amount of surface bleeding is normal for the first 48 hours.  ? Your eye(s) and eyelid(s) may be painful and tender. This is normal after surgery.  Use the pain medication as prescribed. If your pain does not improve despite the  medication, contact the office.      Contact information and follow-up  ? Return to the Eye Clinic for a follow-up appointment with your physician as  scheduled. If no appointment has been scheduled, call [phone] for an  appointment with Dr. Lord within 1 to 2 weeks from your date of surgery.      Activity restrictions and driving  ? Avoid heavy lifting, bending, exercise or strenuous activity for 1 week after surgery. You may resume  other activities and return to work as tolerated.  ? You may not resume driving until have you stopped using narcotic pain medication (such as Norco, Percocet, Tylenol #3).    Medications  ? Restart all your regular home medications and eye drops. If you take Plavix or  Aspirin on a regular basis, wait for 5 days after your surgery before restarting these  in order to decrease the risk of bleeding complications.  ? Avoid aspirin and aspirin-like medications (Motrin, Aleve, Ibuprofen, Noreen-  [place] etc) for 5 days to reduce the risk of bleeding. You may take Tylenol  (acetaminophen) for pain.  ? In addition to your home medications, take the following post-operative medications as prescribed by your physician.     ? Apply antibiotic ointment to all sutures three a day, and into the operated eye(s) at night.  ? Instill eye drops 3 times a day until finished.      ? WARNING: All the prescription pain medications listed above contain Tylenol  (acetaminophen). You must not take more than 4,000 mg of acetaminophen per  24-hour period. This is equivalent to 12 tablets of Norco, Percocet or Tylenol #3. If you take other over-the-counter medications  containing acetaminophen, you must take the amount of acetaminophen into  account and reduce the number of prescribed pain pills accordingly.  ? The prescribed medications may make you drowsy. You must not drive a car,  operate heavy machinery or drink alcohol while taking them.  ? The prescribed pain medications may cause constipation and nausea. Take them  with some food to prevent a stomach upset. If you continue to experience nausea,  call your physician.    For severe pain, bleeding, or loss of vision, call the office at [phone].  Same Day Surgery Discharge Instructions for  Sedation and General Anesthesia     It's not unusual to feel dizzy, light-headed or faint for up to 24 hours after surgery or while taking pain medication.  If you have these symptoms: sit for a  few minutes before standing and have someone assist you when you get up to walk or use the bathroom.    You should rest and relax for the next 24 hours. We recommend you make arrangements to have an adult stay with you for at least 24 hours after your discharge.  Avoid hazardous and strenuous activity.    DO NOT DRIVE any vehicle or operate mechanical equipment for 24 hours following the end of your surgery.  Even though you may feel normal, your reactions may be affected by the medication you have received.    Do not drink alcoholic beverages for 24 hours following surgery.     Slowly progress to your regular diet as you feel able. It's not unusual to feel nauseated and/or vomit after receiving anesthesia.  If you develop these symptoms, drink clear liquids (apple juice, ginger ale, broth, 7-up, etc. ) until you feel better.  If your nausea and vomiting persists for 24 hours, please notify your surgeon.      All narcotic pain medications, along with inactivity and anesthesia, can cause constipation. Drinking plenty of liquids and increasing fiber intake will help.    For any questions of a medical nature, call your surgeon.    Do not make important decisions for 24 hours.    If you had general anesthesia, you may have a sore throat for a couple of days related to the breathing tube used during surgery.  You may use Cepacol lozenges to help with this discomfort.  If it worsens or if you develop a fever, contact your surgeon.     If you feel your pain is not well managed with the pain medications prescribed by your surgeon, please contact your surgeon's office to let them know so they can address your concerns.    [facility]   Eyelid/Orbital Surgery with Skin Graft Discharge Instructions   Yonis Anderson MD  [facility]  [phone]      DRESSING  If a dressing is in place, it will be removed at your first post-op appointment.   Do not put ice, ointment or heat to your graft site.      OINTMENT  You may be given some ointment when you leave the hospital.  Apply ointment twice daily to the donor site (except for oral donor sites ). The donor site is the area from where the tissue came.    ACTIVITY  Avoid heavy lifting or vigorous exercise for one week after surgery.  You may resume regular activities as tolerated.  You may shower and wash your hair on the day after surgery, using caution to not get your dressing wet.  Do not bathe or go swimming for 1 week. You may not resume driving until you have stopped your narcotic pain medications.    MEDICATION  If the doctor has given you some medications to take after surgery, please take these according to the instructions on the bottle.  Pain medications may make you drowsy so do not drive, operate heavy machinery, or use alcohol while taking it.  When you feel that you do not need the prescription pain medication, you may substitute Extra Strength Tylenol for mild pain. Restart all of your regular home medications.  If you take Plavix or Aspirin on a regular basis, wait for 72 hours after your surgery before restarting these in order to decrease the risk of bleeding complications.    WHAT TO EXPECT  You should expect some slight oozing of blood from the donor and/or graft site over the first two to three days after surgery.  Swelling and bruising may occur for one to two weeks or longer.  You may also experience itching and tearing during the first several weeks after surgery.  This is part of the normal healing process    QUESTIONS  Please feel free to contact the office, should you have any questions that are not answered above.  Call the [facility] at [phone]  immediately if you are unable to establish vision in the operative eye, if you are experiencing severe pain or heavy bleeding that will not stop with gentle pressure.    After hours or on weekends and holidays, call [phone] and ask to speak with the  ophthalmologist on call.               **If you have questions or concerns about your procedure,   call Dr Lord at [phone](if you see him in [place]) or  [phone] (if you see him at the [place])**

## 2025-05-08 NOTE — ANESTHESIA PREPROCEDURE EVALUATION
Anesthesia Pre-Procedure Evaluation    Patient: Hanane Sorto   MRN: [id number] : 1945          Procedure : Procedure(s):  Bilateral upper lid ptosis repair         Past Medical History:   Diagnosis Date    Anxiety     psych yearly, Dr Shea    CAD (coronary artery disease)     Patient underwent successful coronary angiogram with PCI to the pCX artery and three stents to the LAD on 22 with Dr. Taylor and Dr. Dodd    Congestive heart failure (H) 2022    Depressive disorder     Family hx of colon cancer     sister    Flokindra     Karma leverentkiko    Fracture of wrist, left, with routine healing, subsequent encounter 2017    GERD (gastroesophageal reflux disease)     Gross hematuria 2011    Hypertension May 3,2022    Osteoarthritis 2012    Osteoporosis     Dexa    Restless legs     Skin cancer     Stable angina pectoris due to arteriosclerosis of coronary artery 2022    now s/p PCI x 1 to pCx and PCI x 3 with overlapping stents to pLAD 2022 @ [facility].      Past Surgical History:   Procedure Laterality Date    ARTHROPLASTY KNEE Left 2017    Procedure: ARTHROPLASTY KNEE;  LEFT TOTAL KNEE ARTHROPLASTY;  Surgeon: Lee Ayala MD;  Location:  OR    BIOPSY   approx.    vaginal    BREAST SURGERY      Implants    BUNIONECTOMY      both    CARDIAC SURGERY      4 stents     SECTION      COLONOSCOPY  2010    CV CORONARY ANGIOGRAM N/A 2022    Procedure: Coronary Angiogram;  Surgeon: Lea Sanchez MD;  Location:  HEART CARDIAC CATH LAB    EYE SURGERY  2014    cataract surgery in both eyes    LIPOSUCTION (LOCATION)      LA REPAIR ROTATOR CUFF,ACUTE      RECONSTRUCT BREAST, IMPLANT PROSTHESIS, COMBINED        Allergies   Allergen Reactions    Crestor [Rosuvastatin]     Ezetimibe Other (See Comments)     Zetia = leg cramps     Miralax [Polyethylene Glycol] Other (See Comments)     Back pain    Pravastatin  "Other (See Comments)     Leg cramps     Statins Other (See Comments)     Leg cramping      Social History     Tobacco Use    Smoking status: Former     Current packs/day: 0.00     Average packs/day: 0.5 packs/day for 15.0 years (7.5 ttl pk-yrs)     Types: Cigarettes     Start date: 1960     Quit date: 1975     Years since quittin.3     Passive exposure: Never    Smokeless tobacco: Never    Tobacco comments:     one pack/ three months   Substance Use Topics    Alcohol use: Yes     Comment: one glass of wine a week      Wt Readings from Last 1 Encounters:   25 46.4 kg (102 lb 6.4 oz)             Physical Exam    OUTSIDE LABS:  CBC:   Lab Results   Component Value Date    WBC 6.1 2025    WBC 6.9 2024    HGB 12.5 2025    HGB 12.5 2024    HCT 37.7 2025    HCT 39.2 2024     2025     2024     BMP:   Lab Results   Component Value Date     2025     2024    POTASSIUM 4.2 2025    POTASSIUM 4.2 2024    CHLORIDE 106 2025    CHLORIDE 108 (H) 2024    CO2 23 2025    CO2 21 (L) 2024    BUN 18.2 2025    BUN 17.5 2024    CR 0.85 2025    CR 0.87 2024    GLC 97 2025    GLC 93 2024     COAGS:   Lab Results   Component Value Date    PTT 34 2022    INR 0.90 2024     POC: No results found for: \"BGM\", \"HCG\", \"HCGS\"  HEPATIC:   Lab Results   Component Value Date    ALBUMIN 4.4 2025    PROTTOTAL 6.8 2025    ALT 15 2025    AST 22 2025    ALKPHOS 114 2025    BILITOTAL 0.3 2025     OTHER:   Lab Results   Component Value Date    A1C 5.5 2023    LIO 10.2 2025    PHOS 3.7 2025    MAG 2.3 2025    LIPASE 158 2022    TSH 2.08 2018    CRP 1.4 2016       Anesthesia Plan        SHADE Orr CRNA    I have reviewed the pertinent notes and labs in the chart from the past 30 days and " (re)examined the patient.  Any updates or changes from those notes are reflected in this note.    Clinically Significant Risk Factors Present on Admission                 # Drug Induced Platelet Defect: home medication list includes an antiplatelet medication

## 2025-05-08 NOTE — ANESTHESIA POSTPROCEDURE EVALUATION
Patient: Hanane Sorto    Procedure: Procedure(s):  Bilateral upper lid ptosis repair       Anesthesia Type:  No value filed.    Note:  Disposition: Outpatient   Postop Pain Control: Uneventful            Sign Out: Well controlled pain   PONV: No   Neuro/Psych: Uneventful            Sign Out: Acceptable/Baseline neuro status   Airway/Respiratory: Uneventful            Sign Out: Acceptable/Baseline resp. status   CV/Hemodynamics: Uneventful            Sign Out: Acceptable CV status; No obvious hypovolemia; No obvious fluid overload   Other NRE: NONE   DID A NON-ROUTINE EVENT OCCUR? No           Last vitals:  Vitals Value Taken Time   /69 05/08/25 08:30   Temp 36.6  C (97.9  F) 05/08/25 08:30   Pulse 73 05/08/25 08:30   Resp 17 05/08/25 08:30   SpO2 99 % 05/08/25 08:30       Electronically Signed By: Bubba Mukherjee MD  May 8, 2025  1:41 PM

## 2025-05-08 NOTE — ANESTHESIA CARE TRANSFER NOTE
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    Anesthesia Type:   No value filed.     Note:    Oropharynx: oropharynx clear of all foreign objects  Level of Consciousness: awake  Oxygen Supplementation: nasal cannula  Level of Supplemental Oxygen (L/min / FiO2): 4    Dentition: dentition unchanged  Vital Signs Stable: post-procedure vital signs reviewed and stable  Report to RN Given: handoff report given  Patient transferred to: PACU    Handoff Report: Identifed the Patient, Identified the Reponsible Provider, Reviewed the pertinent medical history, Discussed the surgical course, Reviewed Intra-OP anesthesia mangement and issues during anesthesia, Set expectations for post-procedure period and Allowed opportunity for questions and acknowledgement of understanding      Vitals:  Vitals Value Taken Time   /70    Temp     Pulse 71 05/08/25 08:08   Resp 14 05/08/25 08:08   SpO2 100 % 05/08/25 08:08   Vitals shown include unfiled device data.    Electronically Signed By: SHADE Orr CRNA  May 8, 2025  8:10 AM

## 2025-05-08 NOTE — BRIEF OP NOTE
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

## 2025-05-08 NOTE — ANESTHESIA PREPROCEDURE EVALUATION
Anesthesia Pre-Procedure Evaluation    Patient: Hanane Sorto   MRN: [id number] : 1945          Procedure : Procedure(s):  Bilateral upper lid ptosis repair         Past Medical History:   Diagnosis Date    Anxiety     psych yearly, Dr Shea    CAD (coronary artery disease)     Patient underwent successful coronary angiogram with PCI to the pCX artery and three stents to the LAD on 22 with Dr. Taylor and Dr. Dodd    Congestive heart failure (H) 2022    Depressive disorder     Family hx of colon cancer     sister    Flokindra     Karma leverentkiko    Fracture of wrist, left, with routine healing, subsequent encounter 2017    GERD (gastroesophageal reflux disease)     Gross hematuria 2011    Hypertension May 3,2022    Osteoarthritis 2012    Osteoporosis     Dexa    Restless legs     Skin cancer     Stable angina pectoris due to arteriosclerosis of coronary artery 2022    now s/p PCI x 1 to pCx and PCI x 3 with overlapping stents to pLAD 2022 @ [facility].      Past Surgical History:   Procedure Laterality Date    ARTHROPLASTY KNEE Left 2017    Procedure: ARTHROPLASTY KNEE;  LEFT TOTAL KNEE ARTHROPLASTY;  Surgeon: Lee Ayala MD;  Location:  OR    BIOPSY   approx.    vaginal    BREAST SURGERY      Implants    BUNIONECTOMY      both    CARDIAC SURGERY      4 stents     SECTION      COLONOSCOPY  2010    CV CORONARY ANGIOGRAM N/A 2022    Procedure: Coronary Angiogram;  Surgeon: Lea Sanchez MD;  Location:  HEART CARDIAC CATH LAB    EYE SURGERY  2014    cataract surgery in both eyes    LIPOSUCTION (LOCATION)      IA REPAIR ROTATOR CUFF,ACUTE      RECONSTRUCT BREAST, IMPLANT PROSTHESIS, COMBINED        Allergies   Allergen Reactions    Crestor [Rosuvastatin]     Ezetimibe Other (See Comments)     Zetia = leg cramps     Miralax [Polyethylene Glycol] Other (See Comments)     Back pain    Pravastatin  Other (See Comments)     Leg cramps     Statins Other (See Comments)     Leg cramping      Social History     Tobacco Use    Smoking status: Former     Current packs/day: 0.00     Average packs/day: 0.5 packs/day for 15.0 years (7.5 ttl pk-yrs)     Types: Cigarettes     Start date: 1960     Quit date: 1975     Years since quittin.3     Passive exposure: Never    Smokeless tobacco: Never    Tobacco comments:     one pack/ three months   Substance Use Topics    Alcohol use: Yes     Comment: one glass of wine a week      Wt Readings from Last 1 Encounters:   25 46.4 kg (102 lb 6.4 oz)        Anesthesia Evaluation   Pt has had prior anesthetic.     History of anesthetic complications  - PONV.      ROS/MED HX  ENT/Pulmonary:    (-) tobacco use and sleep apnea   Neurologic:    (-) no seizures and no CVA   Cardiovascular:     (+) Dyslipidemia hypertension- -  CAD -  - stent-      CHF                                METS/Exercise Tolerance:     Hematologic:       Musculoskeletal:       GI/Hepatic:     (+) GERD, Asymptomatic on medication,               (-) liver disease   Renal/Genitourinary:    (-) renal disease   Endo:    (-) Type II DM and thyroid disease   Psychiatric/Substance Use:       Infectious Disease:       Malignancy:       Other:              Physical Exam  Airway  Mallampati: II  TM distance: >3 FB  Neck ROM: full  Mouth opening: >= 4 cm    Cardiovascular - normal exam   Dental   (+) Modest Abnormalities - crowns, retainers, 1 or 2 missing teeth      Pulmonary - normal exam      Neurological - normal exam  She appears awake, alert and oriented x3.    Other Findings       OUTSIDE LABS:  CBC:   Lab Results   Component Value Date    WBC 6.1 2025    WBC 6.9 2024    HGB 12.5 2025    HGB 12.5 2024    HCT 37.7 2025    HCT 39.2 2024     2025     2024     BMP:   Lab Results   Component Value Date     2025     2024     "POTASSIUM 4.2 03/22/2025    POTASSIUM 4.2 12/24/2024    CHLORIDE 106 03/22/2025    CHLORIDE 108 (H) 12/24/2024    CO2 23 03/22/2025    CO2 21 (L) 12/24/2024    BUN 18.2 03/22/2025    BUN 17.5 12/24/2024    CR 0.85 03/22/2025    CR 0.87 12/24/2024    GLC 97 03/22/2025    GLC 93 12/24/2024     COAGS:   Lab Results   Component Value Date    PTT 34 04/21/2022    INR 0.90 09/24/2024     POC: No results found for: \"BGM\", \"HCG\", \"HCGS\"  HEPATIC:   Lab Results   Component Value Date    ALBUMIN 4.4 03/22/2025    PROTTOTAL 6.8 03/22/2025    ALT 15 03/22/2025    AST 22 03/22/2025    ALKPHOS 114 03/22/2025    BILITOTAL 0.3 03/22/2025     OTHER:   Lab Results   Component Value Date    A1C 5.5 12/22/2023    LIO 10.2 03/22/2025    PHOS 3.7 03/22/2025    MAG 2.3 03/22/2025    LIPASE 158 07/23/2022    TSH 2.08 03/09/2018    CRP 1.4 11/14/2016       Anesthesia Plan    ASA Status:  3    Anesthesia Type: MAC.      Techniques and Equipment:       - Monitoring Plan: standard ASA monitoring.     Consents    Anesthesia Plan(s) and associated risks, benefits, and realistic alternatives discussed. Questions answered and patient/representative(s) expressed understanding.     - Discussed: anesthesiologist, CRNA     - Discussed with:  Patient, family            Postoperative Care    Pain management: multimodal analgesia.        Comments:                 Bubba Mukherjee MD    I have reviewed the pertinent notes and labs in the chart from the past 30 days and (re)examined the patient.  Any updates or changes from those notes are reflected in this note.    Clinically Significant Risk Factors Present on Admission                 # Drug Induced Platelet Defect: home medication list includes an antiplatelet medication                              "

## 2025-05-08 NOTE — ANESTHESIA PREPROCEDURE EVALUATION
Anesthesia Pre-Procedure Evaluation    Patient: Hanane Sorto   MRN: [id number] : 1945          Procedure : Procedure(s):  Bilateral upper lid ptosis repair         Past Medical History:   Diagnosis Date    Anxiety     psych yearly, Dr Shea    CAD (coronary artery disease)     Patient underwent successful coronary angiogram with PCI to the pCX artery and three stents to the LAD on 22 with Dr. Taylor and Dr. Dodd    Congestive heart failure (H) 2022    Depressive disorder     Family hx of colon cancer     sister    Flokindra     Karma leverentkiko    Fracture of wrist, left, with routine healing, subsequent encounter 2017    GERD (gastroesophageal reflux disease)     Gross hematuria 2011    Hypertension May 3,2022    Osteoarthritis 2012    Osteoporosis     Dexa    Restless legs     Skin cancer     Stable angina pectoris due to arteriosclerosis of coronary artery 2022    now s/p PCI x 1 to pCx and PCI x 3 with overlapping stents to pLAD 2022 @ [facility].      Past Surgical History:   Procedure Laterality Date    ARTHROPLASTY KNEE Left 2017    Procedure: ARTHROPLASTY KNEE;  LEFT TOTAL KNEE ARTHROPLASTY;  Surgeon: Lee Ayala MD;  Location:  OR    BIOPSY   approx.    vaginal    BREAST SURGERY      Implants    BUNIONECTOMY      both    CARDIAC SURGERY      4 stents     SECTION      COLONOSCOPY  2010    CV CORONARY ANGIOGRAM N/A 2022    Procedure: Coronary Angiogram;  Surgeon: Lea Sanchez MD;  Location:  HEART CARDIAC CATH LAB    EYE SURGERY  2014    cataract surgery in both eyes    LIPOSUCTION (LOCATION)      CT REPAIR ROTATOR CUFF,ACUTE      RECONSTRUCT BREAST, IMPLANT PROSTHESIS, COMBINED        Allergies   Allergen Reactions    Crestor [Rosuvastatin]     Ezetimibe Other (See Comments)     Zetia = leg cramps     Miralax [Polyethylene Glycol] Other (See Comments)     Back pain    Pravastatin  "Other (See Comments)     Leg cramps     Statins Other (See Comments)     Leg cramping      Social History     Tobacco Use    Smoking status: Former     Current packs/day: 0.00     Average packs/day: 0.5 packs/day for 15.0 years (7.5 ttl pk-yrs)     Types: Cigarettes     Start date: 1960     Quit date: 1975     Years since quittin.3     Passive exposure: Never    Smokeless tobacco: Never    Tobacco comments:     one pack/ three months   Substance Use Topics    Alcohol use: Yes     Comment: one glass of wine a week      Wt Readings from Last 1 Encounters:   25 46.4 kg (102 lb 6.4 oz)             Physical Exam    OUTSIDE LABS:  CBC:   Lab Results   Component Value Date    WBC 6.1 2025    WBC 6.9 2024    HGB 12.5 2025    HGB 12.5 2024    HCT 37.7 2025    HCT 39.2 2024     2025     2024     BMP:   Lab Results   Component Value Date     2025     2024    POTASSIUM 4.2 2025    POTASSIUM 4.2 2024    CHLORIDE 106 2025    CHLORIDE 108 (H) 2024    CO2 23 2025    CO2 21 (L) 2024    BUN 18.2 2025    BUN 17.5 2024    CR 0.85 2025    CR 0.87 2024    GLC 97 2025    GLC 93 2024     COAGS:   Lab Results   Component Value Date    PTT 34 2022    INR 0.90 2024     POC: No results found for: \"BGM\", \"HCG\", \"HCGS\"  HEPATIC:   Lab Results   Component Value Date    ALBUMIN 4.4 2025    PROTTOTAL 6.8 2025    ALT 15 2025    AST 22 2025    ALKPHOS 114 2025    BILITOTAL 0.3 2025     OTHER:   Lab Results   Component Value Date    A1C 5.5 2023    LIO 10.2 2025    PHOS 3.7 2025    MAG 2.3 2025    LIPASE 158 2022    TSH 2.08 2018    CRP 1.4 2016       Anesthesia Plan        SHADE Orr CRNA    I have reviewed the pertinent notes and labs in the chart from the past 30 days and " (re)examined the patient.  Any updates or changes from those notes are reflected in this note.    Clinically Significant Risk Factors Present on Admission                 # Drug Induced Platelet Defect: home medication list includes an antiplatelet medication

## 2025-05-08 NOTE — OP NOTE
PREOPERATIVE DIAGNOSIS: Bilateral upper eyelid ptosis.   POSTOPERATIVE DIAGNOSIS:  Bilateral upper eyelid ptosis.   PROCEDURE:Bilateral upper eyelid ptosis repair by Dominguez's muscle conjunctival resection.   SURGEON: Carlos Lord MD  ASSISTANT: Renetta Cordoba MD, CHAMP and  Lonnie Mitchell MD   ANESTHESIA: Monitored with local infiltration of a 50/50 mixture of 2% lidocaine with epinephrine and 0.5% Marcaine.   COMPLICATIONS: None.   ESTIMATED BLOOD LOSS: Less than 5 cc.   HISTORY: Hanane Sorto presented with upper lid ptosis interfering with the superior visual field and activities of daily living. After the risks, benefits and alternatives to the proposed procedure were explained, informed consent was obtained.   PROCEDURE: The patient was brought to the operating room and placed supine on the operating table. IV sedation was given. The bilateral upper eyelid was infiltrated with local anesthetic and  was prepped and draped in the typical sterile ophthalmic fashion. Atttention was directed to the right  side.  A 4-0 silk suture was placed through the eyelid margin and the eyelid everted over a Desmarres retractor. A 6-0 silk suture was then threaded through the conjunctiva and Dominguez's muscle  4mm from the superior tarsal border. These sutures were used to elevate the conjunctiva and Dominguez's muscle which was gently peeled from the underlying levator muscle. The Putterman clamp was used and clamped over the elevated tissues. A 6-0 plain gut suture was run in a horizontal mattress fashion 1 mm below the clamp from lateral to medial, then medial to lateral. The elevated tissues were excised with a 15 blade. The sutures were then externalized and tied in the lid crease. The 4-0 silk suture was removed. The lid was reverted to its normal position and ophthalmic antibiotic ointment placed in the eye. Attention was directed to the left side where the same procedure was performed with the exception that the suture  was run 4.5 mm from the superior tarsal border on this side . The patient tolerated the procedure well and left the operating room in stable condition.   LISA SILVA MD

## 2025-05-14 DIAGNOSIS — F41.9 ANXIETY: ICD-10-CM

## 2025-05-14 RX ORDER — CLONAZEPAM 0.5 MG/1
TABLET, ORALLY DISINTEGRATING ORAL
Qty: 20 TABLET | Refills: 0 | Status: SHIPPED | OUTPATIENT
Start: 2025-05-14

## 2025-05-14 NOTE — TELEPHONE ENCOUNTER
Pt sent mychart for clonazepam refill.  Was discontinued in Feb as not taking, but that looks to be in error.

## 2025-06-04 ENCOUNTER — MYC REFILL (OUTPATIENT)
Dept: FAMILY MEDICINE | Facility: CLINIC | Age: 80
End: 2025-06-04
Payer: COMMERCIAL

## 2025-06-04 DIAGNOSIS — K21.00 GASTROESOPHAGEAL REFLUX DISEASE WITH ESOPHAGITIS WITHOUT HEMORRHAGE: ICD-10-CM

## 2025-06-04 RX ORDER — OMEPRAZOLE 20 MG/1
20 CAPSULE, DELAYED RELEASE ORAL 2 TIMES DAILY
Qty: 180 CAPSULE | Refills: 2 | Status: SHIPPED | OUTPATIENT
Start: 2025-06-04

## 2025-07-15 ASSESSMENT — SLEEP AND FATIGUE QUESTIONNAIRES
HOW LIKELY ARE YOU TO NOD OFF OR FALL ASLEEP WHILE SITTING AND TALKING TO SOMEONE: SLIGHT CHANCE OF DOZING
HOW LIKELY ARE YOU TO NOD OFF OR FALL ASLEEP WHILE WATCHING TV: HIGH CHANCE OF DOZING
HOW LIKELY ARE YOU TO NOD OFF OR FALL ASLEEP WHILE LYING DOWN TO REST IN THE AFTERNOON WHEN CIRCUMSTANCES PERMIT: SLIGHT CHANCE OF DOZING
HOW LIKELY ARE YOU TO NOD OFF OR FALL ASLEEP WHILE SITTING QUIETLY AFTER LUNCH WITHOUT ALCOHOL: MODERATE CHANCE OF DOZING
HOW LIKELY ARE YOU TO NOD OFF OR FALL ASLEEP IN A CAR, WHILE STOPPED FOR A FEW MINUTES IN TRAFFIC: WOULD NEVER DOZE
HOW LIKELY ARE YOU TO NOD OFF OR FALL ASLEEP WHILE SITTING INACTIVE IN A PUBLIC PLACE: SLIGHT CHANCE OF DOZING
HOW LIKELY ARE YOU TO NOD OFF OR FALL ASLEEP WHEN YOU ARE A PASSENGER IN A CAR FOR AN HOUR WITHOUT A BREAK: MODERATE CHANCE OF DOZING
HOW LIKELY ARE YOU TO NOD OFF OR FALL ASLEEP WHILE SITTING AND READING: HIGH CHANCE OF DOZING

## 2025-07-21 ENCOUNTER — OFFICE VISIT (OUTPATIENT)
Dept: SLEEP MEDICINE | Facility: CLINIC | Age: 80
End: 2025-07-21
Payer: COMMERCIAL

## 2025-07-21 VITALS
WEIGHT: 100.6 LBS | HEIGHT: 56 IN | HEART RATE: 73 BPM | BODY MASS INDEX: 22.63 KG/M2 | OXYGEN SATURATION: 97 % | SYSTOLIC BLOOD PRESSURE: 120 MMHG | DIASTOLIC BLOOD PRESSURE: 80 MMHG

## 2025-07-21 DIAGNOSIS — J34.89 NASAL OBSTRUCTION: Primary | ICD-10-CM

## 2025-07-21 PROCEDURE — 3074F SYST BP LT 130 MM HG: CPT | Performed by: PSYCHIATRY & NEUROLOGY

## 2025-07-21 PROCEDURE — 1126F AMNT PAIN NOTED NONE PRSNT: CPT | Performed by: PSYCHIATRY & NEUROLOGY

## 2025-07-21 PROCEDURE — 99204 OFFICE O/P NEW MOD 45 MIN: CPT | Performed by: PSYCHIATRY & NEUROLOGY

## 2025-07-21 PROCEDURE — 3079F DIAST BP 80-89 MM HG: CPT | Performed by: PSYCHIATRY & NEUROLOGY

## 2025-07-21 NOTE — NURSING NOTE
"Chief Complaint   Patient presents with    Sleep Problem     Difficulty breathing at night, tapering off of Mirapex, unable to stop moving legs/arms starting during day through the night       Initial /80   Pulse 73   Ht 1.422 m (4' 8\")   Wt 45.6 kg (100 lb 9.6 oz)   SpO2 97%   BMI 22.55 kg/m   Estimated body mass index is 22.55 kg/m  as calculated from the following:    Height as of this encounter: 1.422 m (4' 8\").    Weight as of this encounter: 45.6 kg (100 lb 9.6 oz).    Medication Reconciliation: complete  ESS: 13    DME: n/a    YFN Dunn    "

## 2025-07-21 NOTE — PROGRESS NOTES
"    Outpatient Sleep Medicine Consultation  July 21, 2025      Name: Hanane Sorto MRN# [id number]   Age: 79 year old YOB: 1945     Date of Consultation: July 21, 2025  Consultation is requested by: No referring provider defined for this encounter. No ref. provider found  Primary care provider: Corona Bethea         Reason for Sleep Consult:     Hanane Sorto is a 79 year old female for complaints of RLS and nasal congestion at night, impacting sleep.    Chief Complaint   Patient presents with    Sleep Problem     Difficulty breathing at night            Assessment and Plan:   79-year-old female presents to the sleep clinic complaining of difficulty sleeping due to nasal congestion and to establish care for restless leg syndrome.  Pramipexole has treated her RLS for many years however in recent years she has required increased dosage and now appears to be developing augmentation.  She feels that nasal congestion is very bothersome to her at night when going to bed.  She attributes the nasal congestion to pramipexole.  She also has symptoms of sleep apnea such as apneas at night, snoring, morning headaches.  She had a home sleep study that was \"a mild case\", we do not have these records.  She tells me she did not tolerate any CPAP mask and currently does not use anything to treat possible SHIRA.      We discussed with the patient that our next step is evaluation by ENT to look for any nasal obstruction that could perhaps lead to intervention.  Pending ENT's evaluation, we will see the patient back in clinic at which time we may consider repeating a sleep study.  As for her RLS, we discussed with the patient the natural course of of the disease and that if she is unable to tolerate the pramipexole or it is not effective she may require methadone.    Plan  - ENT referral for evaluation of nasal obstruction  - Return to sleep clinic following ENTs evaluation.  May consider repeating a sleep study at this " time if needed.  - Continue pramipexole at current dose.  May consider increasing the dose in the future, if necessary but recognize the risk of further augmentation.  Discussed with patient the possible options after dopaminergics including opioids.    Summary Counseling:  See instructions    Potential diagnostic strategies and treatment therapies for possible obstructive sleep apnea reviewed  Risk of untreated disease reviewed  Specific testing instructions and video reviewed  Patient was strongly advised to avoid driving, operating any heavy machinery or other hazardous situations while drowsy or sleepy.  Patient was counseled on the importance of driving while alert, to pull over if drowsy, or nap before getting into the vehicle if sleepy     Seen with Dr. Solomon,     Parker Chavira,    Sleep Medicine Fellow    Outpatient Sleep Medicine Staff  I have seen and evaluated the patient and discussed with the sleep fellow on 7-21-25.  I supervised the visit and agree with the documentation.      In addition to face to face time I have also reviewed the patients chart, coordinated care, assisted in placing orders and documentation.  Total time (Face-to-Face, care coordination, orders, documentation) spent on 7-21-25 was 45 minutes.     Parker Solomon MD             History of Present Illness:     Hanane Sorto is a 79 year old female with pmh that includes CAD, anxiety, restless leg syndrome presents complaining of restless leg syndrome.     She tells me RLS started symptoms at least 15 years ago, she was diagnosed with RLS by her PCP. Started on pramipexole, which she feels helps her RLS symptoms. She has the urge to move her legs at night, relived by moving them. A few years ago, she noticed symptoms starting earlier-around 5 pm and started in her arms. After 7 years she developed nasal congestion.     Takes clonazepam to get to sleep. However feels tired in the am. Uses it once every 3 weeks.     Medications tried:  "gabapentin-not effective. Does not remember dose.      She was in the ED 3/22/2025 for restless leg symptoms that worsened, preventing her from sleeping. The ED doctor advised her to take 3-4 tabs of her pramipexole ( 0.125 mg-0.25). For a time, she was taking 3 tabs at 4 pm then 10 pm. However, she endorses many years of nasal congestion and she attributes this to the pramipexole. This pas week, she started taking 2 tabs at the aforementioned time due to the feeling that pramipexole is the cause of her nasal congestion.     She does not have trouble staying awake while driving.     She wakes up gasping for air twice per night, snores. This has been going on for 7 years. She has morning headaches. Had a HST at an OSH 2 years ago, however we do not have those records in our system. She tells me the test showed a \" mild case\", but never started on CPAP as she did not like the masks she tried.       SLEEP-WAKE SCHEDULE:   Time getting in bed:  pm  Falls asleep at: quickly  Wakes up at: 5 am to 6 am.   Does not have a bed partner.   Night time interruptions:  2 am, 3 am. Wakes up gasping for air. Takes her about 30 minutes to get back to sleep. She wakes up, and takes tylenol, which she thinks will help her get back to sleep.   Total sleep per night: 5 hours.     PREVIOUS SLEEP STUDIES HOME/PSG:  Date:as above.         SCALES:        EPWORTH SLEEPINESS SCALE    Sitting and reading (Patient-Rptd) 3     Watching TV (Patient-Rptd) 3     Sitting, inactive in a public place (theatre or mtg.) (Patient-Rptd) 1      As a passenger in a car (Patient-Rptd) 2     Lying down to rest in the afternoon when circumstance permit (Patient-Rptd) 1     Sitting and talking to someone (Patient-Rptd) 1     Sitting quietly after lunch without alcohol (Patient-Rptd) 2     In a car, while stopped for a few minutes in traffic (Patient-Rptd) 0     TOTAL SCORE (normal <11) (Patient-Rptd) 13       INSOMNIA SEVERITY INDEX     Difficulty " falling asleep (Patient-Rptd) 2     Difficult staying asleep (Patient-Rptd) 3     Problems waking up to early (Patient-Rptd) 0     How SATISFIED/DISSATISFIED are you with your CURRENT sleep pattern? (Patient-Rptd) 4     How NOTICEABLE to others do you think your sleep pattern is in terms of your quality of life? (Patient-Rptd) 1     How WORRIED/DISTRESSED are you about your current sleep pattern? (Patient-Rptd) 4     To what extent do you consider your sleep problem to INTERFERE with your daily fuctioning(e.g. daytime fatigue, mood, ability to function at work/daily chores, concentration, mood,etc.) CURRENTLY? (Patient-Rptd) 4   INSOMNIA SEVERITY INDEX TOTAL SCORE  --absence of insomnia (0-7); sub-threshold insomnia (8-14); moderate insomnia (15-21); and severe insomnia (22-28)-- (Patient-Rptd) 18        PHQ-2 Score:         1/13/2025     7:37 AM 12/24/2024    10:56 AM   PHQ-2 ( 1999 [employer])   Q1: Little interest or pleasure in doing things 0  0   Q2: Feeling down, depressed or hopeless 0  1   PHQ-2 Score 0  1    Q1: Little interest or pleasure in doing things Not at all Not at all   Q2: Feeling down, depressed or hopeless Not at all Several days   PHQ-2 Score 0 1       Patient-reported    Proxy-reported          SLEEP COMPLAINTS:    Narcolepsy:  denies sudden urges of sleep attacks       Sleep Behaviors:  Leg symptoms/movements: denies outside of RLS.   Motor restlessness:denies  Night terrors: denies  Bruxism: denies  Automatic behaviors: none    Other subjective complaints:  Anxiety or rumination yes  Pain and discomfort at  night: yes, RLS  Waking up with heart pounding or racing: sometimes  GERD or aspiration:  yes  Morning Headaches:  sometimes  Getting up to urinate: denies  Morning mouth dryness: yes  Stuffy nose when you wake up: yes      Parasomnia: (behaviors at night)  Denies-Recurring Nightmares, Eating, Sleep-talking, Sleepwalking, Teeth grinding, Kicking or Punching, Waking up Paralyzed, Waking  Dream  Safety Concerns:none           Problem List:     Patient Active Problem List   Diagnosis    Anxiety    Osteoporosis    Gross hematuria    Osteoarthritis    Bursitis of hip    Constipation    Gastroesophageal reflux disease without esophagitis    S/P total knee arthroplasty    Restless leg syndrome    Arthritis of neck    Dizziness    Breast tenderness in female    Angina concurrent with and due to arteriosclerosis of coronary artery            Medications:     Current Outpatient Medications   Medication Sig Dispense Refill    aspirin 81 MG EC tablet Take 1 tablet (81 mg) by mouth daily      bisacodyl (DULCOLAX) 5 MG EC tablet Take 5 mg by mouth daily as needed for constipation      Calcium-Phosphorus-Vitamin D (CALCIUM GUMMIES PO) Take 2 Gum by mouth daily      clonazePAM (KLONOPIN) 0.5 MG ODT DISSOLVE 1 TABLET(0.5 MG) ON THE TONGUE EVERY NIGHT AS NEEDED FOR ANXIETY 20 tablet 0    erythromycin (ROMYCIN) 5 MG/GM ophthalmic ointment Place 0.5 inches into both eyes at bedtime. 3.5 g 0    evolocumab (REPATHA) 140 MG/ML prefilled autoinjector Inject 1 mL (140 mg) subcutaneously every 14 days. 6 mL 3    fluticasone (FLONASE) 50 MCG/ACT nasal spray Spray 2 sprays into both nostrils daily. OTC      neomycin-polymyxin-dexAMETHasone (MAXITROL) 3.5-98813-3.1 SUSP ophthalmic susp Place 1-2 drops into both eyes every 4 hours. 5 mL 0    nitroGLYcerin (NITROSTAT) 0.4 MG sublingual tablet For chest pain place 1 tablet under the tongue every 5 minutes for 3 doses. If symptoms persist 5 minutes after 1st dose call 911. 25 tablet 3    omeprazole (PRILOSEC) 20 MG DR capsule Take 1 capsule (20 mg) by mouth 2 times daily. 180 capsule 2    Povidone, PF, (IVIZIA DRY EYES) 0.5 % SOLN Apply to eye.      pramipexole (MIRAPEX) 0.125 MG tablet Take 1-2 tablets (0.125-0.25 mg) by mouth 3 times daily. 270 tablet 1    RESTASIS 0.05 % ophthalmic emulsion INSTILL 1 DROP IN BOTH EYES TWICE DAILY AS DIRECTED      UNABLE TO FIND daily.  MEDICATION NAME: phillps laxative       No current facility-administered medications for this visit.      Allergies   Allergen Reactions    Crestor [Rosuvastatin]     Ezetimibe Other (See Comments)     Zetia = leg cramps     Miralax [Polyethylene Glycol] Other (See Comments)     Back pain    Pravastatin Other (See Comments)     Leg cramps     Statins Other (See Comments)     Leg cramping            Past Medical History:     Does not need 02 supplement at night   Past Medical History:   Diagnosis Date    Anxiety     psych yearly, Dr Shea    CAD (coronary artery disease)     Patient underwent successful coronary angiogram with PCI to the pCX artery and three stents to the LAD on 22 with Dr. Taylor and Dr. Dodd    Congestive heart failure (H) 2022    Depressive disorder     Family hx of colon cancer     sister    Floaters     Karma leverentz    Fracture of wrist, left, with routine healing, subsequent encounter 2017    GERD (gastroesophageal reflux disease)     Gross hematuria 2011    Hypertension May 3,2022    Osteoarthritis 2012    Osteoporosis     Dexa    Restless legs     Skin cancer     Stable angina pectoris due to arteriosclerosis of coronary artery 2022    now s/p PCI x 1 to pCx and PCI x 3 with overlapping stents to pLAD 2022 @ [facility].             Past Surgical History:      Past Surgical History:   Procedure Laterality Date    ARTHROPLASTY KNEE Left 2017    Procedure: ARTHROPLASTY KNEE;  LEFT TOTAL KNEE ARTHROPLASTY;  Surgeon: Lee Ayala MD;  Location:  OR    BIOPSY   approx.    vaginal    BREAST SURGERY      Implants    BUNIONECTOMY      both    CARDIAC SURGERY      4 stents     SECTION      COLONOSCOPY  2010    CV CORONARY ANGIOGRAM N/A 2022    Procedure: Coronary Angiogram;  Surgeon: Lea Sanchez MD;  Location:  HE[facility]ARD[facility] CATH [facility]    EYE SURGERY  2014    cataract surgery in both  "eyes    LIPOSUCTION (LOCATION)      IN REPAIR ROTATOR CUFF,ACUTE      RECONSTRUCT BREAST, IMPLANT PROSTHESIS, COMBINED      REPAIR PTOSIS BILATERAL Bilateral 2025    Procedure: Bilateral upper lid ptosis repair;  Surgeon: Carlos Lord MD;  Location:  OR            Social History:     Social History     Tobacco Use    Smoking status: Former     Current packs/day: 0.00     Average packs/day: 0.5 packs/day for 15.0 years (7.5 ttl pk-yrs)     Types: Cigarettes     Start date: 1960     Quit date: 1975     Years since quittin.5     Passive exposure: Never    Smokeless tobacco: Never    Tobacco comments:     one pack/ three months   Substance Use Topics    Alcohol use: Yes     Comment: one glass of wine a week         Chemical History:     Tobacco: (Y/N) yes, as above      Uses caffeine coffee/tea [employer])2 cups/day of coffee. Last caffeine intake is usually before  0730 am    Medical marijuana (Y/N)    EtOH: few glasses of wine on   Recreational Drugs: none    Psych Hx (hospital/outpatient care)  denies             Family History:     Family History   Problem Relation Age of Onset    Lipids Mother     Alzheimer Disease Mother     Depression Mother     Osteoporosis Mother         RA    CRusselAALFREDO Father     Coronary Artery Disease Father     Hypertension Father     Diabetes Father     Breast Cancer Sister     Leukemia Sister     Hyperlipidemia Sister     Colon Cancer Sister             Cerebrovascular Disease Brother         Sleep Family Hx:        RLS- mother, sister   SHIRA - denies           Review of Systems:     A complete 10 point review of systems was negative other than HPI           Physical Examination:     Vitals: /80   Pulse 73   Ht 1.422 m (4' 8\")   Wt 45.6 kg (100 lb 9.6 oz)   SpO2 97%   BMI 22.55 kg/m    BMI= Body mass index is 22.55 kg/m .  Mallampati Class: II.  Tonsillar Stage: 2  visible at pillars.     GENERAL: alert and no distress  EYES: Eyes " "grossly normal to inspection.  No discharge or erythema, or obvious scleral/conjunctival abnormalities.  RESP: No audible wheeze, cough, or visible cyanosis.    SKIN: Visible skin clear. No significant rash, abnormal pigmentation or lesions.  NEURO: Cranial nerves grossly intact.  Mentation and speech appropriate for age.  PSYCH: Appropriate affect, tone, and pace of words             Data: All pertinent previous laboratory data reviewed     No results found for: \"PH\", \"PHARTERIAL\", \"PO2\", \"QF6IPTCCMYB\", \"SAT\", \"PCO2\", \"HCO3\", \"BASEEXCESS\", \"CINDY\", \"BEB\"  Lab Results   Component Value Date    TSH 2.08 03/09/2018    TSH 2.40 05/20/2015     Lab Results   Component Value Date    GLC 97 03/22/2025    GLC 93 12/24/2024     Lab Results   Component Value Date    HGB 12.5 03/22/2025    HGB 12.5 12/24/2024     Lab Results   Component Value Date    BUN 18.2 03/22/2025    BUN 17.5 12/24/2024    CR 0.85 03/22/2025    CR 0.87 12/24/2024     Lab Results   Component Value Date    AST 22 03/22/2025    AST 22 12/24/2024    ALT 15 03/22/2025    ALT 15 12/24/2024    ALKPHOS 114 03/22/2025    ALKPHOS 96 12/24/2024    BILITOTAL 0.3 03/22/2025    BILITOTAL 0.4 12/24/2024     No results found for: \"UAMP\", \"UBARB\", \"BENZODIAZEUR\", \"UCANN\", \"UCOC\", \"OPIT\", \"UPCP\"    Echocardiology: EF: 55-60% 8/15/2023      Total time spent reviewing medical records, history and physical examination, review of previous testing and interpretation as well as documentation on this date:  Copy to: Corona Bethea DO 7/21/2025     "

## 2025-07-22 ENCOUNTER — PATIENT OUTREACH (OUTPATIENT)
Dept: CARE COORDINATION | Facility: CLINIC | Age: 80
End: 2025-07-22
Payer: COMMERCIAL

## 2025-07-24 ENCOUNTER — PATIENT OUTREACH (OUTPATIENT)
Dept: CARE COORDINATION | Facility: CLINIC | Age: 80
End: 2025-07-24
Payer: COMMERCIAL

## 2025-07-26 ENCOUNTER — MYC REFILL (OUTPATIENT)
Dept: FAMILY MEDICINE | Facility: CLINIC | Age: 80
End: 2025-07-26
Payer: COMMERCIAL

## 2025-07-26 DIAGNOSIS — G25.81 RESTLESS LEG SYNDROME: ICD-10-CM

## 2025-07-28 RX ORDER — PRAMIPEXOLE DIHYDROCHLORIDE 0.12 MG/1
.125-.25 TABLET ORAL 3 TIMES DAILY
Qty: 270 TABLET | Refills: 1 | Status: SHIPPED | OUTPATIENT
Start: 2025-07-28

## 2025-08-15 ENCOUNTER — TRANSFERRED RECORDS (OUTPATIENT)
Dept: HEALTH INFORMATION MANAGEMENT | Facility: CLINIC | Age: 80
End: 2025-08-15
Payer: COMMERCIAL

## 2025-08-27 ENCOUNTER — OFFICE VISIT (OUTPATIENT)
Dept: FAMILY MEDICINE | Facility: CLINIC | Age: 80
End: 2025-08-27
Payer: COMMERCIAL

## 2025-08-27 VITALS
RESPIRATION RATE: 14 BRPM | WEIGHT: 99.2 LBS | BODY MASS INDEX: 22.32 KG/M2 | TEMPERATURE: 96.8 F | SYSTOLIC BLOOD PRESSURE: 131 MMHG | OXYGEN SATURATION: 99 % | HEIGHT: 56 IN | HEART RATE: 86 BPM | DIASTOLIC BLOOD PRESSURE: 85 MMHG

## 2025-08-27 DIAGNOSIS — Z01.818 PRE-OP EXAM: Primary | ICD-10-CM

## 2025-08-27 DIAGNOSIS — G56.02 CARPAL TUNNEL SYNDROME OF LEFT WRIST: ICD-10-CM

## 2025-08-27 DIAGNOSIS — I25.10 CORONARY ARTERY DISEASE INVOLVING NATIVE CORONARY ARTERY OF NATIVE HEART WITHOUT ANGINA PECTORIS: ICD-10-CM

## 2025-08-27 DIAGNOSIS — E78.5 HYPERLIPIDEMIA LDL GOAL <70: ICD-10-CM

## 2025-08-27 LAB
ANION GAP SERPL CALCULATED.3IONS-SCNC: 13 MMOL/L (ref 7–15)
BASOPHILS # BLD AUTO: 0.04 10E3/UL (ref 0–0.2)
BASOPHILS NFR BLD AUTO: 0.6 %
BUN SERPL-MCNC: 18.8 MG/DL (ref 8–23)
CALCIUM SERPL-MCNC: 10.7 MG/DL (ref 8.8–10.4)
CHLORIDE SERPL-SCNC: 106 MMOL/L (ref 98–107)
CREAT SERPL-MCNC: 1.04 MG/DL (ref 0.51–0.95)
EGFRCR SERPLBLD CKD-EPI 2021: 54 ML/MIN/1.73M2
EOSINOPHIL # BLD AUTO: 0.2 10E3/UL (ref 0–0.7)
EOSINOPHIL NFR BLD AUTO: 2.8 %
ERYTHROCYTE [DISTWIDTH] IN BLOOD BY AUTOMATED COUNT: 13.4 % (ref 10–15)
GLUCOSE SERPL-MCNC: 100 MG/DL (ref 70–99)
HCO3 SERPL-SCNC: 24 MMOL/L (ref 22–29)
HCT VFR BLD AUTO: 40.7 % (ref 35–47)
HGB BLD-MCNC: 12.8 G/DL (ref 11.7–15.7)
IMM GRANULOCYTES # BLD: <0.04 10E3/UL
IMM GRANULOCYTES NFR BLD: 0.1 %
LYMPHOCYTES # BLD AUTO: 1.77 10E3/UL (ref 0.8–5.3)
LYMPHOCYTES NFR BLD AUTO: 24.7 %
MCH RBC QN AUTO: 29.7 PG (ref 26.5–33)
MCHC RBC AUTO-ENTMCNC: 31.4 G/DL (ref 31.5–36.5)
MCV RBC AUTO: 94.4 FL (ref 78–100)
MONOCYTES # BLD AUTO: 0.55 10E3/UL (ref 0–1.3)
MONOCYTES NFR BLD AUTO: 7.7 %
NEUTROPHILS # BLD AUTO: 4.59 10E3/UL (ref 1.6–8.3)
NEUTROPHILS NFR BLD AUTO: 64.1 %
PLATELET # BLD AUTO: 392 10E3/UL (ref 150–450)
POTASSIUM SERPL-SCNC: 4.2 MMOL/L (ref 3.4–5.3)
RBC # BLD AUTO: 4.31 10E6/UL (ref 3.8–5.2)
SODIUM SERPL-SCNC: 143 MMOL/L (ref 135–145)
WBC # BLD AUTO: 7.16 10E3/UL (ref 4–11)

## 2025-08-27 PROCEDURE — 36415 COLL VENOUS BLD VENIPUNCTURE: CPT | Performed by: PHYSICIAN ASSISTANT

## 2025-08-27 PROCEDURE — 85025 COMPLETE CBC W/AUTO DIFF WBC: CPT | Performed by: PHYSICIAN ASSISTANT

## 2025-08-27 PROCEDURE — 93000 ELECTROCARDIOGRAM COMPLETE: CPT | Performed by: PHYSICIAN ASSISTANT

## 2025-08-27 PROCEDURE — 3075F SYST BP GE 130 - 139MM HG: CPT | Performed by: PHYSICIAN ASSISTANT

## 2025-08-27 PROCEDURE — 99214 OFFICE O/P EST MOD 30 MIN: CPT | Performed by: PHYSICIAN ASSISTANT

## 2025-08-27 PROCEDURE — 80048 BASIC METABOLIC PNL TOTAL CA: CPT | Performed by: PHYSICIAN ASSISTANT

## 2025-08-27 PROCEDURE — 3079F DIAST BP 80-89 MM HG: CPT | Performed by: PHYSICIAN ASSISTANT

## 2025-08-27 PROCEDURE — 1125F AMNT PAIN NOTED PAIN PRSNT: CPT | Performed by: PHYSICIAN ASSISTANT

## 2025-08-27 ASSESSMENT — PAIN SCALES - GENERAL: PAINLEVEL_OUTOF10: MODERATE PAIN (6)

## (undated) DEVICE — SUCTION IRR SYSTEM W/O TIP INTERPULSE HANDPIECE 0210-100-000

## (undated) DEVICE — SOL WATER IRRIG 1000ML BOTTLE 2F7114

## (undated) DEVICE — RAD INTRODUCER KIT MICRO 5FRX10CM .018 NITINOL G/W

## (undated) DEVICE — TOURNIQUET CUFF 30" STERILE

## (undated) DEVICE — BLADE SAW SAGITTAL STRK 29X83.5X1.27MM 4/2000 2108-183-000

## (undated) DEVICE — GLIDEWIRE TERUMO .035X180CM 1.5,, J-TIP GR3525

## (undated) DEVICE — ESU GROUND PAD UNIVERSAL W/O CORD

## (undated) DEVICE — INTRO SHEATH 4FRX10CM PINNACLE RSS402

## (undated) DEVICE — SLEEVE TR BAND RADIAL COMPRESSION DEVICE 24CM TRB24-REG

## (undated) DEVICE — GOWN IMPERVIOUS SPECIALTY XL/XLONG 39049

## (undated) DEVICE — DRAIN ROUND W/RESERV KIT JACKSON PRATT 10FR 400ML SU130-402D

## (undated) DEVICE — INTRO GLIDESHEATH SLENDER 6FR 10X45CM 60-1060

## (undated) DEVICE — TOTE ANGIO CORP PC15AT SAN32CC83O

## (undated) DEVICE — NDL SPINAL 18GA 3.5" 405184

## (undated) DEVICE — SU ETHIBOND 0 CTX CR  8X18" CX31D

## (undated) DEVICE — EYE PREP BETADINE 5% SOLUTION 30ML 0065-0411-30

## (undated) DEVICE — HOOD FLYTE W/PEELAWAY 408-800-100

## (undated) DEVICE — PREP CHLORAPREP 26ML TINTED ORANGE  260815

## (undated) DEVICE — MANIFOLD NEPTUNE 4 PORT 700-20

## (undated) DEVICE — DRSG XEROFORM 5X9" 8884431605

## (undated) DEVICE — SU PLAIN 6-0 G-1DA 18" 770G

## (undated) DEVICE — SYR ANGIOGRAPHY MULTIUSE KIT ACIST 014612

## (undated) DEVICE — LINEN TOWEL PACK X5 5464

## (undated) DEVICE — WRAP EZY KNEE

## (undated) DEVICE — KIT HAND CONTROL ACIST 016795

## (undated) DEVICE — SU WND CLOSURE VLOC 180 ABS 0 24" GS-25 VLOCL0436

## (undated) DEVICE — BONE CLEANING TIP INTERPULSE  0210-010-000

## (undated) DEVICE — DRAPE POUCH INSTRUMENT 1018

## (undated) DEVICE — SU SILK 6-0 C-1 18" 707G

## (undated) DEVICE — GLOVE PROTEXIS W/NEU-THERA 8.5  2D73TE85

## (undated) DEVICE — SYR 30ML LL W/O NDL

## (undated) DEVICE — BLADE SAW SAGITTAL STRK 21X90X1.27MM HD SYS 6 6221-127-090

## (undated) DEVICE — CAST PADDING 6" UNSTERILE 9046

## (undated) DEVICE — SU VICRYL 2-0 CP-1 27" UND J266H

## (undated) DEVICE — PACK TOTAL KNEE SOP15TKFSD

## (undated) DEVICE — CATH ANGIO INFINITI 3DRC 4FRX100CM 538476

## (undated) DEVICE — MANIFOLD KIT ANGIO AUTOMATED 014613

## (undated) DEVICE — SU SILK 4-0 G-3DA 18" 783G

## (undated) DEVICE — CATH DIAGNOSTIC RADIAL 5FR TIG 4.0

## (undated) DEVICE — BONE CEMENT MIXEVAC III HI VAC KIT  0206-015-000

## (undated) DEVICE — GLOVE PROTEXIS POWDER FREE 8.5 ORTHOPEDIC 2D73ET85

## (undated) DEVICE — GLOVE PROTEXIS POWDER FREE 8.0 ORTHOPEDIC 2D73ET80

## (undated) DEVICE — GLOVE PROTEXIS W/NEU-THERA 8.0  2D73TE80

## (undated) DEVICE — PACK OCULOPLATIC SEN15OCFSD

## (undated) DEVICE — DECANTER VIAL 2006S

## (undated) DEVICE — DEFIB PRO-PADZ LVP LQD GEL ADULT 8900-2105-01

## (undated) DEVICE — ESU HIGH TEMP LOOP TIP AA03

## (undated) DEVICE — IMM KNEE 20" 0814-2660

## (undated) DEVICE — CATH DIAG 4FR JL 4.5 538417

## (undated) RX ORDER — LIDOCAINE HYDROCHLORIDE 20 MG/ML
INJECTION, SOLUTION EPIDURAL; INFILTRATION; INTRACAUDAL; PERINEURAL
Status: DISPENSED
Start: 2017-04-24

## (undated) RX ORDER — ACYCLOVIR 200 MG/1
CAPSULE ORAL
Status: DISPENSED
Start: 2017-04-24

## (undated) RX ORDER — FENTANYL CITRATE 50 UG/ML
INJECTION, SOLUTION INTRAMUSCULAR; INTRAVENOUS
Status: DISPENSED
Start: 2017-04-24

## (undated) RX ORDER — PROPOFOL 10 MG/ML
INJECTION, EMULSION INTRAVENOUS
Status: DISPENSED
Start: 2017-04-24

## (undated) RX ORDER — NITROGLYCERIN 5 MG/ML
VIAL (ML) INTRAVENOUS
Status: DISPENSED
Start: 2022-07-27

## (undated) RX ORDER — KETOROLAC TROMETHAMINE 30 MG/ML
INJECTION, SOLUTION INTRAMUSCULAR; INTRAVENOUS
Status: DISPENSED
Start: 2017-04-24

## (undated) RX ORDER — ROPIVACAINE HYDROCHLORIDE 2 MG/ML
INJECTION, SOLUTION EPIDURAL; INFILTRATION; PERINEURAL
Status: DISPENSED
Start: 2017-04-24

## (undated) RX ORDER — HEPARIN SODIUM 1000 [USP'U]/ML
INJECTION, SOLUTION INTRAVENOUS; SUBCUTANEOUS
Status: DISPENSED
Start: 2022-07-27

## (undated) RX ORDER — CEFAZOLIN SODIUM 2 G/100ML
INJECTION, SOLUTION INTRAVENOUS
Status: DISPENSED
Start: 2017-04-24

## (undated) RX ORDER — ONDANSETRON 2 MG/ML
INJECTION INTRAMUSCULAR; INTRAVENOUS
Status: DISPENSED
Start: 2017-04-24

## (undated) RX ORDER — HEPARIN SODIUM 200 [USP'U]/100ML
INJECTION, SOLUTION INTRAVENOUS
Status: DISPENSED
Start: 2022-07-27

## (undated) RX ORDER — VERAPAMIL HYDROCHLORIDE 2.5 MG/ML
INJECTION, SOLUTION INTRAVENOUS
Status: DISPENSED
Start: 2022-07-27

## (undated) RX ORDER — FENTANYL CITRATE 50 UG/ML
INJECTION, SOLUTION INTRAMUSCULAR; INTRAVENOUS
Status: DISPENSED
Start: 2022-07-27

## (undated) RX ORDER — LIDOCAINE HYDROCHLORIDE 10 MG/ML
INJECTION, SOLUTION EPIDURAL; INFILTRATION; INTRACAUDAL; PERINEURAL
Status: DISPENSED
Start: 2022-07-27